# Patient Record
Sex: FEMALE | Race: WHITE | Employment: OTHER
[De-identification: names, ages, dates, MRNs, and addresses within clinical notes are randomized per-mention and may not be internally consistent; named-entity substitution may affect disease eponyms.]

---

## 2017-01-09 RX ORDER — TRAZODONE HYDROCHLORIDE 50 MG/1
50 TABLET ORAL NIGHTLY
Qty: 30 TABLET | Refills: 1 | Status: SHIPPED | OUTPATIENT
Start: 2017-01-09 | End: 2017-02-24 | Stop reason: ALTCHOICE

## 2017-02-09 DIAGNOSIS — I48.20 CHRONIC ATRIAL FIBRILLATION (HCC): ICD-10-CM

## 2017-02-09 DIAGNOSIS — Z79.01 LONG TERM (CURRENT) USE OF ANTICOAGULANTS: ICD-10-CM

## 2017-02-09 RX ORDER — LISINOPRIL 5 MG/1
TABLET ORAL
Qty: 180 TABLET | Refills: 3 | Status: SHIPPED | OUTPATIENT
Start: 2017-02-09 | End: 2017-04-04 | Stop reason: ALTCHOICE

## 2017-02-09 RX ORDER — WARFARIN SODIUM 10 MG/1
TABLET ORAL
Qty: 90 TABLET | Refills: 3 | Status: ON HOLD | OUTPATIENT
Start: 2017-02-09 | End: 2017-06-01 | Stop reason: HOSPADM

## 2017-02-09 RX ORDER — DILTIAZEM HCL 90 MG
TABLET ORAL
Qty: 270 TABLET | Refills: 3 | Status: ON HOLD | OUTPATIENT
Start: 2017-02-09 | End: 2017-06-01 | Stop reason: HOSPADM

## 2017-02-16 ENCOUNTER — TELEPHONE (OUTPATIENT)
Dept: FAMILY MEDICINE CLINIC | Facility: CLINIC | Age: 72
End: 2017-02-16

## 2017-02-16 DIAGNOSIS — R79.89 ELEVATED SERUM CREATININE: ICD-10-CM

## 2017-02-16 DIAGNOSIS — R79.9 ELEVATED BUN: Primary | ICD-10-CM

## 2017-02-20 ENCOUNTER — TELEPHONE (OUTPATIENT)
Dept: FAMILY MEDICINE CLINIC | Facility: CLINIC | Age: 72
End: 2017-02-20

## 2017-02-20 DIAGNOSIS — R79.89 ELEVATED SERUM CREATININE: Primary | ICD-10-CM

## 2017-02-22 ENCOUNTER — OFFICE VISIT (OUTPATIENT)
Dept: FAMILY MEDICINE CLINIC | Facility: CLINIC | Age: 72
End: 2017-02-22

## 2017-02-22 VITALS — DIASTOLIC BLOOD PRESSURE: 60 MMHG | WEIGHT: 265 LBS | BODY MASS INDEX: 46.94 KG/M2 | SYSTOLIC BLOOD PRESSURE: 94 MMHG

## 2017-02-22 DIAGNOSIS — L29.9 ITCHING: ICD-10-CM

## 2017-02-22 DIAGNOSIS — R58 ECCHYMOSIS: Primary | ICD-10-CM

## 2017-02-22 PROCEDURE — 3014F SCREEN MAMMO DOC REV: CPT | Performed by: FAMILY MEDICINE

## 2017-02-22 PROCEDURE — 99213 OFFICE O/P EST LOW 20 MIN: CPT | Performed by: FAMILY MEDICINE

## 2017-02-22 PROCEDURE — 1036F TOBACCO NON-USER: CPT | Performed by: FAMILY MEDICINE

## 2017-02-22 PROCEDURE — 4040F PNEUMOC VAC/ADMIN/RCVD: CPT | Performed by: FAMILY MEDICINE

## 2017-02-22 PROCEDURE — 1123F ACP DISCUSS/DSCN MKR DOCD: CPT | Performed by: FAMILY MEDICINE

## 2017-02-22 PROCEDURE — G8400 PT W/DXA NO RESULTS DOC: HCPCS | Performed by: FAMILY MEDICINE

## 2017-02-22 PROCEDURE — G8419 CALC BMI OUT NRM PARAM NOF/U: HCPCS | Performed by: FAMILY MEDICINE

## 2017-02-22 PROCEDURE — G8484 FLU IMMUNIZE NO ADMIN: HCPCS | Performed by: FAMILY MEDICINE

## 2017-02-22 PROCEDURE — G8427 DOCREV CUR MEDS BY ELIG CLIN: HCPCS | Performed by: FAMILY MEDICINE

## 2017-02-22 PROCEDURE — 1090F PRES/ABSN URINE INCON ASSESS: CPT | Performed by: FAMILY MEDICINE

## 2017-02-22 PROCEDURE — 3017F COLORECTAL CA SCREEN DOC REV: CPT | Performed by: FAMILY MEDICINE

## 2017-02-23 ENCOUNTER — TELEPHONE (OUTPATIENT)
Dept: FAMILY MEDICINE CLINIC | Facility: CLINIC | Age: 72
End: 2017-02-23

## 2017-02-23 DIAGNOSIS — R79.89 ELEVATED SERUM CREATININE: Primary | ICD-10-CM

## 2017-02-23 DIAGNOSIS — R58 ECCHYMOSIS: ICD-10-CM

## 2017-02-23 ASSESSMENT — ENCOUNTER SYMPTOMS
DIARRHEA: 0
VOMITING: 0
BLOOD IN STOOL: 0
EYE DISCHARGE: 0
SHORTNESS OF BREATH: 0
EYE REDNESS: 0
COLOR CHANGE: 1
COUGH: 0
NAUSEA: 0

## 2017-02-24 ENCOUNTER — TELEPHONE (OUTPATIENT)
Dept: FAMILY MEDICINE CLINIC | Facility: CLINIC | Age: 72
End: 2017-02-24

## 2017-02-24 DIAGNOSIS — R79.89 ELEVATED SERUM CREATININE: Primary | ICD-10-CM

## 2017-02-27 ENCOUNTER — TELEPHONE (OUTPATIENT)
Dept: FAMILY MEDICINE CLINIC | Facility: CLINIC | Age: 72
End: 2017-02-27

## 2017-02-27 DIAGNOSIS — R79.89 ELEVATED SERUM CREATININE: Primary | ICD-10-CM

## 2017-03-01 ENCOUNTER — TELEPHONE (OUTPATIENT)
Dept: FAMILY MEDICINE CLINIC | Facility: CLINIC | Age: 72
End: 2017-03-01

## 2017-03-01 DIAGNOSIS — R79.89 LOW SERUM CREATININE: Primary | ICD-10-CM

## 2017-03-07 ENCOUNTER — TELEPHONE (OUTPATIENT)
Dept: FAMILY MEDICINE CLINIC | Facility: CLINIC | Age: 72
End: 2017-03-07

## 2017-03-07 DIAGNOSIS — R79.89 ELEVATED SERUM CREATININE: Primary | ICD-10-CM

## 2017-03-10 ENCOUNTER — OFFICE VISIT (OUTPATIENT)
Dept: CARDIOLOGY | Facility: CLINIC | Age: 72
End: 2017-03-10

## 2017-03-10 VITALS
WEIGHT: 260 LBS | DIASTOLIC BLOOD PRESSURE: 70 MMHG | HEART RATE: 100 BPM | BODY MASS INDEX: 46.06 KG/M2 | OXYGEN SATURATION: 95 % | SYSTOLIC BLOOD PRESSURE: 110 MMHG

## 2017-03-10 DIAGNOSIS — E55.9 VITAMIN D DEFICIENCY DISEASE: ICD-10-CM

## 2017-03-10 DIAGNOSIS — R06.02 SOB (SHORTNESS OF BREATH): Primary | ICD-10-CM

## 2017-03-10 DIAGNOSIS — I27.20 PULMONARY HTN (HCC): ICD-10-CM

## 2017-03-10 DIAGNOSIS — I10 ESSENTIAL HYPERTENSION: ICD-10-CM

## 2017-03-10 DIAGNOSIS — I48.20 CHRONIC ATRIAL FIBRILLATION (HCC): ICD-10-CM

## 2017-03-10 PROCEDURE — G8427 DOCREV CUR MEDS BY ELIG CLIN: HCPCS | Performed by: INTERNAL MEDICINE

## 2017-03-10 PROCEDURE — G8484 FLU IMMUNIZE NO ADMIN: HCPCS | Performed by: INTERNAL MEDICINE

## 2017-03-10 PROCEDURE — G8400 PT W/DXA NO RESULTS DOC: HCPCS | Performed by: INTERNAL MEDICINE

## 2017-03-10 PROCEDURE — 1036F TOBACCO NON-USER: CPT | Performed by: INTERNAL MEDICINE

## 2017-03-10 PROCEDURE — 1090F PRES/ABSN URINE INCON ASSESS: CPT | Performed by: INTERNAL MEDICINE

## 2017-03-10 PROCEDURE — 99214 OFFICE O/P EST MOD 30 MIN: CPT | Performed by: INTERNAL MEDICINE

## 2017-03-10 PROCEDURE — 3017F COLORECTAL CA SCREEN DOC REV: CPT | Performed by: INTERNAL MEDICINE

## 2017-03-10 PROCEDURE — 3014F SCREEN MAMMO DOC REV: CPT | Performed by: INTERNAL MEDICINE

## 2017-03-10 PROCEDURE — G8417 CALC BMI ABV UP PARAM F/U: HCPCS | Performed by: INTERNAL MEDICINE

## 2017-03-10 PROCEDURE — 1123F ACP DISCUSS/DSCN MKR DOCD: CPT | Performed by: INTERNAL MEDICINE

## 2017-03-10 PROCEDURE — 4040F PNEUMOC VAC/ADMIN/RCVD: CPT | Performed by: INTERNAL MEDICINE

## 2017-03-14 ENCOUNTER — HOSPITAL ENCOUNTER (OUTPATIENT)
Age: 72
Setting detail: SPECIMEN
Discharge: HOME | End: 2017-03-14
Payer: MEDICARE

## 2017-03-14 ENCOUNTER — TELEPHONE (OUTPATIENT)
Dept: FAMILY MEDICINE CLINIC | Age: 72
End: 2017-03-14

## 2017-03-14 DIAGNOSIS — R79.89 ELEVATED SERUM CREATININE: Primary | ICD-10-CM

## 2017-03-14 DIAGNOSIS — R79.89 ELEVATED SERUM CREATININE: ICD-10-CM

## 2017-03-14 LAB
ANION GAP SERPL CALCULATED.3IONS-SCNC: 14 MMOL/L (ref 9–17)
BUN BLDV-MCNC: 41 MG/DL (ref 8–23)
BUN/CREAT BLD: 29 (ref 9–20)
CALCIUM SERPL-MCNC: 9 MG/DL (ref 8.6–10.4)
CHLORIDE BLD-SCNC: 102 MMOL/L (ref 98–107)
CO2: 20 MMOL/L (ref 20–31)
CREAT SERPL-MCNC: 1.42 MG/DL (ref 0.5–0.9)
GFR AFRICAN AMERICAN: 44 ML/MIN
GFR NON-AFRICAN AMERICAN: 36 ML/MIN
GFR SERPL CREATININE-BSD FRML MDRD: ABNORMAL ML/MIN/{1.73_M2}
GFR SERPL CREATININE-BSD FRML MDRD: ABNORMAL ML/MIN/{1.73_M2}
GLUCOSE BLD-MCNC: 100 MG/DL (ref 70–99)
POTASSIUM SERPL-SCNC: 5.1 MMOL/L (ref 3.7–5.3)
SODIUM BLD-SCNC: 136 MMOL/L (ref 135–144)

## 2017-03-14 PROCEDURE — 36415 COLL VENOUS BLD VENIPUNCTURE: CPT

## 2017-03-14 PROCEDURE — 80048 BASIC METABOLIC PNL TOTAL CA: CPT

## 2017-03-21 ENCOUNTER — HOSPITAL ENCOUNTER (OUTPATIENT)
Age: 72
Discharge: HOME | End: 2017-03-21
Payer: MEDICARE

## 2017-03-21 ENCOUNTER — HOSPITAL ENCOUNTER (OUTPATIENT)
Dept: PHARMACY | Age: 72
Setting detail: THERAPIES SERIES
Discharge: HOME | End: 2017-03-21
Payer: MEDICARE

## 2017-03-21 ENCOUNTER — OFFICE VISIT (OUTPATIENT)
Dept: FAMILY MEDICINE CLINIC | Age: 72
End: 2017-03-21
Payer: MEDICARE

## 2017-03-21 VITALS — WEIGHT: 263 LBS | SYSTOLIC BLOOD PRESSURE: 110 MMHG | BODY MASS INDEX: 46.59 KG/M2 | DIASTOLIC BLOOD PRESSURE: 60 MMHG

## 2017-03-21 VITALS
DIASTOLIC BLOOD PRESSURE: 68 MMHG | WEIGHT: 262.2 LBS | SYSTOLIC BLOOD PRESSURE: 110 MMHG | HEART RATE: 64 BPM | BODY MASS INDEX: 46.45 KG/M2

## 2017-03-21 DIAGNOSIS — Z79.01 LONG TERM CURRENT USE OF ANTICOAGULANT THERAPY: ICD-10-CM

## 2017-03-21 DIAGNOSIS — L85.3 DRY SKIN: ICD-10-CM

## 2017-03-21 DIAGNOSIS — E87.5 HYPERKALEMIA: ICD-10-CM

## 2017-03-21 DIAGNOSIS — N17.8 ACUTE RENAL FAILURE WITH OTHER SPECIFIED PATHOLOGICAL LESION IN KIDNEY (HCC): Primary | ICD-10-CM

## 2017-03-21 LAB
ANION GAP SERPL CALCULATED.3IONS-SCNC: 13 MMOL/L (ref 9–17)
BUN BLDV-MCNC: 35 MG/DL (ref 8–23)
BUN/CREAT BLD: 24 (ref 9–20)
CALCIUM SERPL-MCNC: 9.3 MG/DL (ref 8.6–10.4)
CHLORIDE BLD-SCNC: 102 MMOL/L (ref 98–107)
CO2: 22 MMOL/L (ref 20–31)
CREAT SERPL-MCNC: 1.44 MG/DL (ref 0.5–0.9)
GFR AFRICAN AMERICAN: 43 ML/MIN
GFR NON-AFRICAN AMERICAN: 36 ML/MIN
GFR SERPL CREATININE-BSD FRML MDRD: ABNORMAL ML/MIN/{1.73_M2}
GFR SERPL CREATININE-BSD FRML MDRD: ABNORMAL ML/MIN/{1.73_M2}
GLUCOSE BLD-MCNC: 92 MG/DL (ref 70–99)
INR BLD: 2
POTASSIUM SERPL-SCNC: 5.4 MMOL/L (ref 3.7–5.3)
SODIUM BLD-SCNC: 137 MMOL/L (ref 135–144)

## 2017-03-21 PROCEDURE — G8417 CALC BMI ABV UP PARAM F/U: HCPCS | Performed by: FAMILY MEDICINE

## 2017-03-21 PROCEDURE — 1090F PRES/ABSN URINE INCON ASSESS: CPT | Performed by: FAMILY MEDICINE

## 2017-03-21 PROCEDURE — 36415 COLL VENOUS BLD VENIPUNCTURE: CPT

## 2017-03-21 PROCEDURE — G8484 FLU IMMUNIZE NO ADMIN: HCPCS | Performed by: FAMILY MEDICINE

## 2017-03-21 PROCEDURE — 99213 OFFICE O/P EST LOW 20 MIN: CPT | Performed by: FAMILY MEDICINE

## 2017-03-21 PROCEDURE — G8427 DOCREV CUR MEDS BY ELIG CLIN: HCPCS | Performed by: FAMILY MEDICINE

## 2017-03-21 PROCEDURE — 85610 PROTHROMBIN TIME: CPT

## 2017-03-21 PROCEDURE — 3014F SCREEN MAMMO DOC REV: CPT | Performed by: FAMILY MEDICINE

## 2017-03-21 PROCEDURE — G8400 PT W/DXA NO RESULTS DOC: HCPCS | Performed by: FAMILY MEDICINE

## 2017-03-21 PROCEDURE — 1036F TOBACCO NON-USER: CPT | Performed by: FAMILY MEDICINE

## 2017-03-21 PROCEDURE — 3017F COLORECTAL CA SCREEN DOC REV: CPT | Performed by: FAMILY MEDICINE

## 2017-03-21 PROCEDURE — 80048 BASIC METABOLIC PNL TOTAL CA: CPT

## 2017-03-21 PROCEDURE — G0463 HOSPITAL OUTPT CLINIC VISIT: HCPCS

## 2017-03-21 PROCEDURE — 4040F PNEUMOC VAC/ADMIN/RCVD: CPT | Performed by: FAMILY MEDICINE

## 2017-03-21 PROCEDURE — 1123F ACP DISCUSS/DSCN MKR DOCD: CPT | Performed by: FAMILY MEDICINE

## 2017-03-21 ASSESSMENT — ENCOUNTER SYMPTOMS
SHORTNESS OF BREATH: 0
NAUSEA: 0
EYE DISCHARGE: 0
DIARRHEA: 0
COUGH: 0
EYE REDNESS: 0
VOMITING: 0
BLOOD IN STOOL: 0
ABDOMINAL PAIN: 0

## 2017-03-23 ENCOUNTER — HOSPITAL ENCOUNTER (OUTPATIENT)
Age: 72
Discharge: HOME | End: 2017-03-23
Payer: MEDICARE

## 2017-03-23 DIAGNOSIS — E87.5 HYPERKALEMIA: ICD-10-CM

## 2017-03-23 LAB — POTASSIUM SERPL-SCNC: 5 MMOL/L (ref 3.7–5.3)

## 2017-03-23 PROCEDURE — 84132 ASSAY OF SERUM POTASSIUM: CPT

## 2017-03-23 PROCEDURE — 36415 COLL VENOUS BLD VENIPUNCTURE: CPT

## 2017-03-23 RX ORDER — ATENOLOL 50 MG/1
TABLET ORAL
Qty: 90 TABLET | Refills: 3 | Status: SHIPPED | OUTPATIENT
Start: 2017-03-23 | End: 2018-04-16 | Stop reason: SDUPTHER

## 2017-03-29 ENCOUNTER — HOSPITAL ENCOUNTER (OUTPATIENT)
Dept: ULTRASOUND IMAGING | Age: 72
Discharge: HOME | End: 2017-03-29
Payer: MEDICARE

## 2017-03-29 ENCOUNTER — HOSPITAL ENCOUNTER (OUTPATIENT)
Age: 72
Discharge: HOME | End: 2017-03-29
Payer: MEDICARE

## 2017-03-29 DIAGNOSIS — N17.9 ACUTE KIDNEY FAILURE, UNSPECIFIED (HCC): ICD-10-CM

## 2017-03-29 LAB
-: ABNORMAL
ABSOLUTE EOS #: 0.2 K/UL (ref 0–0.4)
ABSOLUTE LYMPH #: 1.3 K/UL (ref 1.1–2.7)
ABSOLUTE MONO #: 0.6 K/UL (ref 0–1)
ALBUMIN SERPL-MCNC: 4.2 G/DL (ref 3.5–5.2)
AMORPHOUS: ABNORMAL
ANION GAP SERPL CALCULATED.3IONS-SCNC: 16 MMOL/L (ref 9–17)
BACTERIA: ABNORMAL
BASOPHILS # BLD: 1 % (ref 0–2)
BASOPHILS ABSOLUTE: 0.1 K/UL (ref 0–0.2)
BILIRUBIN URINE: NEGATIVE
BUN BLDV-MCNC: 36 MG/DL (ref 8–23)
CALCIUM SERPL-MCNC: 9.3 MG/DL (ref 8.6–10.4)
CASTS UA: ABNORMAL /LPF
CHLORIDE BLD-SCNC: 102 MMOL/L (ref 98–107)
CO2: 22 MMOL/L (ref 20–31)
COLOR: YELLOW
COMMENT UA: ABNORMAL
COMPLEMENT C3: 138 MG/DL (ref 90–180)
COMPLEMENT C4: 36 MG/DL (ref 10–40)
CREAT SERPL-MCNC: 1.44 MG/DL (ref 0.5–0.9)
CREATININE URINE: 37.2 MG/DL (ref 28–217)
CRYSTALS, UA: ABNORMAL /HPF
DIFFERENTIAL TYPE: YES
EOSINOPHIL,URINE: NORMAL
EOSINOPHILS RELATIVE PERCENT: 2 % (ref 0–5)
EPITHELIAL CELLS UA: ABNORMAL /HPF
GFR AFRICAN AMERICAN: 43 ML/MIN
GFR NON-AFRICAN AMERICAN: 36 ML/MIN
GFR SERPL CREATININE-BSD FRML MDRD: ABNORMAL ML/MIN/{1.73_M2}
GFR SERPL CREATININE-BSD FRML MDRD: ABNORMAL ML/MIN/{1.73_M2}
GLUCOSE URINE: NEGATIVE
HCT VFR BLD CALC: 32.3 % (ref 36–46)
HEMOGLOBIN: 10.6 G/DL (ref 12–16)
KETONES, URINE: NEGATIVE
LEUKOCYTE ESTERASE, URINE: NEGATIVE
LYMPHOCYTES # BLD: 16 % (ref 15–40)
MCH RBC QN AUTO: 29 PG (ref 26–34)
MCHC RBC AUTO-ENTMCNC: 32.7 G/DL (ref 31–37)
MCV RBC AUTO: 88.7 FL (ref 80–100)
MONOCYTES # BLD: 8 % (ref 4–8)
MUCUS: ABNORMAL
NITRITE, URINE: NEGATIVE
OTHER OBSERVATIONS UA: ABNORMAL
PDW BLD-RTO: 17.8 % (ref 12.1–15.2)
PH UA: 5 (ref 5–8)
PHOSPHORUS: 3.9 MG/DL (ref 2.6–4.5)
PLATELET # BLD: 206 K/UL (ref 140–450)
PLATELET ESTIMATE: ABNORMAL
PMV BLD AUTO: ABNORMAL FL (ref 6–12)
POTASSIUM SERPL-SCNC: 4.6 MMOL/L (ref 3.7–5.3)
PROTEIN UA: NEGATIVE
RBC # BLD: 3.65 M/UL (ref 4–5.2)
RBC # BLD: ABNORMAL 10*6/UL
RBC UA: ABNORMAL /HPF (ref 0–2)
RENAL EPITHELIAL, UA: ABNORMAL /HPF
SEG NEUTROPHILS: 73 % (ref 47–75)
SEGMENTED NEUTROPHILS ABSOLUTE COUNT: 5.9 K/UL (ref 2.5–7)
SODIUM BLD-SCNC: 140 MMOL/L (ref 135–144)
SPECIFIC GRAVITY UA: 1.01 (ref 1–1.03)
TOTAL PROTEIN, URINE: <4 MG/DL
TRICHOMONAS: ABNORMAL
TURBIDITY: CLEAR
URIC ACID: 7.2 MG/DL (ref 2.4–5.7)
URINE HGB: NEGATIVE
UROBILINOGEN, URINE: NORMAL
WBC # BLD: 8 K/UL (ref 3.5–11)
WBC # BLD: ABNORMAL 10*3/UL
WBC UA: ABNORMAL /HPF
YEAST: ABNORMAL

## 2017-03-29 PROCEDURE — 83516 IMMUNOASSAY NONANTIBODY: CPT

## 2017-03-29 PROCEDURE — 84100 ASSAY OF PHOSPHORUS: CPT

## 2017-03-29 PROCEDURE — 82040 ASSAY OF SERUM ALBUMIN: CPT

## 2017-03-29 PROCEDURE — 87205 SMEAR GRAM STAIN: CPT

## 2017-03-29 PROCEDURE — 86038 ANTINUCLEAR ANTIBODIES: CPT

## 2017-03-29 PROCEDURE — 86334 IMMUNOFIX E-PHORESIS SERUM: CPT

## 2017-03-29 PROCEDURE — 80051 ELECTROLYTE PANEL: CPT

## 2017-03-29 PROCEDURE — 82310 ASSAY OF CALCIUM: CPT

## 2017-03-29 PROCEDURE — 86335 IMMUNFIX E-PHORSIS/URINE/CSF: CPT

## 2017-03-29 PROCEDURE — 84550 ASSAY OF BLOOD/URIC ACID: CPT

## 2017-03-29 PROCEDURE — 84520 ASSAY OF UREA NITROGEN: CPT

## 2017-03-29 PROCEDURE — 82565 ASSAY OF CREATININE: CPT

## 2017-03-29 PROCEDURE — 82570 ASSAY OF URINE CREATININE: CPT

## 2017-03-29 PROCEDURE — 36415 COLL VENOUS BLD VENIPUNCTURE: CPT

## 2017-03-29 PROCEDURE — 81001 URINALYSIS AUTO W/SCOPE: CPT

## 2017-03-29 PROCEDURE — 76770 US EXAM ABDO BACK WALL COMP: CPT

## 2017-03-29 PROCEDURE — 86160 COMPLEMENT ANTIGEN: CPT

## 2017-03-29 PROCEDURE — 85025 COMPLETE CBC W/AUTO DIFF WBC: CPT

## 2017-03-29 PROCEDURE — 84156 ASSAY OF PROTEIN URINE: CPT

## 2017-03-30 LAB
ANTI-NUCLEAR ANTIBODY (ANA): NEGATIVE
PATHOLOGIST: NORMAL
SERUM IFX INTERP: NORMAL
URINE IFX INTERP: NORMAL
URINE IFX SPECIMEN: NORMAL
URINE TOTAL PROTEIN: 4 MG/DL
VOLUME: NORMAL ML

## 2017-03-31 LAB
ANCA MYELOPEROXIDASE: 17 AU/ML
ANCA PROTEINASE 3: 5 AU/ML
ANCA REFERENCE RANGE:: NORMAL

## 2017-04-04 ENCOUNTER — HOSPITAL ENCOUNTER (OUTPATIENT)
Dept: PHARMACY | Age: 72
Setting detail: THERAPIES SERIES
Discharge: HOME | End: 2017-04-04
Payer: MEDICARE

## 2017-04-04 VITALS
DIASTOLIC BLOOD PRESSURE: 82 MMHG | HEART RATE: 64 BPM | SYSTOLIC BLOOD PRESSURE: 120 MMHG | BODY MASS INDEX: 46.59 KG/M2 | WEIGHT: 263 LBS

## 2017-04-04 DIAGNOSIS — Z79.01 LONG TERM CURRENT USE OF ANTICOAGULANT THERAPY: ICD-10-CM

## 2017-04-04 LAB — INR BLD: 2.8

## 2017-04-04 PROCEDURE — 85610 PROTHROMBIN TIME: CPT

## 2017-04-04 PROCEDURE — G0463 HOSPITAL OUTPT CLINIC VISIT: HCPCS

## 2017-04-04 RX ORDER — ACETAMINOPHEN,DIPHENHYDRAMINE HCL 500; 25 MG/1; MG/1
2 TABLET, FILM COATED ORAL NIGHTLY PRN
COMMUNITY
End: 2020-07-13 | Stop reason: ALTCHOICE

## 2017-04-07 ENCOUNTER — HOSPITAL ENCOUNTER (OUTPATIENT)
Age: 72
Discharge: HOME | End: 2017-04-07
Payer: MEDICARE

## 2017-04-07 LAB
ALBUMIN SERPL-MCNC: 4 G/DL (ref 3.5–5.2)
ANION GAP SERPL CALCULATED.3IONS-SCNC: 16 MMOL/L (ref 9–17)
BUN BLDV-MCNC: 27 MG/DL (ref 8–23)
CALCIUM SERPL-MCNC: 9.2 MG/DL (ref 8.6–10.4)
CHLORIDE BLD-SCNC: 102 MMOL/L (ref 98–107)
CO2: 20 MMOL/L (ref 20–31)
CREAT SERPL-MCNC: 1.22 MG/DL (ref 0.5–0.9)
GFR AFRICAN AMERICAN: 52 ML/MIN
GFR NON-AFRICAN AMERICAN: 43 ML/MIN
GFR SERPL CREATININE-BSD FRML MDRD: ABNORMAL ML/MIN/{1.73_M2}
GFR SERPL CREATININE-BSD FRML MDRD: ABNORMAL ML/MIN/{1.73_M2}
PHOSPHORUS: 3.2 MG/DL (ref 2.6–4.5)
POTASSIUM SERPL-SCNC: 4.3 MMOL/L (ref 3.7–5.3)
SODIUM BLD-SCNC: 138 MMOL/L (ref 135–144)

## 2017-04-07 PROCEDURE — 82040 ASSAY OF SERUM ALBUMIN: CPT

## 2017-04-07 PROCEDURE — 82565 ASSAY OF CREATININE: CPT

## 2017-04-07 PROCEDURE — 84100 ASSAY OF PHOSPHORUS: CPT

## 2017-04-07 PROCEDURE — 36415 COLL VENOUS BLD VENIPUNCTURE: CPT

## 2017-04-07 PROCEDURE — 84520 ASSAY OF UREA NITROGEN: CPT

## 2017-04-07 PROCEDURE — 80051 ELECTROLYTE PANEL: CPT

## 2017-04-07 PROCEDURE — 82310 ASSAY OF CALCIUM: CPT

## 2017-04-19 ENCOUNTER — TELEPHONE (OUTPATIENT)
Dept: FAMILY MEDICINE CLINIC | Age: 72
End: 2017-04-19

## 2017-04-25 ENCOUNTER — HOSPITAL ENCOUNTER (OUTPATIENT)
Dept: PHARMACY | Age: 72
Setting detail: THERAPIES SERIES
Discharge: HOME | End: 2017-04-25
Payer: MEDICARE

## 2017-04-25 VITALS — BODY MASS INDEX: 46.54 KG/M2 | WEIGHT: 262.7 LBS

## 2017-04-25 DIAGNOSIS — Z79.01 LONG TERM CURRENT USE OF ANTICOAGULANT THERAPY: ICD-10-CM

## 2017-04-25 LAB — INR BLD: 3.8

## 2017-04-25 PROCEDURE — G0463 HOSPITAL OUTPT CLINIC VISIT: HCPCS | Performed by: FAMILY MEDICINE

## 2017-04-25 PROCEDURE — 85610 PROTHROMBIN TIME: CPT | Performed by: FAMILY MEDICINE

## 2017-05-04 DIAGNOSIS — I10 ESSENTIAL HYPERTENSION: ICD-10-CM

## 2017-05-05 RX ORDER — SPIRONOLACTONE 25 MG/1
TABLET ORAL
Qty: 90 TABLET | Refills: 1 | Status: SHIPPED | OUTPATIENT
Start: 2017-05-05 | End: 2017-06-08 | Stop reason: SDUPTHER

## 2017-05-05 RX ORDER — FUROSEMIDE 40 MG/1
40 TABLET ORAL DAILY
Qty: 90 TABLET | Refills: 1 | Status: ON HOLD | OUTPATIENT
Start: 2017-05-05 | End: 2017-06-01 | Stop reason: HOSPADM

## 2017-05-09 ENCOUNTER — HOSPITAL ENCOUNTER (OUTPATIENT)
Dept: PHARMACY | Age: 72
Setting detail: THERAPIES SERIES
Discharge: HOME | End: 2017-05-09
Payer: MEDICARE

## 2017-05-09 VITALS
HEART RATE: 72 BPM | SYSTOLIC BLOOD PRESSURE: 124 MMHG | WEIGHT: 262.2 LBS | BODY MASS INDEX: 46.45 KG/M2 | DIASTOLIC BLOOD PRESSURE: 82 MMHG

## 2017-05-09 DIAGNOSIS — Z79.01 LONG TERM CURRENT USE OF ANTICOAGULANT THERAPY: ICD-10-CM

## 2017-05-09 LAB — INR BLD: 3.1

## 2017-05-09 PROCEDURE — 99211 OFF/OP EST MAY X REQ PHY/QHP: CPT

## 2017-05-09 PROCEDURE — 85610 PROTHROMBIN TIME: CPT

## 2017-05-16 ENCOUNTER — TELEPHONE (OUTPATIENT)
Dept: PHARMACY | Age: 72
End: 2017-05-16

## 2017-05-18 ENCOUNTER — HOSPITAL ENCOUNTER (OUTPATIENT)
Dept: PHARMACY | Age: 72
Setting detail: THERAPIES SERIES
Discharge: HOME | End: 2017-05-18
Payer: MEDICARE

## 2017-05-18 VITALS
SYSTOLIC BLOOD PRESSURE: 133 MMHG | HEART RATE: 80 BPM | DIASTOLIC BLOOD PRESSURE: 81 MMHG | WEIGHT: 263.6 LBS | BODY MASS INDEX: 46.69 KG/M2

## 2017-05-18 DIAGNOSIS — Z79.01 LONG TERM CURRENT USE OF ANTICOAGULANT THERAPY: ICD-10-CM

## 2017-05-18 LAB — INR BLD: 2.4

## 2017-05-18 PROCEDURE — 85610 PROTHROMBIN TIME: CPT

## 2017-05-18 PROCEDURE — 99211 OFF/OP EST MAY X REQ PHY/QHP: CPT

## 2017-05-26 ENCOUNTER — APPOINTMENT (OUTPATIENT)
Dept: GENERAL RADIOLOGY | Age: 72
End: 2017-05-26
Payer: MEDICARE

## 2017-05-26 ENCOUNTER — HOSPITAL ENCOUNTER (EMERGENCY)
Age: 72
Discharge: OTHER INSTITUTION | End: 2017-05-27
Attending: FAMILY MEDICINE
Payer: MEDICARE

## 2017-05-26 DIAGNOSIS — I48.91 ATRIAL FIBRILLATION WITH RAPID VENTRICULAR RESPONSE (HCC): ICD-10-CM

## 2017-05-26 DIAGNOSIS — K92.2 LOWER GI BLEED: Primary | ICD-10-CM

## 2017-05-26 LAB
ABSOLUTE EOS #: 0 K/UL (ref 0–0.4)
ABSOLUTE EOS #: 0.1 K/UL (ref 0–0.4)
ABSOLUTE LYMPH #: 1.1 K/UL (ref 1.1–2.7)
ABSOLUTE LYMPH #: 1.7 K/UL (ref 1.1–2.7)
ABSOLUTE MONO #: 0.7 K/UL (ref 0–1)
ABSOLUTE MONO #: 0.9 K/UL (ref 0–1)
ALBUMIN SERPL-MCNC: 3.6 G/DL (ref 3.5–5.2)
ALBUMIN/GLOBULIN RATIO: ABNORMAL (ref 1–2.5)
ALP BLD-CCNC: 69 U/L (ref 35–104)
ALT SERPL-CCNC: 20 U/L (ref 5–33)
ANION GAP SERPL CALCULATED.3IONS-SCNC: 19 MMOL/L (ref 9–17)
AST SERPL-CCNC: 18 U/L
BASOPHILS # BLD: 0 %
BASOPHILS # BLD: 0 %
BASOPHILS ABSOLUTE: 0 K/UL (ref 0–0.2)
BASOPHILS ABSOLUTE: 0 K/UL (ref 0–0.2)
BILIRUB SERPL-MCNC: 0.21 MG/DL (ref 0.3–1.2)
BUN BLDV-MCNC: 27 MG/DL (ref 8–23)
BUN/CREAT BLD: 19 (ref 9–20)
CALCIUM SERPL-MCNC: 8.9 MG/DL (ref 8.6–10.4)
CHLORIDE BLD-SCNC: 100 MMOL/L (ref 98–107)
CO2: 21 MMOL/L (ref 20–31)
CREAT SERPL-MCNC: 1.43 MG/DL (ref 0.5–0.9)
DIFFERENTIAL TYPE: ABNORMAL
DIFFERENTIAL TYPE: YES
EOSINOPHILS RELATIVE PERCENT: 0 %
EOSINOPHILS RELATIVE PERCENT: 1 %
GFR AFRICAN AMERICAN: 43 ML/MIN
GFR NON-AFRICAN AMERICAN: 36 ML/MIN
GFR SERPL CREATININE-BSD FRML MDRD: ABNORMAL ML/MIN/{1.73_M2}
GFR SERPL CREATININE-BSD FRML MDRD: ABNORMAL ML/MIN/{1.73_M2}
GLUCOSE BLD-MCNC: 209 MG/DL (ref 70–99)
HCT VFR BLD CALC: 23.9 % (ref 36–46)
HCT VFR BLD CALC: 27 % (ref 36–46)
HEMOGLOBIN: 7.6 G/DL (ref 12–16)
HEMOGLOBIN: 8.7 G/DL (ref 12–16)
INR BLD: 1
LYMPHOCYTES # BLD: 13 %
LYMPHOCYTES # BLD: 9 %
MCH RBC QN AUTO: 27.7 PG (ref 26–34)
MCH RBC QN AUTO: 27.9 PG (ref 26–34)
MCHC RBC AUTO-ENTMCNC: 31.9 G/DL (ref 31–37)
MCHC RBC AUTO-ENTMCNC: 32.3 G/DL (ref 31–37)
MCV RBC AUTO: 86.5 FL (ref 80–100)
MCV RBC AUTO: 86.9 FL (ref 80–100)
MONOCYTES # BLD: 5 %
MONOCYTES # BLD: 7 %
PARTIAL THROMBOPLASTIN TIME: 24.7 SEC (ref 21–33)
PDW BLD-RTO: 16.8 % (ref 12.1–15.2)
PDW BLD-RTO: 16.9 % (ref 12.1–15.2)
PLATELET # BLD: 199 K/UL (ref 140–450)
PLATELET # BLD: 245 K/UL (ref 140–450)
PLATELET ESTIMATE: ABNORMAL
PLATELET ESTIMATE: ABNORMAL
PMV BLD AUTO: ABNORMAL FL (ref 6–12)
PMV BLD AUTO: ABNORMAL FL (ref 6–12)
POTASSIUM SERPL-SCNC: 4.6 MMOL/L (ref 3.7–5.3)
PROTHROMBIN TIME: 10.5 SEC (ref 9–11.6)
RBC # BLD: 2.75 M/UL (ref 4–5.2)
RBC # BLD: 3.12 M/UL (ref 4–5.2)
RBC # BLD: ABNORMAL 10*6/UL
RBC # BLD: ABNORMAL 10*6/UL
SEG NEUTROPHILS: 79 %
SEG NEUTROPHILS: 86 %
SEGMENTED NEUTROPHILS ABSOLUTE COUNT: 10.5 K/UL (ref 2.5–7)
SEGMENTED NEUTROPHILS ABSOLUTE COUNT: 11.2 K/UL (ref 2.5–7)
SODIUM BLD-SCNC: 140 MMOL/L (ref 135–144)
TOTAL PROTEIN: 6.5 G/DL (ref 6.4–8.3)
TROPONIN INTERP: NORMAL
TROPONIN T: <0.03 NG/ML
WBC # BLD: 13.1 K/UL (ref 3.5–11)
WBC # BLD: 13.1 K/UL (ref 3.5–11)
WBC # BLD: ABNORMAL 10*3/UL
WBC # BLD: ABNORMAL 10*3/UL

## 2017-05-26 PROCEDURE — 93005 ELECTROCARDIOGRAM TRACING: CPT

## 2017-05-26 PROCEDURE — 36415 COLL VENOUS BLD VENIPUNCTURE: CPT

## 2017-05-26 PROCEDURE — 86901 BLOOD TYPING SEROLOGIC RH(D): CPT

## 2017-05-26 PROCEDURE — 51702 INSERT TEMP BLADDER CATH: CPT

## 2017-05-26 PROCEDURE — 2580000003 HC RX 258: Performed by: FAMILY MEDICINE

## 2017-05-26 PROCEDURE — 85730 THROMBOPLASTIN TIME PARTIAL: CPT

## 2017-05-26 PROCEDURE — 86900 BLOOD TYPING SEROLOGIC ABO: CPT

## 2017-05-26 PROCEDURE — 85610 PROTHROMBIN TIME: CPT

## 2017-05-26 PROCEDURE — 96374 THER/PROPH/DIAG INJ IV PUSH: CPT

## 2017-05-26 PROCEDURE — 85025 COMPLETE CBC W/AUTO DIFF WBC: CPT

## 2017-05-26 PROCEDURE — 84484 ASSAY OF TROPONIN QUANT: CPT

## 2017-05-26 PROCEDURE — 6360000002 HC RX W HCPCS: Performed by: FAMILY MEDICINE

## 2017-05-26 PROCEDURE — 80053 COMPREHEN METABOLIC PANEL: CPT

## 2017-05-26 PROCEDURE — 86850 RBC ANTIBODY SCREEN: CPT

## 2017-05-26 PROCEDURE — 74000 XR ABDOMEN LIMITED (KUB): CPT

## 2017-05-26 PROCEDURE — 99285 EMERGENCY DEPT VISIT HI MDM: CPT

## 2017-05-26 RX ORDER — 0.9 % SODIUM CHLORIDE 0.9 %
500 INTRAVENOUS SOLUTION INTRAVENOUS ONCE
Status: DISCONTINUED | OUTPATIENT
Start: 2017-05-27 | End: 2017-05-27 | Stop reason: HOSPADM

## 2017-05-26 RX ORDER — 0.9 % SODIUM CHLORIDE 0.9 %
250 INTRAVENOUS SOLUTION INTRAVENOUS ONCE
Status: DISCONTINUED | OUTPATIENT
Start: 2017-05-27 | End: 2017-05-27 | Stop reason: HOSPADM

## 2017-05-26 RX ORDER — 0.9 % SODIUM CHLORIDE 0.9 %
500 INTRAVENOUS SOLUTION INTRAVENOUS ONCE
Status: DISCONTINUED | OUTPATIENT
Start: 2017-05-26 | End: 2017-05-27 | Stop reason: HOSPADM

## 2017-05-26 RX ORDER — 0.9 % SODIUM CHLORIDE 0.9 %
1000 INTRAVENOUS SOLUTION INTRAVENOUS ONCE
Status: COMPLETED | OUTPATIENT
Start: 2017-05-26 | End: 2017-05-26

## 2017-05-26 RX ORDER — ONDANSETRON 2 MG/ML
4 INJECTION INTRAMUSCULAR; INTRAVENOUS EVERY 30 MIN PRN
Status: DISCONTINUED | OUTPATIENT
Start: 2017-05-26 | End: 2017-05-27 | Stop reason: HOSPADM

## 2017-05-26 RX ADMIN — SODIUM CHLORIDE 1000 ML: 9 INJECTION, SOLUTION INTRAVENOUS at 21:40

## 2017-05-26 RX ADMIN — ONDANSETRON 4 MG: 2 INJECTION INTRAMUSCULAR; INTRAVENOUS at 21:40

## 2017-05-27 ENCOUNTER — HOSPITAL ENCOUNTER (INPATIENT)
Age: 72
LOS: 5 days | Discharge: HOME | DRG: 920 | End: 2017-06-01
Attending: EMERGENCY MEDICINE | Admitting: INTERNAL MEDICINE
Payer: MEDICARE

## 2017-05-27 VITALS
HEART RATE: 159 BPM | RESPIRATION RATE: 23 BRPM | BODY MASS INDEX: 46.25 KG/M2 | DIASTOLIC BLOOD PRESSURE: 64 MMHG | HEIGHT: 63 IN | WEIGHT: 261 LBS | SYSTOLIC BLOOD PRESSURE: 104 MMHG | OXYGEN SATURATION: 99 % | TEMPERATURE: 97.8 F

## 2017-05-27 DIAGNOSIS — K92.2 GASTROINTESTINAL HEMORRHAGE, UNSPECIFIED GASTROINTESTINAL HEMORRHAGE TYPE: Primary | ICD-10-CM

## 2017-05-27 DIAGNOSIS — D64.9 ANEMIA, UNSPECIFIED TYPE: ICD-10-CM

## 2017-05-27 DIAGNOSIS — I95.9 HYPOTENSION, UNSPECIFIED HYPOTENSION TYPE: ICD-10-CM

## 2017-05-27 PROBLEM — D50.0 BLOOD LOSS ANEMIA: Status: ACTIVE | Noted: 2017-05-27

## 2017-05-27 LAB
-: ABNORMAL
-: NORMAL
ABO/RH: NORMAL
ABSOLUTE EOS #: 0 K/UL (ref 0–0.4)
ABSOLUTE LYMPH #: 1.3 K/UL (ref 1–4.8)
ABSOLUTE MONO #: 0.6 K/UL (ref 0.1–1.2)
ALBUMIN SERPL-MCNC: 2.8 G/DL (ref 3.5–5.2)
ALBUMIN/GLOBULIN RATIO: 1.2 (ref 1–2.5)
ALP BLD-CCNC: 51 U/L (ref 35–104)
ALT SERPL-CCNC: 15 U/L (ref 5–33)
AMORPHOUS: ABNORMAL
AMORPHOUS: NORMAL
ANION GAP SERPL CALCULATED.3IONS-SCNC: 11 MMOL/L (ref 9–17)
ANION GAP: 15 MMOL/L (ref 8–16)
ANTIBODY SCREEN: NEGATIVE
ARM BAND NUMBER: NORMAL
AST SERPL-CCNC: 12 U/L
BACTERIA: ABNORMAL
BACTERIA: NORMAL
BASOPHILS # BLD: 0 %
BASOPHILS ABSOLUTE: 0 K/UL (ref 0–0.2)
BILIRUB SERPL-MCNC: 0.29 MG/DL (ref 0.3–1.2)
BILIRUBIN DIRECT: 0.09 MG/DL
BILIRUBIN URINE: NEGATIVE
BILIRUBIN URINE: NEGATIVE
BILIRUBIN, INDIRECT: 0.2 MG/DL (ref 0–1)
BLD PROD TYP BPU: NORMAL
BLD PROD TYP BPU: NORMAL
BUN BLDV-MCNC: 24 MG/DL (ref 8–23)
BUN/CREAT BLD: ABNORMAL (ref 9–20)
CALCIUM IONIZED: 1.1 MMOL/L (ref 1.13–1.33)
CALCIUM SERPL-MCNC: 7.5 MG/DL (ref 8.6–10.4)
CASTS UA: ABNORMAL /LPF (ref 0–8)
CASTS UA: NORMAL /LPF
CHLORIDE BLD-SCNC: 107 MMOL/L (ref 98–107)
CO2: 21 MMOL/L (ref 20–31)
COLOR: YELLOW
COLOR: YELLOW
COMMENT UA: ABNORMAL
CREAT SERPL-MCNC: 1.13 MG/DL (ref 0.5–0.9)
CROSSMATCH RESULT: NORMAL
CROSSMATCH RESULT: NORMAL
CRYSTALS, UA: ABNORMAL /HPF
CRYSTALS, UA: NORMAL /HPF
DIFFERENTIAL TYPE: ABNORMAL
DISPENSE STATUS BLOOD BANK: NORMAL
DISPENSE STATUS BLOOD BANK: NORMAL
EKG ATRIAL RATE: 101 BPM
EKG Q-T INTERVAL: 320 MS
EKG QRS DURATION: 78 MS
EKG QTC CALCULATION (BAZETT): 463 MS
EKG R AXIS: -40 DEGREES
EKG T AXIS: 66 DEGREES
EKG VENTRICULAR RATE: 126 BPM
EOSINOPHILS RELATIVE PERCENT: 0 %
EPITHELIAL CELLS UA: ABNORMAL /HPF (ref 0–5)
EPITHELIAL CELLS UA: NORMAL /HPF
EXPIRATION DATE: NORMAL
GFR AFRICAN AMERICAN: 57 ML/MIN
GFR NON-AFRICAN AMERICAN: 47 ML/MIN
GFR SERPL CREATININE-BSD FRML MDRD: ABNORMAL ML/MIN/{1.73_M2}
GFR SERPL CREATININE-BSD FRML MDRD: ABNORMAL ML/MIN/{1.73_M2}
GLOBULIN: ABNORMAL G/DL (ref 1.5–3.8)
GLUCOSE BLD-MCNC: 138 MG/DL (ref 74–106)
GLUCOSE BLD-MCNC: 148 MG/DL (ref 70–99)
GLUCOSE URINE: NEGATIVE
GLUCOSE URINE: NEGATIVE
HCO3 VENOUS: 22.4 MMOL/L (ref 24–30)
HCT VFR BLD CALC: 22.6 % (ref 36–46)
HCT VFR BLD CALC: 22.9 % (ref 36–46)
HCT VFR BLD CALC: 23.7 % (ref 36–46)
HCT VFR BLD CALC: 23.8 % (ref 36–46)
HEMOGLOBIN: 7.4 G/DL (ref 12–16)
HEMOGLOBIN: 7.4 G/DL (ref 12–16)
HEMOGLOBIN: 7.9 G/DL (ref 12–16)
HEMOGLOBIN: 8.1 G/DL (ref 12–16)
INR BLD: 1.1
KETONES, URINE: NEGATIVE
KETONES, URINE: NEGATIVE
LACTIC ACID, WHOLE BLOOD: 2 MMOL/L (ref 0.7–2.1)
LACTIC ACID: NORMAL MMOL/L
LEUKOCYTE ESTERASE, URINE: ABNORMAL
LEUKOCYTE ESTERASE, URINE: NEGATIVE
LIPASE: 25 U/L (ref 13–60)
LYMPHOCYTES # BLD: 12 %
MCH RBC QN AUTO: 28.8 PG (ref 26–34)
MCHC RBC AUTO-ENTMCNC: 32.5 G/DL (ref 31–37)
MCV RBC AUTO: 88.4 FL (ref 80–100)
MONOCYTES # BLD: 5 %
MRSA, DNA, NASAL: NORMAL
MUCUS: ABNORMAL
MUCUS: NORMAL
NEGATIVE BASE EXCESS, VEN: 4 (ref 0–2)
NITRITE, URINE: NEGATIVE
NITRITE, URINE: NEGATIVE
OTHER OBSERVATIONS UA: ABNORMAL
OTHER OBSERVATIONS UA: NORMAL
PARTIAL THROMBOPLASTIN TIME: 19.4 SEC (ref 21.3–31.3)
PCO2, VEN: 45 MM HG (ref 39–55)
PDW BLD-RTO: 17.3 % (ref 12.5–15.4)
PH UA: 5 (ref 5–8)
PH UA: 5 (ref 5–8)
PH VENOUS: 7.3 (ref 7.32–7.42)
PLATELET # BLD: 160 K/UL (ref 140–450)
PLATELET ESTIMATE: ABNORMAL
PMV BLD AUTO: 9 FL (ref 6–12)
POC BUN: 25 MG/DL (ref 6–20)
POC CHLORIDE: 110 MMOL/L (ref 98–110)
POC HEMATOCRIT: 21 % (ref 36–46)
POC HEMOGLOBIN: 7.1 G/DL (ref 12–16)
POC POTASSIUM: 4.6 MMOL/L (ref 3.5–5.1)
POC SODIUM: 143 MMOL/L (ref 136–145)
POC TROPONIN I: 0.01 NG/ML (ref 0–0.1)
POC TROPONIN INTERP: NORMAL
POSITIVE BASE EXCESS, VEN: ABNORMAL (ref 0–2)
POTASSIUM SERPL-SCNC: 4.8 MMOL/L (ref 3.7–5.3)
PROTEIN UA: ABNORMAL
PROTEIN UA: NEGATIVE
PROTHROMBIN TIME: 11.5 SEC (ref 9.4–12.6)
RBC # BLD: 2.59 M/UL (ref 4–5.2)
RBC # BLD: ABNORMAL 10*6/UL
RBC UA: ABNORMAL /HPF (ref 0–4)
RBC UA: NORMAL /HPF (ref 0–2)
RENAL EPITHELIAL, UA: ABNORMAL /HPF
RENAL EPITHELIAL, UA: NORMAL /HPF
SEG NEUTROPHILS: 83 %
SEGMENTED NEUTROPHILS ABSOLUTE COUNT: 9.6 K/UL (ref 1.8–7.7)
SODIUM BLD-SCNC: 139 MMOL/L (ref 135–144)
SPECIFIC GRAVITY UA: 1.02 (ref 1–1.03)
SPECIFIC GRAVITY UA: 1.02 (ref 1–1.03)
SPECIMEN DESCRIPTION: NORMAL
TOTAL CO2, VENOUS: 24 MM HG (ref 25–31)
TOTAL PROTEIN: 5.1 G/DL (ref 6.4–8.3)
TRANSFUSION STATUS: NORMAL
TRANSFUSION STATUS: NORMAL
TRICHOMONAS: ABNORMAL
TRICHOMONAS: NORMAL
TURBIDITY: CLEAR
TURBIDITY: CLEAR
UNIT DIVISION: 0
UNIT DIVISION: 0
UNIT NUMBER: NORMAL
UNIT NUMBER: NORMAL
URINE HGB: ABNORMAL
URINE HGB: NEGATIVE
UROBILINOGEN, URINE: NORMAL
UROBILINOGEN, URINE: NORMAL
WBC # BLD: 11.6 K/UL (ref 3.5–11)
WBC # BLD: ABNORMAL 10*3/UL
WBC UA: ABNORMAL /HPF (ref 0–5)
WBC UA: NORMAL /HPF
YEAST: ABNORMAL
YEAST: NORMAL

## 2017-05-27 PROCEDURE — 2580000003 HC RX 258: Performed by: STUDENT IN AN ORGANIZED HEALTH CARE EDUCATION/TRAINING PROGRAM

## 2017-05-27 PROCEDURE — 85014 HEMATOCRIT: CPT

## 2017-05-27 PROCEDURE — 82803 BLOOD GASES ANY COMBINATION: CPT

## 2017-05-27 PROCEDURE — 84520 ASSAY OF UREA NITROGEN: CPT

## 2017-05-27 PROCEDURE — 2580000003 HC RX 258: Performed by: INTERNAL MEDICINE

## 2017-05-27 PROCEDURE — 81001 URINALYSIS AUTO W/SCOPE: CPT

## 2017-05-27 PROCEDURE — 85018 HEMOGLOBIN: CPT

## 2017-05-27 PROCEDURE — P9016 RBC LEUKOCYTES REDUCED: HCPCS

## 2017-05-27 PROCEDURE — 84132 ASSAY OF SERUM POTASSIUM: CPT

## 2017-05-27 PROCEDURE — 83690 ASSAY OF LIPASE: CPT

## 2017-05-27 PROCEDURE — 36430 TRANSFUSION BLD/BLD COMPNT: CPT

## 2017-05-27 PROCEDURE — 80076 HEPATIC FUNCTION PANEL: CPT

## 2017-05-27 PROCEDURE — 99291 CRITICAL CARE FIRST HOUR: CPT | Performed by: INTERNAL MEDICINE

## 2017-05-27 PROCEDURE — 86920 COMPATIBILITY TEST SPIN: CPT

## 2017-05-27 PROCEDURE — 86850 RBC ANTIBODY SCREEN: CPT

## 2017-05-27 PROCEDURE — 87641 MR-STAPH DNA AMP PROBE: CPT

## 2017-05-27 PROCEDURE — 82947 ASSAY GLUCOSE BLOOD QUANT: CPT

## 2017-05-27 PROCEDURE — 36415 COLL VENOUS BLD VENIPUNCTURE: CPT

## 2017-05-27 PROCEDURE — 85610 PROTHROMBIN TIME: CPT

## 2017-05-27 PROCEDURE — 2580000003 HC RX 258: Performed by: EMERGENCY MEDICINE

## 2017-05-27 PROCEDURE — 6360000002 HC RX W HCPCS: Performed by: EMERGENCY MEDICINE

## 2017-05-27 PROCEDURE — 85025 COMPLETE CBC W/AUTO DIFF WBC: CPT

## 2017-05-27 PROCEDURE — 80048 BASIC METABOLIC PNL TOTAL CA: CPT

## 2017-05-27 PROCEDURE — 2000000000 HC ICU R&B

## 2017-05-27 PROCEDURE — 6360000002 HC RX W HCPCS: Performed by: INTERNAL MEDICINE

## 2017-05-27 PROCEDURE — 85730 THROMBOPLASTIN TIME PARTIAL: CPT

## 2017-05-27 PROCEDURE — 84484 ASSAY OF TROPONIN QUANT: CPT

## 2017-05-27 PROCEDURE — 86900 BLOOD TYPING SEROLOGIC ABO: CPT

## 2017-05-27 PROCEDURE — 93005 ELECTROCARDIOGRAM TRACING: CPT

## 2017-05-27 PROCEDURE — 82330 ASSAY OF CALCIUM: CPT

## 2017-05-27 PROCEDURE — 83605 ASSAY OF LACTIC ACID: CPT

## 2017-05-27 PROCEDURE — 99285 EMERGENCY DEPT VISIT HI MDM: CPT

## 2017-05-27 PROCEDURE — 82435 ASSAY OF BLOOD CHLORIDE: CPT

## 2017-05-27 PROCEDURE — 84295 ASSAY OF SERUM SODIUM: CPT

## 2017-05-27 PROCEDURE — 86901 BLOOD TYPING SEROLOGIC RH(D): CPT

## 2017-05-27 RX ORDER — SODIUM CHLORIDE 0.9 % (FLUSH) 0.9 %
10 SYRINGE (ML) INJECTION PRN
Status: DISCONTINUED | OUTPATIENT
Start: 2017-05-27 | End: 2017-06-01 | Stop reason: HOSPADM

## 2017-05-27 RX ORDER — ONDANSETRON 2 MG/ML
4 INJECTION INTRAMUSCULAR; INTRAVENOUS EVERY 6 HOURS PRN
Status: DISCONTINUED | OUTPATIENT
Start: 2017-05-27 | End: 2017-06-01 | Stop reason: HOSPADM

## 2017-05-27 RX ORDER — DILTIAZEM HYDROCHLORIDE 5 MG/ML
15 INJECTION INTRAVENOUS ONCE
Status: DISCONTINUED | OUTPATIENT
Start: 2017-05-27 | End: 2017-05-27

## 2017-05-27 RX ORDER — SODIUM CHLORIDE 9 MG/ML
INJECTION, SOLUTION INTRAVENOUS CONTINUOUS
Status: DISCONTINUED | OUTPATIENT
Start: 2017-05-27 | End: 2017-05-29

## 2017-05-27 RX ORDER — 0.9 % SODIUM CHLORIDE 0.9 %
250 INTRAVENOUS SOLUTION INTRAVENOUS ONCE
Status: DISCONTINUED | OUTPATIENT
Start: 2017-05-27 | End: 2017-05-27

## 2017-05-27 RX ORDER — 0.9 % SODIUM CHLORIDE 0.9 %
1000 INTRAVENOUS SOLUTION INTRAVENOUS ONCE
Status: COMPLETED | OUTPATIENT
Start: 2017-05-27 | End: 2017-05-27

## 2017-05-27 RX ORDER — ACETAMINOPHEN 325 MG/1
650 TABLET ORAL EVERY 4 HOURS PRN
Status: DISCONTINUED | OUTPATIENT
Start: 2017-05-27 | End: 2017-06-01 | Stop reason: HOSPADM

## 2017-05-27 RX ORDER — 0.9 % SODIUM CHLORIDE 0.9 %
250 INTRAVENOUS SOLUTION INTRAVENOUS ONCE
Status: COMPLETED | OUTPATIENT
Start: 2017-05-27 | End: 2017-05-28

## 2017-05-27 RX ORDER — SODIUM CHLORIDE 0.9 % (FLUSH) 0.9 %
10 SYRINGE (ML) INJECTION EVERY 12 HOURS SCHEDULED
Status: DISCONTINUED | OUTPATIENT
Start: 2017-05-27 | End: 2017-06-01 | Stop reason: HOSPADM

## 2017-05-27 RX ADMIN — SODIUM CHLORIDE: 9 INJECTION, SOLUTION INTRAVENOUS at 06:07

## 2017-05-27 RX ADMIN — Medication 10 ML: at 20:22

## 2017-05-27 RX ADMIN — CALCIUM GLUCONATE 1 G: 94 INJECTION, SOLUTION INTRAVENOUS at 08:52

## 2017-05-27 RX ADMIN — SODIUM CHLORIDE: 9 INJECTION, SOLUTION INTRAVENOUS at 16:27

## 2017-05-27 RX ADMIN — SODIUM CHLORIDE 250 ML: 9 INJECTION, SOLUTION INTRAVENOUS at 16:24

## 2017-05-27 RX ADMIN — SODIUM CHLORIDE 1000 ML: 9 INJECTION, SOLUTION INTRAVENOUS at 04:18

## 2017-05-27 RX ADMIN — CALCIUM GLUCONATE 1 G: 94 INJECTION, SOLUTION INTRAVENOUS at 16:24

## 2017-05-27 ASSESSMENT — ENCOUNTER SYMPTOMS
CHEST TIGHTNESS: 0
BLOOD IN STOOL: 1
SHORTNESS OF BREATH: 0
VOMITING: 0
NAUSEA: 0
ABDOMINAL PAIN: 0
DIARRHEA: 0
PHOTOPHOBIA: 0
COUGH: 0
CONSTIPATION: 0

## 2017-05-27 ASSESSMENT — PAIN SCALES - GENERAL
PAINLEVEL_OUTOF10: 0

## 2017-05-28 LAB
ANION GAP SERPL CALCULATED.3IONS-SCNC: 11 MMOL/L (ref 9–17)
BUN BLDV-MCNC: 11 MG/DL (ref 8–23)
BUN/CREAT BLD: ABNORMAL (ref 9–20)
CALCIUM IONIZED: 1.2 MMOL/L (ref 1.13–1.33)
CALCIUM SERPL-MCNC: 8 MG/DL (ref 8.6–10.4)
CHLORIDE BLD-SCNC: 110 MMOL/L (ref 98–107)
CO2: 21 MMOL/L (ref 20–31)
CREAT SERPL-MCNC: 0.78 MG/DL (ref 0.5–0.9)
GFR AFRICAN AMERICAN: >60 ML/MIN
GFR NON-AFRICAN AMERICAN: >60 ML/MIN
GFR SERPL CREATININE-BSD FRML MDRD: ABNORMAL ML/MIN/{1.73_M2}
GFR SERPL CREATININE-BSD FRML MDRD: ABNORMAL ML/MIN/{1.73_M2}
GLUCOSE BLD-MCNC: 107 MG/DL (ref 70–99)
HCT VFR BLD CALC: 22.5 % (ref 36–46)
HCT VFR BLD CALC: 22.8 % (ref 36–46)
HCT VFR BLD CALC: 23 % (ref 36–46)
HCT VFR BLD CALC: 24.7 % (ref 36–46)
HEMOGLOBIN: 7.5 G/DL (ref 12–16)
HEMOGLOBIN: 7.6 G/DL (ref 12–16)
HEMOGLOBIN: 7.6 G/DL (ref 12–16)
HEMOGLOBIN: 8 G/DL (ref 12–16)
MCH RBC QN AUTO: 29.2 PG (ref 26–34)
MCHC RBC AUTO-ENTMCNC: 34 G/DL (ref 31–37)
MCV RBC AUTO: 85.8 FL (ref 80–100)
PDW BLD-RTO: 17.9 % (ref 12.5–15.4)
PLATELET # BLD: 135 K/UL (ref 140–450)
PMV BLD AUTO: 8.7 FL (ref 6–12)
POTASSIUM SERPL-SCNC: 4.4 MMOL/L (ref 3.7–5.3)
RBC # BLD: 2.62 M/UL (ref 4–5.2)
SODIUM BLD-SCNC: 142 MMOL/L (ref 135–144)
WBC # BLD: 10.7 K/UL (ref 3.5–11)

## 2017-05-28 PROCEDURE — 6370000000 HC RX 637 (ALT 250 FOR IP): Performed by: FAMILY MEDICINE

## 2017-05-28 PROCEDURE — 85014 HEMATOCRIT: CPT

## 2017-05-28 PROCEDURE — 2580000003 HC RX 258: Performed by: EMERGENCY MEDICINE

## 2017-05-28 PROCEDURE — 85018 HEMOGLOBIN: CPT

## 2017-05-28 PROCEDURE — 99291 CRITICAL CARE FIRST HOUR: CPT | Performed by: INTERNAL MEDICINE

## 2017-05-28 PROCEDURE — 80048 BASIC METABOLIC PNL TOTAL CA: CPT

## 2017-05-28 PROCEDURE — 2580000003 HC RX 258: Performed by: FAMILY MEDICINE

## 2017-05-28 PROCEDURE — 6370000000 HC RX 637 (ALT 250 FOR IP): Performed by: EMERGENCY MEDICINE

## 2017-05-28 PROCEDURE — 36415 COLL VENOUS BLD VENIPUNCTURE: CPT

## 2017-05-28 PROCEDURE — 6370000000 HC RX 637 (ALT 250 FOR IP): Performed by: INTERNAL MEDICINE

## 2017-05-28 PROCEDURE — 2060000000 HC ICU INTERMEDIATE R&B

## 2017-05-28 PROCEDURE — 85027 COMPLETE CBC AUTOMATED: CPT

## 2017-05-28 PROCEDURE — 2580000003 HC RX 258: Performed by: INTERNAL MEDICINE

## 2017-05-28 PROCEDURE — 82330 ASSAY OF CALCIUM: CPT

## 2017-05-28 RX ORDER — ALLOPURINOL 300 MG/1
150 TABLET ORAL EVERY EVENING
Status: DISCONTINUED | OUTPATIENT
Start: 2017-05-28 | End: 2017-06-01 | Stop reason: HOSPADM

## 2017-05-28 RX ORDER — DILTIAZEM HYDROCHLORIDE 60 MG/1
60 TABLET, FILM COATED ORAL EVERY 8 HOURS SCHEDULED
Status: DISCONTINUED | OUTPATIENT
Start: 2017-05-28 | End: 2017-05-29

## 2017-05-28 RX ORDER — PANTOPRAZOLE SODIUM 40 MG/1
40 TABLET, DELAYED RELEASE ORAL
Status: DISCONTINUED | OUTPATIENT
Start: 2017-05-28 | End: 2017-06-01 | Stop reason: HOSPADM

## 2017-05-28 RX ORDER — DILTIAZEM HYDROCHLORIDE 60 MG/1
60 TABLET, FILM COATED ORAL ONCE
Status: COMPLETED | OUTPATIENT
Start: 2017-05-28 | End: 2017-05-28

## 2017-05-28 RX ORDER — DILTIAZEM HYDROCHLORIDE 60 MG/1
60 TABLET, FILM COATED ORAL EVERY 8 HOURS SCHEDULED
Status: DISCONTINUED | OUTPATIENT
Start: 2017-05-28 | End: 2017-05-28

## 2017-05-28 RX ORDER — 0.9 % SODIUM CHLORIDE 0.9 %
250 INTRAVENOUS SOLUTION INTRAVENOUS ONCE
Status: COMPLETED | OUTPATIENT
Start: 2017-05-28 | End: 2017-05-28

## 2017-05-28 RX ADMIN — SODIUM CHLORIDE 250 ML: 9 INJECTION, SOLUTION INTRAVENOUS at 09:07

## 2017-05-28 RX ADMIN — Medication 10 ML: at 22:03

## 2017-05-28 RX ADMIN — SODIUM CHLORIDE: 9 INJECTION, SOLUTION INTRAVENOUS at 20:04

## 2017-05-28 RX ADMIN — ACETAMINOPHEN 650 MG: 325 TABLET ORAL at 22:03

## 2017-05-28 RX ADMIN — ALLOPURINOL 150 MG: 300 TABLET ORAL at 17:42

## 2017-05-28 RX ADMIN — SODIUM CHLORIDE: 9 INJECTION, SOLUTION INTRAVENOUS at 03:51

## 2017-05-28 RX ADMIN — DILTIAZEM HYDROCHLORIDE 60 MG: 60 TABLET, FILM COATED ORAL at 10:20

## 2017-05-28 RX ADMIN — SODIUM CHLORIDE: 9 INJECTION, SOLUTION INTRAVENOUS at 11:56

## 2017-05-28 RX ADMIN — PANTOPRAZOLE SODIUM 40 MG: 40 TABLET, DELAYED RELEASE ORAL at 09:07

## 2017-05-28 RX ADMIN — Medication 10 ML: at 19:00

## 2017-05-28 RX ADMIN — DILTIAZEM HYDROCHLORIDE 60 MG: 60 TABLET, FILM COATED ORAL at 17:42

## 2017-05-28 RX ADMIN — Medication 10 ML: at 09:07

## 2017-05-28 ASSESSMENT — PULMONARY FUNCTION TESTS: PIF_VALUE: 19

## 2017-05-28 ASSESSMENT — PAIN SCALES - GENERAL
PAINLEVEL_OUTOF10: 0
PAINLEVEL_OUTOF10: 3
PAINLEVEL_OUTOF10: 0

## 2017-05-29 LAB
ALBUMIN SERPL-MCNC: 2.6 G/DL (ref 3.5–5.2)
ALBUMIN/GLOBULIN RATIO: 1 (ref 1–2.5)
ALP BLD-CCNC: 53 U/L (ref 35–104)
ALT SERPL-CCNC: 12 U/L (ref 5–33)
ANION GAP SERPL CALCULATED.3IONS-SCNC: 10 MMOL/L (ref 9–17)
AST SERPL-CCNC: 13 U/L
BILIRUB SERPL-MCNC: 0.32 MG/DL (ref 0.3–1.2)
BILIRUBIN DIRECT: 0.12 MG/DL
BILIRUBIN, INDIRECT: 0.2 MG/DL (ref 0–1)
BUN BLDV-MCNC: 6 MG/DL (ref 8–23)
BUN/CREAT BLD: ABNORMAL (ref 9–20)
CALCIUM SERPL-MCNC: 7.8 MG/DL (ref 8.6–10.4)
CHLORIDE BLD-SCNC: 107 MMOL/L (ref 98–107)
CO2: 22 MMOL/L (ref 20–31)
CREAT SERPL-MCNC: 0.76 MG/DL (ref 0.5–0.9)
GFR AFRICAN AMERICAN: >60 ML/MIN
GFR NON-AFRICAN AMERICAN: >60 ML/MIN
GFR SERPL CREATININE-BSD FRML MDRD: ABNORMAL ML/MIN/{1.73_M2}
GFR SERPL CREATININE-BSD FRML MDRD: ABNORMAL ML/MIN/{1.73_M2}
GLOBULIN: ABNORMAL G/DL (ref 1.5–3.8)
GLUCOSE BLD-MCNC: 95 MG/DL (ref 70–99)
HCT VFR BLD CALC: 20.5 % (ref 36–46)
HCT VFR BLD CALC: 24.3 % (ref 36–46)
HCT VFR BLD CALC: 25.8 % (ref 36–46)
HCT VFR BLD CALC: 26 % (ref 36–46)
HEMOGLOBIN: 6.8 G/DL (ref 12–16)
HEMOGLOBIN: 7.9 G/DL (ref 12–16)
HEMOGLOBIN: 8.4 G/DL (ref 12–16)
HEMOGLOBIN: 8.6 G/DL (ref 12–16)
INR BLD: 1
MCH RBC QN AUTO: 28.6 PG (ref 26–34)
MCHC RBC AUTO-ENTMCNC: 32.5 G/DL (ref 31–37)
MCV RBC AUTO: 87.8 FL (ref 80–100)
PDW BLD-RTO: 16.4 % (ref 12.5–15.4)
PLATELET # BLD: 133 K/UL (ref 140–450)
PMV BLD AUTO: 8.9 FL (ref 6–12)
POTASSIUM SERPL-SCNC: 4.1 MMOL/L (ref 3.7–5.3)
PROTHROMBIN TIME: 10.7 SEC (ref 9.4–12.6)
RBC # BLD: 2.76 M/UL (ref 4–5.2)
SODIUM BLD-SCNC: 139 MMOL/L (ref 135–144)
TOTAL PROTEIN: 5.1 G/DL (ref 6.4–8.3)
WBC # BLD: 8.7 K/UL (ref 3.5–11)

## 2017-05-29 PROCEDURE — 36415 COLL VENOUS BLD VENIPUNCTURE: CPT

## 2017-05-29 PROCEDURE — 85018 HEMOGLOBIN: CPT

## 2017-05-29 PROCEDURE — 85014 HEMATOCRIT: CPT

## 2017-05-29 PROCEDURE — 6370000000 HC RX 637 (ALT 250 FOR IP): Performed by: INTERNAL MEDICINE

## 2017-05-29 PROCEDURE — 6370000000 HC RX 637 (ALT 250 FOR IP): Performed by: EMERGENCY MEDICINE

## 2017-05-29 PROCEDURE — 93005 ELECTROCARDIOGRAM TRACING: CPT

## 2017-05-29 PROCEDURE — 6370000000 HC RX 637 (ALT 250 FOR IP): Performed by: FAMILY MEDICINE

## 2017-05-29 PROCEDURE — 85027 COMPLETE CBC AUTOMATED: CPT

## 2017-05-29 PROCEDURE — 2580000003 HC RX 258: Performed by: INTERNAL MEDICINE

## 2017-05-29 PROCEDURE — 80076 HEPATIC FUNCTION PANEL: CPT

## 2017-05-29 PROCEDURE — 2580000003 HC RX 258: Performed by: EMERGENCY MEDICINE

## 2017-05-29 PROCEDURE — 30233N1 TRANSFUSION OF NONAUTOLOGOUS RED BLOOD CELLS INTO PERIPHERAL VEIN, PERCUTANEOUS APPROACH: ICD-10-PCS | Performed by: INTERNAL MEDICINE

## 2017-05-29 PROCEDURE — 85610 PROTHROMBIN TIME: CPT

## 2017-05-29 PROCEDURE — 1200000000 HC SEMI PRIVATE

## 2017-05-29 PROCEDURE — 80048 BASIC METABOLIC PNL TOTAL CA: CPT

## 2017-05-29 PROCEDURE — 99291 CRITICAL CARE FIRST HOUR: CPT | Performed by: INTERNAL MEDICINE

## 2017-05-29 PROCEDURE — 86900 BLOOD TYPING SEROLOGIC ABO: CPT

## 2017-05-29 PROCEDURE — 36430 TRANSFUSION BLD/BLD COMPNT: CPT

## 2017-05-29 PROCEDURE — 2580000003 HC RX 258: Performed by: STUDENT IN AN ORGANIZED HEALTH CARE EDUCATION/TRAINING PROGRAM

## 2017-05-29 PROCEDURE — P9016 RBC LEUKOCYTES REDUCED: HCPCS

## 2017-05-29 RX ORDER — DIPHENHYDRAMINE HCL 25 MG
25 TABLET ORAL ONCE
Status: COMPLETED | OUTPATIENT
Start: 2017-05-29 | End: 2017-05-29

## 2017-05-29 RX ORDER — MAGNESIUM SULFATE 1 G/100ML
1 INJECTION INTRAVENOUS
Status: COMPLETED | OUTPATIENT
Start: 2017-05-30 | End: 2017-05-30

## 2017-05-29 RX ORDER — 0.9 % SODIUM CHLORIDE 0.9 %
250 INTRAVENOUS SOLUTION INTRAVENOUS ONCE
Status: COMPLETED | OUTPATIENT
Start: 2017-05-29 | End: 2017-05-29

## 2017-05-29 RX ORDER — ATENOLOL 50 MG/1
50 TABLET ORAL DAILY
Status: DISCONTINUED | OUTPATIENT
Start: 2017-05-29 | End: 2017-06-01 | Stop reason: HOSPADM

## 2017-05-29 RX ADMIN — DILTIAZEM HYDROCHLORIDE 60 MG: 60 TABLET, FILM COATED ORAL at 06:05

## 2017-05-29 RX ADMIN — DILTIAZEM HYDROCHLORIDE 90 MG: 60 TABLET, FILM COATED ORAL at 21:31

## 2017-05-29 RX ADMIN — ALLOPURINOL 150 MG: 300 TABLET ORAL at 18:02

## 2017-05-29 RX ADMIN — ACETAMINOPHEN 650 MG: 325 TABLET ORAL at 22:39

## 2017-05-29 RX ADMIN — DILTIAZEM HYDROCHLORIDE 90 MG: 60 TABLET, FILM COATED ORAL at 14:54

## 2017-05-29 RX ADMIN — Medication 10 ML: at 20:29

## 2017-05-29 RX ADMIN — PANTOPRAZOLE SODIUM 40 MG: 40 TABLET, DELAYED RELEASE ORAL at 06:05

## 2017-05-29 RX ADMIN — SODIUM CHLORIDE 250 ML: 0.9 INJECTION, SOLUTION INTRAVENOUS at 01:47

## 2017-05-29 RX ADMIN — Medication 10 ML: at 09:43

## 2017-05-29 RX ADMIN — DIPHENHYDRAMINE HCL 25 MG: 25 TABLET ORAL at 23:46

## 2017-05-29 RX ADMIN — ACETAMINOPHEN 650 MG: 325 TABLET ORAL at 14:11

## 2017-05-29 RX ADMIN — ATENOLOL 50 MG: 50 TABLET ORAL at 14:01

## 2017-05-29 ASSESSMENT — PAIN DESCRIPTION - PAIN TYPE: TYPE: ACUTE PAIN

## 2017-05-29 ASSESSMENT — PAIN SCALES - WONG BAKER
WONGBAKER_NUMERICALRESPONSE: 0

## 2017-05-29 ASSESSMENT — PAIN DESCRIPTION - LOCATION: LOCATION: HEAD

## 2017-05-29 ASSESSMENT — PAIN SCALES - GENERAL
PAINLEVEL_OUTOF10: 3
PAINLEVEL_OUTOF10: 0
PAINLEVEL_OUTOF10: 3
PAINLEVEL_OUTOF10: 0
PAINLEVEL_OUTOF10: 0

## 2017-05-30 LAB
ABO/RH: NORMAL
ANION GAP SERPL CALCULATED.3IONS-SCNC: 11 MMOL/L (ref 9–17)
ANTIBODY SCREEN: NEGATIVE
ARM BAND NUMBER: NORMAL
BLD PROD TYP BPU: NORMAL
BUN BLDV-MCNC: 12 MG/DL (ref 8–23)
BUN/CREAT BLD: ABNORMAL (ref 9–20)
CALCIUM IONIZED: 1.16 MMOL/L (ref 1.13–1.33)
CALCIUM SERPL-MCNC: 8.2 MG/DL (ref 8.6–10.4)
CHLORIDE BLD-SCNC: 103 MMOL/L (ref 98–107)
CO2: 23 MMOL/L (ref 20–31)
CREAT SERPL-MCNC: 0.98 MG/DL (ref 0.5–0.9)
CROSSMATCH RESULT: NORMAL
DISPENSE STATUS BLOOD BANK: NORMAL
EKG ATRIAL RATE: 113 BPM
EKG ATRIAL RATE: 94 BPM
EKG Q-T INTERVAL: 310 MS
EKG Q-T INTERVAL: 356 MS
EKG QRS DURATION: 82 MS
EKG QRS DURATION: 86 MS
EKG QTC CALCULATION (BAZETT): 428 MS
EKG QTC CALCULATION (BAZETT): 466 MS
EKG R AXIS: -27 DEGREES
EKG R AXIS: -4 DEGREES
EKG T AXIS: 37 DEGREES
EKG T AXIS: 58 DEGREES
EKG VENTRICULAR RATE: 136 BPM
EKG VENTRICULAR RATE: 87 BPM
EXPIRATION DATE: NORMAL
GFR AFRICAN AMERICAN: >60 ML/MIN
GFR NON-AFRICAN AMERICAN: 55 ML/MIN
GFR SERPL CREATININE-BSD FRML MDRD: ABNORMAL ML/MIN/{1.73_M2}
GFR SERPL CREATININE-BSD FRML MDRD: ABNORMAL ML/MIN/{1.73_M2}
GLUCOSE BLD-MCNC: 116 MG/DL (ref 70–99)
HCT VFR BLD CALC: 22.9 % (ref 36–46)
HEMOGLOBIN: 7.7 G/DL (ref 12–16)
MAGNESIUM: 2.2 MG/DL (ref 1.6–2.6)
MCH RBC QN AUTO: 29.4 PG (ref 26–34)
MCHC RBC AUTO-ENTMCNC: 33.5 G/DL (ref 31–37)
MCV RBC AUTO: 87.7 FL (ref 80–100)
PDW BLD-RTO: 16.8 % (ref 12.5–15.4)
PHOSPHORUS: 3.1 MG/DL (ref 2.6–4.5)
PLATELET # BLD: 151 K/UL (ref 140–450)
PMV BLD AUTO: 8.9 FL (ref 6–12)
POTASSIUM SERPL-SCNC: 3.9 MMOL/L (ref 3.7–5.3)
RBC # BLD: 2.61 M/UL (ref 4–5.2)
SODIUM BLD-SCNC: 137 MMOL/L (ref 135–144)
TRANSFUSION STATUS: NORMAL
UNIT DIVISION: 0
UNIT NUMBER: NORMAL
WBC # BLD: 8.1 K/UL (ref 3.5–11)

## 2017-05-30 PROCEDURE — 6360000002 HC RX W HCPCS: Performed by: INTERNAL MEDICINE

## 2017-05-30 PROCEDURE — 80048 BASIC METABOLIC PNL TOTAL CA: CPT

## 2017-05-30 PROCEDURE — 6370000000 HC RX 637 (ALT 250 FOR IP): Performed by: INTERNAL MEDICINE

## 2017-05-30 PROCEDURE — 99233 SBSQ HOSP IP/OBS HIGH 50: CPT | Performed by: INTERNAL MEDICINE

## 2017-05-30 PROCEDURE — 83735 ASSAY OF MAGNESIUM: CPT

## 2017-05-30 PROCEDURE — 85027 COMPLETE CBC AUTOMATED: CPT

## 2017-05-30 PROCEDURE — 84100 ASSAY OF PHOSPHORUS: CPT

## 2017-05-30 PROCEDURE — 36415 COLL VENOUS BLD VENIPUNCTURE: CPT

## 2017-05-30 PROCEDURE — 1200000000 HC SEMI PRIVATE

## 2017-05-30 PROCEDURE — 82330 ASSAY OF CALCIUM: CPT

## 2017-05-30 PROCEDURE — 6370000000 HC RX 637 (ALT 250 FOR IP): Performed by: FAMILY MEDICINE

## 2017-05-30 PROCEDURE — 2580000003 HC RX 258: Performed by: EMERGENCY MEDICINE

## 2017-05-30 RX ORDER — DILTIAZEM HYDROCHLORIDE 60 MG/1
60 TABLET, FILM COATED ORAL EVERY 8 HOURS SCHEDULED
Status: DISCONTINUED | OUTPATIENT
Start: 2017-05-30 | End: 2017-06-01 | Stop reason: HOSPADM

## 2017-05-30 RX ADMIN — DILTIAZEM HYDROCHLORIDE 90 MG: 60 TABLET, FILM COATED ORAL at 15:37

## 2017-05-30 RX ADMIN — MAGNESIUM SULFATE IN DEXTROSE 1 G: 10 INJECTION, SOLUTION INTRAVENOUS at 01:39

## 2017-05-30 RX ADMIN — MAGNESIUM SULFATE IN DEXTROSE 1 G: 10 INJECTION, SOLUTION INTRAVENOUS at 00:22

## 2017-05-30 RX ADMIN — Medication 10 ML: at 20:22

## 2017-05-30 RX ADMIN — DILTIAZEM HYDROCHLORIDE 60 MG: 60 TABLET, FILM COATED ORAL at 22:36

## 2017-05-30 RX ADMIN — ALLOPURINOL 150 MG: 300 TABLET ORAL at 18:10

## 2017-05-30 RX ADMIN — PANTOPRAZOLE SODIUM 40 MG: 40 TABLET, DELAYED RELEASE ORAL at 07:50

## 2017-05-30 ASSESSMENT — PAIN SCALES - GENERAL
PAINLEVEL_OUTOF10: 0

## 2017-05-31 ENCOUNTER — TELEPHONE (OUTPATIENT)
Dept: PHARMACY | Age: 72
End: 2017-05-31

## 2017-05-31 LAB
HCT VFR BLD CALC: 23 % (ref 36–46)
HEMOGLOBIN: 7.6 G/DL (ref 12–16)
MCH RBC QN AUTO: 29 PG (ref 26–34)
MCHC RBC AUTO-ENTMCNC: 33 G/DL (ref 31–37)
MCV RBC AUTO: 87.9 FL (ref 80–100)
PDW BLD-RTO: 17.2 % (ref 12.5–15.4)
PLATELET # BLD: 174 K/UL (ref 140–450)
PMV BLD AUTO: 8.9 FL (ref 6–12)
RBC # BLD: 2.62 M/UL (ref 4–5.2)
WBC # BLD: 8.9 K/UL (ref 3.5–11)

## 2017-05-31 PROCEDURE — 6370000000 HC RX 637 (ALT 250 FOR IP): Performed by: FAMILY MEDICINE

## 2017-05-31 PROCEDURE — 6370000000 HC RX 637 (ALT 250 FOR IP): Performed by: INTERNAL MEDICINE

## 2017-05-31 PROCEDURE — 85027 COMPLETE CBC AUTOMATED: CPT

## 2017-05-31 PROCEDURE — 83540 ASSAY OF IRON: CPT

## 2017-05-31 PROCEDURE — 83550 IRON BINDING TEST: CPT

## 2017-05-31 PROCEDURE — 99233 SBSQ HOSP IP/OBS HIGH 50: CPT | Performed by: INTERNAL MEDICINE

## 2017-05-31 PROCEDURE — 36415 COLL VENOUS BLD VENIPUNCTURE: CPT

## 2017-05-31 PROCEDURE — 2580000003 HC RX 258: Performed by: EMERGENCY MEDICINE

## 2017-05-31 PROCEDURE — 1200000000 HC SEMI PRIVATE

## 2017-05-31 PROCEDURE — 82728 ASSAY OF FERRITIN: CPT

## 2017-05-31 PROCEDURE — 6360000002 HC RX W HCPCS: Performed by: INTERNAL MEDICINE

## 2017-05-31 RX ORDER — FUROSEMIDE 10 MG/ML
20 INJECTION INTRAMUSCULAR; INTRAVENOUS DAILY
Status: DISCONTINUED | OUTPATIENT
Start: 2017-05-31 | End: 2017-06-01

## 2017-05-31 RX ORDER — FUROSEMIDE 20 MG/1
20 TABLET ORAL ONCE
Status: DISCONTINUED | OUTPATIENT
Start: 2017-05-31 | End: 2017-05-31

## 2017-05-31 RX ADMIN — DILTIAZEM HYDROCHLORIDE 60 MG: 60 TABLET, FILM COATED ORAL at 21:27

## 2017-05-31 RX ADMIN — ALLOPURINOL 150 MG: 300 TABLET ORAL at 18:49

## 2017-05-31 RX ADMIN — DILTIAZEM HYDROCHLORIDE 60 MG: 60 TABLET, FILM COATED ORAL at 06:34

## 2017-05-31 RX ADMIN — Medication 10 ML: at 20:17

## 2017-05-31 RX ADMIN — ATENOLOL 50 MG: 50 TABLET ORAL at 08:26

## 2017-05-31 RX ADMIN — DILTIAZEM HYDROCHLORIDE 60 MG: 60 TABLET, FILM COATED ORAL at 14:04

## 2017-05-31 RX ADMIN — FUROSEMIDE 20 MG: 10 INJECTION, SOLUTION INTRAMUSCULAR; INTRAVENOUS at 08:26

## 2017-05-31 RX ADMIN — PANTOPRAZOLE SODIUM 40 MG: 40 TABLET, DELAYED RELEASE ORAL at 06:34

## 2017-05-31 ASSESSMENT — PAIN SCALES - GENERAL
PAINLEVEL_OUTOF10: 0

## 2017-06-01 VITALS
HEART RATE: 89 BPM | OXYGEN SATURATION: 94 % | BODY MASS INDEX: 50.07 KG/M2 | RESPIRATION RATE: 20 BRPM | WEIGHT: 282.6 LBS | HEIGHT: 63 IN | DIASTOLIC BLOOD PRESSURE: 65 MMHG | SYSTOLIC BLOOD PRESSURE: 123 MMHG | TEMPERATURE: 98.3 F

## 2017-06-01 PROBLEM — D50.0 IRON DEFICIENCY ANEMIA DUE TO CHRONIC BLOOD LOSS: Status: ACTIVE | Noted: 2017-06-01

## 2017-06-01 PROBLEM — M10.9 ACUTE GOUT OF RIGHT FOOT: Status: ACTIVE | Noted: 2017-06-01

## 2017-06-01 LAB
ANION GAP SERPL CALCULATED.3IONS-SCNC: 13 MMOL/L (ref 9–17)
BUN BLDV-MCNC: 16 MG/DL (ref 8–23)
BUN/CREAT BLD: ABNORMAL (ref 9–20)
CALCIUM SERPL-MCNC: 8.3 MG/DL (ref 8.6–10.4)
CHLORIDE BLD-SCNC: 102 MMOL/L (ref 98–107)
CO2: 25 MMOL/L (ref 20–31)
CREAT SERPL-MCNC: 0.89 MG/DL (ref 0.5–0.9)
FERRITIN: 26 UG/L (ref 13–150)
GFR AFRICAN AMERICAN: >60 ML/MIN
GFR NON-AFRICAN AMERICAN: >60 ML/MIN
GFR SERPL CREATININE-BSD FRML MDRD: ABNORMAL ML/MIN/{1.73_M2}
GFR SERPL CREATININE-BSD FRML MDRD: ABNORMAL ML/MIN/{1.73_M2}
GLUCOSE BLD-MCNC: 85 MG/DL (ref 70–99)
HCT VFR BLD CALC: 24.8 % (ref 36–46)
HEMOGLOBIN: 8.3 G/DL (ref 12–16)
IRON SATURATION: 5 % (ref 20–55)
IRON: 16 UG/DL (ref 37–145)
MCH RBC QN AUTO: 29.1 PG (ref 26–34)
MCHC RBC AUTO-ENTMCNC: 33.3 G/DL (ref 31–37)
MCV RBC AUTO: 87.3 FL (ref 80–100)
PDW BLD-RTO: 16.6 % (ref 12.5–15.4)
PLATELET # BLD: 205 K/UL (ref 140–450)
PMV BLD AUTO: 8.6 FL (ref 6–12)
POTASSIUM SERPL-SCNC: 3.9 MMOL/L (ref 3.7–5.3)
RBC # BLD: 2.85 M/UL (ref 4–5.2)
SODIUM BLD-SCNC: 140 MMOL/L (ref 135–144)
TOTAL IRON BINDING CAPACITY: 306 UG/DL (ref 250–450)
UNSATURATED IRON BINDING CAPACITY: 290 UG/DL (ref 112–347)
WBC # BLD: 10.2 K/UL (ref 3.5–11)

## 2017-06-01 PROCEDURE — 6370000000 HC RX 637 (ALT 250 FOR IP): Performed by: HOSPITALIST

## 2017-06-01 PROCEDURE — 97162 PT EVAL MOD COMPLEX 30 MIN: CPT

## 2017-06-01 PROCEDURE — G8978 MOBILITY CURRENT STATUS: HCPCS

## 2017-06-01 PROCEDURE — G8987 SELF CARE CURRENT STATUS: HCPCS

## 2017-06-01 PROCEDURE — 6370000000 HC RX 637 (ALT 250 FOR IP): Performed by: EMERGENCY MEDICINE

## 2017-06-01 PROCEDURE — 84560 ASSAY OF URINE/URIC ACID: CPT

## 2017-06-01 PROCEDURE — 6360000002 HC RX W HCPCS: Performed by: HOSPITALIST

## 2017-06-01 PROCEDURE — 6370000000 HC RX 637 (ALT 250 FOR IP): Performed by: INTERNAL MEDICINE

## 2017-06-01 PROCEDURE — 99239 HOSP IP/OBS DSCHRG MGMT >30: CPT | Performed by: INTERNAL MEDICINE

## 2017-06-01 PROCEDURE — 6360000002 HC RX W HCPCS: Performed by: NURSE PRACTITIONER

## 2017-06-01 PROCEDURE — 80048 BASIC METABOLIC PNL TOTAL CA: CPT

## 2017-06-01 PROCEDURE — G8979 MOBILITY GOAL STATUS: HCPCS

## 2017-06-01 PROCEDURE — G8989 SELF CARE D/C STATUS: HCPCS

## 2017-06-01 PROCEDURE — 2580000003 HC RX 258: Performed by: INTERNAL MEDICINE

## 2017-06-01 PROCEDURE — 36415 COLL VENOUS BLD VENIPUNCTURE: CPT

## 2017-06-01 PROCEDURE — 85027 COMPLETE CBC AUTOMATED: CPT

## 2017-06-01 PROCEDURE — 6370000000 HC RX 637 (ALT 250 FOR IP): Performed by: FAMILY MEDICINE

## 2017-06-01 PROCEDURE — 97530 THERAPEUTIC ACTIVITIES: CPT

## 2017-06-01 PROCEDURE — 2580000003 HC RX 258: Performed by: HOSPITALIST

## 2017-06-01 PROCEDURE — 97166 OT EVAL MOD COMPLEX 45 MIN: CPT

## 2017-06-01 RX ORDER — LANOLIN ALCOHOL/MO/W.PET/CERES
325 CREAM (GRAM) TOPICAL 2 TIMES DAILY
Qty: 90 TABLET | Refills: 3 | Status: SHIPPED | OUTPATIENT
Start: 2017-06-01 | End: 2017-10-25

## 2017-06-01 RX ORDER — FUROSEMIDE 10 MG/ML
40 INJECTION INTRAMUSCULAR; INTRAVENOUS ONCE
Status: COMPLETED | OUTPATIENT
Start: 2017-06-01 | End: 2017-06-01

## 2017-06-01 RX ORDER — NAPROXEN 250 MG/1
500 TABLET ORAL 2 TIMES DAILY WITH MEALS
Status: DISCONTINUED | OUTPATIENT
Start: 2017-06-01 | End: 2017-06-01 | Stop reason: HOSPADM

## 2017-06-01 RX ORDER — FUROSEMIDE 40 MG/1
40 TABLET ORAL DAILY
Qty: 60 TABLET | Refills: 3 | Status: SHIPPED | OUTPATIENT
Start: 2017-06-02 | End: 2017-06-08 | Stop reason: DRUGHIGH

## 2017-06-01 RX ORDER — DILTIAZEM HYDROCHLORIDE 60 MG/1
60 TABLET, FILM COATED ORAL EVERY 8 HOURS SCHEDULED
Qty: 120 TABLET | Refills: 3 | Status: SHIPPED | OUTPATIENT
Start: 2017-06-01 | End: 2017-07-13 | Stop reason: SDUPTHER

## 2017-06-01 RX ORDER — FUROSEMIDE 40 MG/1
40 TABLET ORAL DAILY
Status: DISCONTINUED | OUTPATIENT
Start: 2017-06-02 | End: 2017-06-01 | Stop reason: HOSPADM

## 2017-06-01 RX ORDER — SPIRONOLACTONE 25 MG/1
25 TABLET ORAL DAILY
Status: DISCONTINUED | OUTPATIENT
Start: 2017-06-01 | End: 2017-06-01 | Stop reason: HOSPADM

## 2017-06-01 RX ADMIN — SPIRONOLACTONE 25 MG: 25 TABLET ORAL at 09:19

## 2017-06-01 RX ADMIN — DILTIAZEM HYDROCHLORIDE 60 MG: 60 TABLET, FILM COATED ORAL at 13:39

## 2017-06-01 RX ADMIN — PANTOPRAZOLE SODIUM 40 MG: 40 TABLET, DELAYED RELEASE ORAL at 05:30

## 2017-06-01 RX ADMIN — IRON SUCROSE 500 MG: 20 INJECTION, SOLUTION INTRAVENOUS at 09:19

## 2017-06-01 RX ADMIN — ALLOPURINOL 150 MG: 300 TABLET ORAL at 17:25

## 2017-06-01 RX ADMIN — NAPROXEN 500 MG: 250 TABLET ORAL at 09:35

## 2017-06-01 RX ADMIN — Medication 10 ML: at 09:35

## 2017-06-01 RX ADMIN — DILTIAZEM HYDROCHLORIDE 60 MG: 60 TABLET, FILM COATED ORAL at 05:30

## 2017-06-01 RX ADMIN — FUROSEMIDE 40 MG: 10 INJECTION, SOLUTION INTRAMUSCULAR; INTRAVENOUS at 13:37

## 2017-06-01 RX ADMIN — ACETAMINOPHEN 650 MG: 325 TABLET ORAL at 00:22

## 2017-06-01 RX ADMIN — NAPROXEN 500 MG: 250 TABLET ORAL at 17:24

## 2017-06-01 ASSESSMENT — PAIN DESCRIPTION - ORIENTATION
ORIENTATION: LEFT

## 2017-06-01 ASSESSMENT — PAIN DESCRIPTION - LOCATION
LOCATION: TOE (COMMENT WHICH ONE)
LOCATION: LEG
LOCATION: TOE (COMMENT WHICH ONE)
LOCATION: TOE (COMMENT WHICH ONE)

## 2017-06-01 ASSESSMENT — PAIN SCALES - GENERAL
PAINLEVEL_OUTOF10: 3
PAINLEVEL_OUTOF10: 2
PAINLEVEL_OUTOF10: 3
PAINLEVEL_OUTOF10: 3
PAINLEVEL_OUTOF10: 2
PAINLEVEL_OUTOF10: 2

## 2017-06-01 ASSESSMENT — PAIN DESCRIPTION - FREQUENCY: FREQUENCY: INTERMITTENT

## 2017-06-01 ASSESSMENT — PAIN DESCRIPTION - ONSET: ONSET: UNABLE TO TELL

## 2017-06-01 ASSESSMENT — PAIN DESCRIPTION - PAIN TYPE
TYPE: CHRONIC PAIN
TYPE: CHRONIC PAIN
TYPE: ACUTE PAIN

## 2017-06-01 ASSESSMENT — PAIN DESCRIPTION - DESCRIPTORS: DESCRIPTORS: ACHING

## 2017-06-05 ENCOUNTER — TELEPHONE (OUTPATIENT)
Dept: CARDIOLOGY CLINIC | Age: 72
End: 2017-06-05

## 2017-06-05 ENCOUNTER — TELEPHONE (OUTPATIENT)
Dept: PHARMACY | Age: 72
End: 2017-06-05

## 2017-06-05 ENCOUNTER — APPOINTMENT (OUTPATIENT)
Dept: PHARMACY | Age: 72
End: 2017-06-05
Payer: MEDICARE

## 2017-06-05 DIAGNOSIS — K92.0 GASTROINTESTINAL HEMORRHAGE WITH HEMATEMESIS: Primary | ICD-10-CM

## 2017-06-08 ENCOUNTER — OFFICE VISIT (OUTPATIENT)
Dept: CARDIOLOGY CLINIC | Age: 72
End: 2017-06-08
Payer: MEDICARE

## 2017-06-08 ENCOUNTER — HOSPITAL ENCOUNTER (OUTPATIENT)
Age: 72
Discharge: HOME | End: 2017-06-08
Payer: MEDICARE

## 2017-06-08 ENCOUNTER — HOSPITAL ENCOUNTER (OUTPATIENT)
Dept: PHARMACY | Age: 72
Setting detail: THERAPIES SERIES
Discharge: HOME | End: 2017-06-08
Payer: MEDICARE

## 2017-06-08 ENCOUNTER — HOSPITAL ENCOUNTER (OUTPATIENT)
Dept: GENERAL RADIOLOGY | Age: 72
Discharge: HOME | End: 2017-06-08
Payer: MEDICARE

## 2017-06-08 VITALS
BODY MASS INDEX: 48.54 KG/M2 | WEIGHT: 274 LBS | SYSTOLIC BLOOD PRESSURE: 98 MMHG | DIASTOLIC BLOOD PRESSURE: 72 MMHG | HEART RATE: 76 BPM

## 2017-06-08 VITALS
WEIGHT: 274 LBS | BODY MASS INDEX: 48.54 KG/M2 | HEART RATE: 97 BPM | SYSTOLIC BLOOD PRESSURE: 118 MMHG | OXYGEN SATURATION: 93 % | DIASTOLIC BLOOD PRESSURE: 60 MMHG

## 2017-06-08 DIAGNOSIS — I48.91 ATRIAL FIBRILLATION WITH RAPID VENTRICULAR RESPONSE (HCC): ICD-10-CM

## 2017-06-08 DIAGNOSIS — I10 ESSENTIAL HYPERTENSION: ICD-10-CM

## 2017-06-08 DIAGNOSIS — D50.0 IRON DEFICIENCY ANEMIA DUE TO CHRONIC BLOOD LOSS: ICD-10-CM

## 2017-06-08 DIAGNOSIS — D64.9 ANEMIA, UNSPECIFIED TYPE: ICD-10-CM

## 2017-06-08 DIAGNOSIS — R05.9 COUGH: ICD-10-CM

## 2017-06-08 DIAGNOSIS — Z79.01 LONG TERM CURRENT USE OF ANTICOAGULANT THERAPY: ICD-10-CM

## 2017-06-08 DIAGNOSIS — I48.20 CHRONIC ATRIAL FIBRILLATION (HCC): ICD-10-CM

## 2017-06-08 DIAGNOSIS — R06.02 SOB (SHORTNESS OF BREATH): Primary | ICD-10-CM

## 2017-06-08 DIAGNOSIS — R06.02 SOB (SHORTNESS OF BREATH): ICD-10-CM

## 2017-06-08 DIAGNOSIS — K92.0 GASTROINTESTINAL HEMORRHAGE WITH HEMATEMESIS: ICD-10-CM

## 2017-06-08 DIAGNOSIS — R93.89 ABNORMAL CXR: ICD-10-CM

## 2017-06-08 LAB
ABSOLUTE EOS #: 0.1 K/UL (ref 0–0.4)
ABSOLUTE LYMPH #: 1.1 K/UL (ref 1.1–2.7)
ABSOLUTE MONO #: 1.2 K/UL (ref 0–1)
BASOPHILS # BLD: 0 %
BASOPHILS ABSOLUTE: 0 K/UL (ref 0–0.2)
DIFFERENTIAL TYPE: YES
EOSINOPHILS RELATIVE PERCENT: 1 %
HCT VFR BLD CALC: 26.3 % (ref 36–46)
HEMOGLOBIN: 8.4 G/DL (ref 12–16)
INR BLD: 1.3
LYMPHOCYTES # BLD: 8 %
MCH RBC QN AUTO: 28.6 PG (ref 26–34)
MCHC RBC AUTO-ENTMCNC: 32.1 G/DL (ref 31–37)
MCV RBC AUTO: 89.2 FL (ref 80–100)
MONOCYTES # BLD: 10 %
PDW BLD-RTO: 17.4 % (ref 12.1–15.2)
PLATELET # BLD: 293 K/UL (ref 140–450)
PLATELET ESTIMATE: ABNORMAL
PMV BLD AUTO: ABNORMAL FL (ref 6–12)
RBC # BLD: 2.95 M/UL (ref 4–5.2)
RBC # BLD: ABNORMAL 10*6/UL
SEG NEUTROPHILS: 81 %
SEGMENTED NEUTROPHILS ABSOLUTE COUNT: 10.3 K/UL (ref 2.5–7)
WBC # BLD: 12.8 K/UL (ref 3.5–11)
WBC # BLD: ABNORMAL 10*3/UL

## 2017-06-08 PROCEDURE — 99213 OFFICE O/P EST LOW 20 MIN: CPT | Performed by: INTERNAL MEDICINE

## 2017-06-08 PROCEDURE — 85025 COMPLETE CBC W/AUTO DIFF WBC: CPT

## 2017-06-08 PROCEDURE — 71020 XR CHEST STANDARD TWO VW: CPT

## 2017-06-08 PROCEDURE — 36415 COLL VENOUS BLD VENIPUNCTURE: CPT

## 2017-06-08 PROCEDURE — 99211 OFF/OP EST MAY X REQ PHY/QHP: CPT

## 2017-06-08 PROCEDURE — 85610 PROTHROMBIN TIME: CPT

## 2017-06-08 RX ORDER — WARFARIN SODIUM 1 MG/1
1 TABLET ORAL
COMMUNITY
End: 2017-06-08 | Stop reason: ALTCHOICE

## 2017-06-08 RX ORDER — SPIRONOLACTONE 25 MG/1
50 TABLET ORAL DAILY
Qty: 90 TABLET | Refills: 1 | Status: SHIPPED | OUTPATIENT
Start: 2017-06-08 | End: 2017-08-10 | Stop reason: DRUGHIGH

## 2017-06-08 RX ORDER — FUROSEMIDE 40 MG/1
80 TABLET ORAL DAILY
Qty: 60 TABLET | Refills: 3 | Status: SHIPPED | OUTPATIENT
Start: 2017-06-08 | End: 2017-06-15 | Stop reason: SDUPTHER

## 2017-06-08 RX ORDER — WARFARIN SODIUM 10 MG/1
10 TABLET ORAL DAILY
COMMUNITY
End: 2017-06-22 | Stop reason: SDUPTHER

## 2017-06-09 ENCOUNTER — TELEPHONE (OUTPATIENT)
Dept: CARDIOLOGY CLINIC | Age: 72
End: 2017-06-09

## 2017-06-09 ENCOUNTER — HOSPITAL ENCOUNTER (OUTPATIENT)
Dept: CT IMAGING | Age: 72
Discharge: HOME | End: 2017-06-09
Payer: MEDICARE

## 2017-06-09 DIAGNOSIS — R06.02 SOB (SHORTNESS OF BREATH): ICD-10-CM

## 2017-06-09 DIAGNOSIS — R93.89 ABNORMAL CXR: ICD-10-CM

## 2017-06-09 DIAGNOSIS — J18.9 PNEUMONIA DUE TO INFECTIOUS ORGANISM, UNSPECIFIED LATERALITY, UNSPECIFIED PART OF LUNG: Primary | ICD-10-CM

## 2017-06-09 PROCEDURE — 71260 CT THORAX DX C+: CPT

## 2017-06-09 PROCEDURE — 6360000004 HC RX CONTRAST MEDICATION: Performed by: INTERNAL MEDICINE

## 2017-06-09 RX ORDER — LEVOFLOXACIN 500 MG/1
500 TABLET, FILM COATED ORAL DAILY
Qty: 7 TABLET | Refills: 0 | Status: SHIPPED | OUTPATIENT
Start: 2017-06-09 | End: 2017-06-15 | Stop reason: ALTCHOICE

## 2017-06-09 RX ORDER — PREDNISONE 20 MG/1
20 TABLET ORAL DAILY
Qty: 10 TABLET | Refills: 0 | Status: SHIPPED | OUTPATIENT
Start: 2017-06-09 | End: 2017-06-19

## 2017-06-09 RX ADMIN — IOVERSOL 75 ML: 741 INJECTION INTRA-ARTERIAL; INTRAVENOUS at 10:22

## 2017-06-12 ENCOUNTER — HOSPITAL ENCOUNTER (OUTPATIENT)
Dept: PHARMACY | Age: 72
Setting detail: THERAPIES SERIES
Discharge: HOME | End: 2017-06-12
Payer: MEDICARE

## 2017-06-12 ENCOUNTER — TELEPHONE (OUTPATIENT)
Dept: CARDIOLOGY CLINIC | Age: 72
End: 2017-06-12

## 2017-06-12 VITALS — BODY MASS INDEX: 46.91 KG/M2 | WEIGHT: 264.8 LBS

## 2017-06-12 DIAGNOSIS — Z79.01 LONG TERM CURRENT USE OF ANTICOAGULANT THERAPY: ICD-10-CM

## 2017-06-12 DIAGNOSIS — R07.89 OTHER CHEST PAIN: ICD-10-CM

## 2017-06-12 DIAGNOSIS — I27.20 PULMONARY HTN (HCC): ICD-10-CM

## 2017-06-12 DIAGNOSIS — I48.91 ATRIAL FIBRILLATION WITH RAPID VENTRICULAR RESPONSE (HCC): Primary | ICD-10-CM

## 2017-06-12 DIAGNOSIS — I48.20 CHRONIC ATRIAL FIBRILLATION (HCC): ICD-10-CM

## 2017-06-12 LAB — INR BLD: 2

## 2017-06-12 PROCEDURE — 99211 OFF/OP EST MAY X REQ PHY/QHP: CPT

## 2017-06-12 PROCEDURE — 85610 PROTHROMBIN TIME: CPT

## 2017-06-15 ENCOUNTER — HOSPITAL ENCOUNTER (OUTPATIENT)
Age: 72
Discharge: HOME | End: 2017-06-15
Payer: MEDICARE

## 2017-06-15 ENCOUNTER — HOSPITAL ENCOUNTER (OUTPATIENT)
Dept: GENERAL RADIOLOGY | Age: 72
Discharge: HOME | End: 2017-06-15
Payer: MEDICARE

## 2017-06-15 ENCOUNTER — HOSPITAL ENCOUNTER (OUTPATIENT)
Dept: PHARMACY | Age: 72
Setting detail: THERAPIES SERIES
Discharge: HOME | End: 2017-06-15
Payer: MEDICARE

## 2017-06-15 ENCOUNTER — OFFICE VISIT (OUTPATIENT)
Dept: CARDIOLOGY CLINIC | Age: 72
End: 2017-06-15
Payer: MEDICARE

## 2017-06-15 VITALS
OXYGEN SATURATION: 94 % | DIASTOLIC BLOOD PRESSURE: 60 MMHG | SYSTOLIC BLOOD PRESSURE: 120 MMHG | WEIGHT: 253 LBS | BODY MASS INDEX: 44.82 KG/M2 | HEART RATE: 103 BPM

## 2017-06-15 VITALS
WEIGHT: 254.4 LBS | BODY MASS INDEX: 45.06 KG/M2 | DIASTOLIC BLOOD PRESSURE: 62 MMHG | HEART RATE: 86 BPM | SYSTOLIC BLOOD PRESSURE: 103 MMHG

## 2017-06-15 DIAGNOSIS — Z79.01 LONG TERM CURRENT USE OF ANTICOAGULANT THERAPY: ICD-10-CM

## 2017-06-15 DIAGNOSIS — I27.20 PULMONARY HTN (HCC): Primary | ICD-10-CM

## 2017-06-15 DIAGNOSIS — I48.91 ATRIAL FIBRILLATION WITH RAPID VENTRICULAR RESPONSE (HCC): ICD-10-CM

## 2017-06-15 DIAGNOSIS — R07.89 OTHER CHEST PAIN: ICD-10-CM

## 2017-06-15 DIAGNOSIS — D64.9 ANEMIA, UNSPECIFIED TYPE: ICD-10-CM

## 2017-06-15 DIAGNOSIS — I10 ESSENTIAL HYPERTENSION: ICD-10-CM

## 2017-06-15 DIAGNOSIS — I27.20 PULMONARY HTN (HCC): ICD-10-CM

## 2017-06-15 DIAGNOSIS — D50.0 IRON DEFICIENCY ANEMIA DUE TO CHRONIC BLOOD LOSS: ICD-10-CM

## 2017-06-15 DIAGNOSIS — I48.20 CHRONIC ATRIAL FIBRILLATION (HCC): ICD-10-CM

## 2017-06-15 LAB
ABSOLUTE EOS #: 0.2 K/UL (ref 0–0.4)
ABSOLUTE LYMPH #: 2.6 K/UL (ref 1.1–2.7)
ABSOLUTE MONO #: 1 K/UL (ref 0–1)
ANION GAP SERPL CALCULATED.3IONS-SCNC: 16 MMOL/L (ref 9–17)
BASOPHILS # BLD: 0 %
BASOPHILS ABSOLUTE: 0 K/UL (ref 0–0.2)
BUN BLDV-MCNC: 35 MG/DL (ref 8–23)
BUN/CREAT BLD: 27 (ref 9–20)
CALCIUM SERPL-MCNC: 9.1 MG/DL (ref 8.6–10.4)
CHLORIDE BLD-SCNC: 102 MMOL/L (ref 98–107)
CO2: 25 MMOL/L (ref 20–31)
CREAT SERPL-MCNC: 1.28 MG/DL (ref 0.5–0.9)
DIFFERENTIAL TYPE: YES
EOSINOPHILS RELATIVE PERCENT: 1 %
GFR AFRICAN AMERICAN: 49 ML/MIN
GFR NON-AFRICAN AMERICAN: 41 ML/MIN
GFR SERPL CREATININE-BSD FRML MDRD: ABNORMAL ML/MIN/{1.73_M2}
GFR SERPL CREATININE-BSD FRML MDRD: ABNORMAL ML/MIN/{1.73_M2}
GLUCOSE BLD-MCNC: 115 MG/DL (ref 70–99)
HCT VFR BLD CALC: 31 % (ref 36–46)
HEMOGLOBIN: 9.9 G/DL (ref 12–16)
INR BLD: 2.3
LYMPHOCYTES # BLD: 18 %
MCH RBC QN AUTO: 28 PG (ref 26–34)
MCHC RBC AUTO-ENTMCNC: 32 G/DL (ref 31–37)
MCV RBC AUTO: 87.5 FL (ref 80–100)
MONOCYTES # BLD: 7 %
PDW BLD-RTO: 17.8 % (ref 12.1–15.2)
PLATELET # BLD: 393 K/UL (ref 140–450)
PLATELET ESTIMATE: ABNORMAL
PMV BLD AUTO: ABNORMAL FL (ref 6–12)
POTASSIUM SERPL-SCNC: 3.8 MMOL/L (ref 3.7–5.3)
RBC # BLD: 3.54 M/UL (ref 4–5.2)
RBC # BLD: ABNORMAL 10*6/UL
SEG NEUTROPHILS: 74 %
SEGMENTED NEUTROPHILS ABSOLUTE COUNT: 10.7 K/UL (ref 2.5–7)
SODIUM BLD-SCNC: 143 MMOL/L (ref 135–144)
WBC # BLD: 14.4 K/UL (ref 3.5–11)
WBC # BLD: ABNORMAL 10*3/UL

## 2017-06-15 PROCEDURE — 36415 COLL VENOUS BLD VENIPUNCTURE: CPT

## 2017-06-15 PROCEDURE — 85610 PROTHROMBIN TIME: CPT

## 2017-06-15 PROCEDURE — 99211 OFF/OP EST MAY X REQ PHY/QHP: CPT

## 2017-06-15 PROCEDURE — 71020 XR CHEST STANDARD TWO VW: CPT

## 2017-06-15 PROCEDURE — 1123F ACP DISCUSS/DSCN MKR DOCD: CPT | Performed by: INTERNAL MEDICINE

## 2017-06-15 PROCEDURE — 1090F PRES/ABSN URINE INCON ASSESS: CPT | Performed by: INTERNAL MEDICINE

## 2017-06-15 PROCEDURE — 85025 COMPLETE CBC W/AUTO DIFF WBC: CPT

## 2017-06-15 PROCEDURE — 3014F SCREEN MAMMO DOC REV: CPT | Performed by: INTERNAL MEDICINE

## 2017-06-15 PROCEDURE — 1036F TOBACCO NON-USER: CPT | Performed by: INTERNAL MEDICINE

## 2017-06-15 PROCEDURE — 4040F PNEUMOC VAC/ADMIN/RCVD: CPT | Performed by: INTERNAL MEDICINE

## 2017-06-15 PROCEDURE — 80048 BASIC METABOLIC PNL TOTAL CA: CPT

## 2017-06-15 PROCEDURE — 99213 OFFICE O/P EST LOW 20 MIN: CPT | Performed by: INTERNAL MEDICINE

## 2017-06-15 PROCEDURE — 3017F COLORECTAL CA SCREEN DOC REV: CPT | Performed by: INTERNAL MEDICINE

## 2017-06-15 PROCEDURE — G8427 DOCREV CUR MEDS BY ELIG CLIN: HCPCS | Performed by: INTERNAL MEDICINE

## 2017-06-15 PROCEDURE — G8417 CALC BMI ABV UP PARAM F/U: HCPCS | Performed by: INTERNAL MEDICINE

## 2017-06-15 PROCEDURE — 1111F DSCHRG MED/CURRENT MED MERGE: CPT | Performed by: INTERNAL MEDICINE

## 2017-06-15 PROCEDURE — G8400 PT W/DXA NO RESULTS DOC: HCPCS | Performed by: INTERNAL MEDICINE

## 2017-06-15 RX ORDER — FUROSEMIDE 40 MG/1
40 TABLET ORAL DAILY
Qty: 60 TABLET | Refills: 3 | Status: SHIPPED | OUTPATIENT
Start: 2017-06-15 | End: 2017-07-17 | Stop reason: ALTCHOICE

## 2017-06-15 RX ORDER — FUROSEMIDE 40 MG/1
80 TABLET ORAL DAILY
Qty: 60 TABLET | Refills: 3 | Status: SHIPPED | OUTPATIENT
Start: 2017-06-15 | End: 2017-06-22 | Stop reason: DRUGHIGH

## 2017-06-22 ENCOUNTER — HOSPITAL ENCOUNTER (OUTPATIENT)
Dept: PHARMACY | Age: 72
Setting detail: THERAPIES SERIES
Discharge: HOME | End: 2017-06-22
Payer: MEDICARE

## 2017-06-22 VITALS
SYSTOLIC BLOOD PRESSURE: 114 MMHG | HEART RATE: 76 BPM | WEIGHT: 251.8 LBS | DIASTOLIC BLOOD PRESSURE: 72 MMHG | BODY MASS INDEX: 44.6 KG/M2

## 2017-06-22 DIAGNOSIS — Z79.01 LONG TERM CURRENT USE OF ANTICOAGULANT THERAPY: ICD-10-CM

## 2017-06-22 DIAGNOSIS — I48.20 CHRONIC ATRIAL FIBRILLATION (HCC): ICD-10-CM

## 2017-06-22 LAB — INR BLD: 2.7

## 2017-06-22 PROCEDURE — 99211 OFF/OP EST MAY X REQ PHY/QHP: CPT

## 2017-06-22 PROCEDURE — 85610 PROTHROMBIN TIME: CPT

## 2017-06-22 RX ORDER — WARFARIN SODIUM 10 MG/1
TABLET ORAL
Qty: 90 TABLET | Refills: 0 | Status: SHIPPED
Start: 2017-06-22 | End: 2017-07-13 | Stop reason: DRUGHIGH

## 2017-07-05 ENCOUNTER — HOSPITAL ENCOUNTER (OUTPATIENT)
Dept: PHARMACY | Age: 72
Setting detail: THERAPIES SERIES
Discharge: HOME | End: 2017-07-05
Payer: MEDICARE

## 2017-07-05 VITALS — BODY MASS INDEX: 45.6 KG/M2 | SYSTOLIC BLOOD PRESSURE: 110 MMHG | WEIGHT: 257.4 LBS | DIASTOLIC BLOOD PRESSURE: 82 MMHG

## 2017-07-05 DIAGNOSIS — I48.20 CHRONIC ATRIAL FIBRILLATION (HCC): ICD-10-CM

## 2017-07-05 DIAGNOSIS — Z79.01 LONG TERM CURRENT USE OF ANTICOAGULANT THERAPY: ICD-10-CM

## 2017-07-05 LAB — INR BLD: 3

## 2017-07-05 PROCEDURE — 85610 PROTHROMBIN TIME: CPT

## 2017-07-05 PROCEDURE — 99211 OFF/OP EST MAY X REQ PHY/QHP: CPT

## 2017-07-13 ENCOUNTER — HOSPITAL ENCOUNTER (OUTPATIENT)
Dept: PHARMACY | Age: 72
Setting detail: THERAPIES SERIES
Discharge: HOME | End: 2017-07-13
Payer: MEDICARE

## 2017-07-13 ENCOUNTER — OFFICE VISIT (OUTPATIENT)
Dept: CARDIOLOGY CLINIC | Age: 72
End: 2017-07-13
Payer: MEDICARE

## 2017-07-13 ENCOUNTER — HOSPITAL ENCOUNTER (OUTPATIENT)
Age: 72
Discharge: HOME | End: 2017-07-13
Payer: MEDICARE

## 2017-07-13 VITALS
SYSTOLIC BLOOD PRESSURE: 108 MMHG | OXYGEN SATURATION: 92 % | HEART RATE: 68 BPM | BODY MASS INDEX: 45.88 KG/M2 | WEIGHT: 259 LBS | DIASTOLIC BLOOD PRESSURE: 64 MMHG

## 2017-07-13 VITALS
DIASTOLIC BLOOD PRESSURE: 68 MMHG | SYSTOLIC BLOOD PRESSURE: 112 MMHG | WEIGHT: 260.8 LBS | HEART RATE: 78 BPM | BODY MASS INDEX: 46.2 KG/M2

## 2017-07-13 DIAGNOSIS — D50.0 IRON DEFICIENCY ANEMIA DUE TO CHRONIC BLOOD LOSS: ICD-10-CM

## 2017-07-13 DIAGNOSIS — I10 ESSENTIAL HYPERTENSION: ICD-10-CM

## 2017-07-13 DIAGNOSIS — I48.20 CHRONIC ATRIAL FIBRILLATION (HCC): ICD-10-CM

## 2017-07-13 DIAGNOSIS — I48.91 ATRIAL FIBRILLATION WITH RAPID VENTRICULAR RESPONSE (HCC): Primary | ICD-10-CM

## 2017-07-13 DIAGNOSIS — Z79.01 LONG TERM CURRENT USE OF ANTICOAGULANT THERAPY: ICD-10-CM

## 2017-07-13 DIAGNOSIS — I27.20 PULMONARY HTN (HCC): ICD-10-CM

## 2017-07-13 LAB
ANION GAP SERPL CALCULATED.3IONS-SCNC: 17 MMOL/L (ref 9–17)
BUN BLDV-MCNC: 24 MG/DL (ref 8–23)
BUN/CREAT BLD: 20 (ref 9–20)
CALCIUM SERPL-MCNC: 9 MG/DL (ref 8.6–10.4)
CHLORIDE BLD-SCNC: 102 MMOL/L (ref 98–107)
CO2: 22 MMOL/L (ref 20–31)
CREAT SERPL-MCNC: 1.22 MG/DL (ref 0.5–0.9)
GFR AFRICAN AMERICAN: 52 ML/MIN
GFR NON-AFRICAN AMERICAN: 43 ML/MIN
GFR SERPL CREATININE-BSD FRML MDRD: ABNORMAL ML/MIN/{1.73_M2}
GFR SERPL CREATININE-BSD FRML MDRD: ABNORMAL ML/MIN/{1.73_M2}
GLUCOSE BLD-MCNC: 111 MG/DL (ref 70–99)
INR BLD: 2.9
POTASSIUM SERPL-SCNC: 4.1 MMOL/L (ref 3.7–5.3)
SODIUM BLD-SCNC: 141 MMOL/L (ref 135–144)

## 2017-07-13 PROCEDURE — 80048 BASIC METABOLIC PNL TOTAL CA: CPT

## 2017-07-13 PROCEDURE — 1090F PRES/ABSN URINE INCON ASSESS: CPT | Performed by: INTERNAL MEDICINE

## 2017-07-13 PROCEDURE — 1123F ACP DISCUSS/DSCN MKR DOCD: CPT | Performed by: INTERNAL MEDICINE

## 2017-07-13 PROCEDURE — 85610 PROTHROMBIN TIME: CPT

## 2017-07-13 PROCEDURE — 3017F COLORECTAL CA SCREEN DOC REV: CPT | Performed by: INTERNAL MEDICINE

## 2017-07-13 PROCEDURE — 36415 COLL VENOUS BLD VENIPUNCTURE: CPT

## 2017-07-13 PROCEDURE — G8400 PT W/DXA NO RESULTS DOC: HCPCS | Performed by: INTERNAL MEDICINE

## 2017-07-13 PROCEDURE — 99211 OFF/OP EST MAY X REQ PHY/QHP: CPT

## 2017-07-13 PROCEDURE — 99212 OFFICE O/P EST SF 10 MIN: CPT | Performed by: INTERNAL MEDICINE

## 2017-07-13 PROCEDURE — 3014F SCREEN MAMMO DOC REV: CPT | Performed by: INTERNAL MEDICINE

## 2017-07-13 PROCEDURE — G8417 CALC BMI ABV UP PARAM F/U: HCPCS | Performed by: INTERNAL MEDICINE

## 2017-07-13 PROCEDURE — 4040F PNEUMOC VAC/ADMIN/RCVD: CPT | Performed by: INTERNAL MEDICINE

## 2017-07-13 PROCEDURE — G8428 CUR MEDS NOT DOCUMENT: HCPCS | Performed by: INTERNAL MEDICINE

## 2017-07-13 PROCEDURE — 1036F TOBACCO NON-USER: CPT | Performed by: INTERNAL MEDICINE

## 2017-07-13 RX ORDER — WARFARIN SODIUM 10 MG/1
TABLET ORAL
Qty: 90 TABLET | Refills: 0 | Status: SHIPPED
Start: 2017-07-13 | End: 2017-10-25 | Stop reason: DRUGHIGH

## 2017-07-13 RX ORDER — DILTIAZEM HYDROCHLORIDE 60 MG/1
60 TABLET, FILM COATED ORAL EVERY 8 HOURS SCHEDULED
Qty: 120 TABLET | Refills: 3 | Status: SHIPPED | OUTPATIENT
Start: 2017-07-13 | End: 2017-08-03 | Stop reason: SDUPTHER

## 2017-07-17 ENCOUNTER — TELEPHONE (OUTPATIENT)
Dept: CARDIOLOGY CLINIC | Age: 72
End: 2017-07-17

## 2017-07-17 DIAGNOSIS — R60.0 BILATERAL EDEMA OF LOWER EXTREMITY: Primary | ICD-10-CM

## 2017-07-17 RX ORDER — FUROSEMIDE 20 MG/1
40 TABLET ORAL 2 TIMES DAILY
COMMUNITY
End: 2017-09-06 | Stop reason: SDUPTHER

## 2017-07-21 ENCOUNTER — TELEPHONE (OUTPATIENT)
Dept: CARDIOLOGY CLINIC | Age: 72
End: 2017-07-21

## 2017-07-21 ENCOUNTER — HOSPITAL ENCOUNTER (OUTPATIENT)
Age: 72
Discharge: HOME | End: 2017-07-21
Payer: MEDICARE

## 2017-07-21 DIAGNOSIS — R60.0 BILATERAL EDEMA OF LOWER EXTREMITY: ICD-10-CM

## 2017-07-21 LAB
ANION GAP SERPL CALCULATED.3IONS-SCNC: 19 MMOL/L (ref 9–17)
BUN BLDV-MCNC: 30 MG/DL (ref 8–23)
BUN/CREAT BLD: 23 (ref 9–20)
CALCIUM SERPL-MCNC: 9.5 MG/DL (ref 8.6–10.4)
CHLORIDE BLD-SCNC: 99 MMOL/L (ref 98–107)
CO2: 23 MMOL/L (ref 20–31)
CREAT SERPL-MCNC: 1.31 MG/DL (ref 0.5–0.9)
GFR AFRICAN AMERICAN: 48 ML/MIN
GFR NON-AFRICAN AMERICAN: 40 ML/MIN
GFR SERPL CREATININE-BSD FRML MDRD: ABNORMAL ML/MIN/{1.73_M2}
GFR SERPL CREATININE-BSD FRML MDRD: ABNORMAL ML/MIN/{1.73_M2}
GLUCOSE BLD-MCNC: 109 MG/DL (ref 70–99)
POTASSIUM SERPL-SCNC: 4.2 MMOL/L (ref 3.7–5.3)
SODIUM BLD-SCNC: 141 MMOL/L (ref 135–144)

## 2017-07-21 PROCEDURE — 36415 COLL VENOUS BLD VENIPUNCTURE: CPT

## 2017-07-21 PROCEDURE — 80048 BASIC METABOLIC PNL TOTAL CA: CPT

## 2017-08-03 RX ORDER — DILTIAZEM HYDROCHLORIDE 60 MG/1
60 TABLET, FILM COATED ORAL EVERY 8 HOURS SCHEDULED
Qty: 120 TABLET | Refills: 3 | Status: SHIPPED | OUTPATIENT
Start: 2017-08-03 | End: 2017-10-16 | Stop reason: SDUPTHER

## 2017-08-10 ENCOUNTER — HOSPITAL ENCOUNTER (OUTPATIENT)
Dept: PHARMACY | Age: 72
Setting detail: THERAPIES SERIES
Discharge: HOME | End: 2017-08-10
Payer: MEDICARE

## 2017-08-10 VITALS
WEIGHT: 253.4 LBS | HEART RATE: 92 BPM | BODY MASS INDEX: 44.89 KG/M2 | SYSTOLIC BLOOD PRESSURE: 120 MMHG | DIASTOLIC BLOOD PRESSURE: 76 MMHG

## 2017-08-10 DIAGNOSIS — Z79.01 LONG TERM CURRENT USE OF ANTICOAGULANT THERAPY: ICD-10-CM

## 2017-08-10 DIAGNOSIS — I48.20 CHRONIC ATRIAL FIBRILLATION (HCC): ICD-10-CM

## 2017-08-10 LAB — INR BLD: 2.7

## 2017-08-10 PROCEDURE — 85610 PROTHROMBIN TIME: CPT

## 2017-08-10 PROCEDURE — 99211 OFF/OP EST MAY X REQ PHY/QHP: CPT

## 2017-08-10 RX ORDER — SPIRONOLACTONE 25 MG/1
25 TABLET ORAL 2 TIMES DAILY
COMMUNITY
End: 2017-09-28 | Stop reason: SDUPTHER

## 2017-08-21 RX ORDER — ALLOPURINOL 300 MG/1
150 TABLET ORAL EVERY EVENING
Qty: 45 TABLET | Refills: 1 | Status: SHIPPED | OUTPATIENT
Start: 2017-08-21 | End: 2017-12-29 | Stop reason: SDUPTHER

## 2017-09-06 ENCOUNTER — HOSPITAL ENCOUNTER (OUTPATIENT)
Dept: PHARMACY | Age: 72
Setting detail: THERAPIES SERIES
Discharge: HOME | End: 2017-09-06
Payer: MEDICARE

## 2017-09-06 VITALS
BODY MASS INDEX: 45.28 KG/M2 | SYSTOLIC BLOOD PRESSURE: 152 MMHG | DIASTOLIC BLOOD PRESSURE: 71 MMHG | HEART RATE: 82 BPM | WEIGHT: 255.6 LBS

## 2017-09-06 DIAGNOSIS — Z79.01 LONG TERM CURRENT USE OF ANTICOAGULANT THERAPY: ICD-10-CM

## 2017-09-06 DIAGNOSIS — I48.20 CHRONIC ATRIAL FIBRILLATION (HCC): ICD-10-CM

## 2017-09-06 LAB — INR BLD: 3

## 2017-09-06 PROCEDURE — 99211 OFF/OP EST MAY X REQ PHY/QHP: CPT

## 2017-09-06 PROCEDURE — 85610 PROTHROMBIN TIME: CPT

## 2017-09-06 RX ORDER — FUROSEMIDE 40 MG/1
40 TABLET ORAL 2 TIMES DAILY
COMMUNITY
End: 2017-09-28 | Stop reason: SDUPTHER

## 2017-09-22 ENCOUNTER — HOSPITAL ENCOUNTER (OUTPATIENT)
Age: 72
Discharge: HOME | End: 2017-09-22
Payer: MEDICARE

## 2017-09-22 DIAGNOSIS — D50.0 IRON DEFICIENCY ANEMIA DUE TO CHRONIC BLOOD LOSS: ICD-10-CM

## 2017-09-22 DIAGNOSIS — I10 ESSENTIAL HYPERTENSION: ICD-10-CM

## 2017-09-22 DIAGNOSIS — I48.91 ATRIAL FIBRILLATION WITH RAPID VENTRICULAR RESPONSE (HCC): ICD-10-CM

## 2017-09-22 LAB
ABSOLUTE EOS #: 0.6 K/UL (ref 0–0.4)
ABSOLUTE LYMPH #: 1.3 K/UL (ref 1–4.8)
ABSOLUTE MONO #: 0.8 K/UL (ref 0–1)
ALBUMIN SERPL-MCNC: 4.3 G/DL (ref 3.5–5.2)
ALBUMIN/GLOBULIN RATIO: ABNORMAL (ref 1–2.5)
ALP BLD-CCNC: 83 U/L (ref 35–104)
ALT SERPL-CCNC: 22 U/L (ref 5–33)
ANION GAP SERPL CALCULATED.3IONS-SCNC: 14 MMOL/L (ref 9–17)
AST SERPL-CCNC: 23 U/L
BASOPHILS # BLD: 1 %
BASOPHILS ABSOLUTE: 0.1 K/UL (ref 0–0.2)
BILIRUB SERPL-MCNC: 0.4 MG/DL (ref 0.3–1.2)
BUN BLDV-MCNC: 28 MG/DL (ref 8–23)
BUN/CREAT BLD: 26 (ref 9–20)
CALCIUM SERPL-MCNC: 9.4 MG/DL (ref 8.6–10.4)
CHLORIDE BLD-SCNC: 99 MMOL/L (ref 98–107)
CO2: 25 MMOL/L (ref 20–31)
CREAT SERPL-MCNC: 1.09 MG/DL (ref 0.5–0.9)
DIFFERENTIAL TYPE: YES
EOSINOPHILS RELATIVE PERCENT: 6 %
GFR AFRICAN AMERICAN: 59 ML/MIN
GFR NON-AFRICAN AMERICAN: 49 ML/MIN
GFR SERPL CREATININE-BSD FRML MDRD: ABNORMAL ML/MIN/{1.73_M2}
GFR SERPL CREATININE-BSD FRML MDRD: ABNORMAL ML/MIN/{1.73_M2}
GLUCOSE BLD-MCNC: 113 MG/DL (ref 70–99)
HCT VFR BLD CALC: 41.5 % (ref 36–46)
HEMOGLOBIN: 13.9 G/DL (ref 12–16)
LYMPHOCYTES # BLD: 13 %
MCH RBC QN AUTO: 29.3 PG (ref 26–34)
MCHC RBC AUTO-ENTMCNC: 33.4 G/DL (ref 31–37)
MCV RBC AUTO: 87.8 FL (ref 80–100)
MONOCYTES # BLD: 9 %
PDW BLD-RTO: 17.2 % (ref 12.1–15.2)
PLATELET # BLD: 216 K/UL (ref 140–450)
PLATELET ESTIMATE: ABNORMAL
PMV BLD AUTO: ABNORMAL FL (ref 6–12)
POTASSIUM SERPL-SCNC: 4.2 MMOL/L (ref 3.7–5.3)
RBC # BLD: 4.73 M/UL (ref 4–5.2)
RBC # BLD: ABNORMAL 10*6/UL
SEG NEUTROPHILS: 71 %
SEGMENTED NEUTROPHILS ABSOLUTE COUNT: 6.9 K/UL (ref 2.5–7)
SODIUM BLD-SCNC: 138 MMOL/L (ref 135–144)
TOTAL PROTEIN: 7.8 G/DL (ref 6.4–8.3)
WBC # BLD: 9.8 K/UL (ref 3.5–11)
WBC # BLD: ABNORMAL 10*3/UL

## 2017-09-22 PROCEDURE — 85025 COMPLETE CBC W/AUTO DIFF WBC: CPT

## 2017-09-22 PROCEDURE — 36415 COLL VENOUS BLD VENIPUNCTURE: CPT

## 2017-09-22 PROCEDURE — 80053 COMPREHEN METABOLIC PANEL: CPT

## 2017-09-28 ENCOUNTER — OFFICE VISIT (OUTPATIENT)
Dept: CARDIOLOGY CLINIC | Age: 72
End: 2017-09-28
Payer: MEDICARE

## 2017-09-28 VITALS
BODY MASS INDEX: 45.17 KG/M2 | WEIGHT: 255 LBS | HEART RATE: 86 BPM | SYSTOLIC BLOOD PRESSURE: 110 MMHG | DIASTOLIC BLOOD PRESSURE: 60 MMHG | OXYGEN SATURATION: 96 %

## 2017-09-28 DIAGNOSIS — I48.20 CHRONIC ATRIAL FIBRILLATION (HCC): ICD-10-CM

## 2017-09-28 DIAGNOSIS — E55.9 VITAMIN D DEFICIENCY DISEASE: ICD-10-CM

## 2017-09-28 DIAGNOSIS — I10 ESSENTIAL HYPERTENSION: ICD-10-CM

## 2017-09-28 DIAGNOSIS — I95.9 HYPOTENSION, UNSPECIFIED HYPOTENSION TYPE: ICD-10-CM

## 2017-09-28 DIAGNOSIS — I48.91 ATRIAL FIBRILLATION WITH RAPID VENTRICULAR RESPONSE (HCC): Primary | ICD-10-CM

## 2017-09-28 PROCEDURE — 99214 OFFICE O/P EST MOD 30 MIN: CPT | Performed by: INTERNAL MEDICINE

## 2017-09-28 PROCEDURE — 1036F TOBACCO NON-USER: CPT | Performed by: INTERNAL MEDICINE

## 2017-09-28 PROCEDURE — G8417 CALC BMI ABV UP PARAM F/U: HCPCS | Performed by: INTERNAL MEDICINE

## 2017-09-28 PROCEDURE — 4040F PNEUMOC VAC/ADMIN/RCVD: CPT | Performed by: INTERNAL MEDICINE

## 2017-09-28 PROCEDURE — G8428 CUR MEDS NOT DOCUMENT: HCPCS | Performed by: INTERNAL MEDICINE

## 2017-09-28 PROCEDURE — 3014F SCREEN MAMMO DOC REV: CPT | Performed by: INTERNAL MEDICINE

## 2017-09-28 PROCEDURE — 1123F ACP DISCUSS/DSCN MKR DOCD: CPT | Performed by: INTERNAL MEDICINE

## 2017-09-28 PROCEDURE — 1090F PRES/ABSN URINE INCON ASSESS: CPT | Performed by: INTERNAL MEDICINE

## 2017-09-28 PROCEDURE — G8400 PT W/DXA NO RESULTS DOC: HCPCS | Performed by: INTERNAL MEDICINE

## 2017-09-28 PROCEDURE — 3017F COLORECTAL CA SCREEN DOC REV: CPT | Performed by: INTERNAL MEDICINE

## 2017-09-28 RX ORDER — SPIRONOLACTONE 25 MG/1
25 TABLET ORAL 2 TIMES DAILY
Qty: 90 TABLET | Refills: 3 | Status: SHIPPED | OUTPATIENT
Start: 2017-09-28 | End: 2018-02-17 | Stop reason: SDUPTHER

## 2017-09-28 RX ORDER — FUROSEMIDE 40 MG/1
40 TABLET ORAL 2 TIMES DAILY
Qty: 180 TABLET | Refills: 3 | Status: SHIPPED | OUTPATIENT
Start: 2017-09-28 | End: 2019-07-19 | Stop reason: DRUGHIGH

## 2017-09-28 NOTE — LETTER
Tyshawn Corrales M.D. [address] [place] [address]  [phone]          2017          Gisella Cm M.D.  [address] [address]      RE:  Anastasiya Conte  :  1945    Dear Dr. Issa Favorite:    CHIEF COMPLAINT:  1. Atrial fibrillation. 2.  CHF with shortness of breath. 3.  Moderate-to-severe mitral regurgitation. HISTORY OF PRESENT ILLNESS:  Mrs. Sherry Kaba is a pleasant 68-year-old female with a long history of paroxysmal atrial fibrillation, which is now chronic atrial fibrillation. She is anticoagulated with Coumadin with good rate control. She does have a history of moderate to moderately severe mitral regurgitation with EF in the 50% range, which is very pressure dependent. She does have LVH with diastolic dysfunction. She had marked shortness of breath, which started approximately in 2017 with increase in edema in both lower extremities. I saw her in  and increased her Lasix to twice a day as well as Aldactone to twice a day. I felt her shortness of breath was both CHF and exacerbation of her COPD. I brought her back in  and July and at that time, she was doing better with less edema. As I see her today, she is actually about back to baseline. She has chronic shortness of breath, but it seems to be about the same as what she has been previously. Her edema has been well controlled on her Lasix and her Aldactone. If she misses the dose of Lasix, she can tell her legs begin to swell quickly. She has had no PND or orthopnea. She is in chronic atrial fibrillation, but her rate is controlled and she cannot feel any palpitations. She has had no syncope or near syncope. She did have anemia and had a colonoscopy on 2016, which showed duodenitis.   She had polyps removed and had bleeding from a polypectomy on 05/25/2017. After her last colonoscopy she had gone to [place] because of a larger polyp that was removed in [place] by Dr. Kina Slaughter. The hot, humid weather has affected her breathing. She was more short of breath. However, it is cool today and she does feel better today. She has chronic renal insufficiency and has to watch her fluids carefully. She weighs herself daily and if her weight is up by more than 2 or 3 pounds, she will take an extra Lasix. She has not had to take any extra Lasix since being on the twice a day dosing of both Lasix and Aldactone. She has never had a myocardial infarction, never had a cardiac catheterization. She does have atypical chest pain, but I do not think that is angina but rather musculoskeletal.    RISK FACTORS FOR CORONARY ARTERY DISEASE:  Hypertension:  Positive. Hyperlipidemia:  Positive. Peripheral Vascular Disease:  Negative. Smoking:  Negative. Diabetes:  Negative. Other Family Members:  Negative. MEDICATIONS AT HOME:  She is currently on Zyloprim 300 mg half a tablet in the evening, aspirin 81 mg daily, atenolol 50 mg daily, vitamin D 2000 units b.i.d., colchicine p.r.n., Cardizem 60 mg q. 8 hours, iron 5 grains b.i.d., Lasix 40 mg b.i.d., spironolactone 25 mg b.i.d. and Coumadin as directed. PAST MEDICAL HISTORY:  Tubal ligation many years ago. Tonsillectomy many years ago. Hypertension for many years. She has probable sleep apnea with a long snoring history but will not do a sleep study. She currently has chronic atrial fibrillation, on Coumadin. She has pulmonary hypertension exacerbated by her sleep apnea, carpal tunnel release four years ago, colonoscopy in May with finding of a large ascending colon polyp with this being removed in [place] by Dr. Kina Slaughter on 05/23/2017 with her pending another colonoscopy within the next several months. FAMILY HISTORY:  No significant heart disease in the family. NEUROLOGIC EXAM:  Within normal limits. PSYCHIATRIC EXAM:  Within normal limits. LABORATORY DATA:  Sodium was 138, potassium 4.2, BUN is 28, creatinine 1.09, GFR was 49. ALT was 22. AST was 22. Glucose 113. White count 9.8, hemoglobin 13.9 with a platelet count of 809,378. EKG historically has showed atrial fibrillation with controlled ventricular response. IMPRESSION:  1. Shortness of breath, which I think is about at her baseline secondary to COPD and deconditioning. 2.  Chronic atrial fibrillation, rate well controlled and on Coumadin, which has been therapeutic. 3.  Congestive heart failure, which has cleared at this point and I think her fluid status is approximately euvolemic. 4.  Borderline normal LV function, EF of 50%. 5.  Chronic renal insufficiency, about at her baseline with a creatinine of 1.09 and a GFR of 49.  6.  Hypertension, very labile, well controlled. 7. LVH with resulting diastolic dysfunction. 8.  Hyperlipidemia, under good control. 9.  Lumbar disk disease. 10. Edema, which is about at her baseline now on her current diuretics. PLAN:  1. Continue same medications. 2.  We will see in one year with her seeing Dr. Vijaya Claudio in six months. 3.  If she should start developing increasing weight, shortness of breath, or pedal edema, I would of course want to see her for adjustment of her diuretics. DISCUSSION:  Mrs. Katrin Shell is fairly complex with her COPD, diastolic dysfunction and atrial fibrillation as well as her renal insufficiency. Her fluid status is fairly tenuous because of her LVH and her renal insufficiency. She did have marked edema, but this has resolved on her current diuretic schedule. Her renal function is also doing well with her current regimen.     I would make no change in her medications unless she begins to develop symptoms such as shortness of breath or increase in edema, ascites, etc. She will continue to monitor her weight on a daily basis. There is no indication that I need to do any testing such as a stress test.  She does have a history of moderate-to-severe mitral regurgitation, and she will need to have another echo in the future. I will see her in one year and at that time, we will consider doing a full echocardiogram depending on her clinical status. If she develops any new symptoms, I would be glad to see her anytime. I think, however, at this time she is fairly well tuned. Thank you very much for allowing me the privilege of seeing Mrs. Elvin Herrera. Any questions on my thoughts, please do not hesitate to contact me.     Sincerely,        Kailee Lackey    D:09/28/2017 10:22:01               T: 09/28/2017 10:29:20     [email or web address]  Job#: [id number]     Doc#: [id number]

## 2017-09-28 NOTE — MR AVS SNAPSHOT
After Visit Summary             Landon Clinton   2017 9:30 AM   Office Visit    Description:  Female : 1945   Provider:  Tray Fishman MD   Department:  [facility] Cardiology Specialist              Your Follow-Up and Future Appointments         Below is a list of your follow-up and future appointments. This may not be a complete list as you may have made appointments directly with providers that we are not aware of or your providers may have made some for you. Please call your providers to confirm appointments. It is important to keep your appointments. Please bring your current insurance card, photo ID, co-pay, and all medication bottles to your appointment. If self-pay, payment is expected at the time of service.         Your To-Do List     Future Appointments Provider Department Dept Phone    10/4/2017 9:15 AM [address] [facility] Medication Manangement [phone]    2018 10:30 AM Tray Fishman MD [facility] Cardiology Specialist [phone]    Future Orders Complete By Expires    CBC Auto Differential [[id number] Custom]  10/28/2018 2018    Comprehensive Metabolic Panel [TFC54 Custom]  10/28/2018 2018    EKG 12 Lead [EKG1 Custom]  10/28/2018 2018    Lipid Panel [LAB18 Custom]  10/28/2018 2018    Magnesium [PVH134 Custom]  10/28/2018 2018    TSH with Reflex [[id number] Custom]  10/28/2018 2018    Vitamin D 25 Hydroxy [KWM809 Custom]  10/28/2018 2018    XR CHEST STANDARD (2 VW) [[id number] Custom]  10/28/2018 2018         Information from Your Visit        Department     Name Address Phone [facility] FOR [facility] Cardiology Specialist [address] [place] [address] [phone]      You Were Seen for:         Comments    Atrial fibrillation with rapid ventricular response [facility])   [[id number]]         Vital Signs     Blood Pressure Pulse Weight Oxygen Saturation Body Mass Index Smoking Status 110/60 86 255 lb (115.7 kg) 96% 45.17 kg/m2 Former Smoker      Additional Information about your Body Mass Index (BMI)           Your BMI as listed above is considered obese (30 or more). BMI is an estimate of body fat, calculated from your height and weight. The higher your BMI, the greater your risk of heart disease, high blood pressure, type 2 diabetes, stroke, gallstones, arthritis, sleep apnea, and certain cancers. BMI is not perfect. It may overestimate body fat in athletes and people who are more muscular. Even a small weight loss (between 5 and 10 percent of your current weight) by decreasing your calorie intake and becoming more physically active will help lower your risk of developing or worsening diseases associated with obesity. Learn more at: [employer]             Where to Get Your Medications      These medications were sent to [address] [place] [address] [place] [phone] - F [phone]  [address] [place] [address]. [address]     Phone:  [phone]     furosemide 40 MG tablet    spironolactone 25 MG tablet         Your Current Medications Are              furosemide (LASIX) 40 MG tablet Take 1 tablet by mouth 2 times daily    spironolactone (ALDACTONE) 25 MG tablet Take 1 tablet by mouth 2 times daily    allopurinol (ZYLOPRIM) 300 MG tablet Take 0.5 tablets by mouth every evening    diltiazem (CARDIZEM) 60 MG tablet Take 1 tablet by mouth every 8 hours    warfarin (COUMADIN) 10 MG tablet Indications: daily as directed Take 1 whole tablet on Wednesdays and 1/2 tablet all other days of the week or as directed.  Managed by [facility]    ferrous sulfate (FE TABS) 325 (65 FE) MG EC tablet Take 1 tablet by mouth 2 times daily    NONFORMULARY Take 6-8 oz by mouth daily Tart Cherry Juice    diphenhydrAMINE-APAP, sleep, (ACETAMINOPHEN PM EX ST)  MG tablet

## 2017-09-28 NOTE — PROGRESS NOTES
Ov dR [place] 2 mth follow up  Edema improved takes  Lasix and aldactone bid  Notices if backs off lasix to  Once a day next day has   A lot of swelling  Weight stable at home  Sob improved  Suppose to go back  To [place] for another  Colonoscopy pt putting  It off  Mother age 80 living with pt  Again last 2 years  Seeing DR Jasiel Nielsen in   2 weeks  Will see in 1 year

## 2017-10-02 NOTE — PROGRESS NOTES
gone to [place] because of a larger polyp that was removed in [place] by Dr. Amira Camarena. The hot, humid weather has affected her breathing. She was more short of breath. However, it is cool today and she does feel better today. She has chronic renal insufficiency and has to watch her fluids carefully. She weighs herself daily and if her weight is up by more than 2 or 3 pounds, she will take an extra Lasix. She has not had to take any extra Lasix since being on the twice a day dosing of both Lasix and Aldactone. She has never had a myocardial infarction, never had a cardiac catheterization. She does have atypical chest pain, but I do not think that is angina but rather musculoskeletal.    RISK FACTORS FOR CORONARY ARTERY DISEASE:  Hypertension:  Positive. Hyperlipidemia:  Positive. Peripheral Vascular Disease:  Negative. Smoking:  Negative. Diabetes:  Negative. Other Family Members:  Negative. MEDICATIONS AT HOME:  She is currently on Zyloprim 300 mg half a tablet in the evening, aspirin 81 mg daily, atenolol 50 mg daily, vitamin D 2000 units b.i.d., colchicine p.r.n., Cardizem 60 mg q. 8 hours, iron 5 grains b.i.d., Lasix 40 mg b.i.d., spironolactone 25 mg b.i.d. and Coumadin as directed. PAST MEDICAL HISTORY:  Tubal ligation many years ago. Tonsillectomy many years ago. Hypertension for many years. She has probable sleep apnea with a long snoring history but will not do a sleep study. She currently has chronic atrial fibrillation, on Coumadin. She has pulmonary hypertension exacerbated by her sleep apnea, carpal tunnel release four years ago, colonoscopy in May with finding of a large ascending colon polyp with this being removed in [place] by Dr. Amira Camarena on 05/23/2017 with her pending another colonoscopy within the next several months. FAMILY HISTORY:  No significant heart disease in the family. SOCIAL HISTORY:  She is 74years old,  for 3-1/2 years, has three children.   Does not smoke or drink alcohol. She is fairly inactive. She has chronic back discomfort. Her 80year-old mother lives with her and has done so for the last 2 years, which makes it stressful for her at home. REVIEW OF SYSTEMS:  Cardiac as above. Other 10 systems reviewed including neurologic, psychiatric, pulmonary, cardiac, GI, , renal and musculoskeletal.  She has had no weight loss or weight gain. No change in bowel habits, no blood in stools. No fevers, sweats or chills. She has had no unusual edema but is at her baseline. She has not noticed any increase in abdominal girth secondary to ascites. PHYSICAL EXAMINATION:  VITAL SIGNS:  Her blood pressure is 120/60 with a heart rate of 80 and irregularly irregular. Respirations were 18. O2 saturation 96%. Weight 255 pounds. GENERAL:  She is a pleasant 68-year-old female. Denied pain. She was oriented to person, place and time. Answered questions appropriately. SKIN:  No unusual skin changes. HEENT:  The pupils are equally round and reactive to light and accommodation. Extraocular movements were intact. Mucous membranes were dry. NECK:  No JVD. Good carotid pulses. No carotid bruits. No lymphadenopathy or thyromegaly. CARDIOVASCULAR EXAM:  S1 and S2 were normal.  No S3 or S4. Soft systolic blowing type murmur. No diastolic murmur. PMI was normal.  No lifts, thrusts or pericardial friction rub. LUNGS:  Quite clear to auscultation and percussion. ABDOMEN:  Soft and nontender. Good bowel sounds. The aorta was not enlarged. No hepatomegaly, splenomegaly. EXTREMITIES:  Good femoral pulses. Good pedal pulses. No pedal edema. Skin was warm and dry. No calf tenderness. Nail beds pink. Good cap refill. PULSES:  Bilaterally symmetric radial, brachial and carotid pulses. No carotid bruits. Good femoral and pedal pulses. NEUROLOGIC EXAM:  Within normal limits. PSYCHIATRIC EXAM:  Within normal limits.     LABORATORY DATA:  Sodium was 138,

## 2017-10-04 ENCOUNTER — HOSPITAL ENCOUNTER (OUTPATIENT)
Dept: PHARMACY | Age: 72
Setting detail: THERAPIES SERIES
Discharge: HOME | End: 2017-10-04
Payer: MEDICARE

## 2017-10-04 ENCOUNTER — HOSPITAL ENCOUNTER (OUTPATIENT)
Age: 72
Discharge: HOME | End: 2017-10-04
Payer: MEDICARE

## 2017-10-04 VITALS
BODY MASS INDEX: 45.77 KG/M2 | DIASTOLIC BLOOD PRESSURE: 82 MMHG | SYSTOLIC BLOOD PRESSURE: 116 MMHG | WEIGHT: 258.4 LBS | HEART RATE: 68 BPM

## 2017-10-04 DIAGNOSIS — Z79.01 LONG TERM CURRENT USE OF ANTICOAGULANT THERAPY: ICD-10-CM

## 2017-10-04 DIAGNOSIS — I48.20 CHRONIC ATRIAL FIBRILLATION (HCC): ICD-10-CM

## 2017-10-04 LAB
ANION GAP SERPL CALCULATED.3IONS-SCNC: 13 MMOL/L (ref 9–17)
BUN BLDV-MCNC: 23 MG/DL (ref 8–23)
CALCIUM SERPL-MCNC: 9.2 MG/DL (ref 8.6–10.4)
CHLORIDE BLD-SCNC: 100 MMOL/L (ref 98–107)
CO2: 26 MMOL/L (ref 20–31)
CREAT SERPL-MCNC: 1.04 MG/DL (ref 0.5–0.9)
CREATININE URINE: 181.2 MG/DL (ref 28–217)
GFR AFRICAN AMERICAN: >60 ML/MIN
GFR NON-AFRICAN AMERICAN: 52 ML/MIN
GFR SERPL CREATININE-BSD FRML MDRD: ABNORMAL ML/MIN/{1.73_M2}
GFR SERPL CREATININE-BSD FRML MDRD: ABNORMAL ML/MIN/{1.73_M2}
HCT VFR BLD CALC: 39 % (ref 36–46)
HEMOGLOBIN: 13 G/DL (ref 12–16)
INR BLD: 3.1
PHOSPHORUS: 2.5 MG/DL (ref 2.6–4.5)
POTASSIUM SERPL-SCNC: 4.1 MMOL/L (ref 3.7–5.3)
PTH INTACT: 59.62 PG/ML (ref 15–65)
SODIUM BLD-SCNC: 139 MMOL/L (ref 135–144)
TOTAL PROTEIN, URINE: 20 MG/DL
URINE TOTAL PROTEIN CREATININE RATIO: 0.11 (ref 0–0.2)

## 2017-10-04 PROCEDURE — 99211 OFF/OP EST MAY X REQ PHY/QHP: CPT

## 2017-10-04 PROCEDURE — 84156 ASSAY OF PROTEIN URINE: CPT

## 2017-10-04 PROCEDURE — 85610 PROTHROMBIN TIME: CPT

## 2017-10-04 PROCEDURE — 85014 HEMATOCRIT: CPT

## 2017-10-04 PROCEDURE — 84520 ASSAY OF UREA NITROGEN: CPT

## 2017-10-04 PROCEDURE — 80051 ELECTROLYTE PANEL: CPT

## 2017-10-04 PROCEDURE — 84100 ASSAY OF PHOSPHORUS: CPT

## 2017-10-04 PROCEDURE — 85018 HEMOGLOBIN: CPT

## 2017-10-04 PROCEDURE — 83970 ASSAY OF PARATHORMONE: CPT

## 2017-10-04 PROCEDURE — 36415 COLL VENOUS BLD VENIPUNCTURE: CPT

## 2017-10-04 PROCEDURE — 82310 ASSAY OF CALCIUM: CPT

## 2017-10-04 PROCEDURE — 82570 ASSAY OF URINE CREATININE: CPT

## 2017-10-04 PROCEDURE — 82565 ASSAY OF CREATININE: CPT

## 2017-10-04 NOTE — MR AVS SNAPSHOT
type 2 diabetes, stroke, gallstones, arthritis, sleep apnea, and certain cancers. BMI is not perfect. It may overestimate body fat in athletes and people who are more muscular. Even a small weight loss (between 5 and 10 percent of your current weight) by decreasing your calorie intake and becoming more physically active will help lower your risk of developing or worsening diseases associated with obesity. Learn more at: [employer]             Medications and Orders      Your Current Medications Are              furosemide (LASIX) 40 MG tablet Take 1 tablet by mouth 2 times daily    spironolactone (ALDACTONE) 25 MG tablet Take 1 tablet by mouth 2 times daily    allopurinol (ZYLOPRIM) 300 MG tablet Take 0.5 tablets by mouth every evening    diltiazem (CARDIZEM) 60 MG tablet Take 1 tablet by mouth every 8 hours    warfarin (COUMADIN) 10 MG tablet Indications: daily as directed Take 1 whole tablet on Wednesdays and 1/2 tablet all other days of the week or as directed. Managed by [facility]    ferrous sulfate (FE TABS) 325 (65 FE) MG EC tablet Take 1 tablet by mouth 2 times daily    NONFORMULARY Take 6-8 oz by mouth daily Tart Cherry Juice    diphenhydrAMINE-APAP, sleep, (ACETAMINOPHEN PM EX ST)  MG tablet Take 2 tablets by mouth nightly     atenolol (TENORMIN) 50 MG tablet TAKE 1 TABLET EVERY DAY    acetaminophen (TYLENOL) 500 MG tablet Take 500 mg by mouth every 6 hours as needed for Pain    COLCHICINE PO Take 1 tablet by mouth See Admin Instructions Compounded medication from [facility] containing Colchicine 0.6 mg and APAP 325 mg that the patient takes only PRN for flare ups of gout    Cholecalciferol (VITAMIN D) 2000 UNITS CAPS capsule Take 2,000 Units by mouth 2 times daily. aspirin 81 MG EC tablet Take 81 mg by mouth daily.       Allergies              Merthiolate Glycerite [Thimerosal] Dermatitis    Redness, blisters

## 2017-10-04 NOTE — PATIENT INSTRUCTIONS
Decrease current dose of warfarin as instructed on dosing calendar provided. Continue to monitor urine and stool for signs and symptoms of bleeding. Please notify the clinic of any medication changes. Please remember to bring all medications (both prescription and OTC) to your next visit. Kindly notify the clinic if you are unable to make to your next appointment.

## 2017-10-17 RX ORDER — DILTIAZEM HYDROCHLORIDE 60 MG/1
TABLET, FILM COATED ORAL
Qty: 270 TABLET | Refills: 3 | Status: SHIPPED | OUTPATIENT
Start: 2017-10-17 | End: 2018-10-15 | Stop reason: SDUPTHER

## 2017-10-25 ENCOUNTER — HOSPITAL ENCOUNTER (OUTPATIENT)
Dept: PHARMACY | Age: 72
Setting detail: THERAPIES SERIES
Discharge: HOME | End: 2017-10-25
Payer: MEDICARE

## 2017-10-25 VITALS
BODY MASS INDEX: 45.92 KG/M2 | DIASTOLIC BLOOD PRESSURE: 78 MMHG | HEART RATE: 70 BPM | SYSTOLIC BLOOD PRESSURE: 118 MMHG | WEIGHT: 259.2 LBS

## 2017-10-25 DIAGNOSIS — Z79.01 LONG TERM CURRENT USE OF ANTICOAGULANT THERAPY: ICD-10-CM

## 2017-10-25 DIAGNOSIS — I48.20 CHRONIC ATRIAL FIBRILLATION (HCC): ICD-10-CM

## 2017-10-25 LAB — INR BLD: 2.2

## 2017-10-25 PROCEDURE — 85610 PROTHROMBIN TIME: CPT

## 2017-10-25 PROCEDURE — 99211 OFF/OP EST MAY X REQ PHY/QHP: CPT

## 2017-10-25 RX ORDER — WARFARIN SODIUM 10 MG/1
TABLET ORAL
Qty: 90 TABLET | Refills: 0 | Status: SHIPPED
Start: 2017-10-25 | End: 2018-01-10 | Stop reason: DRUGHIGH

## 2017-10-25 NOTE — PROGRESS NOTES
Fingerstick INR drawn per clinic protocol. Patient states no visible blood in urine and no black tarry stool. Denies any missed doses of warfarin. As discussed above, Joseph Mckeon says she stopped taking Iron \"a couple weeks ago\" on her own and does not plan to restart this medication at this time. She says she doesn't think she needs it anymore and is \"tired of her stools being black\". No change in other maintenance medications or in diet. Given her therapeutic INR today, we will continue current warfarin regimen and recheck INR in 5 weeks.

## 2017-11-07 ENCOUNTER — OFFICE VISIT (OUTPATIENT)
Dept: FAMILY MEDICINE CLINIC | Age: 72
End: 2017-11-07
Payer: MEDICARE

## 2017-11-07 VITALS — BODY MASS INDEX: 45.17 KG/M2 | DIASTOLIC BLOOD PRESSURE: 70 MMHG | WEIGHT: 255 LBS | SYSTOLIC BLOOD PRESSURE: 120 MMHG

## 2017-11-07 DIAGNOSIS — G89.29 CHRONIC PAIN OF RIGHT KNEE: ICD-10-CM

## 2017-11-07 DIAGNOSIS — Z23 NEED FOR INFLUENZA VACCINATION: Primary | ICD-10-CM

## 2017-11-07 DIAGNOSIS — Z86.010 ENCOUNTER FOR COLONOSCOPY DUE TO HISTORY OF ADENOMATOUS COLONIC POLYPS: ICD-10-CM

## 2017-11-07 DIAGNOSIS — D64.89 ANEMIA DUE TO OTHER CAUSE, NOT CLASSIFIED: ICD-10-CM

## 2017-11-07 DIAGNOSIS — Z12.11 ENCOUNTER FOR COLONOSCOPY DUE TO HISTORY OF ADENOMATOUS COLONIC POLYPS: ICD-10-CM

## 2017-11-07 DIAGNOSIS — E66.01 MORBID OBESITY WITH BMI OF 45.0-49.9, ADULT (HCC): ICD-10-CM

## 2017-11-07 DIAGNOSIS — M25.561 CHRONIC PAIN OF RIGHT KNEE: ICD-10-CM

## 2017-11-07 PROCEDURE — 1090F PRES/ABSN URINE INCON ASSESS: CPT | Performed by: FAMILY MEDICINE

## 2017-11-07 PROCEDURE — 1123F ACP DISCUSS/DSCN MKR DOCD: CPT | Performed by: FAMILY MEDICINE

## 2017-11-07 PROCEDURE — G8427 DOCREV CUR MEDS BY ELIG CLIN: HCPCS | Performed by: FAMILY MEDICINE

## 2017-11-07 PROCEDURE — 4040F PNEUMOC VAC/ADMIN/RCVD: CPT | Performed by: FAMILY MEDICINE

## 2017-11-07 PROCEDURE — G8400 PT W/DXA NO RESULTS DOC: HCPCS | Performed by: FAMILY MEDICINE

## 2017-11-07 PROCEDURE — 1036F TOBACCO NON-USER: CPT | Performed by: FAMILY MEDICINE

## 2017-11-07 PROCEDURE — G0008 ADMIN INFLUENZA VIRUS VAC: HCPCS | Performed by: FAMILY MEDICINE

## 2017-11-07 PROCEDURE — 99214 OFFICE O/P EST MOD 30 MIN: CPT | Performed by: FAMILY MEDICINE

## 2017-11-07 PROCEDURE — 3017F COLORECTAL CA SCREEN DOC REV: CPT | Performed by: FAMILY MEDICINE

## 2017-11-07 PROCEDURE — 90686 IIV4 VACC NO PRSV 0.5 ML IM: CPT | Performed by: FAMILY MEDICINE

## 2017-11-07 PROCEDURE — G8484 FLU IMMUNIZE NO ADMIN: HCPCS | Performed by: FAMILY MEDICINE

## 2017-11-07 PROCEDURE — 3014F SCREEN MAMMO DOC REV: CPT | Performed by: FAMILY MEDICINE

## 2017-11-07 PROCEDURE — G8417 CALC BMI ABV UP PARAM F/U: HCPCS | Performed by: FAMILY MEDICINE

## 2017-11-07 ASSESSMENT — PATIENT HEALTH QUESTIONNAIRE - PHQ9
2. FEELING DOWN, DEPRESSED OR HOPELESS: 1
SUM OF ALL RESPONSES TO PHQ QUESTIONS 1-9: 2
1. LITTLE INTEREST OR PLEASURE IN DOING THINGS: 1
SUM OF ALL RESPONSES TO PHQ9 QUESTIONS 1 & 2: 2

## 2017-11-07 NOTE — PROGRESS NOTES
maintenance requirements and ordered appropriate tests  Health Maintenance Due   Topic Date Due    DTaP/Tdap/Td vaccine (1 - Tdap) 03/15/1962    DEXA (modify frequency per FRAX score)  03/15/2008    Flu vaccine (1) 09/01/2017       Past Surgical History:     Past Surgical History:   Procedure Laterality Date    CARPAL TUNNEL RELEASE      bilaterally    COLONOSCOPY  11/28/2016    Nohemy Rust MD    COLONOSCOPY  05/2017    at [address] [place] [address] UPPER GASTROINTESTINAL ENDOSCOPY  11/28/2016    Nohemy Rust MD        Medications:       Prior to Admission medications    Medication Sig Start Date End Date Taking? Authorizing Provider   warfarin (COUMADIN) 10 MG tablet Take 1/2 tablet every day of the week or as directed. Managed by [id number] [address] 10/25/17  Yes Deonte Freeman MD   diltiazem (CARDIZEM) 60 MG tablet TAKE 1 TABLET EVERY 8 HOURS 10/17/17  Yes Deonte Freeman MD   furosemide (LASIX) 40 MG tablet Take 1 tablet by mouth 2 times daily 9/28/17  Yes Deonte Freeman MD   spironolactone (ALDACTONE) 25 MG tablet Take 1 tablet by mouth 2 times daily 9/28/17  Yes Deonte Freeman MD   allopurinol (ZYLOPRIM) 300 MG tablet Take 0.5 tablets by mouth every evening 8/21/17  Yes Lucy Carrillo MD   NONFORMULARY Take 6-8 oz by mouth daily [facility]   Yes Historical Provider, MD   diphenhydrAMINE-APAP, sleep, (ACETAMINOPHEN PM EX ST)  MG tablet Take 2 tablets by mouth nightly    Yes Historical Provider, MD   atenolol (TENORMIN) 50 MG tablet TAKE 1 TABLET EVERY DAY 3/23/17  Yes Deonte Freeman MD   Cholecalciferol (VITAMIN D) 2000 UNITS CAPS capsule Take 2,000 Units by mouth 2 times daily. Yes Historical Provider, MD   aspirin 81 MG EC tablet Take 81 mg by mouth daily.    Yes Historical Provider, MD   acetaminophen (TYLENOL) 500 MG tablet Take 500 mg by mouth every 6 hours as needed for Pain    Historical Provider, MD   COLCHICINE PO Take 1 tablet by mouth See Admin Instructions Compounded medication from [facility] containing Colchicine 0.6 mg and APAP 325 mg that the patient takes only PRN for flare ups of gout    Historical Provider, MD        Allergies:       Clarithromycin; Merthiolate glycerite [thimerosal]; and Tylenol with codeine #3 [acetaminophen-codeine]    Social History:     Tobacco:    reports that she quit smoking about 36 years ago. She has never used smokeless tobacco.  Alcohol:      reports that she does not drink alcohol. Drug Use:  reports that she does not use drugs. Family History:     Family History   Problem Relation Age of Onset    High Blood Pressure Mother     Diabetes Father        Review of Systems:       Review of Systems   Constitutional: Negative for chills, fatigue, fever and unexpected weight change. Eyes: Negative for discharge and redness. Respiratory: Negative for cough and shortness of breath. Cardiovascular: Negative for chest pain and palpitations. Gastrointestinal: Negative for abdominal pain, blood in stool, constipation, diarrhea, nausea and vomiting. Genitourinary: Negative for dysuria and hematuria. Musculoskeletal: Positive for arthralgias. Negative for joint swelling. Rt knee   Skin: Negative for pallor and rash. Neurological: Negative for dizziness, tingling, tremors, light-headedness, numbness and headaches. Psychiatric/Behavioral: Negative for confusion and sleep disturbance. Physical Exam:     Physical Exam   Constitutional: She is oriented to person, place, and time. She appears well-developed and well-nourished. HENT:   Head: Normocephalic and atraumatic. Mouth/Throat: No oropharyngeal exudate. Eyes: Conjunctivae are normal. Pupils are equal, round, and reactive to light. Left eye exhibits no discharge. Neck: Neck supple. No thyromegaly present. Cardiovascular: Normal rate and regular rhythm. No murmur heard.   Pulmonary/Chest: Effort normal and breath sounds normal.   Abdominal: Soft. Bowel sounds are normal. There is no tenderness. Musculoskeletal: She exhibits no edema. Right knee: She exhibits normal range of motion, no swelling, no effusion, no laceration and no erythema. Tenderness found. Neurological: She is alert and oriented to person, place, and time. Skin: Skin is warm. No rash noted. She is not diaphoretic. No erythema. Psychiatric: She has a normal mood and affect. Her behavior is normal.   Vitals reviewed. Vitals:  /70   Wt 255 lb (115.7 kg)   BMI 45.17 kg/m²       Data:     Lab Results   Component Value Date     10/04/2017    K 4.1 10/04/2017     10/04/2017    CO2 26 10/04/2017    BUN 23 10/04/2017    CREATININE 1.04 10/04/2017    GLUCOSE 113 09/22/2017    GLUCOSE 102 04/27/2012    PROT 7.8 09/22/2017    LABALBU 4.3 09/22/2017    LABALBU 4.4 04/10/2012    BILITOT 0.40 09/22/2017    ALKPHOS 83 09/22/2017    AST 23 09/22/2017    ALT 22 09/22/2017     Lab Results   Component Value Date    WBC 9.8 09/22/2017    RBC 4.73 09/22/2017    RBC 4.72 04/10/2012    HGB 13.0 10/04/2017    HCT 39.0 10/04/2017    MCV 87.8 09/22/2017    MCH 29.3 09/22/2017    MCHC 33.4 09/22/2017    RDW 17.2 09/22/2017     09/22/2017     04/10/2012    MPV NOT REPORTED 09/22/2017     Lab Results   Component Value Date    TSH 1.16 03/06/2017     Lab Results   Component Value Date    CHOL 170 03/06/2017    HDL 54 03/06/2017    LABA1C 6.4 01/11/2013          Assessment/Plan:       1. Chronic pain of right knee  Pt will monitor for now. D/w pt heat/ice, stretches and topical treatment. Pt will wait for now. 2. Encounter for colonoscopy due to history of adenomatous colonic polyps  Stable and pt will keep f/u with [facility] for repeat scope in future --- March/April per pt and then a repeat colonoscopy from them    3. Morbid obesity with BMI of 45.0-49.9, adult ([facility] [address]  Reviewed with pt low fat diet and try to stay active     4.

## 2017-11-12 ASSESSMENT — ENCOUNTER SYMPTOMS
ABDOMINAL PAIN: 0
BLOOD IN STOOL: 0
VOMITING: 0
EYE DISCHARGE: 0
CONSTIPATION: 0
NAUSEA: 0
COUGH: 0
SHORTNESS OF BREATH: 0
EYE REDNESS: 0
DIARRHEA: 0

## 2017-11-28 ENCOUNTER — HOSPITAL ENCOUNTER (OUTPATIENT)
Dept: PHARMACY | Age: 72
Setting detail: THERAPIES SERIES
Discharge: HOME | End: 2017-11-28
Payer: MEDICARE

## 2017-11-28 VITALS
HEART RATE: 74 BPM | SYSTOLIC BLOOD PRESSURE: 114 MMHG | DIASTOLIC BLOOD PRESSURE: 64 MMHG | BODY MASS INDEX: 45.65 KG/M2 | WEIGHT: 257.7 LBS

## 2017-11-28 DIAGNOSIS — I48.20 CHRONIC ATRIAL FIBRILLATION (HCC): ICD-10-CM

## 2017-11-28 DIAGNOSIS — Z79.01 LONG TERM CURRENT USE OF ANTICOAGULANT THERAPY: ICD-10-CM

## 2017-11-28 LAB — INR BLD: 2.2

## 2017-11-28 PROCEDURE — 85610 PROTHROMBIN TIME: CPT

## 2017-11-28 PROCEDURE — 99211 OFF/OP EST MAY X REQ PHY/QHP: CPT

## 2017-11-28 NOTE — PROGRESS NOTES
Fingerstick INR drawn per clinic protocol. Patient states no visible blood in urine and no black tarry stool. Denies any missed doses of warfarin. No change in other maintenance medications or in diet. Will continue current warfarin regimen and recheck INR in 6 weeks.

## 2018-01-02 RX ORDER — ALLOPURINOL 300 MG/1
TABLET ORAL
Qty: 45 TABLET | Refills: 1 | Status: SHIPPED | OUTPATIENT
Start: 2018-01-02 | End: 2018-09-17 | Stop reason: SDUPTHER

## 2018-01-08 ENCOUNTER — TELEPHONE (OUTPATIENT)
Dept: FAMILY MEDICINE CLINIC | Age: 73
End: 2018-01-08

## 2018-01-08 NOTE — TELEPHONE ENCOUNTER
[facility]   9/20/2018 10:30 AM MD Sanam Machuca [facility]            Patient Active Problem List:     Atrial fibrillation with rapid ventricular response ([address]     Kidney stones     Osteoarthritis     Chronic atrial fibrillation ([facility] [address]     Hypertension     Other screening breast examination     Pap smear for cervical cancer screening     Long term current use of anticoagulant therapy     Pulmonary HTN     Knee pain, chronic     Guaiac positive stools     Gastrointestinal hemorrhage     Hypotension     Colon polyp     Acute gout of right foot     Iron deficiency anemia due to chronic blood loss

## 2018-01-10 ENCOUNTER — HOSPITAL ENCOUNTER (OUTPATIENT)
Dept: PHARMACY | Age: 73
Setting detail: THERAPIES SERIES
Discharge: HOME | End: 2018-01-10
Payer: MEDICARE

## 2018-01-10 VITALS
HEART RATE: 76 BPM | DIASTOLIC BLOOD PRESSURE: 78 MMHG | BODY MASS INDEX: 46.02 KG/M2 | SYSTOLIC BLOOD PRESSURE: 116 MMHG | WEIGHT: 259.8 LBS

## 2018-01-10 DIAGNOSIS — I48.20 CHRONIC ATRIAL FIBRILLATION (HCC): ICD-10-CM

## 2018-01-10 DIAGNOSIS — Z79.01 LONG TERM CURRENT USE OF ANTICOAGULANT THERAPY: ICD-10-CM

## 2018-01-10 LAB — INR BLD: 1.9

## 2018-01-10 PROCEDURE — 85610 PROTHROMBIN TIME: CPT

## 2018-01-10 PROCEDURE — 99211 OFF/OP EST MAY X REQ PHY/QHP: CPT

## 2018-01-10 RX ORDER — WARFARIN SODIUM 10 MG/1
TABLET ORAL
Qty: 90 TABLET | Refills: 0 | Status: SHIPPED
Start: 2018-01-10 | End: 2018-05-14 | Stop reason: SDUPTHER

## 2018-02-19 RX ORDER — SPIRONOLACTONE 25 MG/1
TABLET ORAL
Qty: 180 TABLET | Refills: 3 | Status: SHIPPED | OUTPATIENT
Start: 2018-02-19 | End: 2019-03-31 | Stop reason: SDUPTHER

## 2018-02-21 ENCOUNTER — HOSPITAL ENCOUNTER (OUTPATIENT)
Dept: PHARMACY | Age: 73
Setting detail: THERAPIES SERIES
Discharge: HOME | End: 2018-02-21
Payer: MEDICARE

## 2018-02-21 VITALS
SYSTOLIC BLOOD PRESSURE: 116 MMHG | DIASTOLIC BLOOD PRESSURE: 72 MMHG | HEART RATE: 78 BPM | BODY MASS INDEX: 45.42 KG/M2 | WEIGHT: 256.4 LBS

## 2018-02-21 DIAGNOSIS — Z79.01 LONG TERM CURRENT USE OF ANTICOAGULANT THERAPY: ICD-10-CM

## 2018-02-21 DIAGNOSIS — I48.20 CHRONIC ATRIAL FIBRILLATION (HCC): ICD-10-CM

## 2018-02-21 LAB — INR BLD: 2.4

## 2018-02-21 PROCEDURE — 99211 OFF/OP EST MAY X REQ PHY/QHP: CPT

## 2018-02-21 PROCEDURE — 85610 PROTHROMBIN TIME: CPT

## 2018-03-01 ENCOUNTER — OFFICE VISIT (OUTPATIENT)
Dept: FAMILY MEDICINE CLINIC | Age: 73
End: 2018-03-01
Payer: MEDICARE

## 2018-03-01 VITALS
HEIGHT: 63 IN | DIASTOLIC BLOOD PRESSURE: 60 MMHG | BODY MASS INDEX: 45.36 KG/M2 | WEIGHT: 256 LBS | TEMPERATURE: 97.8 F | SYSTOLIC BLOOD PRESSURE: 110 MMHG

## 2018-03-01 DIAGNOSIS — M25.561 CHRONIC PAIN OF RIGHT KNEE: ICD-10-CM

## 2018-03-01 DIAGNOSIS — R10.11 RUQ ABDOMINAL PAIN: Primary | ICD-10-CM

## 2018-03-01 DIAGNOSIS — G89.29 CHRONIC PAIN OF RIGHT KNEE: ICD-10-CM

## 2018-03-01 PROCEDURE — 20610 DRAIN/INJ JOINT/BURSA W/O US: CPT | Performed by: FAMILY MEDICINE

## 2018-03-01 PROCEDURE — 99213 OFFICE O/P EST LOW 20 MIN: CPT | Performed by: FAMILY MEDICINE

## 2018-03-01 PROCEDURE — 1036F TOBACCO NON-USER: CPT | Performed by: FAMILY MEDICINE

## 2018-03-01 PROCEDURE — G8417 CALC BMI ABV UP PARAM F/U: HCPCS | Performed by: FAMILY MEDICINE

## 2018-03-01 PROCEDURE — 1123F ACP DISCUSS/DSCN MKR DOCD: CPT | Performed by: FAMILY MEDICINE

## 2018-03-01 PROCEDURE — 3014F SCREEN MAMMO DOC REV: CPT | Performed by: FAMILY MEDICINE

## 2018-03-01 PROCEDURE — G8484 FLU IMMUNIZE NO ADMIN: HCPCS | Performed by: FAMILY MEDICINE

## 2018-03-01 PROCEDURE — 4040F PNEUMOC VAC/ADMIN/RCVD: CPT | Performed by: FAMILY MEDICINE

## 2018-03-01 PROCEDURE — G8400 PT W/DXA NO RESULTS DOC: HCPCS | Performed by: FAMILY MEDICINE

## 2018-03-01 PROCEDURE — 1090F PRES/ABSN URINE INCON ASSESS: CPT | Performed by: FAMILY MEDICINE

## 2018-03-01 PROCEDURE — G8427 DOCREV CUR MEDS BY ELIG CLIN: HCPCS | Performed by: FAMILY MEDICINE

## 2018-03-01 PROCEDURE — 3017F COLORECTAL CA SCREEN DOC REV: CPT | Performed by: FAMILY MEDICINE

## 2018-03-01 ASSESSMENT — ENCOUNTER SYMPTOMS
ABDOMINAL PAIN: 1
EYE DISCHARGE: 0
EYE REDNESS: 0
COUGH: 0
CONSTIPATION: 0
VOMITING: 0
NAUSEA: 1
DIARRHEA: 0
HEMATOCHEZIA: 0
SHORTNESS OF BREATH: 0

## 2018-03-01 NOTE — PROGRESS NOTES
by mouth nightly    Yes Historical Provider, MD   atenolol (TENORMIN) 50 MG tablet TAKE 1 TABLET EVERY DAY 3/23/17  Yes Allie Kee MD   acetaminophen (TYLENOL) 500 MG tablet Take 500 mg by mouth every 6 hours as needed for Pain   Yes Historical Provider, MD   Cholecalciferol (VITAMIN D) 2000 UNITS CAPS capsule Take 2,000 Units by mouth 2 times daily. Yes Historical Provider, MD   aspirin 81 MG EC tablet Take 81 mg by mouth daily. Yes Historical Provider, MD   COLCHICINE PO Take 1 tablet by mouth See Admin Instructions Compounded medication from [facility] containing Colchicine 0.6 mg and APAP 325 mg that the patient takes only PRN for flare ups of gout    Historical Provider, MD        Allergies:       Clarithromycin; Merthiolate glycerite [thimerosal]; and Tylenol with codeine #3 [acetaminophen-codeine]    Social History:     Tobacco:    reports that she quit smoking about 37 years ago. She has never used smokeless tobacco.  Alcohol:      reports that she does not drink alcohol. Drug Use:  reports that she does not use drugs. Family History:     Family History   Problem Relation Age of Onset    High Blood Pressure Mother     Diabetes Father        Review of Systems:       Review of Systems   Constitutional: Negative for chills and fever. Eyes: Negative for discharge and redness. Respiratory: Negative for cough and shortness of breath. Cardiovascular: Negative for chest pain and leg swelling. Gastrointestinal: Positive for abdominal pain and nausea. Negative for constipation, diarrhea, hematochezia, melena and vomiting. Genitourinary: Negative for difficulty urinating and hematuria. Musculoskeletal: Positive for arthralgias. Negative for joint swelling. Rt knee   Skin: Negative for pallor and rash. Neurological: Negative for tingling and numbness. Physical Exam:     Physical Exam   Constitutional: She is oriented to person, place, and time.  She appears well-developed

## 2018-03-04 RX ORDER — METHYLPREDNISOLONE ACETATE 80 MG/ML
80 INJECTION, SUSPENSION INTRA-ARTICULAR; INTRALESIONAL; INTRAMUSCULAR; SOFT TISSUE ONCE
Status: COMPLETED | OUTPATIENT
Start: 2018-03-01 | End: 2018-03-07

## 2018-03-07 RX ADMIN — METHYLPREDNISOLONE ACETATE 80 MG: 80 INJECTION, SUSPENSION INTRA-ARTICULAR; INTRALESIONAL; INTRAMUSCULAR; SOFT TISSUE at 10:13

## 2018-04-04 ENCOUNTER — HOSPITAL ENCOUNTER (OUTPATIENT)
Dept: PHARMACY | Age: 73
Setting detail: THERAPIES SERIES
Discharge: HOME | End: 2018-04-04
Payer: MEDICARE

## 2018-04-04 VITALS
SYSTOLIC BLOOD PRESSURE: 108 MMHG | BODY MASS INDEX: 45.38 KG/M2 | WEIGHT: 256.2 LBS | DIASTOLIC BLOOD PRESSURE: 70 MMHG | HEART RATE: 73 BPM

## 2018-04-04 DIAGNOSIS — I48.20 CHRONIC ATRIAL FIBRILLATION (HCC): ICD-10-CM

## 2018-04-04 DIAGNOSIS — Z79.01 LONG TERM CURRENT USE OF ANTICOAGULANT THERAPY: ICD-10-CM

## 2018-04-04 LAB — INR BLD: 2.5

## 2018-04-04 PROCEDURE — 85610 PROTHROMBIN TIME: CPT

## 2018-04-04 PROCEDURE — 99211 OFF/OP EST MAY X REQ PHY/QHP: CPT

## 2018-04-17 RX ORDER — ATENOLOL 50 MG/1
TABLET ORAL
Qty: 90 TABLET | Refills: 3 | Status: SHIPPED | OUTPATIENT
Start: 2018-04-17 | End: 2019-04-08 | Stop reason: SDUPTHER

## 2018-04-23 ENCOUNTER — HOSPITAL ENCOUNTER (OUTPATIENT)
Age: 73
Discharge: HOME | End: 2018-04-23
Payer: MEDICARE

## 2018-04-23 LAB
ALBUMIN SERPL-MCNC: 4.1 G/DL (ref 3.5–5.2)
ANION GAP SERPL CALCULATED.3IONS-SCNC: 13 MMOL/L (ref 9–17)
BUN BLDV-MCNC: 26 MG/DL (ref 8–23)
BUN/CREAT BLD: 26 (ref 9–20)
CALCIUM SERPL-MCNC: 9.2 MG/DL (ref 8.6–10.4)
CHLORIDE BLD-SCNC: 102 MMOL/L (ref 98–107)
CO2: 24 MMOL/L (ref 20–31)
CREAT SERPL-MCNC: 1.01 MG/DL (ref 0.5–0.9)
CREATININE URINE: 134.9 MG/DL (ref 28–217)
GFR AFRICAN AMERICAN: >60 ML/MIN
GFR NON-AFRICAN AMERICAN: 53 ML/MIN
GFR SERPL CREATININE-BSD FRML MDRD: ABNORMAL ML/MIN/{1.73_M2}
GFR SERPL CREATININE-BSD FRML MDRD: ABNORMAL ML/MIN/{1.73_M2}
GLUCOSE BLD-MCNC: 98 MG/DL (ref 70–99)
HCT VFR BLD CALC: 40.3 % (ref 36–46)
HEMOGLOBIN: 13.5 G/DL (ref 12–16)
PHOSPHORUS: 2.8 MG/DL (ref 2.6–4.5)
POTASSIUM SERPL-SCNC: 4 MMOL/L (ref 3.7–5.3)
PTH INTACT: 46.3 PG/ML (ref 15–65)
SODIUM BLD-SCNC: 139 MMOL/L (ref 135–144)
TOTAL PROTEIN, URINE: 15 MG/DL
URIC ACID: 6.7 MG/DL (ref 2.4–5.7)
URINE TOTAL PROTEIN CREATININE RATIO: 0.11 (ref 0–0.2)
VITAMIN D 25-HYDROXY: 47.3 NG/ML (ref 30–100)

## 2018-04-23 PROCEDURE — 84550 ASSAY OF BLOOD/URIC ACID: CPT

## 2018-04-23 PROCEDURE — 82570 ASSAY OF URINE CREATININE: CPT

## 2018-04-23 PROCEDURE — 36415 COLL VENOUS BLD VENIPUNCTURE: CPT

## 2018-04-23 PROCEDURE — 84156 ASSAY OF PROTEIN URINE: CPT

## 2018-04-23 PROCEDURE — 85018 HEMOGLOBIN: CPT

## 2018-04-23 PROCEDURE — 83970 ASSAY OF PARATHORMONE: CPT

## 2018-04-23 PROCEDURE — 80069 RENAL FUNCTION PANEL: CPT

## 2018-04-23 PROCEDURE — 82306 VITAMIN D 25 HYDROXY: CPT

## 2018-04-23 PROCEDURE — 85014 HEMATOCRIT: CPT

## 2018-05-02 ENCOUNTER — HOSPITAL ENCOUNTER (OUTPATIENT)
Dept: GENERAL RADIOLOGY | Age: 73
Discharge: HOME | End: 2018-05-04
Payer: MEDICARE

## 2018-05-02 ENCOUNTER — HOSPITAL ENCOUNTER (OUTPATIENT)
Age: 73
Discharge: HOME | End: 2018-05-04
Payer: MEDICARE

## 2018-05-02 ENCOUNTER — OFFICE VISIT (OUTPATIENT)
Dept: FAMILY MEDICINE CLINIC | Age: 73
End: 2018-05-02
Payer: MEDICARE

## 2018-05-02 VITALS
OXYGEN SATURATION: 94 % | WEIGHT: 254 LBS | HEART RATE: 64 BPM | DIASTOLIC BLOOD PRESSURE: 70 MMHG | BODY MASS INDEX: 44.99 KG/M2 | SYSTOLIC BLOOD PRESSURE: 116 MMHG

## 2018-05-02 DIAGNOSIS — M25.561 ACUTE PAIN OF RIGHT KNEE: Primary | ICD-10-CM

## 2018-05-02 DIAGNOSIS — I48.20 CHRONIC ATRIAL FIBRILLATION (HCC): ICD-10-CM

## 2018-05-02 DIAGNOSIS — R07.89 OTHER CHEST PAIN: ICD-10-CM

## 2018-05-02 DIAGNOSIS — M25.561 ACUTE PAIN OF RIGHT KNEE: ICD-10-CM

## 2018-05-02 DIAGNOSIS — Z86.010 HISTORY OF COLON POLYPS: ICD-10-CM

## 2018-05-02 DIAGNOSIS — I10 ESSENTIAL HYPERTENSION: ICD-10-CM

## 2018-05-02 PROCEDURE — G8400 PT W/DXA NO RESULTS DOC: HCPCS | Performed by: FAMILY MEDICINE

## 2018-05-02 PROCEDURE — G8417 CALC BMI ABV UP PARAM F/U: HCPCS | Performed by: FAMILY MEDICINE

## 2018-05-02 PROCEDURE — 4040F PNEUMOC VAC/ADMIN/RCVD: CPT | Performed by: FAMILY MEDICINE

## 2018-05-02 PROCEDURE — 1090F PRES/ABSN URINE INCON ASSESS: CPT | Performed by: FAMILY MEDICINE

## 2018-05-02 PROCEDURE — 1123F ACP DISCUSS/DSCN MKR DOCD: CPT | Performed by: FAMILY MEDICINE

## 2018-05-02 PROCEDURE — G8427 DOCREV CUR MEDS BY ELIG CLIN: HCPCS | Performed by: FAMILY MEDICINE

## 2018-05-02 PROCEDURE — 1036F TOBACCO NON-USER: CPT | Performed by: FAMILY MEDICINE

## 2018-05-02 PROCEDURE — 3017F COLORECTAL CA SCREEN DOC REV: CPT | Performed by: FAMILY MEDICINE

## 2018-05-02 PROCEDURE — 73562 X-RAY EXAM OF KNEE 3: CPT

## 2018-05-02 PROCEDURE — 99214 OFFICE O/P EST MOD 30 MIN: CPT | Performed by: FAMILY MEDICINE

## 2018-05-02 ASSESSMENT — ENCOUNTER SYMPTOMS
BLURRED VISION: 0
HEMOPTYSIS: 0
COUGH: 0
VOMITING: 0
CONSTIPATION: 0
EYE REDNESS: 0
EYE DISCHARGE: 0
SHORTNESS OF BREATH: 1
DIARRHEA: 0
WHEEZING: 0
FACIAL SWELLING: 0
NAUSEA: 0
ABDOMINAL PAIN: 0
BLOOD IN STOOL: 0

## 2018-05-03 ENCOUNTER — TELEPHONE (OUTPATIENT)
Dept: FAMILY MEDICINE CLINIC | Age: 73
End: 2018-05-03

## 2018-05-03 DIAGNOSIS — G89.29 CHRONIC PAIN OF RIGHT KNEE: Primary | ICD-10-CM

## 2018-05-03 DIAGNOSIS — M25.561 CHRONIC PAIN OF RIGHT KNEE: Primary | ICD-10-CM

## 2018-05-14 RX ORDER — WARFARIN SODIUM 10 MG/1
TABLET ORAL
Qty: 90 TABLET | Refills: 0 | Status: SHIPPED | OUTPATIENT
Start: 2018-05-14 | End: 2018-06-25 | Stop reason: DRUGHIGH

## 2018-05-16 ENCOUNTER — HOSPITAL ENCOUNTER (OUTPATIENT)
Dept: PHARMACY | Age: 73
Setting detail: THERAPIES SERIES
Discharge: HOME | End: 2018-05-16
Payer: MEDICARE

## 2018-05-16 VITALS
SYSTOLIC BLOOD PRESSURE: 126 MMHG | DIASTOLIC BLOOD PRESSURE: 80 MMHG | BODY MASS INDEX: 44.99 KG/M2 | WEIGHT: 254 LBS | HEART RATE: 92 BPM

## 2018-05-16 DIAGNOSIS — I48.20 CHRONIC ATRIAL FIBRILLATION (HCC): ICD-10-CM

## 2018-05-16 DIAGNOSIS — Z79.01 LONG TERM CURRENT USE OF ANTICOAGULANT THERAPY: ICD-10-CM

## 2018-05-16 LAB — INR BLD: 2.9

## 2018-05-16 PROCEDURE — 99211 OFF/OP EST MAY X REQ PHY/QHP: CPT

## 2018-05-16 PROCEDURE — 85610 PROTHROMBIN TIME: CPT

## 2018-05-31 ENCOUNTER — TELEPHONE (OUTPATIENT)
Dept: CARDIOLOGY CLINIC | Age: 73
End: 2018-05-31

## 2018-06-07 ENCOUNTER — HOSPITAL ENCOUNTER (OUTPATIENT)
Dept: PHARMACY | Age: 73
Setting detail: THERAPIES SERIES
Discharge: HOME | End: 2018-06-07
Payer: MEDICARE

## 2018-06-07 VITALS
HEART RATE: 72 BPM | SYSTOLIC BLOOD PRESSURE: 130 MMHG | WEIGHT: 256 LBS | BODY MASS INDEX: 45.35 KG/M2 | DIASTOLIC BLOOD PRESSURE: 74 MMHG

## 2018-06-07 DIAGNOSIS — I48.20 CHRONIC ATRIAL FIBRILLATION (HCC): ICD-10-CM

## 2018-06-07 DIAGNOSIS — Z79.01 LONG TERM CURRENT USE OF ANTICOAGULANT THERAPY: ICD-10-CM

## 2018-06-07 LAB — INR BLD: 3.5

## 2018-06-07 PROCEDURE — 99211 OFF/OP EST MAY X REQ PHY/QHP: CPT

## 2018-06-07 PROCEDURE — 85610 PROTHROMBIN TIME: CPT

## 2018-06-17 ENCOUNTER — HOSPITAL ENCOUNTER (OUTPATIENT)
Age: 73
Setting detail: OBSERVATION
Discharge: HOME | End: 2018-06-18
Attending: FAMILY MEDICINE | Admitting: INTERNAL MEDICINE
Payer: MEDICARE

## 2018-06-17 DIAGNOSIS — K92.2 LOWER GI BLEED: ICD-10-CM

## 2018-06-17 DIAGNOSIS — K62.5 RECTAL BLEED: ICD-10-CM

## 2018-06-17 DIAGNOSIS — K62.5 RECTAL BLEEDING: Primary | ICD-10-CM

## 2018-06-17 LAB
ABO/RH: NORMAL
ABSOLUTE EOS #: 0.2 K/UL (ref 0–0.4)
ABSOLUTE IMMATURE GRANULOCYTE: ABNORMAL K/UL (ref 0–0.3)
ABSOLUTE LYMPH #: 1.4 K/UL (ref 1–4.8)
ABSOLUTE MONO #: 0.8 K/UL (ref 0–1)
ALBUMIN SERPL-MCNC: 4.1 G/DL (ref 3.5–5.2)
ALBUMIN/GLOBULIN RATIO: ABNORMAL (ref 1–2.5)
ALP BLD-CCNC: 78 U/L (ref 35–104)
ALT SERPL-CCNC: 34 U/L (ref 5–33)
ANION GAP SERPL CALCULATED.3IONS-SCNC: 15 MMOL/L (ref 9–17)
ANTIBODY SCREEN: NEGATIVE
ARM BAND NUMBER: NORMAL
AST SERPL-CCNC: 29 U/L
BASOPHILS # BLD: 0 % (ref 0–2)
BASOPHILS ABSOLUTE: 0 K/UL (ref 0–0.2)
BILIRUB SERPL-MCNC: 0.3 MG/DL (ref 0.3–1.2)
BUN BLDV-MCNC: 24 MG/DL (ref 8–23)
BUN/CREAT BLD: 26 (ref 9–20)
CALCIUM SERPL-MCNC: 9 MG/DL (ref 8.6–10.4)
CHLORIDE BLD-SCNC: 105 MMOL/L (ref 98–107)
CO2: 24 MMOL/L (ref 20–31)
CREAT SERPL-MCNC: 0.94 MG/DL (ref 0.5–0.9)
DIFFERENTIAL TYPE: YES
EOSINOPHILS RELATIVE PERCENT: 2 % (ref 0–5)
EXPIRATION DATE: NORMAL
GFR AFRICAN AMERICAN: >60 ML/MIN
GFR NON-AFRICAN AMERICAN: 58 ML/MIN
GFR SERPL CREATININE-BSD FRML MDRD: ABNORMAL ML/MIN/{1.73_M2}
GFR SERPL CREATININE-BSD FRML MDRD: ABNORMAL ML/MIN/{1.73_M2}
GLUCOSE BLD-MCNC: 116 MG/DL (ref 70–99)
HCT VFR BLD CALC: 36.6 % (ref 36–46)
HCT VFR BLD CALC: 37.6 % (ref 36–46)
HCT VFR BLD CALC: 38.2 % (ref 36–46)
HEMOGLOBIN: 12.6 G/DL (ref 12–16)
HEMOGLOBIN: 12.7 G/DL (ref 12–16)
HEMOGLOBIN: 12.8 G/DL (ref 12–16)
IMMATURE GRANULOCYTES: ABNORMAL %
INR BLD: 1.1
LYMPHOCYTES # BLD: 14 % (ref 15–40)
MCH RBC QN AUTO: 30.5 PG (ref 26–34)
MCHC RBC AUTO-ENTMCNC: 33.7 G/DL (ref 31–37)
MCV RBC AUTO: 90.7 FL (ref 80–100)
MONOCYTES # BLD: 8 % (ref 4–8)
NRBC AUTOMATED: ABNORMAL PER 100 WBC
PARTIAL THROMBOPLASTIN TIME: 29.6 SEC (ref 21–33)
PDW BLD-RTO: 15.3 % (ref 12.1–15.2)
PLATELET # BLD: 216 K/UL (ref 140–450)
PLATELET ESTIMATE: ABNORMAL
PMV BLD AUTO: ABNORMAL FL (ref 6–12)
POTASSIUM SERPL-SCNC: 4 MMOL/L (ref 3.7–5.3)
PROTHROMBIN TIME: 11.6 SEC (ref 9–11.6)
RBC # BLD: 4.15 M/UL (ref 4–5.2)
RBC # BLD: ABNORMAL 10*6/UL
SEG NEUTROPHILS: 76 % (ref 47–75)
SEGMENTED NEUTROPHILS ABSOLUTE COUNT: 7.2 K/UL (ref 2.5–7)
SODIUM BLD-SCNC: 144 MMOL/L (ref 135–144)
TOTAL PROTEIN: 7.1 G/DL (ref 6.4–8.3)
WBC # BLD: 9.6 K/UL (ref 3.5–11)
WBC # BLD: ABNORMAL 10*3/UL

## 2018-06-17 PROCEDURE — 85730 THROMBOPLASTIN TIME PARTIAL: CPT

## 2018-06-17 PROCEDURE — 99284 EMERGENCY DEPT VISIT MOD MDM: CPT

## 2018-06-17 PROCEDURE — 85025 COMPLETE CBC W/AUTO DIFF WBC: CPT

## 2018-06-17 PROCEDURE — 85610 PROTHROMBIN TIME: CPT

## 2018-06-17 PROCEDURE — 86901 BLOOD TYPING SEROLOGIC RH(D): CPT

## 2018-06-17 PROCEDURE — 86900 BLOOD TYPING SEROLOGIC ABO: CPT

## 2018-06-17 PROCEDURE — 85014 HEMATOCRIT: CPT

## 2018-06-17 PROCEDURE — 36415 COLL VENOUS BLD VENIPUNCTURE: CPT

## 2018-06-17 PROCEDURE — G0378 HOSPITAL OBSERVATION PER HR: HCPCS

## 2018-06-17 PROCEDURE — 80053 COMPREHEN METABOLIC PANEL: CPT

## 2018-06-17 PROCEDURE — 94761 N-INVAS EAR/PLS OXIMETRY MLT: CPT

## 2018-06-17 PROCEDURE — 6370000000 HC RX 637 (ALT 250 FOR IP): Performed by: INTERNAL MEDICINE

## 2018-06-17 PROCEDURE — 2580000003 HC RX 258

## 2018-06-17 PROCEDURE — 85018 HEMOGLOBIN: CPT

## 2018-06-17 PROCEDURE — 86850 RBC ANTIBODY SCREEN: CPT

## 2018-06-17 PROCEDURE — 2580000003 HC RX 258: Performed by: INTERNAL MEDICINE

## 2018-06-17 RX ORDER — FUROSEMIDE 40 MG/1
40 TABLET ORAL DAILY
Status: DISCONTINUED | OUTPATIENT
Start: 2018-06-17 | End: 2018-06-18 | Stop reason: HOSPADM

## 2018-06-17 RX ORDER — ACETAMINOPHEN 500 MG
500 TABLET ORAL EVERY 6 HOURS PRN
Status: DISCONTINUED | OUTPATIENT
Start: 2018-06-17 | End: 2018-06-18 | Stop reason: HOSPADM

## 2018-06-17 RX ORDER — ATENOLOL 50 MG/1
50 TABLET ORAL DAILY
Status: DISCONTINUED | OUTPATIENT
Start: 2018-06-17 | End: 2018-06-18 | Stop reason: HOSPADM

## 2018-06-17 RX ORDER — SODIUM CHLORIDE 0.9 % (FLUSH) 0.9 %
10 SYRINGE (ML) INJECTION PRN
Status: DISCONTINUED | OUTPATIENT
Start: 2018-06-17 | End: 2018-06-18 | Stop reason: HOSPADM

## 2018-06-17 RX ORDER — ONDANSETRON 2 MG/ML
4 INJECTION INTRAMUSCULAR; INTRAVENOUS EVERY 6 HOURS PRN
Status: DISCONTINUED | OUTPATIENT
Start: 2018-06-17 | End: 2018-06-18 | Stop reason: HOSPADM

## 2018-06-17 RX ORDER — SPIRONOLACTONE 25 MG/1
25 TABLET ORAL DAILY
Status: DISCONTINUED | OUTPATIENT
Start: 2018-06-17 | End: 2018-06-18 | Stop reason: HOSPADM

## 2018-06-17 RX ORDER — SODIUM CHLORIDE 0.9 % (FLUSH) 0.9 %
SYRINGE (ML) INJECTION
Status: COMPLETED
Start: 2018-06-17 | End: 2018-06-17

## 2018-06-17 RX ORDER — SODIUM CHLORIDE 9 MG/ML
INJECTION, SOLUTION INTRAVENOUS CONTINUOUS
Status: DISCONTINUED | OUTPATIENT
Start: 2018-06-17 | End: 2018-06-18

## 2018-06-17 RX ORDER — ALLOPURINOL 100 MG/1
150 TABLET ORAL NIGHTLY
Status: DISCONTINUED | OUTPATIENT
Start: 2018-06-17 | End: 2018-06-18 | Stop reason: HOSPADM

## 2018-06-17 RX ORDER — DIPHENHYDRAMINE HCL 25 MG
25 TABLET ORAL NIGHTLY PRN
Status: DISCONTINUED | OUTPATIENT
Start: 2018-06-17 | End: 2018-06-18 | Stop reason: HOSPADM

## 2018-06-17 RX ORDER — SODIUM CHLORIDE 0.9 % (FLUSH) 0.9 %
10 SYRINGE (ML) INJECTION EVERY 12 HOURS SCHEDULED
Status: DISCONTINUED | OUTPATIENT
Start: 2018-06-17 | End: 2018-06-18 | Stop reason: HOSPADM

## 2018-06-17 RX ADMIN — Medication 25 MG: at 23:56

## 2018-06-17 RX ADMIN — ATENOLOL 50 MG: 50 TABLET ORAL at 19:38

## 2018-06-17 RX ADMIN — VITAMIN D, TAB 1000IU (100/BT) 2000 UNITS: 25 TAB at 21:50

## 2018-06-17 RX ADMIN — Medication 10 ML: at 10:49

## 2018-06-17 RX ADMIN — FUROSEMIDE 40 MG: 40 TABLET ORAL at 13:54

## 2018-06-17 RX ADMIN — Medication 500 MG: at 13:56

## 2018-06-17 RX ADMIN — SODIUM CHLORIDE: 9 INJECTION, SOLUTION INTRAVENOUS at 10:50

## 2018-06-17 RX ADMIN — Medication 500 MG: at 23:55

## 2018-06-17 RX ADMIN — ALLOPURINOL 150 MG: 100 TABLET ORAL at 21:51

## 2018-06-17 RX ADMIN — SPIRONOLACTONE 25 MG: 25 TABLET, FILM COATED ORAL at 13:46

## 2018-06-17 RX ADMIN — DILTIAZEM HYDROCHLORIDE 60 MG: 30 TABLET, FILM COATED ORAL at 13:55

## 2018-06-17 RX ADMIN — DILTIAZEM HYDROCHLORIDE 60 MG: 30 TABLET, FILM COATED ORAL at 21:50

## 2018-06-17 ASSESSMENT — PAIN SCALES - GENERAL
PAINLEVEL_OUTOF10: 0
PAINLEVEL_OUTOF10: 5
PAINLEVEL_OUTOF10: 0
PAINLEVEL_OUTOF10: 2
PAINLEVEL_OUTOF10: 0

## 2018-06-18 ENCOUNTER — APPOINTMENT (OUTPATIENT)
Dept: PHARMACY | Age: 73
End: 2018-06-18
Payer: MEDICARE

## 2018-06-18 VITALS
TEMPERATURE: 98.2 F | WEIGHT: 254.9 LBS | OXYGEN SATURATION: 96 % | BODY MASS INDEX: 45.16 KG/M2 | RESPIRATION RATE: 18 BRPM | SYSTOLIC BLOOD PRESSURE: 114 MMHG | HEIGHT: 63 IN | DIASTOLIC BLOOD PRESSURE: 49 MMHG | HEART RATE: 82 BPM

## 2018-06-18 LAB
HCT VFR BLD CALC: 34.6 % (ref 36–46)
HCT VFR BLD CALC: 35.2 % (ref 36–46)
HCT VFR BLD CALC: 36.4 % (ref 36–46)
HEMOGLOBIN: 11.8 G/DL (ref 12–16)
HEMOGLOBIN: 11.8 G/DL (ref 12–16)
HEMOGLOBIN: 12.3 G/DL (ref 12–16)

## 2018-06-18 PROCEDURE — 94761 N-INVAS EAR/PLS OXIMETRY MLT: CPT

## 2018-06-18 PROCEDURE — 99214 OFFICE O/P EST MOD 30 MIN: CPT | Performed by: INTERNAL MEDICINE

## 2018-06-18 PROCEDURE — 6370000000 HC RX 637 (ALT 250 FOR IP): Performed by: INTERNAL MEDICINE

## 2018-06-18 PROCEDURE — 85018 HEMOGLOBIN: CPT

## 2018-06-18 PROCEDURE — 2580000003 HC RX 258: Performed by: INTERNAL MEDICINE

## 2018-06-18 PROCEDURE — 85014 HEMATOCRIT: CPT

## 2018-06-18 PROCEDURE — G0378 HOSPITAL OBSERVATION PER HR: HCPCS

## 2018-06-18 PROCEDURE — 36415 COLL VENOUS BLD VENIPUNCTURE: CPT

## 2018-06-18 RX ADMIN — DILTIAZEM HYDROCHLORIDE 60 MG: 30 TABLET, FILM COATED ORAL at 07:43

## 2018-06-18 RX ADMIN — Medication 10 ML: at 09:18

## 2018-06-18 RX ADMIN — ATENOLOL 50 MG: 50 TABLET ORAL at 09:16

## 2018-06-18 RX ADMIN — VITAMIN D, TAB 1000IU (100/BT) 2000 UNITS: 25 TAB at 09:16

## 2018-06-18 RX ADMIN — SPIRONOLACTONE 25 MG: 25 TABLET, FILM COATED ORAL at 09:16

## 2018-06-18 RX ADMIN — FUROSEMIDE 40 MG: 40 TABLET ORAL at 09:16

## 2018-06-18 RX ADMIN — DILTIAZEM HYDROCHLORIDE 60 MG: 30 TABLET, FILM COATED ORAL at 15:17

## 2018-06-18 ASSESSMENT — PAIN SCALES - GENERAL
PAINLEVEL_OUTOF10: 0
PAINLEVEL_OUTOF10: 0

## 2018-06-19 ENCOUNTER — CARE COORDINATION (OUTPATIENT)
Dept: CASE MANAGEMENT | Age: 73
End: 2018-06-19

## 2018-06-19 DIAGNOSIS — K62.5 RECTAL BLEED: Primary | ICD-10-CM

## 2018-06-19 PROCEDURE — 1111F DSCHRG MED/CURRENT MED MERGE: CPT | Performed by: FAMILY MEDICINE

## 2018-06-21 ENCOUNTER — TELEPHONE (OUTPATIENT)
Dept: PHARMACY | Age: 73
End: 2018-06-21

## 2018-06-22 ENCOUNTER — HOSPITAL ENCOUNTER (OUTPATIENT)
Age: 73
Discharge: HOME | End: 2018-06-22
Payer: MEDICARE

## 2018-06-22 LAB
ANION GAP SERPL CALCULATED.3IONS-SCNC: 14 MMOL/L (ref 9–17)
BUN BLDV-MCNC: 24 MG/DL (ref 8–23)
BUN/CREAT BLD: 22 (ref 9–20)
CALCIUM SERPL-MCNC: 9.1 MG/DL (ref 8.6–10.4)
CHLORIDE BLD-SCNC: 101 MMOL/L (ref 98–107)
CO2: 23 MMOL/L (ref 20–31)
CREAT SERPL-MCNC: 1.07 MG/DL (ref 0.5–0.9)
GFR AFRICAN AMERICAN: >60 ML/MIN
GFR NON-AFRICAN AMERICAN: 50 ML/MIN
GFR SERPL CREATININE-BSD FRML MDRD: ABNORMAL ML/MIN/{1.73_M2}
GFR SERPL CREATININE-BSD FRML MDRD: ABNORMAL ML/MIN/{1.73_M2}
GLUCOSE BLD-MCNC: 124 MG/DL (ref 70–99)
HCT VFR BLD CALC: 35.6 % (ref 36–46)
HEMOGLOBIN: 12.1 G/DL (ref 12–16)
MCH RBC QN AUTO: 30.8 PG (ref 26–34)
MCHC RBC AUTO-ENTMCNC: 33.9 G/DL (ref 31–37)
MCV RBC AUTO: 90.9 FL (ref 80–100)
NRBC AUTOMATED: ABNORMAL PER 100 WBC
PDW BLD-RTO: 15.4 % (ref 12.1–15.2)
PLATELET # BLD: 246 K/UL (ref 140–450)
PMV BLD AUTO: ABNORMAL FL (ref 6–12)
POTASSIUM SERPL-SCNC: 4.2 MMOL/L (ref 3.7–5.3)
RBC # BLD: 3.91 M/UL (ref 4–5.2)
SODIUM BLD-SCNC: 138 MMOL/L (ref 135–144)
WBC # BLD: 8.8 K/UL (ref 3.5–11)

## 2018-06-22 PROCEDURE — 36415 COLL VENOUS BLD VENIPUNCTURE: CPT

## 2018-06-22 PROCEDURE — 80048 BASIC METABOLIC PNL TOTAL CA: CPT

## 2018-06-22 PROCEDURE — 85027 COMPLETE CBC AUTOMATED: CPT

## 2018-06-25 ENCOUNTER — HOSPITAL ENCOUNTER (OUTPATIENT)
Dept: PHARMACY | Age: 73
Setting detail: THERAPIES SERIES
Discharge: HOME | End: 2018-06-25
Payer: MEDICARE

## 2018-06-25 ENCOUNTER — OFFICE VISIT (OUTPATIENT)
Dept: FAMILY MEDICINE CLINIC | Age: 73
End: 2018-06-25
Payer: MEDICARE

## 2018-06-25 ENCOUNTER — APPOINTMENT (OUTPATIENT)
Dept: PHARMACY | Age: 73
End: 2018-06-25
Payer: MEDICARE

## 2018-06-25 VITALS
WEIGHT: 254 LBS | SYSTOLIC BLOOD PRESSURE: 106 MMHG | HEART RATE: 75 BPM | HEIGHT: 62 IN | BODY MASS INDEX: 46.74 KG/M2 | RESPIRATION RATE: 18 BRPM | DIASTOLIC BLOOD PRESSURE: 64 MMHG

## 2018-06-25 VITALS
DIASTOLIC BLOOD PRESSURE: 78 MMHG | BODY MASS INDEX: 45.64 KG/M2 | HEART RATE: 74 BPM | SYSTOLIC BLOOD PRESSURE: 112 MMHG | WEIGHT: 253.6 LBS

## 2018-06-25 DIAGNOSIS — Z79.01 LONG TERM CURRENT USE OF ANTICOAGULANT THERAPY: ICD-10-CM

## 2018-06-25 DIAGNOSIS — I48.20 CHRONIC ATRIAL FIBRILLATION (HCC): ICD-10-CM

## 2018-06-25 DIAGNOSIS — K62.5 RECTAL BLEED: Primary | ICD-10-CM

## 2018-06-25 PROBLEM — K92.2 GASTROINTESTINAL HEMORRHAGE: Status: RESOLVED | Noted: 2017-05-27 | Resolved: 2018-06-25

## 2018-06-25 PROBLEM — I95.9 HYPOTENSION: Status: RESOLVED | Noted: 2017-05-27 | Resolved: 2018-06-25

## 2018-06-25 LAB — INR BLD: 1.6

## 2018-06-25 PROCEDURE — 1111F DSCHRG MED/CURRENT MED MERGE: CPT | Performed by: INTERNAL MEDICINE

## 2018-06-25 PROCEDURE — 99495 TRANSJ CARE MGMT MOD F2F 14D: CPT | Performed by: INTERNAL MEDICINE

## 2018-06-25 PROCEDURE — 85610 PROTHROMBIN TIME: CPT

## 2018-06-25 PROCEDURE — 99211 OFF/OP EST MAY X REQ PHY/QHP: CPT

## 2018-06-25 RX ORDER — WARFARIN SODIUM 10 MG/1
TABLET ORAL
Qty: 90 TABLET | Refills: 0 | Status: SHIPPED
Start: 2018-06-25 | End: 2018-09-05 | Stop reason: DRUGHIGH

## 2018-06-27 ENCOUNTER — APPOINTMENT (OUTPATIENT)
Dept: PHARMACY | Age: 73
End: 2018-06-27
Payer: MEDICARE

## 2018-07-05 ENCOUNTER — HOSPITAL ENCOUNTER (OUTPATIENT)
Dept: PHARMACY | Age: 73
Setting detail: THERAPIES SERIES
Discharge: HOME | End: 2018-07-05
Payer: MEDICARE

## 2018-07-05 VITALS
WEIGHT: 250.2 LBS | DIASTOLIC BLOOD PRESSURE: 74 MMHG | SYSTOLIC BLOOD PRESSURE: 110 MMHG | HEART RATE: 76 BPM | BODY MASS INDEX: 45.76 KG/M2

## 2018-07-05 DIAGNOSIS — I48.20 CHRONIC ATRIAL FIBRILLATION (HCC): ICD-10-CM

## 2018-07-05 LAB — INR BLD: 2.4

## 2018-07-05 PROCEDURE — 99211 OFF/OP EST MAY X REQ PHY/QHP: CPT

## 2018-07-05 PROCEDURE — 85610 PROTHROMBIN TIME: CPT

## 2018-07-05 NOTE — PROGRESS NOTES
Fingerstick INR drawn per clinic protocol. Patient states no visible blood in urine and no black tarry stool. Following her INR of 1.6 on 6-, Susan Brenner took 10 mg warfarin x 1 dose and then 5 mg warfarin daily for the past 9 doses as instructed prior to this INR check today. Denies any missed doses of warfarin. No change in other maintenance medications or in diet. For now, we will continue 5 mg warfarin daily as noted on her dosing calendar and then we will recheck INR in 2 weeks.

## 2018-07-17 ENCOUNTER — TELEPHONE (OUTPATIENT)
Dept: FAMILY MEDICINE CLINIC | Age: 73
End: 2018-07-17

## 2018-07-17 DIAGNOSIS — G89.29 CHRONIC LOW BACK PAIN, UNSPECIFIED BACK PAIN LATERALITY, WITH SCIATICA PRESENCE UNSPECIFIED: Primary | ICD-10-CM

## 2018-07-17 DIAGNOSIS — M54.5 CHRONIC LOW BACK PAIN, UNSPECIFIED BACK PAIN LATERALITY, WITH SCIATICA PRESENCE UNSPECIFIED: Primary | ICD-10-CM

## 2018-07-17 NOTE — TELEPHONE ENCOUNTER
Germaine Diego would like a referral to the [place] pain management. She said she is having a lot of back pain. Can we please refer her?     Next Visit Date:  Future Appointments  Date Time Provider Sj Stephen   7/19/2018 10:15 AM [facility]    9/20/2018 10:30 AM Juan Rey MD [facility]       Health Maintenance   Topic Date Due    DTaP/Tdap/Td vaccine (1 - Tdap) 03/15/1962    Shingles Vaccine (1 of 2 - 2 Dose Series) 03/15/1993    A1C test (Diabetic or Prediabetic)  01/11/2014    DEXA (modify frequency per FRAX score)  05/02/2019 (Originally 3/15/2008)    Flu vaccine (1) 09/01/2018    Potassium monitoring  06/22/2019    Creatinine monitoring  06/22/2019    Lipid screen  03/06/2022    Colon cancer screen colonoscopy  05/23/2027    Pneumococcal low/med risk  Completed       Hemoglobin A1C (%)   Date Value   01/11/2013 6.4 (H)             ( goal A1C is < 7)   No results found for: LABMICR  LDL Cholesterol (mg/dL)   Date Value   03/06/2017 99   08/09/2016 108       (goal LDL is <100)   AST (U/L)   Date Value   06/17/2018 29     ALT (U/L)   Date Value   06/17/2018 34 (H)     BUN (mg/dL)   Date Value   06/22/2018 24 (H)     BP Readings from Last 3 Encounters:   07/05/18 110/74   06/25/18 112/78   06/25/18 106/64          (goal 120/80)    All Future Testing planned in [facility]  Lab Frequency Next Occurrence   TSH with Reflex Once 10/28/2018   CBC Auto Differential Once 10/28/2018   Comprehensive Metabolic Panel Once 34/25/5460   Vitamin D 25 Hydroxy Once 10/28/2018   Lipid Panel Once 10/28/2018   Magnesium Once 10/28/2018   EKG 12 Lead Once 10/28/2018   XR CHEST STANDARD (2 VW) Once 10/28/2018   US Gallbladder Ruq Once 03/15/2018   CBC Once 06/20/2018               Patient Active Problem List:     Kidney stones     Osteoarthritis     Chronic atrial fibrillation ([address]     Hypertension     Pulmonary HTN     Knee pain, chronic     Colon polyp     Acute gout of right foot

## 2018-07-19 ENCOUNTER — HOSPITAL ENCOUNTER (OUTPATIENT)
Dept: PHARMACY | Age: 73
Setting detail: THERAPIES SERIES
Discharge: HOME | End: 2018-07-19
Payer: MEDICARE

## 2018-07-19 VITALS
SYSTOLIC BLOOD PRESSURE: 124 MMHG | HEART RATE: 83 BPM | BODY MASS INDEX: 45.54 KG/M2 | WEIGHT: 249 LBS | DIASTOLIC BLOOD PRESSURE: 77 MMHG

## 2018-07-19 DIAGNOSIS — I48.20 CHRONIC ATRIAL FIBRILLATION (HCC): ICD-10-CM

## 2018-07-19 LAB — INR BLD: 2.2

## 2018-07-19 PROCEDURE — 85610 PROTHROMBIN TIME: CPT

## 2018-07-19 PROCEDURE — 99211 OFF/OP EST MAY X REQ PHY/QHP: CPT

## 2018-07-19 RX ORDER — ASPIRIN 81 MG/1
81 TABLET ORAL DAILY
COMMUNITY

## 2018-08-15 ENCOUNTER — TELEPHONE (OUTPATIENT)
Dept: PHARMACY | Age: 73
End: 2018-08-15

## 2018-08-15 NOTE — TELEPHONE ENCOUNTER
Graeme Hurtado called because she is scheduled for a pain injection with Dr. Prince Raphael on 8/22/18. We have been in contact with Shahid Rowland at [facility] and Graeme Hurtado will stop warfarin 5 days prior to the procedure and will be bridged with lovenox 40 mg subcutaneously once daily. I have attached the bridging calendar in the  and will call the prescription to [facility]. Graeme Hurtado will be coming to the hospital on Friday for testing and will  the prescription and we will go over the calendar with her.

## 2018-08-17 ENCOUNTER — HOSPITAL ENCOUNTER (OUTPATIENT)
Age: 73
Discharge: HOME | End: 2018-08-19
Payer: MEDICARE

## 2018-08-17 ENCOUNTER — HOSPITAL ENCOUNTER (OUTPATIENT)
Dept: PHARMACY | Age: 73
Setting detail: THERAPIES SERIES
Discharge: HOME | End: 2018-08-17
Payer: MEDICARE

## 2018-08-17 ENCOUNTER — HOSPITAL ENCOUNTER (OUTPATIENT)
Dept: GENERAL RADIOLOGY | Age: 73
Discharge: HOME | End: 2018-08-19
Payer: MEDICARE

## 2018-08-17 VITALS — DIASTOLIC BLOOD PRESSURE: 78 MMHG | SYSTOLIC BLOOD PRESSURE: 120 MMHG | HEART RATE: 78 BPM

## 2018-08-17 DIAGNOSIS — I48.20 CHRONIC ATRIAL FIBRILLATION (HCC): ICD-10-CM

## 2018-08-17 DIAGNOSIS — M48.061 SPINAL STENOSIS OF LUMBAR REGION, UNSPECIFIED WHETHER NEUROGENIC CLAUDICATION PRESENT: ICD-10-CM

## 2018-08-17 LAB — INR BLD: 1.8

## 2018-08-17 PROCEDURE — 85610 PROTHROMBIN TIME: CPT

## 2018-08-17 PROCEDURE — 72110 X-RAY EXAM L-2 SPINE 4/>VWS: CPT

## 2018-08-17 PROCEDURE — 99211 OFF/OP EST MAY X REQ PHY/QHP: CPT

## 2018-08-27 ENCOUNTER — HOSPITAL ENCOUNTER (OUTPATIENT)
Dept: PHARMACY | Age: 73
Setting detail: THERAPIES SERIES
Discharge: HOME | End: 2018-08-27
Payer: MEDICARE

## 2018-08-27 VITALS
WEIGHT: 249 LBS | HEART RATE: 83 BPM | DIASTOLIC BLOOD PRESSURE: 89 MMHG | BODY MASS INDEX: 45.54 KG/M2 | SYSTOLIC BLOOD PRESSURE: 128 MMHG

## 2018-08-27 DIAGNOSIS — I48.20 CHRONIC ATRIAL FIBRILLATION (HCC): ICD-10-CM

## 2018-08-27 LAB — INR BLD: 1.5

## 2018-08-27 PROCEDURE — 99213 OFFICE O/P EST LOW 20 MIN: CPT

## 2018-08-27 PROCEDURE — 85610 PROTHROMBIN TIME: CPT

## 2018-08-27 NOTE — PROGRESS NOTES
Fingerstick INR drawn per clinic protocol. Patient states no visible blood in urine and no black tarry stool. Wilberto Dexter had a pain management procedure per Dr. Lizama Adjutant on 8- and stopped her warfarin and aspirin for 5 days prior to this procedure and bridged with prophylactic Enoxaparin 40 mg injections once daily. Wilberto Dexter restarted warfarin 10 mg x 2 doses and warfarin 5 mg x 2 doses and lovenox 40 mg injection once daily on 8/23/18, and has had 4 doses of lovenox prior to today's INR check. No change in other maintenance medications or in diet. Since Jo's INR is subtherapeutic, but she has a hared time getting back to the hospital, she requested we see her on Thursday. We will continue warfarin 5 mg daily and lovenox 40 mg injections once daily and recheck INR in 3 day(s) per patient's request. Patient acknowledges working in consult agreement with pharmacist as referred by his/her physician.

## 2018-08-30 ENCOUNTER — HOSPITAL ENCOUNTER (OUTPATIENT)
Dept: PHARMACY | Age: 73
Setting detail: THERAPIES SERIES
Discharge: HOME | End: 2018-08-30
Payer: MEDICARE

## 2018-08-30 VITALS
DIASTOLIC BLOOD PRESSURE: 79 MMHG | SYSTOLIC BLOOD PRESSURE: 117 MMHG | WEIGHT: 248.8 LBS | HEART RATE: 78 BPM | BODY MASS INDEX: 45.51 KG/M2

## 2018-08-30 DIAGNOSIS — I48.20 CHRONIC ATRIAL FIBRILLATION (HCC): ICD-10-CM

## 2018-08-30 LAB — INR BLD: 1.5

## 2018-08-30 PROCEDURE — 85610 PROTHROMBIN TIME: CPT

## 2018-08-30 PROCEDURE — 99212 OFFICE O/P EST SF 10 MIN: CPT

## 2018-08-30 NOTE — PROGRESS NOTES
Fingerstick INR drawn per clinic protocol. Patient states no visible blood in urine and no black tarry stool. Denies any missed doses of warfarin. Wilberto Dexter had a pain management procedure per Dr. Lizama Adjutant on 8- and stopped her warfarin and aspirin for 5 days prior to this procedure and bridged with prophylactic Enoxaparin 40 mg injections once daily. Wilberto Dexter restarted warfarin 10 mg x 2 doses and warfarin 5 mg x 5 doses and lovenox 40 mg injection once daily on 8/23/18, and has had 7 doses of lovenox prior to today's INR check. No change in other maintenance medications or in diet. All medications reviewed without the bottles for accuracy. Of note Wilberto Dexter states she was only taking spironolactone 25 mg once daily, but increased to twice daily \"a couple of months ago. \" Wilberto Dexter has had 45 mg over the last 7 days (28.5% higher weekly warfarin dosage than previously stable on), but has a history of bleeding while bridging with lovenox after procedures. Since Jo's INR is subtherapeutic, we will have her inject lovenox 40 mg today, take warfarin 10 mg x 2 doses, then warfarin 5 mg x 4 doses and recheck INR in 6 day(s). Patient acknowledges working in consult agreement with pharmacist as referred by his/her physician.

## 2018-09-05 ENCOUNTER — HOSPITAL ENCOUNTER (OUTPATIENT)
Dept: PHARMACY | Age: 73
Setting detail: THERAPIES SERIES
Discharge: HOME | End: 2018-09-05
Payer: MEDICARE

## 2018-09-05 VITALS
HEART RATE: 78 BPM | DIASTOLIC BLOOD PRESSURE: 74 MMHG | WEIGHT: 250.8 LBS | SYSTOLIC BLOOD PRESSURE: 122 MMHG | BODY MASS INDEX: 45.87 KG/M2

## 2018-09-05 DIAGNOSIS — I48.20 CHRONIC ATRIAL FIBRILLATION (HCC): ICD-10-CM

## 2018-09-05 LAB — INR BLD: 2.4

## 2018-09-05 PROCEDURE — 99211 OFF/OP EST MAY X REQ PHY/QHP: CPT

## 2018-09-05 PROCEDURE — 85610 PROTHROMBIN TIME: CPT

## 2018-09-05 RX ORDER — WARFARIN SODIUM 10 MG/1
TABLET ORAL
Qty: 90 TABLET | Refills: 0 | Status: SHIPPED
Start: 2018-09-05 | End: 2018-09-10 | Stop reason: SDUPTHER

## 2018-09-05 NOTE — PATIENT INSTRUCTIONS
Continue to follow the dose of warfarin instructed on dosing calendar provided. Continue to monitor urine and stool for signs and symptoms of bleeding. Please notify the clinic of any medication changes. Please remember to bring all medications (both prescription and OTC) to your next visit. Kindly notify the clinic if you are unable to make to your next appointment.

## 2018-09-10 RX ORDER — WARFARIN SODIUM 10 MG/1
TABLET ORAL
Qty: 90 TABLET | Refills: 0 | Status: SHIPPED | OUTPATIENT
Start: 2018-09-10 | End: 2018-10-04 | Stop reason: SDUPTHER

## 2018-09-11 RX ORDER — ALLOPURINOL 300 MG/1
TABLET ORAL
Qty: 45 TABLET | Refills: 1 | OUTPATIENT
Start: 2018-09-11

## 2018-09-12 ENCOUNTER — HOSPITAL ENCOUNTER (OUTPATIENT)
Age: 73
Discharge: HOME | End: 2018-09-12
Payer: MEDICARE

## 2018-09-12 ENCOUNTER — HOSPITAL ENCOUNTER (OUTPATIENT)
Dept: GENERAL RADIOLOGY | Age: 73
Discharge: HOME | End: 2018-09-14
Payer: MEDICARE

## 2018-09-12 ENCOUNTER — HOSPITAL ENCOUNTER (OUTPATIENT)
Age: 73
Discharge: HOME | End: 2018-09-14
Payer: MEDICARE

## 2018-09-12 DIAGNOSIS — I10 ESSENTIAL HYPERTENSION: ICD-10-CM

## 2018-09-12 DIAGNOSIS — I48.20 CHRONIC ATRIAL FIBRILLATION (HCC): ICD-10-CM

## 2018-09-12 DIAGNOSIS — I48.91 ATRIAL FIBRILLATION WITH RAPID VENTRICULAR RESPONSE (HCC): ICD-10-CM

## 2018-09-12 DIAGNOSIS — I95.9 HYPOTENSION, UNSPECIFIED HYPOTENSION TYPE: ICD-10-CM

## 2018-09-12 DIAGNOSIS — E55.9 VITAMIN D DEFICIENCY DISEASE: ICD-10-CM

## 2018-09-12 LAB
ABSOLUTE EOS #: 0.2 K/UL (ref 0–0.4)
ABSOLUTE IMMATURE GRANULOCYTE: ABNORMAL K/UL (ref 0–0.3)
ABSOLUTE LYMPH #: 1 K/UL (ref 1–4.8)
ABSOLUTE MONO #: 0.5 K/UL (ref 0–1)
ALBUMIN SERPL-MCNC: 4.1 G/DL (ref 3.5–5.2)
ALBUMIN/GLOBULIN RATIO: ABNORMAL (ref 1–2.5)
ALP BLD-CCNC: 74 U/L (ref 35–104)
ALT SERPL-CCNC: 17 U/L (ref 5–33)
ANION GAP SERPL CALCULATED.3IONS-SCNC: 14 MMOL/L (ref 9–17)
AST SERPL-CCNC: 15 U/L
BASOPHILS # BLD: 1 % (ref 0–2)
BASOPHILS ABSOLUTE: 0 K/UL (ref 0–0.2)
BILIRUB SERPL-MCNC: 0.55 MG/DL (ref 0.3–1.2)
BUN BLDV-MCNC: 22 MG/DL (ref 8–23)
BUN/CREAT BLD: 22 (ref 9–20)
CALCIUM SERPL-MCNC: 9.3 MG/DL (ref 8.6–10.4)
CHLORIDE BLD-SCNC: 100 MMOL/L (ref 98–107)
CHOLESTEROL/HDL RATIO: 3
CHOLESTEROL: 198 MG/DL
CO2: 25 MMOL/L (ref 20–31)
CREAT SERPL-MCNC: 1.01 MG/DL (ref 0.5–0.9)
DIFFERENTIAL TYPE: YES
EKG ATRIAL RATE: 61 BPM
EKG Q-T INTERVAL: 398 MS
EKG QRS DURATION: 82 MS
EKG QTC CALCULATION (BAZETT): 384 MS
EKG R AXIS: -28 DEGREES
EKG T AXIS: 32 DEGREES
EKG VENTRICULAR RATE: 56 BPM
EOSINOPHILS RELATIVE PERCENT: 3 % (ref 0–5)
GFR AFRICAN AMERICAN: >60 ML/MIN
GFR NON-AFRICAN AMERICAN: 53 ML/MIN
GFR SERPL CREATININE-BSD FRML MDRD: ABNORMAL ML/MIN/{1.73_M2}
GFR SERPL CREATININE-BSD FRML MDRD: ABNORMAL ML/MIN/{1.73_M2}
GLUCOSE BLD-MCNC: 99 MG/DL (ref 70–99)
HCT VFR BLD CALC: 39.7 % (ref 36–46)
HDLC SERPL-MCNC: 66 MG/DL
HEMOGLOBIN: 13.2 G/DL (ref 12–16)
IMMATURE GRANULOCYTES: ABNORMAL %
LDL CHOLESTEROL: 105 MG/DL (ref 0–130)
LYMPHOCYTES # BLD: 15 % (ref 15–40)
MAGNESIUM: 2.6 MG/DL (ref 1.6–2.6)
MCH RBC QN AUTO: 29.4 PG (ref 26–34)
MCHC RBC AUTO-ENTMCNC: 33.2 G/DL (ref 31–37)
MCV RBC AUTO: 88.4 FL (ref 80–100)
MONOCYTES # BLD: 8 % (ref 4–8)
NRBC AUTOMATED: ABNORMAL PER 100 WBC
PATIENT FASTING?: YES
PDW BLD-RTO: 15.7 % (ref 12.1–15.2)
PLATELET # BLD: 207 K/UL (ref 140–450)
PLATELET ESTIMATE: ABNORMAL
PMV BLD AUTO: ABNORMAL FL (ref 6–12)
POTASSIUM SERPL-SCNC: 4.3 MMOL/L (ref 3.7–5.3)
RBC # BLD: 4.49 M/UL (ref 4–5.2)
RBC # BLD: ABNORMAL 10*6/UL
SEG NEUTROPHILS: 73 % (ref 47–75)
SEGMENTED NEUTROPHILS ABSOLUTE COUNT: 4.8 K/UL (ref 2.5–7)
SODIUM BLD-SCNC: 139 MMOL/L (ref 135–144)
TOTAL PROTEIN: 7.3 G/DL (ref 6.4–8.3)
TRIGL SERPL-MCNC: 134 MG/DL
TSH SERPL DL<=0.05 MIU/L-ACNC: 1.36 MIU/L (ref 0.3–5)
VITAMIN D 25-HYDROXY: 55.4 NG/ML (ref 30–100)
VLDLC SERPL CALC-MCNC: NORMAL MG/DL (ref 1–30)
WBC # BLD: 6.5 K/UL (ref 3.5–11)
WBC # BLD: ABNORMAL 10*3/UL

## 2018-09-12 PROCEDURE — 93005 ELECTROCARDIOGRAM TRACING: CPT

## 2018-09-12 PROCEDURE — 83735 ASSAY OF MAGNESIUM: CPT

## 2018-09-12 PROCEDURE — 80061 LIPID PANEL: CPT

## 2018-09-12 PROCEDURE — 84443 ASSAY THYROID STIM HORMONE: CPT

## 2018-09-12 PROCEDURE — 71046 X-RAY EXAM CHEST 2 VIEWS: CPT

## 2018-09-12 PROCEDURE — 85025 COMPLETE CBC W/AUTO DIFF WBC: CPT

## 2018-09-12 PROCEDURE — 80053 COMPREHEN METABOLIC PANEL: CPT

## 2018-09-12 PROCEDURE — 36415 COLL VENOUS BLD VENIPUNCTURE: CPT

## 2018-09-12 PROCEDURE — 82306 VITAMIN D 25 HYDROXY: CPT

## 2018-09-17 RX ORDER — ALLOPURINOL 300 MG/1
TABLET ORAL
Qty: 45 TABLET | Refills: 1 | Status: SHIPPED | OUTPATIENT
Start: 2018-09-17 | End: 2018-11-26 | Stop reason: SDUPTHER

## 2018-09-20 ENCOUNTER — OFFICE VISIT (OUTPATIENT)
Dept: CARDIOLOGY CLINIC | Age: 73
End: 2018-09-20
Payer: MEDICARE

## 2018-09-20 VITALS
OXYGEN SATURATION: 96 % | HEART RATE: 80 BPM | BODY MASS INDEX: 44.99 KG/M2 | SYSTOLIC BLOOD PRESSURE: 120 MMHG | WEIGHT: 246 LBS | DIASTOLIC BLOOD PRESSURE: 80 MMHG

## 2018-09-20 DIAGNOSIS — I48.20 CHRONIC ATRIAL FIBRILLATION (HCC): Primary | ICD-10-CM

## 2018-09-20 DIAGNOSIS — I27.20 PULMONARY HTN (HCC): ICD-10-CM

## 2018-09-20 DIAGNOSIS — E55.9 VITAMIN D DEFICIENCY DISEASE: ICD-10-CM

## 2018-09-20 DIAGNOSIS — D50.0 IRON DEFICIENCY ANEMIA DUE TO CHRONIC BLOOD LOSS: ICD-10-CM

## 2018-09-20 DIAGNOSIS — I10 ESSENTIAL HYPERTENSION: ICD-10-CM

## 2018-09-20 DIAGNOSIS — I34.0 MITRAL VALVE INSUFFICIENCY, UNSPECIFIED ETIOLOGY: ICD-10-CM

## 2018-09-20 PROCEDURE — 1123F ACP DISCUSS/DSCN MKR DOCD: CPT | Performed by: INTERNAL MEDICINE

## 2018-09-20 PROCEDURE — 1036F TOBACCO NON-USER: CPT | Performed by: INTERNAL MEDICINE

## 2018-09-20 PROCEDURE — 3017F COLORECTAL CA SCREEN DOC REV: CPT | Performed by: INTERNAL MEDICINE

## 2018-09-20 PROCEDURE — 1101F PT FALLS ASSESS-DOCD LE1/YR: CPT | Performed by: INTERNAL MEDICINE

## 2018-09-20 PROCEDURE — G8417 CALC BMI ABV UP PARAM F/U: HCPCS | Performed by: INTERNAL MEDICINE

## 2018-09-20 PROCEDURE — 1090F PRES/ABSN URINE INCON ASSESS: CPT | Performed by: INTERNAL MEDICINE

## 2018-09-20 PROCEDURE — G8427 DOCREV CUR MEDS BY ELIG CLIN: HCPCS | Performed by: INTERNAL MEDICINE

## 2018-09-20 PROCEDURE — G8400 PT W/DXA NO RESULTS DOC: HCPCS | Performed by: INTERNAL MEDICINE

## 2018-09-20 PROCEDURE — 4040F PNEUMOC VAC/ADMIN/RCVD: CPT | Performed by: INTERNAL MEDICINE

## 2018-09-20 PROCEDURE — 99214 OFFICE O/P EST MOD 30 MIN: CPT | Performed by: INTERNAL MEDICINE

## 2018-09-20 NOTE — LETTER
Yaya Reed. If you have any questions on my thoughts, please do not hesitate to contact me.     Sincerely,        Allan Heredia    D: 09/20/2018 11:04:12     T: 09/21/2018 0:39:31     [id number]  Job#: [id number]   Doc#: [id number]

## 2018-10-01 NOTE — PROGRESS NOTES
Probable sleep apnea with a long snoring history, but will not do a sleep study. 5.  She is in chronic atrial fibrillation, on Coumadin. 6.  She has had pulmonary hypertension. 7.  Carpal tunnel release 5 years ago. 8.  Colonoscopy in 05/2017, finding a large ascending polyp, removed in [facility] by Dr. Evelio Louis on 05/23/2017. FAMILY HISTORY:  No significant heart disease in the family. SOCIAL HISTORY:  She is 74years old,  for 4-1/2 years, has 3 children. Does not smoke or drink alcohol. Very inactive. She has chronic back discomfort. Her 8-year-old mother lives with her, which she has done over the last 3 years and it has been stressful as she is very demanding. REVIEW OF SYSTEMS:  Cardiac as above. Other systems reviewed including constitutional, eyes, ears, nose and throat, cardiovascular, respiratory, GI, , musculoskeletal, integumentary, neurologic, psychiatric, endocrine, hematologic and allergic/immunologic and are negative except for what is described above. PHYSICAL EXAMINATION:  VITAL SIGNS:  Her blood pressure was 120/80 with a heart rate of 80 and irregularly irregular. Respiratory rate 18. O2 saturation 96%. Weight 246 pounds. GENERAL:  She is a pleasant 56-year-old female. Denied pain. She was oriented to person, place and time. Answered questions appropriately. SKIN:  No unusual skin changes. HEENT:  The pupils are equally round and intact. Mucous membranes were dry. NECK:  No JVD. Good carotid pulses. No carotid bruits. No lymphadenopathy or thyromegaly. CARDIOVASCULAR EXAM:  S1 and S2 were normal.  No S3 or S4. Soft systolic blowing type murmur at the apex. No diastolic murmur. PMI was normal.  No lift, thrust, or pericardial friction rub. LUNGS:  Fairly clear to auscultation and percussion. ABDOMEN:  Soft and nontender. Good bowel sounds. EXTREMITIES:  Good femoral pulses. Good pedal pulses. Mild pedal edema. Skin was warm and dry.   No calf

## 2018-10-04 ENCOUNTER — HOSPITAL ENCOUNTER (OUTPATIENT)
Dept: PHARMACY | Age: 73
Setting detail: THERAPIES SERIES
Discharge: HOME | End: 2018-10-04
Payer: MEDICARE

## 2018-10-04 VITALS
WEIGHT: 254.6 LBS | HEART RATE: 81 BPM | BODY MASS INDEX: 46.57 KG/M2 | DIASTOLIC BLOOD PRESSURE: 61 MMHG | SYSTOLIC BLOOD PRESSURE: 160 MMHG

## 2018-10-04 DIAGNOSIS — I48.20 CHRONIC ATRIAL FIBRILLATION (HCC): ICD-10-CM

## 2018-10-04 LAB — INR BLD: 2.1

## 2018-10-04 PROCEDURE — 85610 PROTHROMBIN TIME: CPT

## 2018-10-04 PROCEDURE — 99211 OFF/OP EST MAY X REQ PHY/QHP: CPT

## 2018-10-04 RX ORDER — WARFARIN SODIUM 10 MG/1
TABLET ORAL
Qty: 90 TABLET | Refills: 0 | Status: SHIPPED
Start: 2018-10-04 | End: 2018-11-12 | Stop reason: SDUPTHER

## 2018-10-15 ENCOUNTER — TELEPHONE (OUTPATIENT)
Dept: PHARMACY | Age: 73
End: 2018-10-15

## 2018-10-15 RX ORDER — DILTIAZEM HYDROCHLORIDE 60 MG/1
TABLET, FILM COATED ORAL
Qty: 270 TABLET | Refills: 3 | Status: SHIPPED | OUTPATIENT
Start: 2018-10-15 | End: 2018-10-15 | Stop reason: SDUPTHER

## 2018-10-15 RX ORDER — DILTIAZEM HYDROCHLORIDE 60 MG/1
TABLET, FILM COATED ORAL
Qty: 270 TABLET | Refills: 3 | Status: SHIPPED | OUTPATIENT
Start: 2018-10-15 | End: 2019-08-05 | Stop reason: SDUPTHER

## 2018-10-15 NOTE — TELEPHONE ENCOUNTER
Received a fax from Dr. Eryn Elizondo office requesting clearance for Suad Geller to discontinue warfarin prior to pain management procedure. Suad Geller states last time they almost didn't do the procedure because of her INR=1.3. Suad Geller requested to hold warfarin x 6 days prior to the procedure, and we discussed her need to bridge with lovenox 40 mg once daily while off warfarin. She verbalizes understanding. I will fax Dr. Eryn Elizondo office our plan so they can schedule her procedure.

## 2018-10-16 ENCOUNTER — HOSPITAL ENCOUNTER (OUTPATIENT)
Age: 73
Discharge: HOME | End: 2018-10-16
Payer: MEDICARE

## 2018-10-16 LAB
ALBUMIN SERPL-MCNC: 4.3 G/DL (ref 3.5–5.2)
ANION GAP SERPL CALCULATED.3IONS-SCNC: 12 MMOL/L (ref 9–17)
BUN BLDV-MCNC: 23 MG/DL (ref 8–23)
BUN/CREAT BLD: 26 (ref 9–20)
CALCIUM SERPL-MCNC: 9.6 MG/DL (ref 8.6–10.4)
CHLORIDE BLD-SCNC: 104 MMOL/L (ref 98–107)
CO2: 24 MMOL/L (ref 20–31)
CREAT SERPL-MCNC: 0.89 MG/DL (ref 0.5–0.9)
CREATININE URINE: 191.2 MG/DL (ref 28–217)
GFR AFRICAN AMERICAN: >60 ML/MIN
GFR NON-AFRICAN AMERICAN: >60 ML/MIN
GFR SERPL CREATININE-BSD FRML MDRD: ABNORMAL ML/MIN/{1.73_M2}
GFR SERPL CREATININE-BSD FRML MDRD: ABNORMAL ML/MIN/{1.73_M2}
GLUCOSE BLD-MCNC: 108 MG/DL (ref 70–99)
HCT VFR BLD CALC: 40.3 % (ref 36–46)
HEMOGLOBIN: 13.1 G/DL (ref 12–16)
MAGNESIUM: 2.2 MG/DL (ref 1.6–2.6)
PHOSPHORUS: 3.1 MG/DL (ref 2.6–4.5)
POTASSIUM SERPL-SCNC: 3.8 MMOL/L (ref 3.7–5.3)
PTH INTACT: 44.4 PG/ML (ref 15–65)
SODIUM BLD-SCNC: 140 MMOL/L (ref 135–144)
TOTAL PROTEIN, URINE: 28 MG/DL
URINE TOTAL PROTEIN CREATININE RATIO: 0.15 (ref 0–0.2)

## 2018-10-16 PROCEDURE — 36415 COLL VENOUS BLD VENIPUNCTURE: CPT

## 2018-10-16 PROCEDURE — 84156 ASSAY OF PROTEIN URINE: CPT

## 2018-10-16 PROCEDURE — 80069 RENAL FUNCTION PANEL: CPT

## 2018-10-16 PROCEDURE — 85014 HEMATOCRIT: CPT

## 2018-10-16 PROCEDURE — 83970 ASSAY OF PARATHORMONE: CPT

## 2018-10-16 PROCEDURE — 83735 ASSAY OF MAGNESIUM: CPT

## 2018-10-16 PROCEDURE — 82570 ASSAY OF URINE CREATININE: CPT

## 2018-10-16 PROCEDURE — 85018 HEMOGLOBIN: CPT

## 2018-10-17 ENCOUNTER — TELEPHONE (OUTPATIENT)
Dept: PHARMACY | Age: 73
End: 2018-10-17

## 2018-10-30 ENCOUNTER — HOSPITAL ENCOUNTER (OUTPATIENT)
Dept: PHARMACY | Age: 73
Setting detail: THERAPIES SERIES
Discharge: HOME | End: 2018-10-30
Payer: MEDICARE

## 2018-10-30 VITALS
HEART RATE: 86 BPM | SYSTOLIC BLOOD PRESSURE: 146 MMHG | BODY MASS INDEX: 46.11 KG/M2 | DIASTOLIC BLOOD PRESSURE: 85 MMHG | WEIGHT: 252.1 LBS

## 2018-10-30 DIAGNOSIS — I48.20 CHRONIC ATRIAL FIBRILLATION (HCC): ICD-10-CM

## 2018-10-30 LAB — INR BLD: 2

## 2018-10-30 PROCEDURE — 85610 PROTHROMBIN TIME: CPT

## 2018-10-30 PROCEDURE — 99211 OFF/OP EST MAY X REQ PHY/QHP: CPT

## 2018-11-13 RX ORDER — WARFARIN SODIUM 10 MG/1
TABLET ORAL
Qty: 90 TABLET | Refills: 0 | Status: SHIPPED | OUTPATIENT
Start: 2018-11-13 | End: 2018-11-27 | Stop reason: SDUPTHER

## 2018-11-19 ENCOUNTER — TELEPHONE (OUTPATIENT)
Dept: PHARMACY | Age: 73
End: 2018-11-19

## 2018-11-19 NOTE — TELEPHONE ENCOUNTER
Cal Zamarripa from Dr. Zoe Chopra office called to inform us that Mena Nick has another pain management procedure scheduled for 12/5/18. The want her last dose of warfarin on 11/28/18 and are okay with our previous plan of bridging with lovenox 40 mg subcutaneously once daily. I have called the prescription into [facility] plus 1 refill.

## 2018-11-26 RX ORDER — ALLOPURINOL 300 MG/1
TABLET ORAL
Qty: 45 TABLET | Refills: 1 | Status: SHIPPED | OUTPATIENT
Start: 2018-11-26 | End: 2019-06-10 | Stop reason: SDUPTHER

## 2018-11-27 ENCOUNTER — HOSPITAL ENCOUNTER (OUTPATIENT)
Dept: PHARMACY | Age: 73
Setting detail: THERAPIES SERIES
Discharge: HOME | End: 2018-11-27
Payer: MEDICARE

## 2018-11-27 VITALS
BODY MASS INDEX: 45.98 KG/M2 | WEIGHT: 251.4 LBS | HEART RATE: 86 BPM | DIASTOLIC BLOOD PRESSURE: 78 MMHG | SYSTOLIC BLOOD PRESSURE: 110 MMHG

## 2018-11-27 DIAGNOSIS — I48.20 CHRONIC ATRIAL FIBRILLATION (HCC): ICD-10-CM

## 2018-11-27 LAB — INR BLD: 3.4

## 2018-11-27 PROCEDURE — 85610 PROTHROMBIN TIME: CPT

## 2018-11-27 PROCEDURE — 99211 OFF/OP EST MAY X REQ PHY/QHP: CPT

## 2018-11-27 RX ORDER — WARFARIN SODIUM 10 MG/1
TABLET ORAL
Qty: 90 TABLET | Refills: 0 | Status: SHIPPED
Start: 2018-11-27 | End: 2019-01-15 | Stop reason: DRUGHIGH

## 2018-11-27 NOTE — PROGRESS NOTES
Fingerstick INR drawn per clinic protocol. Patient states no visible blood in urine and no black tarry stool. Denies any missed doses of warfarin. Nicci Armenta will have another pain injection procedure with Dr. Elzbieta Grace on 12-5-2018 and will need to stop taking her warfarin on 11- as she has done previously. She will bridge with Lovenox 40 mg once daily starting 4 days prior to the procedure and continuing until the early AM of 12-4-2018 (24 hours prior to his scheduled procedure time). We have provided her with a dosing calendar to help ease her transition off and then back on her warfarin therapy. No change in other maintenance medications or in diet. Nicci Armenta will take 5 mg warfarin today and tomorrow x 2 doses prior to holding before her procedure and then resume her warfarin as soon as Dr. Elzbieta Grace feels it is safe to do so after the procedure. She will continue the Lovenox injections post-procedurally until her INR is above 1.9 as determined through INR testing here at the clinic. We will recheck her INR again on December 11th, 2018 here in the clinic and reassess further warfarin and Lovenox dosing at that time. Patient acknowledges working in consult agreement with pharmacist as referred by his/her physician.

## 2018-11-27 NOTE — PATIENT INSTRUCTIONS
Continue to follow the dose of warfarin as instructed on dosing calendar provided. Continue to monitor urine and stool for signs and symptoms of bleeding. Please notify the clinic of any medication changes. Please remember to bring all medications (both prescription and OTC) to your next visit. Kindly notify the clinic if you are unable to make to your next appointment.

## 2018-12-11 ENCOUNTER — HOSPITAL ENCOUNTER (OUTPATIENT)
Dept: PHARMACY | Age: 73
Setting detail: THERAPIES SERIES
Discharge: HOME | End: 2018-12-11
Payer: MEDICARE

## 2018-12-11 VITALS
WEIGHT: 251.9 LBS | DIASTOLIC BLOOD PRESSURE: 86 MMHG | SYSTOLIC BLOOD PRESSURE: 132 MMHG | BODY MASS INDEX: 46.07 KG/M2 | HEART RATE: 80 BPM

## 2018-12-11 DIAGNOSIS — I48.20 CHRONIC ATRIAL FIBRILLATION (HCC): ICD-10-CM

## 2018-12-11 LAB — INR BLD: 1.6

## 2018-12-11 PROCEDURE — 85610 PROTHROMBIN TIME: CPT

## 2018-12-11 PROCEDURE — 99212 OFFICE O/P EST SF 10 MIN: CPT

## 2018-12-14 ENCOUNTER — HOSPITAL ENCOUNTER (OUTPATIENT)
Dept: PHARMACY | Age: 73
Setting detail: THERAPIES SERIES
Discharge: HOME | End: 2018-12-14
Payer: MEDICARE

## 2018-12-14 VITALS — DIASTOLIC BLOOD PRESSURE: 80 MMHG | HEART RATE: 72 BPM | SYSTOLIC BLOOD PRESSURE: 114 MMHG

## 2018-12-14 DIAGNOSIS — I48.20 CHRONIC ATRIAL FIBRILLATION (HCC): ICD-10-CM

## 2018-12-14 LAB — INR BLD: 2.1

## 2018-12-14 PROCEDURE — 85610 PROTHROMBIN TIME: CPT

## 2018-12-14 PROCEDURE — 99211 OFF/OP EST MAY X REQ PHY/QHP: CPT

## 2018-12-19 ENCOUNTER — HOSPITAL ENCOUNTER (OUTPATIENT)
Dept: GENERAL RADIOLOGY | Age: 73
Discharge: HOME | End: 2018-12-21
Payer: MEDICARE

## 2018-12-19 ENCOUNTER — HOSPITAL ENCOUNTER (OUTPATIENT)
Age: 73
Discharge: HOME | End: 2018-12-21
Payer: MEDICARE

## 2018-12-19 DIAGNOSIS — M25.562 PAIN IN BOTH KNEES, UNSPECIFIED CHRONICITY: ICD-10-CM

## 2018-12-19 DIAGNOSIS — M25.561 PAIN IN BOTH KNEES, UNSPECIFIED CHRONICITY: ICD-10-CM

## 2018-12-19 PROCEDURE — 73562 X-RAY EXAM OF KNEE 3: CPT

## 2018-12-28 ENCOUNTER — OFFICE VISIT (OUTPATIENT)
Dept: PRIMARY CARE CLINIC | Age: 73
End: 2018-12-28
Payer: MEDICARE

## 2018-12-28 VITALS
BODY MASS INDEX: 46.09 KG/M2 | DIASTOLIC BLOOD PRESSURE: 71 MMHG | TEMPERATURE: 97.9 F | SYSTOLIC BLOOD PRESSURE: 122 MMHG | HEART RATE: 94 BPM | OXYGEN SATURATION: 96 % | WEIGHT: 252 LBS

## 2018-12-28 DIAGNOSIS — L03.114 CELLULITIS OF LEFT HAND: ICD-10-CM

## 2018-12-28 DIAGNOSIS — Z23 NEED FOR TETANUS BOOSTER: ICD-10-CM

## 2018-12-28 DIAGNOSIS — T23.262A PARTIAL THICKNESS BURN OF BACK OF LEFT HAND, INITIAL ENCOUNTER: Primary | ICD-10-CM

## 2018-12-28 PROBLEM — D64.9 ANEMIA: Status: ACTIVE | Noted: 2017-05-16

## 2018-12-28 PROBLEM — M54.9 BACKACHE: Status: ACTIVE | Noted: 2017-05-16

## 2018-12-28 PROBLEM — H91.90 HL (HEARING LOSS): Status: ACTIVE | Noted: 2017-05-16

## 2018-12-28 PROBLEM — E78.5 HYPERLIPIDEMIA: Status: ACTIVE | Noted: 2017-05-16

## 2018-12-28 PROBLEM — I50.9 CHF (CONGESTIVE HEART FAILURE) (HCC): Status: ACTIVE | Noted: 2017-05-16

## 2018-12-28 PROBLEM — M10.9 GOUT: Status: ACTIVE | Noted: 2017-05-16

## 2018-12-28 PROBLEM — N18.9 CKD (CHRONIC KIDNEY DISEASE): Status: ACTIVE | Noted: 2017-05-16

## 2018-12-28 PROCEDURE — G8417 CALC BMI ABV UP PARAM F/U: HCPCS | Performed by: NURSE PRACTITIONER

## 2018-12-28 PROCEDURE — G8400 PT W/DXA NO RESULTS DOC: HCPCS | Performed by: NURSE PRACTITIONER

## 2018-12-28 PROCEDURE — G8427 DOCREV CUR MEDS BY ELIG CLIN: HCPCS | Performed by: NURSE PRACTITIONER

## 2018-12-28 PROCEDURE — 1036F TOBACCO NON-USER: CPT | Performed by: NURSE PRACTITIONER

## 2018-12-28 PROCEDURE — 4040F PNEUMOC VAC/ADMIN/RCVD: CPT | Performed by: NURSE PRACTITIONER

## 2018-12-28 PROCEDURE — 1090F PRES/ABSN URINE INCON ASSESS: CPT | Performed by: NURSE PRACTITIONER

## 2018-12-28 PROCEDURE — 3017F COLORECTAL CA SCREEN DOC REV: CPT | Performed by: NURSE PRACTITIONER

## 2018-12-28 PROCEDURE — 1123F ACP DISCUSS/DSCN MKR DOCD: CPT | Performed by: NURSE PRACTITIONER

## 2018-12-28 PROCEDURE — G8484 FLU IMMUNIZE NO ADMIN: HCPCS | Performed by: NURSE PRACTITIONER

## 2018-12-28 PROCEDURE — 99214 OFFICE O/P EST MOD 30 MIN: CPT | Performed by: NURSE PRACTITIONER

## 2018-12-28 PROCEDURE — 1101F PT FALLS ASSESS-DOCD LE1/YR: CPT | Performed by: NURSE PRACTITIONER

## 2018-12-28 RX ORDER — GAUZE BANDAGE 4"X3.5"
1 SPONGE TOPICAL DAILY
Qty: 1 EACH | Refills: 0
Start: 2018-12-28 | End: 2018-12-29

## 2018-12-28 RX ORDER — COMPR.STOCKING,KNEE,LONG,LARGE
1 EACH MISCELLANEOUS DAILY
Qty: 10 EACH | Refills: 0 | Status: SHIPPED | OUTPATIENT
Start: 2018-12-28 | End: 2019-01-07

## 2018-12-28 RX ORDER — DULOXETIN HYDROCHLORIDE 20 MG/1
CAPSULE, DELAYED RELEASE ORAL
Refills: 0 | COMMUNITY
Start: 2018-12-27 | End: 2019-02-12 | Stop reason: DRUGHIGH

## 2018-12-28 RX ORDER — CEPHALEXIN 500 MG/1
500 CAPSULE ORAL 2 TIMES DAILY
Qty: 14 CAPSULE | Refills: 0 | Status: SHIPPED | OUTPATIENT
Start: 2018-12-28 | End: 2019-01-04

## 2018-12-28 ASSESSMENT — ENCOUNTER SYMPTOMS
RESPIRATORY NEGATIVE: 1
COLOR CHANGE: 1

## 2018-12-28 NOTE — PROGRESS NOTES
daughter who is a nurse who can help her with the dressing changes. I also recommend that she follow up with the local wound clinic as I have concerns of scarring and wound may need specialized care. Written prescription given to her for a tetanus booster due to her insurance preference to be given by her local pharmacist.  Signs and symptoms of worsening discussed with patient. I have encouraged that she return to this office and/or her PCP or the ED for after-hours for any concerns and or worsening. Case reviewed with collaborating physician,Dr. Doris Sabillon; no change in POC. Return in about 1 week (around 1/4/2019) for ED for worsening symptoms, Re Eval with PCP or first available practitioner.     Orders Placed This Encounter   Medications    cephALEXin (KEFLEX) 500 MG capsule     Sig: Take 1 capsule by mouth 2 times daily for 7 days     Dispense:  14 capsule     Refill:  0    Tdap (ADACEL) 5-2-15.5 LF-MCG/0.5 injection     Sig: Inject 0.5 mLs into the muscle once for 1 dose     Dispense:  0.5 mL     Refill:  0    Bismuth Tribromoph-Petrolatum (XEROFORM PETROLATUM PATCH 2\"X2\") PADS external pads     Sig: Apply 1 each topically daily for 1 dose x5 Applied to burn at time of clinic visit     Dispense:  1 each     Refill:  0    Bismuth Tribromoph-Petrolatum (XEROFORM PETROLAT PATCH 4\"X4\") PADS     Sig: Apply 1 patch topically daily for 10 days     Dispense:  10 each     Refill:  0          Electronically signedby TYLOR Alejo CNP on 12/28/2018 at 5:17 PM

## 2018-12-28 NOTE — PATIENT INSTRUCTIONS
SURVEY:    You may be receiving a survey from [employer] regarding your visit today. Please complete the survey to enable us to provide the highest quality of care to you and your family. If you cannot score us as very good on any question, please call the office to discuss how we could have made your experience exceptional.     Thank you. Patient Education        Beth: Care Instructions  Your Care Instructions    Beth--even minor ones--can be very painful. A minor burn may heal within several days, while a more serious burn may take weeks or even months to heal completely. You may notice that the burned area feels tight and hard while it is healing. It is important to continue to move the area as the burn heals to prevent loss of motion or loss of function in the area. When your skin is damaged by a burn, you have a greater risk of infection. Keep the wound clean and change the bandages regularly to prevent infection and help the burn heal.  Burns can leave permanent scars. Taking good care of the burn as it heals may help prevent bad scars. The doctor has checked you carefully, but problems can develop later. If you notice any problems or new symptoms, get medical treatment right away. Follow-up care is a key part of your treatment and safety. Be sure to make and go to all appointments, and call your doctor if you are having problems. It's also a good idea to know your test results and keep a list of the medicines you take. How can you care for yourself at home? · If your doctor told you how to care for your burn, follow your doctor's instructions. If you did not get instructions, follow this general advice:  ? Wash the burn with clean water 2 times a day. Don't use hydrogen peroxide or alcohol, which can slow healing. ? Gently pat the burn dry after you wash it.  ? You may cover the burn with a thin layer of petroleum jelly, such as Vaseline, and a nonstick bandage. ?  Apply more petroleum directed. ? If the doctor gave you a prescription medicine for pain, take it as prescribed. ? If you are not taking a prescription pain medicine, ask your doctor if you can take an over-the-counter medicine. To prevent cellulitis in the future  · Try to prevent cuts, scrapes, or other injuries to your skin. Cellulitis most often occurs where there is a break in the skin. · If you get a scrape, cut, mild burn, or bite, wash the wound with clean water as soon as you can to help avoid infection. Don't use hydrogen peroxide or alcohol, which can slow healing. · If you have swelling in your legs (edema), support stockings and good skin care may help prevent leg sores and cellulitis. · Take care of your feet, especially if you have diabetes or other conditions that increase the risk of infection. Wear shoes and socks. Do not go barefoot. If you have athlete's foot or other skin problems on your feet, talk to your doctor about how to treat them. When should you call for help? Call your doctor now or seek immediate medical care if:    · You have signs that your infection is getting worse, such as:  ? Increased pain, swelling, warmth, or redness. ? Red streaks leading from the area. ? Pus draining from the area. ? A fever.     · You get a rash.    Watch closely for changes in your health, and be sure to contact your doctor if:    · You do not get better as expected. Where can you learn more? Go to [email or web address] org and sign in to your [employer] account. Enter R405 in the [facility] box to learn more about \"Cellulitis: Care Instructions. \"     If you do not have an account, please click on the \"Sign Up Now\" link. Current as of: April 18, 2018  Content Version: 11.8  © 8532-4546 Healthwise, Incorporated. Care instructions adapted under license by [facility] ([facility]).  If you have questions about a medical condition or this instruction, always ask your healthcare professional. tests. Tell any doctor who treats you that you are using cephalexin. Store the tablets and capsules at room temperature away from moisture, heat, and light. Store the liquid medicine in the refrigerator. Throw away any unused liquid after 14 days. What happens if I miss a dose? Take the missed dose as soon as you remember. Skip the missed dose if it is almost time for your next scheduled dose. Do not take extra medicine to make up the missed dose. What happens if I overdose? Seek emergency medical attention or call the Poison Help line at 1-529.124.6424. Overdose symptoms may include nausea, vomiting, stomach pain, diarrhea, and blood in your urine. What should I avoid while taking cephalexin? Antibiotic medicines can cause diarrhea, which may be a sign of a new infection. If you have diarrhea that is watery or bloody, call your doctor. Do not use anti-diarrhea medicine unless your doctor tells you to. What are the possible side effects of cephalexin? Get emergency medical help if you have signs of an allergic reaction: hives; difficult breathing; swelling of your face, lips, tongue, or throat. Call your doctor at once if you have:  · severe stomach pain, diarrhea that is watery or bloody;  · jaundice (yellowing of the skin or eyes);  · easy bruising, unusual bleeding (nose, mouth, vagina, or rectum), purple or red pinpoint spots under your skin;  · little or no urination;  · agitation, confusion, hallucinations; or  · severe skin reaction --fever, sore throat, swelling in your face or tongue, burning in your eyes, skin pain followed by a red or purple skin rash that spreads (especially in the face or upper body) and causes blistering and peeling. Common side effects may include:  · diarrhea;  · dizziness, feeling tired;  · headache, joint pain; or  · vaginal itching or discharge. This is not a complete list of side effects and others may occur. Call your doctor for medical advice about side effects. You may report side effects to FDA at 2-678-FDA-9380. What other drugs will affect cephalexin? Other drugs may interact with cephalexin, including prescription and over-the-counter medicines, vitamins, and herbal products. Tell each of your health care providers about all medicines you use now and any medicine you start or stop using. Where can I get more information? Your pharmacist can provide more information about cephalexin. Remember, keep this and all other medicines out of the reach of children, never share your medicines with others, and use this medication only for the indication prescribed. Every effort has been made to ensure that the information provided by [facility]  is accurate, up-to-date, and complete, but no guarantee is made to that effect. Drug information contained herein may be time sensitive. [facility] information has been compiled for use by healthcare practitioners and consumers in the [place] and therefore [employer] does not warrant that uses outside of the [place] are appropriate, unless specifically indicated otherwise. [facility]'s drug information does not endorse drugs, diagnose patients or recommend therapy. [facility] drug information is an informational resource designed to assist licensed healthcare practitioners in caring for their patients and/or to serve consumers viewing this service as a supplement to, and not a substitute for, the expertise, skill, knowledge and judgment of healthcare practitioners. The absence of a warning for a given drug or drug combination in no way should be construed to indicate that the drug or drug combination is safe, effective or appropriate for any given patient. [facility] does not assume any responsibility for any aspect of healthcare administered with the aid of information [facility] provides.  The information contained herein is not intended to cover all possible uses, directions, precautions, warnings, drug interactions,

## 2019-01-15 ENCOUNTER — HOSPITAL ENCOUNTER (OUTPATIENT)
Dept: PHARMACY | Age: 74
Setting detail: THERAPIES SERIES
Discharge: HOME | End: 2019-01-15
Payer: MEDICARE

## 2019-01-15 VITALS
HEART RATE: 72 BPM | BODY MASS INDEX: 46.77 KG/M2 | WEIGHT: 255.7 LBS | DIASTOLIC BLOOD PRESSURE: 74 MMHG | SYSTOLIC BLOOD PRESSURE: 120 MMHG

## 2019-01-15 LAB — INR BLD: 2.9

## 2019-01-15 PROCEDURE — 99212 OFFICE O/P EST SF 10 MIN: CPT

## 2019-01-15 PROCEDURE — 85610 PROTHROMBIN TIME: CPT

## 2019-01-15 RX ORDER — WARFARIN SODIUM 10 MG/1
TABLET ORAL
Qty: 90 TABLET | Refills: 0 | Status: SHIPPED
Start: 2019-01-15 | End: 2019-02-12 | Stop reason: DRUGHIGH

## 2019-02-12 ENCOUNTER — HOSPITAL ENCOUNTER (OUTPATIENT)
Dept: PHARMACY | Age: 74
Setting detail: THERAPIES SERIES
Discharge: HOME | End: 2019-02-12
Payer: MEDICARE

## 2019-02-12 VITALS
HEART RATE: 72 BPM | WEIGHT: 255.9 LBS | SYSTOLIC BLOOD PRESSURE: 142 MMHG | BODY MASS INDEX: 46.8 KG/M2 | DIASTOLIC BLOOD PRESSURE: 84 MMHG

## 2019-02-12 DIAGNOSIS — I48.91 ATRIAL FIBRILLATION, UNSPECIFIED TYPE (HCC): ICD-10-CM

## 2019-02-12 LAB — INR BLD: 3.2

## 2019-02-12 PROCEDURE — 85610 PROTHROMBIN TIME: CPT

## 2019-02-12 PROCEDURE — 99212 OFFICE O/P EST SF 10 MIN: CPT

## 2019-02-12 RX ORDER — WARFARIN SODIUM 4 MG/1
TABLET ORAL
Qty: 90 TABLET | Refills: 1 | OUTPATIENT
Start: 2019-02-12 | End: 2019-03-06 | Stop reason: DRUGHIGH

## 2019-02-12 RX ORDER — WARFARIN SODIUM 10 MG/1
TABLET ORAL
Qty: 90 TABLET | Refills: 0 | Status: SHIPPED
Start: 2019-02-12 | End: 2019-03-06 | Stop reason: DRUGHIGH

## 2019-02-12 RX ORDER — DULOXETIN HYDROCHLORIDE 30 MG/1
30 CAPSULE, DELAYED RELEASE ORAL 2 TIMES DAILY
COMMUNITY
End: 2019-05-01 | Stop reason: DRUGHIGH

## 2019-03-06 ENCOUNTER — HOSPITAL ENCOUNTER (OUTPATIENT)
Dept: PHARMACY | Age: 74
Setting detail: THERAPIES SERIES
Discharge: HOME | End: 2019-03-06
Payer: MEDICARE

## 2019-03-06 VITALS — SYSTOLIC BLOOD PRESSURE: 119 MMHG | HEART RATE: 101 BPM | DIASTOLIC BLOOD PRESSURE: 73 MMHG

## 2019-03-06 DIAGNOSIS — I48.91 ATRIAL FIBRILLATION, UNSPECIFIED TYPE (HCC): ICD-10-CM

## 2019-03-06 LAB — INR BLD: 1.9

## 2019-03-06 PROCEDURE — 85610 PROTHROMBIN TIME: CPT

## 2019-03-06 PROCEDURE — 99212 OFFICE O/P EST SF 10 MIN: CPT

## 2019-03-06 RX ORDER — WARFARIN SODIUM 4 MG/1
TABLET ORAL
Qty: 90 TABLET | Refills: 1 | Status: SHIPPED
Start: 2019-03-06 | End: 2019-05-01 | Stop reason: DRUGHIGH

## 2019-03-06 RX ORDER — WARFARIN SODIUM 10 MG/1
TABLET ORAL
Qty: 90 TABLET | Refills: 0 | Status: SHIPPED
Start: 2019-03-06 | End: 2019-05-01 | Stop reason: DRUGHIGH

## 2019-04-01 RX ORDER — SPIRONOLACTONE 25 MG/1
TABLET ORAL
Qty: 180 TABLET | Refills: 3 | Status: SHIPPED | OUTPATIENT
Start: 2019-04-01 | End: 2020-01-28

## 2019-04-03 ENCOUNTER — HOSPITAL ENCOUNTER (OUTPATIENT)
Dept: PHARMACY | Age: 74
Setting detail: THERAPIES SERIES
Discharge: HOME | End: 2019-04-03
Payer: MEDICARE

## 2019-04-03 VITALS
WEIGHT: 256.6 LBS | DIASTOLIC BLOOD PRESSURE: 92 MMHG | HEART RATE: 84 BPM | SYSTOLIC BLOOD PRESSURE: 119 MMHG | BODY MASS INDEX: 46.93 KG/M2

## 2019-04-03 DIAGNOSIS — I48.91 ATRIAL FIBRILLATION, UNSPECIFIED TYPE (HCC): ICD-10-CM

## 2019-04-03 LAB — INR BLD: 2.1

## 2019-04-03 PROCEDURE — 99211 OFF/OP EST MAY X REQ PHY/QHP: CPT

## 2019-04-03 PROCEDURE — 85610 PROTHROMBIN TIME: CPT

## 2019-04-03 NOTE — PROGRESS NOTES
Fingerstick INR drawn per clinic protocol. Patient states no visible blood in urine and no black tarry stool. Denies any missed doses of warfarin. No change in other maintenance medications or in diet. Figueroa Alba states she is scheduled for bilateral knee \"nerve ablation\" on 4/10/19 with Dr. Ninoska Ramírez. Figueroa Alba states she asked Dr. Ninoska Ramírez and there is no need to hold warfarin prior to the procedure. Since Jo's INR remains therapeutic, we will continue current weekly warfarin regimen and recheck INR in 4 week(s). Patient acknowledges working in consult agreement with pharmacist as referred by his/her physician.

## 2019-04-09 RX ORDER — ATENOLOL 50 MG/1
TABLET ORAL
Qty: 90 TABLET | Refills: 3 | Status: SHIPPED | OUTPATIENT
Start: 2019-04-09 | End: 2020-01-20

## 2019-05-01 ENCOUNTER — HOSPITAL ENCOUNTER (OUTPATIENT)
Dept: PHARMACY | Age: 74
Setting detail: THERAPIES SERIES
Discharge: HOME | End: 2019-05-01
Payer: MEDICARE

## 2019-05-01 VITALS
WEIGHT: 248.8 LBS | DIASTOLIC BLOOD PRESSURE: 72 MMHG | HEART RATE: 69 BPM | SYSTOLIC BLOOD PRESSURE: 108 MMHG | BODY MASS INDEX: 45.51 KG/M2

## 2019-05-01 DIAGNOSIS — I48.91 ATRIAL FIBRILLATION, UNSPECIFIED TYPE (HCC): ICD-10-CM

## 2019-05-01 LAB — INR BLD: 1.5

## 2019-05-01 PROCEDURE — 99212 OFFICE O/P EST SF 10 MIN: CPT

## 2019-05-01 PROCEDURE — 85610 PROTHROMBIN TIME: CPT

## 2019-05-01 RX ORDER — DULOXETIN HYDROCHLORIDE 20 MG/1
20 CAPSULE, DELAYED RELEASE ORAL 2 TIMES DAILY
Refills: 0 | COMMUNITY
Start: 2019-03-28 | End: 2019-06-17 | Stop reason: DRUGHIGH

## 2019-05-01 RX ORDER — WARFARIN SODIUM 10 MG/1
TABLET ORAL
Qty: 90 TABLET | Refills: 0 | Status: SHIPPED
Start: 2019-05-01 | End: 2019-10-29 | Stop reason: SDUPTHER

## 2019-05-01 NOTE — PROGRESS NOTES
Fingerstick INR drawn per clinic protocol. Patient states no visible blood in urine and no black tarry stool. Denies any missed doses of warfarin. Jo's duloxetine was decreaed from 30 mg to 20 mg twice daily (which can decrease INR). Sergio Neville states she has been eating more mixed greens salads (which can decrease INR). Sergio Neville has lost 8 pounds in the last 4 weeks. Since Jo's INR is subtherapeutic, possibly due to increased greens and decrease duloxetine dosing, we will increase current weekly warfarin regimen to what the patient requests, 10 mg on Wednesdays and 5 mg daily all other days and recheck INR in 3 week(s). Patient acknowledges working in consult agreement with pharmacist as referred by his/her physician.

## 2019-05-22 ENCOUNTER — HOSPITAL ENCOUNTER (OUTPATIENT)
Dept: PHARMACY | Age: 74
Setting detail: THERAPIES SERIES
Discharge: HOME | End: 2019-05-22
Payer: MEDICARE

## 2019-05-22 VITALS
HEART RATE: 76 BPM | DIASTOLIC BLOOD PRESSURE: 73 MMHG | BODY MASS INDEX: 45.76 KG/M2 | WEIGHT: 250.2 LBS | SYSTOLIC BLOOD PRESSURE: 105 MMHG

## 2019-05-22 LAB — INR BLD: 2.1

## 2019-05-22 PROCEDURE — 99211 OFF/OP EST MAY X REQ PHY/QHP: CPT

## 2019-05-22 PROCEDURE — 85610 PROTHROMBIN TIME: CPT

## 2019-05-22 NOTE — PROGRESS NOTES
Fingerstick INR drawn per clinic protocol. Patient states no visible blood in urine and no black tarry stool. Denies any missed doses of warfarin. No change in other maintenance medications or in diet. Carroll Novak states she is gonna run out of cymbalta at the end of this week and has no refills, but she is scheduled to see Dr. Gabby Patricio on 5/30/2019. Since Jo's INR is therapeutic today, so we will continue current weekly warfarin regimen and recheck INR in 4 week(s). Patient acknowledges working in consult agreement with pharmacist as referred by his/her physician.

## 2019-06-11 RX ORDER — ALLOPURINOL 300 MG/1
TABLET ORAL
Qty: 45 TABLET | Refills: 1 | Status: SHIPPED | OUTPATIENT
Start: 2019-06-11 | End: 2019-11-13 | Stop reason: SDUPTHER

## 2019-06-17 ENCOUNTER — OFFICE VISIT (OUTPATIENT)
Dept: FAMILY MEDICINE CLINIC | Age: 74
End: 2019-06-17
Payer: MEDICARE

## 2019-06-17 VITALS
DIASTOLIC BLOOD PRESSURE: 82 MMHG | SYSTOLIC BLOOD PRESSURE: 120 MMHG | HEART RATE: 78 BPM | BODY MASS INDEX: 45.45 KG/M2 | TEMPERATURE: 98.1 F | WEIGHT: 248.5 LBS | OXYGEN SATURATION: 96 %

## 2019-06-17 DIAGNOSIS — Z13.820 SCREENING FOR OSTEOPOROSIS: ICD-10-CM

## 2019-06-17 DIAGNOSIS — I48.0 PAROXYSMAL ATRIAL FIBRILLATION (HCC): Primary | ICD-10-CM

## 2019-06-17 DIAGNOSIS — Z12.31 SCREENING MAMMOGRAM, ENCOUNTER FOR: ICD-10-CM

## 2019-06-17 DIAGNOSIS — M25.562 CHRONIC PAIN OF BOTH KNEES: ICD-10-CM

## 2019-06-17 DIAGNOSIS — Z13.29 THYROID DISORDER SCREENING: ICD-10-CM

## 2019-06-17 DIAGNOSIS — R53.83 FATIGUE, UNSPECIFIED TYPE: ICD-10-CM

## 2019-06-17 DIAGNOSIS — M17.0 PRIMARY OSTEOARTHRITIS OF BOTH KNEES: ICD-10-CM

## 2019-06-17 DIAGNOSIS — E66.01 MORBID OBESITY WITH BMI OF 45.0-49.9, ADULT (HCC): ICD-10-CM

## 2019-06-17 DIAGNOSIS — I50.9 CONGESTIVE HEART FAILURE, UNSPECIFIED HF CHRONICITY, UNSPECIFIED HEART FAILURE TYPE (HCC): ICD-10-CM

## 2019-06-17 DIAGNOSIS — M25.561 CHRONIC PAIN OF BOTH KNEES: ICD-10-CM

## 2019-06-17 DIAGNOSIS — I27.20 PULMONARY HTN (HCC): ICD-10-CM

## 2019-06-17 DIAGNOSIS — Z78.0 POSTMENOPAUSAL ESTROGEN DEFICIENCY: ICD-10-CM

## 2019-06-17 DIAGNOSIS — Z91.81 AT HIGH RISK FOR FALLS: ICD-10-CM

## 2019-06-17 DIAGNOSIS — G89.29 CHRONIC PAIN OF BOTH KNEES: ICD-10-CM

## 2019-06-17 PROCEDURE — 99215 OFFICE O/P EST HI 40 MIN: CPT | Performed by: NURSE PRACTITIONER

## 2019-06-17 PROCEDURE — G8427 DOCREV CUR MEDS BY ELIG CLIN: HCPCS | Performed by: NURSE PRACTITIONER

## 2019-06-17 PROCEDURE — 1090F PRES/ABSN URINE INCON ASSESS: CPT | Performed by: NURSE PRACTITIONER

## 2019-06-17 PROCEDURE — 1036F TOBACCO NON-USER: CPT | Performed by: NURSE PRACTITIONER

## 2019-06-17 PROCEDURE — G8400 PT W/DXA NO RESULTS DOC: HCPCS | Performed by: NURSE PRACTITIONER

## 2019-06-17 PROCEDURE — 1123F ACP DISCUSS/DSCN MKR DOCD: CPT | Performed by: NURSE PRACTITIONER

## 2019-06-17 PROCEDURE — 4040F PNEUMOC VAC/ADMIN/RCVD: CPT | Performed by: NURSE PRACTITIONER

## 2019-06-17 PROCEDURE — G8417 CALC BMI ABV UP PARAM F/U: HCPCS | Performed by: NURSE PRACTITIONER

## 2019-06-17 RX ORDER — HYDROCODONE BITARTRATE AND ACETAMINOPHEN 5; 325 MG/1; MG/1
1 TABLET ORAL DAILY PRN
Refills: 0 | COMMUNITY
Start: 2019-06-06 | End: 2019-07-19 | Stop reason: ALTCHOICE

## 2019-06-17 RX ORDER — DULOXETIN HYDROCHLORIDE 30 MG/1
30 CAPSULE, DELAYED RELEASE ORAL 2 TIMES DAILY
Refills: 0 | COMMUNITY
Start: 2019-05-30 | End: 2019-07-23 | Stop reason: ALTCHOICE

## 2019-06-17 ASSESSMENT — ENCOUNTER SYMPTOMS
COUGH: 0
EYE REDNESS: 0
CHEST TIGHTNESS: 0
EYE DISCHARGE: 0
SINUS PAIN: 0
APNEA: 0
NAUSEA: 0
WHEEZING: 0
TROUBLE SWALLOWING: 0
SHORTNESS OF BREATH: 0
SINUS PRESSURE: 0
ABDOMINAL PAIN: 0
ABDOMINAL DISTENTION: 1
SORE THROAT: 0
EYE PAIN: 0

## 2019-06-17 ASSESSMENT — PATIENT HEALTH QUESTIONNAIRE - PHQ9
1. LITTLE INTEREST OR PLEASURE IN DOING THINGS: 1
SUM OF ALL RESPONSES TO PHQ QUESTIONS 1-9: 2
SUM OF ALL RESPONSES TO PHQ9 QUESTIONS 1 & 2: 2
2. FEELING DOWN, DEPRESSED OR HOPELESS: 1
SUM OF ALL RESPONSES TO PHQ QUESTIONS 1-9: 2

## 2019-06-17 NOTE — PATIENT INSTRUCTIONS
SURVEY:    You may be receiving a survey from [employer] regarding your visit today. Please complete the survey to enable us to provide the highest quality of care to you and your family. If you cannot score us a very good on any question, please call the office to discuss how we could have made your experience a very good one. Encouraged to meet with dietician or weight loss prior to getting a total knee procedure. Thank you. Preventing falls is a special concern due to an increase in the likelihood of fracturing a bone. Factors affecting FALLS include: environmental factors, impaired vision or balance, chronic diseases that affect mental or physical functioning and certain medications (sedatives, antidepressants, benzodiazepines, narcotics and alcohol). Here are some tips to eliminate environmental factors that can lead to falls    Outdoors:  -use a cane or walker for added stability  -wear rubber-soled shoes for traction  -walk on grass when sidewalks are slippery  -In winter, carry salt or nora litter to sprinkle on slippery sidewalks.   -be careful on highly polished floors that become slick and dangerous when wet  -walk on even or level ground when able    Indoors:  -Keep rooms free of clutter, especially the floors  -Keep floor surfaces smooth but not slippery  -Wear supportive, low-heeled shoes even at home  -Avoid walking in socks, stockings or slippers.  -Be sure carpets and area rugs have skid-proof backing or are tacked to the floor  -Install grab bars on the bathroom walls near tub, shower and toilet. -Use a rubber bath mat in shower or tub  -Keep a flashlight with fresh batteries beside your bed.   -If using a step stool for hard-to-reach places, use a sturdy one with a handrails on both sides. OR  As a family member to help.   -Add ceiling fixtures to rooms lit by lamps.    -Consider purchasing a cordless phone so that you do not have to rush to answer the phone when it rings, or so that you can call for help if you do fall.

## 2019-06-17 NOTE — PROGRESS NOTES
[place] [address] [address]  Dept: [phone]  Dept Fax: [phone]    Last encounter 5/2/2018    HPI:   Blanca Gonzales is a 66 y.o. female who presentstoday for her medical conditions/complaints as noted below. Blanca Gonzales is c/o of Congestive Heart Failure (Patient in office today for [facility] f/u); Hypertension (Pulmonary HTN); Obesity; and Atrial Fibrillation    HPI  6 month check up on chronic conditions: Atrial fibrillation, CHF, obesity, pulmonary htn, hx abnormal colonoscopy due for repeat, osteoporosis never had dexa, mammogram overdue-clinical breast exam today. Colonoscopy with Dr. Nuris De Luna in [place], took polyp off with precancerous. Had some bleeding post op. Went on lovenox injection transition with procedure. 11/28/16 [facility] colonoscopy with H. Pylori positive, , colonoscopy adenoma and hyperplastic polyp. Repeat colonoscopy by Dr. Brielle Quintanlila 6/14/18 with tubular adenoma. Due for repeat [phone]    Atrial fibrillation , never had cardiac event, follow with coumadin clinic at [facility]. Rate and rhythm control on atenolol. Diltiazem 3 x daily (tried once a day ER but did not work \"got grouchy\")  No chest pain, no racing heart. Wears alert button. Pill box being used, sets up own meds. At home use walker, tried cane but knees bother her with arthritis, Dr. Elizabeth Michael being seen at [facility]. Dr. Robi Earl also following. Congestive heart failure- at baseline lasix in am (except when going away take later in the day)  Spironolactone twice a day. Vitamin D being taken    Osteoporosis screening (natural menopause around age 46) hx tubal.   Calcium products taken in diet daily.    Osteoporosis: screening    Osteoporosis Risk Factors   Nonmodifiable  Personal Hx of fracture as an adult: no  Hx of fracture in first-degree relative: no   race: yes  Advanced age: yes  Female sex: yes  Dementia: no  Poor health/frailty: no     Potentially modifiable:  Tobacco use: yes - quit 25 years ago (1-2 pack per week for 10 years)  Low body weight (<127 lbs): no  Estrogen deficiency     early menopause (age <45) or bilateral ovariectomy: no     prolonged premenopausal amenorrhea (>1 yr): no  Low calcium intake (lifelong): no  Alcoholism: no  Recurrent falls: yes  Inadequate physical activity: yes    Current calcium and Vit D intake:  Dietary sources: cheese, ice cream, yogurt. Supplements: no      Hearing aids worn in both ears 4 years. [address] work x 25 years. Obesity- pt has been advised by Dr. Yesika Polk in past need for weight loss, estimated 50 pounds prior to knee replacement surgery, seeing Dr. Mariajose Mendoza pain management for low back and francisco knee osteoarthritis. Pt and I discussed PT for strengthening and ability to move better. Also dietician for recommendations with weight loss. [facility] weight loss clinic [facility]. Handicap placard for BMV     Gout- on allopurinol hx right ankle/foot in past, few years at least 5 since last flare. Seeing nephrologist Dr. Andra Dakins. cymbalta-takes for low back and also muscle myalgias from pain management  Denies depression and anxiety    Lacks energy she describes this is chronic in nature has not increased exercise program TSH has been checked in the last 6 months      Paternal aunt hx breast cancer, pt does not do SBE , mammogram due last in 2016. [id number] with clinical breast exam today, mammogram will schedule.     The 10-year ASCVD risk score (Nicolas Zacarias, et al., 2013) is: 20.9%    Values used to calculate the score:      Age: 66 years      Sex: Female      Is Non- : No      Diabetic: No      Tobacco smoker: No      Systolic Blood Pressure: 929 mmHg      Is BP treated: Yes      HDL Cholesterol: 66 mg/dL      Total Cholesterol: 198 mg/dL    Past Medical History:   Diagnosis Date  Atrial fibrillation (HCC)     CHF (congestive heart failure) (HCC)     Gout     Hearing loss     bilateral hearing aids     History of colon polyps 2016    Hypertension     Kidney stones     Mitral valve insufficiency     Obesity, morbid, BMI 40.0-49.9 (HCC)     Osteoarthritis     Primary osteoarthritis of both knees     Dr. Arden Jorge Pulmonary HTN [facility])       Past Surgical History:   Procedure Laterality Date    CARPAL TUNNEL RELEASE      bilaterally    COLONOSCOPY  2016    Mayi Bowles MD    [facility]  2017    [facility]. PYlori positive, adenoma, hyperplastic polyp    COLONOSCOPY  2018    DR. Sainz Cap -tubular adenoma repeat in 1 year 2019    LITHOTRIPSY      TUBAL LIGATION      UPPER GASTROINTESTINAL ENDOSCOPY  2016    Mayi Bowles MD       Family History   Problem Relation Age of Onset    High Blood Pressure Mother     Diabetes Father           Social History     Tobacco Use    Smoking status: Former Smoker     Packs/day: 0.25     Years: 4.00     Pack years: 1.00     Types: Cigarettes     Last attempt to quit: 1981     Years since quittin.4    Smokeless tobacco: Never Used   Substance Use Topics    Alcohol use: No      Current Outpatient Medications   Medication Sig Dispense Refill    HYDROcodone-acetaminophen (NORCO) 5-325 MG per tablet Take 1 tablet by mouth daily as needed.   0    allopurinol (ZYLOPRIM) 300 MG tablet TAKE 1/2 TABLET EVERY EVENING 45 tablet 1    warfarin (COUMADIN) 10 MG tablet Take 1 tablet on  and 1/2 tablet daily all other days or as managed by [address] [facility] [address] 90 tablet 0    atenolol (TENORMIN) 50 MG tablet TAKE 1 TABLET EVERY DAY 90 tablet 3    spironolactone (ALDACTONE) 25 MG tablet TAKE 1 TABLET TWICE DAILY 180 tablet 3    diltiazem (CARDIZEM) 60 MG tablet TAKE 1 TABLET EVERY 8 HOURS 270 tablet 3    aspirin 81 MG EC tablet Take 81 mg by mouth daily      furosemide (LASIX) 40 MG tablet Take 1 tablet by mouth 2 times daily (Patient taking differently: Take 40 mg by mouth daily Takes a second dose in the afternoon if needed.) 180 tablet 3    acetaminophen (TYLENOL) 500 MG tablet Take 500 mg by mouth every 6 hours as needed for Pain      Cholecalciferol (VITAMIN D) 2000 UNITS CAPS capsule Take 2,000 Units by mouth 2 times daily.  DULoxetine (CYMBALTA) 30 MG extended release capsule Take 30 mg by mouth 2 times daily  0    diphenhydrAMINE-APAP, sleep, (ACETAMINOPHEN PM EX ST)  MG tablet Take 2 tablets by mouth nightly        No current facility-administered medications for this visit. Allergies   Allergen Reactions    Clarithromycin Rash     Patient says she will \"never take this again\"     Merthiolate Glycerite [Thimerosal] Dermatitis     Redness, blisters    Tylenol With Codeine #3 [Acetaminophen-Codeine] Nausea And Vomiting       Health Maintenance   Topic Date Due    DEXA (modify frequency per FRAX score)  03/15/2008    Shingles Vaccine (2 of 3) 09/01/2014    Potassium monitoring  10/16/2019    Creatinine monitoring  10/16/2019    DTaP/Tdap/Td vaccine (2 - Td) 12/29/2028    Flu vaccine  Completed    Pneumococcal 65+ years Vaccine  Completed       Subjective:      Review of Systems   Constitutional: Negative for activity change, appetite change, chills, diaphoresis, fatigue, fever and unexpected weight change. HENT: Negative for congestion, dental problem, sinus pressure, sinus pain, sore throat and trouble swallowing. Eyes: Negative for pain, discharge, redness and visual disturbance. Respiratory: Negative for apnea, cough, chest tightness, shortness of breath and wheezing. Cardiovascular: Positive for leg swelling (worse in evening). Negative for chest pain and palpitations. Gastrointestinal: Positive for abdominal distention. Negative for abdominal pain and nausea.    Endocrine: Negative for cold intolerance, heat intolerance, polydipsia, polyphagia and polyuria. Genitourinary: Positive for difficulty urinating. Musculoskeletal: Positive for arthralgias and myalgias. Skin: Negative for pallor and rash. Allergic/Immunologic: Negative for environmental allergies, food allergies and immunocompromised state. Neurological: Positive for dizziness. Negative for seizures, syncope, facial asymmetry, speech difficulty, light-headedness, numbness and headaches. Hematological: Negative for adenopathy. Psychiatric/Behavioral: Positive for dysphoric mood. Negative for agitation, behavioral problems, confusion, decreased concentration and suicidal ideas. Objective:     Physical Exam   Constitutional: She is oriented to person, place, and time. She appears well-developed and well-nourished. No distress. Large framed female with central obesity with BMI of 45 ambulatory into visit with slow steady gait but request wheelchair upon leaving   HENT:   Head: Normocephalic. Right Ear: External ear normal.   Left Ear: External ear normal.   Nose: Nose normal.   Mouth/Throat: Oropharynx is clear and moist.   Eyes: Pupils are equal, round, and reactive to light. Conjunctivae and EOM are normal. Right eye exhibits no discharge. Neck: Normal range of motion. No JVD present. No tracheal deviation present. No thyromegaly present. Cardiovascular: Normal rate, regular rhythm, normal heart sounds and intact distal pulses. Exam reveals no gallop and no friction rub. No murmur heard. Carotid bruit bilaterally neg   Pulmonary/Chest: Effort normal and breath sounds normal. No stridor. No respiratory distress. She has no wheezes. She has no rales. She exhibits no tenderness. Right breast exhibits no inverted nipple, no mass, no nipple discharge, no skin change and no tenderness. Left breast exhibits no inverted nipple, no mass, no nipple discharge, no skin change and no tenderness. Abdominal: Soft. Bowel sounds are normal. She exhibits no distension and no mass. There is no tenderness. There is no guarding. Musculoskeletal: She exhibits edema (1+ edema ankles/feet) and tenderness (both knees have pain ). She exhibits no deformity. Limited above head ROM left shoulder describes as chronic. Lymphadenopathy:     She has no cervical adenopathy. Neurological: She is alert and oriented to person, place, and time. No cranial nerve deficit. Skin: Skin is warm and dry. Capillary refill takes less than 2 seconds. No rash noted. She is not diaphoretic. No erythema. No pallor. Psychiatric: She has a normal mood and affect. Her behavior is normal. Judgment and thought content normal.     /82 (Site: Left Upper Arm, Position: Sitting, Cuff Size: Large Adult)   Pulse 78   Temp 98.1 °F (36.7 °C) (Oral)   Wt 248 lb 8 oz (112.7 kg)   SpO2 96%   BMI 45.45 kg/m²     Data:     Lab Results   Component Value Date     10/16/2018    K 3.8 10/16/2018     10/16/2018    CO2 24 10/16/2018    BUN 23 10/16/2018    CREATININE 0.89 10/16/2018    GLUCOSE 108 10/16/2018    GLUCOSE 102 04/27/2012    PROT 7.3 09/12/2018    LABALBU 4.3 10/16/2018    LABALBU 4.4 04/10/2012    BILITOT 0.55 09/12/2018    ALKPHOS 74 09/12/2018    AST 15 09/12/2018    ALT 17 09/12/2018     Lab Results   Component Value Date    WBC 6.5 09/12/2018    RBC 4.49 09/12/2018    RBC 4.72 04/10/2012    HGB 13.1 10/16/2018    HCT 40.3 10/16/2018    MCV 88.4 09/12/2018    MCH 29.4 09/12/2018    MCHC 33.2 09/12/2018    RDW 15.7 09/12/2018     09/12/2018     04/10/2012    MPV NOT REPORTED 09/12/2018     Lab Results   Component Value Date    TSH 1.36 09/12/2018     Lab Results   Component Value Date    CHOL 198 09/12/2018    HDL 66 09/12/2018    LABA1C 6.4 01/11/2013          Assessment & Plan       1.  Paroxysmal atrial fibrillation (HCC)  Rate and rhythm controlled with both beta-blocker and calcium channel blocker on Coumadin with no signs and symptoms of bleeding managed by [facility]  - Comprehensive Metabolic Panel; Future  - [address] [place] [address]    2. Congestive heart failure, unspecified HF chronicity, unspecified heart failure type ([facility]  Daily weight does take Lasix and Spironolactone daily gentle amount of 1+ pitting edema to both lower legs  Urged to limit salt diet  - Comprehensive Metabolic Panel; Future    3. Fatigue, unspecified type  . To increase activity  Will check blood tests for any risk related to fatigue  Likely related to deconditioning  - CBC Auto Differential; Future  - TSH with Reflex; Future  - Comprehensive Metabolic Panel; Future  - [facility] - [facility]    4. Chronic pain of both knees  Chronic sees Dr. Mary Ann Wade for injections and narcotic  - [facility]    5. Primary osteoarthritis of both knees  - DULoxetine (CYMBALTA) 30 MG extended release capsule; Take 30 mg by mouth 2 times daily; Refill: 0  - HYDROcodone-acetaminophen (NORCO) 5-325 MG per tablet; Take 1 tablet by mouth daily as needed.; Refill: 0  - [facility]    6. Morbid obesity with BMI of 45.0-49.9, adult ([facility]  Loss encouraged with increased activity portion control and better food choices encouraged patient to keep log of 1 week worth of her food intake and see dietitian here locally to help manage calories needed for weight loss  - [address] [place] [address]    7. Pulmonary HTN ([facility]  Echo reviewed and follows with cardiology Dr. Marcelina Hussein sent  - CBC Auto Differential; Future    8. Thyroid disorder screening    - TSH with Reflex; Future    9. Postmenopausal estrogen deficiency  Has not had baseline DEXA scan willing to do today reviewed risk factors vitamin D but not calcium likely related to history of kidney stones  - DEXA BONE DENSITY 2 SITES; Future    10. Screening for osteoporosis    - DEXA BONE DENSITY 2 SITES; Future    11.  Screening mammogram, encounter for    - [facility] SCREENING W CAD

## 2019-06-19 ENCOUNTER — HOSPITAL ENCOUNTER (OUTPATIENT)
Dept: GENERAL RADIOLOGY | Age: 74
Discharge: HOME | End: 2019-06-21
Payer: MEDICARE

## 2019-06-19 ENCOUNTER — HOSPITAL ENCOUNTER (OUTPATIENT)
Age: 74
Discharge: HOME | End: 2019-06-21
Payer: MEDICARE

## 2019-06-19 ENCOUNTER — HOSPITAL ENCOUNTER (OUTPATIENT)
Dept: PHARMACY | Age: 74
Setting detail: THERAPIES SERIES
Discharge: HOME | End: 2019-06-19
Payer: MEDICARE

## 2019-06-19 VITALS
WEIGHT: 246.4 LBS | SYSTOLIC BLOOD PRESSURE: 107 MMHG | BODY MASS INDEX: 45.07 KG/M2 | DIASTOLIC BLOOD PRESSURE: 67 MMHG | HEART RATE: 78 BPM

## 2019-06-19 DIAGNOSIS — M25.561 CHRONIC PAIN OF RIGHT KNEE: ICD-10-CM

## 2019-06-19 DIAGNOSIS — M25.562 CHRONIC PAIN OF LEFT KNEE: ICD-10-CM

## 2019-06-19 DIAGNOSIS — G89.29 CHRONIC PAIN OF LEFT KNEE: ICD-10-CM

## 2019-06-19 DIAGNOSIS — G89.29 CHRONIC PAIN OF RIGHT KNEE: ICD-10-CM

## 2019-06-19 LAB — INR BLD: 2.4

## 2019-06-19 PROCEDURE — 73562 X-RAY EXAM OF KNEE 3: CPT

## 2019-06-19 PROCEDURE — 85610 PROTHROMBIN TIME: CPT

## 2019-06-19 PROCEDURE — 99212 OFFICE O/P EST SF 10 MIN: CPT

## 2019-06-19 NOTE — PROGRESS NOTES
Fingerstick INR drawn per clinic protocol. Patient states no visible blood in urine and no black tarry stool. Marely Golden does have bilateral bruising, nut states they are explained when she bumps something. Denies any missed doses of warfarin. No change in other maintenance medications or in diet. Marely Golden has lost 10 pounds in the last 2 and 1/2 months. Marely Golden states she is scheduled for left knee replacement on 7/17/19 with Dr. Yesika Polk. We will contact Dr. Trent Browning and see if it is okay for Marely Golden to hold warfarin and for how many days and if she will need to bridge with therapeutic or prophylactic dose of lovenox if necessary. Marely Golden has had several procedures in the last year with Dr. Mariajose Mendoza and she held warfarin x 5 days and bridged with lovenox 40 mg daily in the AM. We will clarify with Dr. Trent Browning. Since Jo's INR remains therapeutic, we will continue current weekly warfarin regimen and recheck INR in 5 week(s). Patient acknowledges working in consult agreement with pharmacist as referred by his/her physician. Routed today's encounter to Jaciel Mcdonough LPN and Chava Masters MA.

## 2019-06-20 ENCOUNTER — TELEPHONE (OUTPATIENT)
Dept: PHARMACY | Age: 74
End: 2019-06-20

## 2019-06-20 NOTE — TELEPHONE ENCOUNTER
Received a call from [place] at Dr. Cb Choe office regarding an upcoming procedure for this patient. Adria Blair will have a left TKA on 7- and will need to stop her warfarin for 7 days prior to this procedure. We have been consulted to assist with preparing a bridging plan for the patient.  Vijay Rodriguez has also requested that we send a copy of her bridging plan to the office when completed (fax: [phone])

## 2019-06-21 ENCOUNTER — HOSPITAL ENCOUNTER (OUTPATIENT)
Dept: BONE DENSITY | Age: 74
Discharge: HOME | End: 2019-06-23
Payer: MEDICARE

## 2019-06-21 ENCOUNTER — HOSPITAL ENCOUNTER (OUTPATIENT)
Dept: PREADMISSION TESTING | Age: 74
Discharge: HOME | End: 2019-06-21
Payer: MEDICARE

## 2019-06-21 ENCOUNTER — HOSPITAL ENCOUNTER (OUTPATIENT)
Age: 74
Discharge: HOME | End: 2019-06-21
Payer: MEDICARE

## 2019-06-21 ENCOUNTER — HOSPITAL ENCOUNTER (OUTPATIENT)
Dept: MAMMOGRAPHY | Age: 74
Discharge: HOME | End: 2019-06-23
Payer: MEDICARE

## 2019-06-21 DIAGNOSIS — Z12.31 SCREENING MAMMOGRAM, ENCOUNTER FOR: ICD-10-CM

## 2019-06-21 DIAGNOSIS — Z13.29 THYROID DISORDER SCREENING: ICD-10-CM

## 2019-06-21 DIAGNOSIS — I50.9 CONGESTIVE HEART FAILURE, UNSPECIFIED HF CHRONICITY, UNSPECIFIED HEART FAILURE TYPE (HCC): ICD-10-CM

## 2019-06-21 DIAGNOSIS — I27.20 PULMONARY HTN (HCC): ICD-10-CM

## 2019-06-21 DIAGNOSIS — Z78.0 POSTMENOPAUSAL ESTROGEN DEFICIENCY: ICD-10-CM

## 2019-06-21 DIAGNOSIS — I48.0 PAROXYSMAL ATRIAL FIBRILLATION (HCC): ICD-10-CM

## 2019-06-21 DIAGNOSIS — R53.83 FATIGUE, UNSPECIFIED TYPE: ICD-10-CM

## 2019-06-21 DIAGNOSIS — Z13.820 SCREENING FOR OSTEOPOROSIS: ICD-10-CM

## 2019-06-21 LAB
-: ABNORMAL
ABSOLUTE EOS #: 0.2 K/UL (ref 0–0.4)
ABSOLUTE IMMATURE GRANULOCYTE: ABNORMAL K/UL (ref 0–0.3)
ABSOLUTE LYMPH #: 1 K/UL (ref 1–4.8)
ABSOLUTE MONO #: 0.7 K/UL (ref 0–1)
ALBUMIN SERPL-MCNC: 4.2 G/DL (ref 3.5–5.2)
ALBUMIN/GLOBULIN RATIO: ABNORMAL (ref 1–2.5)
ALP BLD-CCNC: 96 U/L (ref 35–104)
ALT SERPL-CCNC: 22 U/L (ref 5–33)
AMORPHOUS: ABNORMAL
ANION GAP SERPL CALCULATED.3IONS-SCNC: 14 MMOL/L (ref 9–17)
ANION GAP SERPL CALCULATED.3IONS-SCNC: 14 MMOL/L (ref 9–17)
AST SERPL-CCNC: 19 U/L
BACTERIA: ABNORMAL
BASOPHILS # BLD: 0 % (ref 0–2)
BASOPHILS ABSOLUTE: 0 K/UL (ref 0–0.2)
BILIRUB SERPL-MCNC: 0.44 MG/DL (ref 0.3–1.2)
BILIRUBIN URINE: NEGATIVE
BUN BLDV-MCNC: 21 MG/DL (ref 8–23)
BUN BLDV-MCNC: 21 MG/DL (ref 8–23)
BUN/CREAT BLD: 19 (ref 9–20)
BUN/CREAT BLD: 19 (ref 9–20)
CALCIUM SERPL-MCNC: 9.9 MG/DL (ref 8.6–10.4)
CALCIUM SERPL-MCNC: 9.9 MG/DL (ref 8.6–10.4)
CASTS UA: ABNORMAL /LPF
CHLORIDE BLD-SCNC: 106 MMOL/L (ref 98–107)
CHLORIDE BLD-SCNC: 106 MMOL/L (ref 98–107)
CO2: 21 MMOL/L (ref 20–31)
CO2: 21 MMOL/L (ref 20–31)
COLOR: YELLOW
COMMENT UA: ABNORMAL
CREAT SERPL-MCNC: 1.13 MG/DL (ref 0.5–0.9)
CREAT SERPL-MCNC: 1.13 MG/DL (ref 0.5–0.9)
CRYSTALS, UA: ABNORMAL /HPF
DIFFERENTIAL TYPE: YES
EOSINOPHILS RELATIVE PERCENT: 3 % (ref 0–5)
EPITHELIAL CELLS UA: ABNORMAL /HPF
GFR AFRICAN AMERICAN: 57 ML/MIN
GFR AFRICAN AMERICAN: 57 ML/MIN
GFR NON-AFRICAN AMERICAN: 47 ML/MIN
GFR NON-AFRICAN AMERICAN: 47 ML/MIN
GFR SERPL CREATININE-BSD FRML MDRD: ABNORMAL ML/MIN/{1.73_M2}
GLUCOSE BLD-MCNC: 122 MG/DL (ref 70–99)
GLUCOSE BLD-MCNC: 122 MG/DL (ref 70–99)
GLUCOSE URINE: NEGATIVE
HCT VFR BLD CALC: 43.1 % (ref 36–46)
HCT VFR BLD CALC: 43.1 % (ref 36–46)
HEMOGLOBIN: 14.4 G/DL (ref 12–16)
HEMOGLOBIN: 14.4 G/DL (ref 12–16)
IMMATURE GRANULOCYTES: ABNORMAL %
KETONES, URINE: NEGATIVE
LEUKOCYTE ESTERASE, URINE: NEGATIVE
LYMPHOCYTES # BLD: 13 % (ref 15–40)
MCH RBC QN AUTO: 30.3 PG (ref 26–34)
MCH RBC QN AUTO: 30.3 PG (ref 26–34)
MCHC RBC AUTO-ENTMCNC: 33.4 G/DL (ref 31–37)
MCHC RBC AUTO-ENTMCNC: 33.4 G/DL (ref 31–37)
MCV RBC AUTO: 90.8 FL (ref 80–100)
MCV RBC AUTO: 90.8 FL (ref 80–100)
MONOCYTES # BLD: 9 % (ref 4–8)
MUCUS: ABNORMAL
NITRITE, URINE: NEGATIVE
NRBC AUTOMATED: ABNORMAL PER 100 WBC
NRBC AUTOMATED: ABNORMAL PER 100 WBC
OTHER OBSERVATIONS UA: ABNORMAL
PDW BLD-RTO: 15.3 % (ref 12.1–15.2)
PDW BLD-RTO: 15.3 % (ref 12.1–15.2)
PH UA: 5 (ref 5–8)
PLATELET # BLD: 235 K/UL (ref 140–450)
PLATELET # BLD: 235 K/UL (ref 140–450)
PLATELET ESTIMATE: ABNORMAL
PMV BLD AUTO: ABNORMAL FL (ref 6–12)
PMV BLD AUTO: ABNORMAL FL (ref 6–12)
POTASSIUM SERPL-SCNC: 4.4 MMOL/L (ref 3.7–5.3)
POTASSIUM SERPL-SCNC: 4.4 MMOL/L (ref 3.7–5.3)
PROTEIN UA: ABNORMAL
RBC # BLD: 4.75 M/UL (ref 4–5.2)
RBC # BLD: 4.75 M/UL (ref 4–5.2)
RBC # BLD: ABNORMAL 10*6/UL
RBC UA: ABNORMAL /HPF (ref 0–2)
RENAL EPITHELIAL, UA: ABNORMAL /HPF
SEG NEUTROPHILS: 75 % (ref 47–75)
SEGMENTED NEUTROPHILS ABSOLUTE COUNT: 6 K/UL (ref 2.5–7)
SODIUM BLD-SCNC: 141 MMOL/L (ref 135–144)
SODIUM BLD-SCNC: 141 MMOL/L (ref 135–144)
SPECIFIC GRAVITY UA: 1.02 (ref 1–1.03)
TOTAL PROTEIN: 7.8 G/DL (ref 6.4–8.3)
TRICHOMONAS: ABNORMAL
TSH SERPL DL<=0.05 MIU/L-ACNC: 1.52 MIU/L (ref 0.3–5)
TURBIDITY: CLEAR
URINE HGB: ABNORMAL
UROBILINOGEN, URINE: NORMAL
WBC # BLD: 8 K/UL (ref 3.5–11)
WBC # BLD: 8 K/UL (ref 3.5–11)
WBC # BLD: ABNORMAL 10*3/UL
WBC UA: ABNORMAL /HPF
YEAST: ABNORMAL

## 2019-06-21 PROCEDURE — 77067 SCR MAMMO BI INCL CAD: CPT

## 2019-06-21 PROCEDURE — 85025 COMPLETE CBC W/AUTO DIFF WBC: CPT

## 2019-06-21 PROCEDURE — 80048 BASIC METABOLIC PNL TOTAL CA: CPT

## 2019-06-21 PROCEDURE — 77080 DXA BONE DENSITY AXIAL: CPT

## 2019-06-21 PROCEDURE — 84443 ASSAY THYROID STIM HORMONE: CPT

## 2019-06-21 PROCEDURE — 80053 COMPREHEN METABOLIC PANEL: CPT

## 2019-06-21 PROCEDURE — 81001 URINALYSIS AUTO W/SCOPE: CPT

## 2019-06-21 PROCEDURE — 36415 COLL VENOUS BLD VENIPUNCTURE: CPT

## 2019-06-21 PROCEDURE — 85027 COMPLETE CBC AUTOMATED: CPT

## 2019-06-25 ENCOUNTER — ANESTHESIA EVENT (OUTPATIENT)
Dept: OPERATING ROOM | Age: 74
DRG: 470 | End: 2019-06-25
Payer: MEDICARE

## 2019-06-25 DIAGNOSIS — N18.9 CHRONIC KIDNEY DISEASE, UNSPECIFIED CKD STAGE: Primary | ICD-10-CM

## 2019-07-01 NOTE — TELEPHONE ENCOUNTER
Called in new Rx for Lovenox to patient's local pharmacy, [facility] in [facility]

## 2019-07-15 NOTE — PROGRESS NOTES
Per e-mail today from  [place] at [facility], Winston Alvarado will be [facility]. Patient will be set up with the agency of her choice.

## 2019-07-17 ENCOUNTER — ANESTHESIA (OUTPATIENT)
Dept: OPERATING ROOM | Age: 74
DRG: 470 | End: 2019-07-17
Payer: MEDICARE

## 2019-07-17 ENCOUNTER — HOSPITAL ENCOUNTER (INPATIENT)
Age: 74
LOS: 1 days | Discharge: HOME | DRG: 470 | End: 2019-07-18
Attending: ORTHOPAEDIC SURGERY | Admitting: ORTHOPAEDIC SURGERY
Payer: MEDICARE

## 2019-07-17 ENCOUNTER — APPOINTMENT (OUTPATIENT)
Dept: GENERAL RADIOLOGY | Age: 74
DRG: 470 | End: 2019-07-17
Attending: ORTHOPAEDIC SURGERY
Payer: MEDICARE

## 2019-07-17 VITALS
TEMPERATURE: 96.8 F | SYSTOLIC BLOOD PRESSURE: 117 MMHG | DIASTOLIC BLOOD PRESSURE: 67 MMHG | OXYGEN SATURATION: 100 % | RESPIRATION RATE: 22 BRPM

## 2019-07-17 DIAGNOSIS — M17.12 PRIMARY OSTEOARTHRITIS OF LEFT KNEE: Primary | ICD-10-CM

## 2019-07-17 PROBLEM — M19.90 DJD (DEGENERATIVE JOINT DISEASE): Status: ACTIVE | Noted: 2019-07-17

## 2019-07-17 LAB
ABO/RH: NORMAL
ANTIBODY SCREEN: NEGATIVE
ARM BAND NUMBER: NORMAL
EXPIRATION DATE: NORMAL
INR BLD: 1.1
INR BLD: 1.1
PROTHROMBIN TIME: 10.4 SEC (ref 9–11.6)
PROTHROMBIN TIME: 10.5 SEC (ref 9–11.6)

## 2019-07-17 PROCEDURE — 85610 PROTHROMBIN TIME: CPT

## 2019-07-17 PROCEDURE — 86900 BLOOD TYPING SEROLOGIC ABO: CPT

## 2019-07-17 PROCEDURE — 7100000001 HC PACU RECOVERY - ADDTL 15 MIN: Performed by: ORTHOPAEDIC SURGERY

## 2019-07-17 PROCEDURE — 2500000003 HC RX 250 WO HCPCS: Performed by: INTERNAL MEDICINE

## 2019-07-17 PROCEDURE — 76942 ECHO GUIDE FOR BIOPSY: CPT | Performed by: NURSE ANESTHETIST, CERTIFIED REGISTERED

## 2019-07-17 PROCEDURE — 3700000001 HC ADD 15 MINUTES (ANESTHESIA): Performed by: ORTHOPAEDIC SURGERY

## 2019-07-17 PROCEDURE — 2580000003 HC RX 258: Performed by: ORTHOPAEDIC SURGERY

## 2019-07-17 PROCEDURE — 97166 OT EVAL MOD COMPLEX 45 MIN: CPT

## 2019-07-17 PROCEDURE — 3600000006 HC SURGERY LEVEL 6 BASE: Performed by: ORTHOPAEDIC SURGERY

## 2019-07-17 PROCEDURE — 0SRD0J9 REPLACEMENT OF LEFT KNEE JOINT WITH SYNTHETIC SUBSTITUTE, CEMENTED, OPEN APPROACH: ICD-10-PCS | Performed by: ORTHOPAEDIC SURGERY

## 2019-07-17 PROCEDURE — 1200000000 HC SEMI PRIVATE

## 2019-07-17 PROCEDURE — 6360000002 HC RX W HCPCS: Performed by: ORTHOPAEDIC SURGERY

## 2019-07-17 PROCEDURE — 3600000016 HC SURGERY LEVEL 6 ADDTL 15MIN: Performed by: ORTHOPAEDIC SURGERY

## 2019-07-17 PROCEDURE — 7100000000 HC PACU RECOVERY - FIRST 15 MIN: Performed by: ORTHOPAEDIC SURGERY

## 2019-07-17 PROCEDURE — 86850 RBC ANTIBODY SCREEN: CPT

## 2019-07-17 PROCEDURE — 36415 COLL VENOUS BLD VENIPUNCTURE: CPT

## 2019-07-17 PROCEDURE — 2500000003 HC RX 250 WO HCPCS: Performed by: NURSE ANESTHETIST, CERTIFIED REGISTERED

## 2019-07-17 PROCEDURE — 97162 PT EVAL MOD COMPLEX 30 MIN: CPT

## 2019-07-17 PROCEDURE — 73560 X-RAY EXAM OF KNEE 1 OR 2: CPT

## 2019-07-17 PROCEDURE — 3700000000 HC ANESTHESIA ATTENDED CARE: Performed by: ORTHOPAEDIC SURGERY

## 2019-07-17 PROCEDURE — 6370000000 HC RX 637 (ALT 250 FOR IP): Performed by: ORTHOPAEDIC SURGERY

## 2019-07-17 PROCEDURE — 94761 N-INVAS EAR/PLS OXIMETRY MLT: CPT

## 2019-07-17 PROCEDURE — 6360000002 HC RX W HCPCS: Performed by: NURSE ANESTHETIST, CERTIFIED REGISTERED

## 2019-07-17 PROCEDURE — 2709999900 HC NON-CHARGEABLE SUPPLY: Performed by: ORTHOPAEDIC SURGERY

## 2019-07-17 PROCEDURE — C1776 JOINT DEVICE (IMPLANTABLE): HCPCS | Performed by: ORTHOPAEDIC SURGERY

## 2019-07-17 PROCEDURE — C9290 INJ, BUPIVACAINE LIPOSOME: HCPCS | Performed by: NURSE ANESTHETIST, CERTIFIED REGISTERED

## 2019-07-17 PROCEDURE — 2500000003 HC RX 250 WO HCPCS: Performed by: ORTHOPAEDIC SURGERY

## 2019-07-17 PROCEDURE — 88311 DECALCIFY TISSUE: CPT

## 2019-07-17 PROCEDURE — 88305 TISSUE EXAM BY PATHOLOGIST: CPT

## 2019-07-17 PROCEDURE — 86901 BLOOD TYPING SEROLOGIC RH(D): CPT

## 2019-07-17 DEVICE — IMPLANTABLE DEVICE: Type: IMPLANTABLE DEVICE | Site: KNEE | Status: FUNCTIONAL

## 2019-07-17 DEVICE — TRAY TIB SZ 3 KNEE CO CHROM FIX BEAR MOD CEM PFC SIG: Type: IMPLANTABLE DEVICE | Site: KNEE | Status: FUNCTIONAL

## 2019-07-17 DEVICE — CEMENT BNE RADIOPAQUE FAST SET ACRYL RESIN HI VISC SIMPLEXHV: Type: IMPLANTABLE DEVICE | Site: KNEE | Status: FUNCTIONAL

## 2019-07-17 DEVICE — COMPONENT PAT DIA35MM DST KNEE POLY OVL DOME 3 PEG NP CEM: Type: IMPLANTABLE DEVICE | Site: KNEE | Status: FUNCTIONAL

## 2019-07-17 RX ORDER — MIDAZOLAM HYDROCHLORIDE 1 MG/ML
INJECTION INTRAMUSCULAR; INTRAVENOUS PRN
Status: DISCONTINUED | OUTPATIENT
Start: 2019-07-17 | End: 2019-07-17 | Stop reason: SDUPTHER

## 2019-07-17 RX ORDER — KETAMINE HYDROCHLORIDE 50 MG/ML
INJECTION, SOLUTION, CONCENTRATE INTRAMUSCULAR; INTRAVENOUS PRN
Status: DISCONTINUED | OUTPATIENT
Start: 2019-07-17 | End: 2019-07-17 | Stop reason: SDUPTHER

## 2019-07-17 RX ORDER — PROPOFOL 10 MG/ML
INJECTION, EMULSION INTRAVENOUS CONTINUOUS PRN
Status: DISCONTINUED | OUTPATIENT
Start: 2019-07-17 | End: 2019-07-17 | Stop reason: SDUPTHER

## 2019-07-17 RX ORDER — PHENYLEPHRINE HYDROCHLORIDE 10 MG/ML
INJECTION INTRAVENOUS PRN
Status: DISCONTINUED | OUTPATIENT
Start: 2019-07-17 | End: 2019-07-17 | Stop reason: SDUPTHER

## 2019-07-17 RX ORDER — OXYCODONE HYDROCHLORIDE 5 MG/1
10 TABLET ORAL EVERY 4 HOURS PRN
Status: DISCONTINUED | OUTPATIENT
Start: 2019-07-17 | End: 2019-07-18 | Stop reason: HOSPADM

## 2019-07-17 RX ORDER — SODIUM CHLORIDE 0.9 % (FLUSH) 0.9 %
10 SYRINGE (ML) INJECTION PRN
Status: DISCONTINUED | OUTPATIENT
Start: 2019-07-17 | End: 2019-07-18 | Stop reason: HOSPADM

## 2019-07-17 RX ORDER — CEFEPIME HYDROCHLORIDE 2 G/1
INJECTION, POWDER, FOR SOLUTION INTRAVENOUS
Status: DISCONTINUED
Start: 2019-07-17 | End: 2019-07-18 | Stop reason: WASHOUT

## 2019-07-17 RX ORDER — ATENOLOL 50 MG/1
50 TABLET ORAL DAILY
Status: DISCONTINUED | OUTPATIENT
Start: 2019-07-17 | End: 2019-07-18 | Stop reason: HOSPADM

## 2019-07-17 RX ORDER — SODIUM CHLORIDE, SODIUM LACTATE, POTASSIUM CHLORIDE, CALCIUM CHLORIDE 600; 310; 30; 20 MG/100ML; MG/100ML; MG/100ML; MG/100ML
INJECTION, SOLUTION INTRAVENOUS CONTINUOUS
Status: DISCONTINUED | OUTPATIENT
Start: 2019-07-17 | End: 2019-07-17

## 2019-07-17 RX ORDER — DEXAMETHASONE SODIUM PHOSPHATE 10 MG/ML
INJECTION INTRAMUSCULAR; INTRAVENOUS PRN
Status: DISCONTINUED | OUTPATIENT
Start: 2019-07-17 | End: 2019-07-17 | Stop reason: SDUPTHER

## 2019-07-17 RX ORDER — FUROSEMIDE 40 MG/1
40 TABLET ORAL DAILY
Status: DISCONTINUED | OUTPATIENT
Start: 2019-07-17 | End: 2019-07-18 | Stop reason: HOSPADM

## 2019-07-17 RX ORDER — WARFARIN SODIUM 5 MG/1
10 TABLET ORAL ONCE
Status: COMPLETED | OUTPATIENT
Start: 2019-07-17 | End: 2019-07-17

## 2019-07-17 RX ORDER — SODIUM CHLORIDE 0.9 % (FLUSH) 0.9 %
10 SYRINGE (ML) INJECTION EVERY 12 HOURS SCHEDULED
Status: DISCONTINUED | OUTPATIENT
Start: 2019-07-17 | End: 2019-07-17 | Stop reason: HOSPADM

## 2019-07-17 RX ORDER — SPIRONOLACTONE 25 MG/1
25 TABLET ORAL DAILY
Status: DISCONTINUED | OUTPATIENT
Start: 2019-07-17 | End: 2019-07-18 | Stop reason: HOSPADM

## 2019-07-17 RX ORDER — SODIUM CHLORIDE 0.9 % (FLUSH) 0.9 %
10 SYRINGE (ML) INJECTION PRN
Status: DISCONTINUED | OUTPATIENT
Start: 2019-07-17 | End: 2019-07-17 | Stop reason: HOSPADM

## 2019-07-17 RX ORDER — DULOXETIN HYDROCHLORIDE 30 MG/1
30 CAPSULE, DELAYED RELEASE ORAL 2 TIMES DAILY
Status: DISCONTINUED | OUTPATIENT
Start: 2019-07-17 | End: 2019-07-18 | Stop reason: HOSPADM

## 2019-07-17 RX ORDER — ONDANSETRON 2 MG/ML
4 INJECTION INTRAMUSCULAR; INTRAVENOUS EVERY 6 HOURS PRN
Status: DISCONTINUED | OUTPATIENT
Start: 2019-07-17 | End: 2019-07-18 | Stop reason: HOSPADM

## 2019-07-17 RX ORDER — DIPHENHYDRAMINE HCL 25 MG
25 TABLET ORAL NIGHTLY PRN
Status: DISCONTINUED | OUTPATIENT
Start: 2019-07-17 | End: 2019-07-18 | Stop reason: HOSPADM

## 2019-07-17 RX ORDER — DOCUSATE SODIUM 100 MG/1
100 CAPSULE, LIQUID FILLED ORAL 2 TIMES DAILY
Status: DISCONTINUED | OUTPATIENT
Start: 2019-07-17 | End: 2019-07-18 | Stop reason: HOSPADM

## 2019-07-17 RX ORDER — DILTIAZEM HYDROCHLORIDE 5 MG/ML
5 INJECTION INTRAVENOUS EVERY 4 HOURS PRN
Status: DISCONTINUED | OUTPATIENT
Start: 2019-07-17 | End: 2019-07-18 | Stop reason: HOSPADM

## 2019-07-17 RX ORDER — BUPIVACAINE HYDROCHLORIDE 7.5 MG/ML
INJECTION, SOLUTION INTRASPINAL PRN
Status: DISCONTINUED | OUTPATIENT
Start: 2019-07-17 | End: 2019-07-17 | Stop reason: SDUPTHER

## 2019-07-17 RX ORDER — ONDANSETRON 2 MG/ML
INJECTION INTRAMUSCULAR; INTRAVENOUS PRN
Status: DISCONTINUED | OUTPATIENT
Start: 2019-07-17 | End: 2019-07-17 | Stop reason: SDUPTHER

## 2019-07-17 RX ORDER — ALLOPURINOL 100 MG/1
300 TABLET ORAL DAILY
Status: DISCONTINUED | OUTPATIENT
Start: 2019-07-17 | End: 2019-07-18 | Stop reason: HOSPADM

## 2019-07-17 RX ORDER — FENTANYL CITRATE 50 UG/ML
INJECTION, SOLUTION INTRAMUSCULAR; INTRAVENOUS PRN
Status: DISCONTINUED | OUTPATIENT
Start: 2019-07-17 | End: 2019-07-17 | Stop reason: SDUPTHER

## 2019-07-17 RX ORDER — OXYCODONE HYDROCHLORIDE 5 MG/1
5 TABLET ORAL EVERY 4 HOURS PRN
Status: DISCONTINUED | OUTPATIENT
Start: 2019-07-17 | End: 2019-07-18 | Stop reason: HOSPADM

## 2019-07-17 RX ORDER — ACETAMINOPHEN 10 MG/ML
INJECTION, SOLUTION INTRAVENOUS PRN
Status: DISCONTINUED | OUTPATIENT
Start: 2019-07-17 | End: 2019-07-17 | Stop reason: SDUPTHER

## 2019-07-17 RX ORDER — MORPHINE SULFATE 2 MG/ML
2 INJECTION, SOLUTION INTRAMUSCULAR; INTRAVENOUS
Status: DISCONTINUED | OUTPATIENT
Start: 2019-07-17 | End: 2019-07-18 | Stop reason: HOSPADM

## 2019-07-17 RX ORDER — ACETAMINOPHEN 500 MG
500 TABLET ORAL NIGHTLY PRN
Status: DISCONTINUED | OUTPATIENT
Start: 2019-07-17 | End: 2019-07-18 | Stop reason: HOSPADM

## 2019-07-17 RX ORDER — ACETAMINOPHEN,DIPHENHYDRAMINE HCL 500; 25 MG/1; MG/1
1 TABLET, FILM COATED ORAL PRN
Status: DISCONTINUED | OUTPATIENT
Start: 2019-07-17 | End: 2019-07-17 | Stop reason: CLARIF

## 2019-07-17 RX ORDER — SODIUM CHLORIDE 0.9 % (FLUSH) 0.9 %
10 SYRINGE (ML) INJECTION EVERY 12 HOURS SCHEDULED
Status: DISCONTINUED | OUTPATIENT
Start: 2019-07-17 | End: 2019-07-18 | Stop reason: HOSPADM

## 2019-07-17 RX ORDER — CEFAZOLIN SODIUM 2 G/50ML
2 SOLUTION INTRAVENOUS EVERY 8 HOURS
Status: COMPLETED | OUTPATIENT
Start: 2019-07-17 | End: 2019-07-18

## 2019-07-17 RX ORDER — TRANEXAMIC ACID 100 MG/ML
INJECTION, SOLUTION INTRAVENOUS PRN
Status: DISCONTINUED | OUTPATIENT
Start: 2019-07-17 | End: 2019-07-17 | Stop reason: ALTCHOICE

## 2019-07-17 RX ORDER — BUPIVACAINE HYDROCHLORIDE 2.5 MG/ML
INJECTION, SOLUTION EPIDURAL; INFILTRATION; INTRACAUDAL PRN
Status: DISCONTINUED | OUTPATIENT
Start: 2019-07-17 | End: 2019-07-17 | Stop reason: SDUPTHER

## 2019-07-17 RX ADMIN — FENTANYL CITRATE 50 MCG: 50 INJECTION, SOLUTION INTRAMUSCULAR; INTRAVENOUS at 13:47

## 2019-07-17 RX ADMIN — DEXAMETHASONE SODIUM PHOSPHATE 10 MG: 10 INJECTION INTRAMUSCULAR; INTRAVENOUS at 13:46

## 2019-07-17 RX ADMIN — ONDANSETRON 4 MG: 2 INJECTION, SOLUTION INTRAMUSCULAR; INTRAVENOUS at 13:51

## 2019-07-17 RX ADMIN — BUPIVACAINE HYDROCHLORIDE 30 ML: 2.5 INJECTION, SOLUTION EPIDURAL; INFILTRATION; INTRACAUDAL at 13:02

## 2019-07-17 RX ADMIN — BUPIVACAINE 10 ML: 13.3 INJECTION, SUSPENSION, LIPOSOMAL INFILTRATION at 13:02

## 2019-07-17 RX ADMIN — SODIUM CHLORIDE, POTASSIUM CHLORIDE, SODIUM LACTATE AND CALCIUM CHLORIDE: 600; 310; 30; 20 INJECTION, SOLUTION INTRAVENOUS at 13:54

## 2019-07-17 RX ADMIN — SODIUM CHLORIDE, POTASSIUM CHLORIDE, SODIUM LACTATE AND CALCIUM CHLORIDE: 600; 310; 30; 20 INJECTION, SOLUTION INTRAVENOUS at 11:24

## 2019-07-17 RX ADMIN — MORPHINE SULFATE 2 MG: 2 INJECTION, SOLUTION INTRAMUSCULAR; INTRAVENOUS at 17:36

## 2019-07-17 RX ADMIN — WARFARIN SODIUM 10 MG: 5 TABLET ORAL at 18:19

## 2019-07-17 RX ADMIN — DILTIAZEM HYDROCHLORIDE 60 MG: 30 TABLET ORAL at 21:26

## 2019-07-17 RX ADMIN — PHENYLEPHRINE HYDROCHLORIDE 100 MCG: 10 INJECTION INTRAVENOUS at 14:42

## 2019-07-17 RX ADMIN — DILTIAZEM HYDROCHLORIDE 5 MG: 5 INJECTION INTRAVENOUS at 23:21

## 2019-07-17 RX ADMIN — PROPOFOL 50 MCG/KG/MIN: 10 INJECTION INTRAVENOUS at 13:36

## 2019-07-17 RX ADMIN — CEFAZOLIN SODIUM 2 G: 2 SOLUTION INTRAVENOUS at 21:52

## 2019-07-17 RX ADMIN — ACETAMINOPHEN 1000 MG: 10 INJECTION, SOLUTION INTRAVENOUS at 14:31

## 2019-07-17 RX ADMIN — BUPIVACAINE HYDROCHLORIDE 1.4 ML: 7.5 INJECTION, SOLUTION SUBARACHNOID at 12:57

## 2019-07-17 RX ADMIN — OXYCODONE HYDROCHLORIDE 5 MG: 5 TABLET ORAL at 20:51

## 2019-07-17 RX ADMIN — DEXTROSE MONOHYDRATE 2 G: 50 INJECTION, SOLUTION INTRAVENOUS at 13:25

## 2019-07-17 RX ADMIN — PHENYLEPHRINE HYDROCHLORIDE 100 MCG: 10 INJECTION INTRAVENOUS at 14:09

## 2019-07-17 RX ADMIN — MIDAZOLAM 2 MG: 1 INJECTION INTRAMUSCULAR; INTRAVENOUS at 12:45

## 2019-07-17 RX ADMIN — PHENYLEPHRINE HYDROCHLORIDE 100 MCG: 10 INJECTION INTRAVENOUS at 14:52

## 2019-07-17 RX ADMIN — DOCUSATE SODIUM 100 MG: 100 CAPSULE, LIQUID FILLED ORAL at 21:27

## 2019-07-17 RX ADMIN — FENTANYL CITRATE 50 MCG: 50 INJECTION, SOLUTION INTRAMUSCULAR; INTRAVENOUS at 12:45

## 2019-07-17 RX ADMIN — KETAMINE HYDROCHLORIDE 25 MG: 50 INJECTION, SOLUTION INTRAMUSCULAR; INTRAVENOUS at 13:38

## 2019-07-17 ASSESSMENT — PAIN SCALES - GENERAL
PAINLEVEL_OUTOF10: 4
PAINLEVEL_OUTOF10: 2
PAINLEVEL_OUTOF10: 0
PAINLEVEL_OUTOF10: 3
PAINLEVEL_OUTOF10: 3
PAINLEVEL_OUTOF10: 4
PAINLEVEL_OUTOF10: 4
PAINLEVEL_OUTOF10: 0
PAINLEVEL_OUTOF10: 0

## 2019-07-17 ASSESSMENT — COPD QUESTIONNAIRES: CAT_SEVERITY: NO INTERVAL CHANGE

## 2019-07-17 ASSESSMENT — PAIN - FUNCTIONAL ASSESSMENT: PAIN_FUNCTIONAL_ASSESSMENT: 0-10

## 2019-07-17 ASSESSMENT — PAIN DESCRIPTION - ORIENTATION: ORIENTATION: LEFT

## 2019-07-17 ASSESSMENT — PAIN DESCRIPTION - LOCATION: LOCATION: KNEE

## 2019-07-17 ASSESSMENT — PAIN DESCRIPTION - PAIN TYPE: TYPE: SURGICAL PAIN

## 2019-07-17 NOTE — PROGRESS NOTES
To PACU for spinal/ block. Time out performed per policy. Procedure and patient signature verified from consent prior to initiating block.

## 2019-07-17 NOTE — PROGRESS NOTES
[facility]  [facility]  Date: 2019  Patient Name: Antonio Stock        MRN: [id number]    : 1945  (66 y.o.)  Gender: female   Referring Practitioner: Dr. Purvi Araiza  Diagnosis: L TKA  Additional Pertinent Hx: Patient is admitted for an elective L TKA  Past Medical History:   Diagnosis Date    Atrial fibrillation ([address]     CHF (congestive heart failure) ([address]     Gout     Hearing loss     bilateral hearing aids     History of colon polyps     Hypertension     Kidney stones     Mitral valve insufficiency     Obesity, morbid, BMI 40.0-49.9 ([address]     Osteoarthritis     Primary osteoarthritis of both knees     Dr. Fidel Perez Pulmonary HTN [facility])      Past Surgical History:   Procedure Laterality Date    CARPAL TUNNEL RELEASE      bilaterally    COLONOSCOPY  2016    Immanuel Beck MD    COLONOSCOPY  2017    at [address] [place] [address]. PYlori positive, adenoma, hyperplastic polyp    COLONOSCOPY  2018    DR. Opal Joshi -tubular adenoma repeat in 1 year 2019    JOINT REPLACEMENT Left 2019    knee    LITHOTRIPSY      TUBAL LIGATION      UPPER GASTROINTESTINAL ENDOSCOPY  2016    Immanuel Beck MD                Subjective  Subjective: Patient is supine in bed upon arrival.   Pain Level: 4  Pain Location: Knee  Pain Orientation: Left  Orientation  Overall Orientation Status: Within Normal Limits  Vision  Vision: Impaired  Vision Exceptions: Wears glasses at all times  Hearing  Hearing: Exceptions to [facility]  Hearing Exceptions: Bilateral hearing aid  Social/Functional History  Lives With: Alone  Type of Home: House  Home Layout: Two level, Able to Live on Main level with bedroom/bathroom  Home Access: Stairs to enter with rails  Entrance Stairs - Number of Steps: 1  Entrance Stairs - Rails: Left  Bathroom Shower/Tub: Walk-in shower(has a lip to get into shower)  Bathroom Toilet: Handicap height  Bathroom Equipment: Grab bars around toilet, Shower chair, 3-in-1 commode  Home Equipment: Rolling walker, Standard walker, Cane, Lift chair  ADL Assistance: Independent  Homemaking Assistance: Needs assistance  Laundry: Moderate  Vacuuming: Moderate  Cleaning: Moderate  Gardening: Total  Yard Work: Total  Homemaking Responsibilities: Yes  Ambulation Assistance: Independent(with use of w. walker as needed)  Transfer Assistance: Independent(with use of w. walker as needed)  Active : Yes  Prior Function  ADL Assistance: Independent  Homemaking Assistance: Needs assistance  Laundry: Moderate  Vacuuming: Moderate  Cleaning: Moderate  Gardening: Total  Yard Work: Total  Ambulation Assistance: Independent(with use of w. walker as needed)  Transfer Assistance: Independent(with use of w. walker as needed)    Objective                     Gross LUE Strength: WFL  Gross RUE Strength: WFL  ADL  Feeding: Independent  Grooming: Independent  UE Bathing: Setup  LE Bathing: Moderate assistance  UE Dressing: Setup  LE Dressing: Setup, Moderate assistance  Toileting: Moderate assistance   Supine to Sit: Moderate assistance  Sit to Supine: Moderate assistance       Balance  Sitting Balance: Independent  Standing Balance: Minimal assistance                 Assessment: Patient is supine in bed upon arrival.  Demonstrated ability to ambulate from bed to bedside chair with minimal assistance x2 for safety. Patient was anxious about transfer and returning home during evaluation. No LOB or SOB noted. Patient remians seated in bedside chair with call light in reach. Recommend OT services in order to address ADL/IADL activities prior to returning home. Goals  Short term goals  Time Frame for Short term goals: 3 days (7-19-19)  Short term goal 1: Patient to be independent with UB/LB bathing. Short term goal 2: Patient to be independent with UB/LB dressing.    Short term goal 3: Patient to tolerate static standing x 7 minutes without LOB in order to complete self care tasks   Short term

## 2019-07-17 NOTE — ANESTHESIA PRE PROCEDURE
 History of colon polyps 2016    Hypertension     Kidney stones     Mitral valve insufficiency     Obesity, morbid, BMI 40.0-49.9 (HCC)     Osteoarthritis     Primary osteoarthritis of both knees      Minus Liner Pulmonary HTN [facility])        Past Surgical History:        Procedure Laterality Date    CARPAL TUNNEL RELEASE      bilaterally    COLONOSCOPY  2016    Galileo Albert MD    COLONOSCOPY  2017    at [facility]. PYlori positive, adenoma, hyperplastic polyp    COLONOSCOPY  2018    DR. Lindsey Certain -tubular adenoma repeat in 1 year 2019    LITHOTRIPSY      TUBAL LIGATION      UPPER GASTROINTESTINAL ENDOSCOPY  2016    Galileo Albert MD       Social History:    Social History     Tobacco Use    Smoking status: Former Smoker     Packs/day: 0.25     Years: 4.00     Pack years: 1.00     Types: Cigarettes     Last attempt to quit: 1981     Years since quittin.5    Smokeless tobacco: Never Used   Substance Use Topics    Alcohol use: No                                Counseling given: Not Answered      Vital Signs (Current):   Vitals:    19 1106   BP: (!) 110/91   Pulse: 67   Resp: 20   Temp: 36.8 °C (98.3 °F)   SpO2: 93%   Weight: 248 lb (112.5 kg)   Height: 5' 1\" (1.549 m)                                              BP Readings from Last 3 Encounters:   19 (!) 110/91   19 107/67   19 120/82       NPO Status: Time of last liquid consumption:                         Time of last solid consumption:                         Date of last liquid consumption: 19                        Date of last solid food consumption: 19    BMI:   Wt Readings from Last 3 Encounters:   19 248 lb (112.5 kg)   19 246 lb 6.4 oz (111.8 kg)   19 248 lb 8 oz (112.7 kg)     Body mass index is 46.86 kg/m².     CBC:   Lab Results   Component Value Date    WBC 8.0 2019    WBC 8.0 2019    RBC 4.75 2019    RBC 4.75 2019    RBC Neuro/Psych:   Negative Neuro/Psych ROS              GI/Hepatic/Renal:   (+) renal disease: no interval change, morbid obesity          Endo/Other: Negative Endo/Other ROS             Pt had PAT visit. Abdominal:   (+) obese,         Vascular:                                    Anesthesia Plan      spinal and MAC     ASA 4     (Left IPACK/SUDARSHAN  And Left adductor canal blocks for post op pain management)      MIPS: Postoperative opioids intended and Prophylactic antiemetics administered. Anesthetic plan and risks discussed with patient. Use of blood products discussed with patient whom consented to blood products. Plan discussed with attending.                 TYLOR Mcdermott - CRNA   7/17/2019

## 2019-07-17 NOTE — BRIEF OP NOTE
Brief Postoperative Note  ______________________________________________________________    Patient: Karli Cota  YOB: 1945  MRN: [id number]  Date of Procedure: 7/17/2019    Pre-Op Diagnosis: LEFT KNEE O/A, OBESITY    Post-Op Diagnosis: Same       Procedure(s):  LEFT KNEE TOTAL ARTHROPLASTY- DEPUY    Anesthesia: Monitor Anesthesia Care, Spinal    Surgeon(s):  Yazmin Carrasquillo DO    Assistant:     Estimated Blood Loss (mL):0    Complications: 0    Specimens:   0    Implants:  0      Drains: 0    Findings: see dictation    [address] [facility] [address], DO  Date: 7/17/2019  Time: 1:47 PM

## 2019-07-17 NOTE — PROGRESS NOTES
[facility]  Physical Therapy  Evaluation  Date: 2019  Patient Name: Michelle Mares        MRN: [id number]    : 1945  (66 y.o.)  Gender: female   Referring Practitioner: Dr. Haider Cortés  Diagnosis: Left TKR  Additional Pertinent Hx: Admitted for elective left TKR and referred to PT to assess functional mobility and prepare for discharge. Past Medical History:   Diagnosis Date    Atrial fibrillation ([address]     CHF (congestive heart failure) (HCC)     Gout     Hearing loss     bilateral hearing aids     History of colon polyps 2016    Hypertension     Kidney stones     Mitral valve insufficiency     Obesity, morbid, BMI 40.0-49.9 (HCC)     Osteoarthritis     Primary osteoarthritis of both knees     Dr. Erik Gallego Pulmonary HTN [facility])      Past Surgical History:   Procedure Laterality Date    CARPAL TUNNEL RELEASE      bilaterally    COLONOSCOPY  2016    Christy Schneider MD    COLONOSCOPY  2017    at [facility]. PYlori positive, adenoma, hyperplastic polyp    COLONOSCOPY  2018    DR. Rodriguez Rivero -tubular adenoma repeat in 1 year 2019    JOINT REPLACEMENT Left 2019    knee    LITHOTRIPSY      TUBAL LIGATION      UPPER GASTROINTESTINAL ENDOSCOPY  2016    Christy Schneider MD       Restrictions         Subjective         Pain Level: 4    Orientation  Overall Orientation Status: Within Functional Limits    Home Living  Type of Home: House  Home Layout: Two level, Able to Live on Main level with bedroom/bathroom  Entrance Stairs - Number of Steps: 1  Entrance Stairs - Rails: Left  Home Access: Stairs to enter with rails  Home Equipment: Rolling walker, Standard walker, Cane, Lift chair    Prior Level of Function  Prior Function  ADL Assistance: Independent  Homemaking Assistance: Needs assistance  Laundry: Moderate  Vacuuming: Moderate  Cleaning: Moderate  Gardening:  Total  Yard Work: Total  Ambulation Assistance: Independent(with use of w. walker as needed)  Transfer

## 2019-07-17 NOTE — PLAN OF CARE
[facility]    Inpatient Occupational Therapy  Plan of Care  OT Orders Received and Evaluation Complete  Date: 2019  Patient Name: Damian Wakefield        MRN: [id number]    : 1945  (66 y.o.)  Referring Practitioner: Dr. Rowena Ramirez  Onset Date: 19  Referral Date: 19  Diagnosis: L TKA  Treatment Diagnosis: weakness    Identified Problem Areas  Performance deficits / Impairments: Decreased functional mobility , Decreased high-level IADLs, Decreased ADL status, Decreased strength, Decreased endurance  Assessment: Patient is supine in bed upon arrival.  Demonstrated ability to ambulate from bed to bedside chair with minimal assistance x2 for safety. Patient was anxious about transfer and returning home during evaluation. No LOB or SOB noted. Patient remians seated in bedside chair with call light in reach. Recommend OT services in order to address ADL/IADL activities prior to returning home. Justification for Skilled Services:  [x]  Complete Daily Tasks Safely  [x] Improve Balance   [x]  Return to Prior Level of Function  [x]  Family/Caregiver Education    [] Improve UE strength  [x] Patient Education: [x]Adaptive Equipment   [x]Home Exercise Program and Progression    Treatment Plan  Plan  Times per week: 3x  Times per day: Daily(1-2x day)  Plan weeks: 3 days   [] Modalities:  [x] Therapeutic Exercise   [x] Therapeutic Activity  [] Splinting:     [] Home Safety Evaluation         [x] ADL Retraining                       [] Muscle Re-education [] Cognitive Retraining            [] Sensory Integration  [x] Patient Education [] Home Exercise Program [] Fine Motor Coordination  Discharge Recommendations: Continue to assess pending progress    GOALS  Short term goals  Time Frame for Short term goals: 3 days (19)  Short term goal 1: Patient to be independent with UB/LB bathing. Short term goal 2: Patient to be independent with UB/LB dressing.    Short term goal 3: Patient to tolerate static standing x 7 minutes without LOB in order to complete self care tasks   Short term goal 4: Patient to complete toileting task and hygiene independently. Upon Swingbed Transfer this Plan of Care will be followed until revision is completed.     Shannon Rahman, OTR/L                    Date: 7/17/2019

## 2019-07-17 NOTE — ANESTHESIA PROCEDURE NOTES
Left IPACK/SUDARSHAN and left adductor canal blocks    Patient location during procedure: procedure area  Start time: 7/17/2019 1:00 PM  End time: 7/17/2019 1:11 PM  Preanesthetic Checklist  Completed: patient identified, site marked, surgical consent, pre-op evaluation, timeout performed, IV checked, risks and benefits discussed, monitors and equipment checked, anesthesia consent given, oxygen available and patient being monitored  Peripheral Block  Patient position: supine  Prep: ChloraPrep  Patient monitoring: cardiac monitor, continuous pulse ox, frequent blood pressure checks and IV access  Block type: iPacks and Saphenous  Laterality: left  Injection technique: single-shot  Procedures: ultrasound guided  Provider prep: mask and sterile gloves  Needle  Needle type:  Other   Needle gauge: 21 G  Needle length: 11 cm  Needle localization: anatomical landmarks and ultrasound guidance  Test dose: negativeOther needle type: [place]  Assessment  Injection assessment: negative aspiration for heme, no paresthesia on injection and local visualized surrounding nerve on ultrasound  Slow fractionated injection: yes  Hemodynamics: stable  Additional Notes  Ropivacaine 0.5% 15ml injected for the left AC block  The following solution was injected for the left IPACK/SUDARSHAN blocks:  Experal 1.3%--20ml  Bupivacaine 0.25%--30ml  Na Cl 0.9%--30ml  Total injected was 75ml  No AAC  Reason for block: post-op pain management and at surgeon's request

## 2019-07-18 VITALS
DIASTOLIC BLOOD PRESSURE: 74 MMHG | SYSTOLIC BLOOD PRESSURE: 126 MMHG | HEART RATE: 85 BPM | HEIGHT: 61 IN | BODY MASS INDEX: 46.82 KG/M2 | RESPIRATION RATE: 18 BRPM | OXYGEN SATURATION: 95 % | WEIGHT: 248 LBS | TEMPERATURE: 97.9 F

## 2019-07-18 LAB
ABSOLUTE EOS #: ABNORMAL K/UL (ref 0–0.4)
ABSOLUTE IMMATURE GRANULOCYTE: ABNORMAL K/UL (ref 0–0.3)
ABSOLUTE LYMPH #: 0.64 K/UL (ref 1–4.8)
ABSOLUTE MONO #: 0.48 K/UL (ref 0–1)
ANION GAP SERPL CALCULATED.3IONS-SCNC: 14 MMOL/L (ref 9–17)
BASOPHILS # BLD: ABNORMAL % (ref 0–2)
BASOPHILS ABSOLUTE: ABNORMAL K/UL (ref 0–0.2)
BUN BLDV-MCNC: 25 MG/DL (ref 8–23)
BUN/CREAT BLD: 23 (ref 9–20)
CALCIUM SERPL-MCNC: 9.5 MG/DL (ref 8.6–10.4)
CHLORIDE BLD-SCNC: 102 MMOL/L (ref 98–107)
CO2: 22 MMOL/L (ref 20–31)
CREAT SERPL-MCNC: 1.07 MG/DL (ref 0.5–0.9)
DIFFERENTIAL TYPE: ABNORMAL
EOSINOPHILS RELATIVE PERCENT: ABNORMAL % (ref 0–5)
GFR AFRICAN AMERICAN: >60 ML/MIN
GFR NON-AFRICAN AMERICAN: 50 ML/MIN
GFR SERPL CREATININE-BSD FRML MDRD: ABNORMAL ML/MIN/{1.73_M2}
GFR SERPL CREATININE-BSD FRML MDRD: ABNORMAL ML/MIN/{1.73_M2}
GLUCOSE BLD-MCNC: 218 MG/DL (ref 70–99)
HCT VFR BLD CALC: 37.7 % (ref 36–46)
HEMOGLOBIN: 12.7 G/DL (ref 12–16)
IMMATURE GRANULOCYTES: ABNORMAL %
INR BLD: 1.1
LYMPHOCYTES # BLD: 4 % (ref 15–40)
MCH RBC QN AUTO: 30.6 PG (ref 26–34)
MCHC RBC AUTO-ENTMCNC: 33.6 G/DL (ref 31–37)
MCV RBC AUTO: 91.1 FL (ref 80–100)
MONOCYTES # BLD: 3 % (ref 4–8)
MORPHOLOGY: ABNORMAL
NRBC AUTOMATED: ABNORMAL PER 100 WBC
PDW BLD-RTO: 15.2 % (ref 12.1–15.2)
PLATELET # BLD: 200 K/UL (ref 140–450)
PLATELET ESTIMATE: ABNORMAL
PMV BLD AUTO: ABNORMAL FL (ref 6–12)
POTASSIUM SERPL-SCNC: 4.5 MMOL/L (ref 3.7–5.3)
PROTHROMBIN TIME: 10.5 SEC (ref 9–11.6)
RBC # BLD: 4.14 M/UL (ref 4–5.2)
RBC # BLD: ABNORMAL 10*6/UL
SEG NEUTROPHILS: 93 % (ref 47–75)
SEGMENTED NEUTROPHILS ABSOLUTE COUNT: 14.98 K/UL (ref 2.5–7)
SODIUM BLD-SCNC: 138 MMOL/L (ref 135–144)
WBC # BLD: 16.1 K/UL (ref 3.5–11)
WBC # BLD: ABNORMAL 10*3/UL

## 2019-07-18 PROCEDURE — 97535 SELF CARE MNGMENT TRAINING: CPT

## 2019-07-18 PROCEDURE — 80048 BASIC METABOLIC PNL TOTAL CA: CPT

## 2019-07-18 PROCEDURE — 85025 COMPLETE CBC W/AUTO DIFF WBC: CPT

## 2019-07-18 PROCEDURE — 6360000002 HC RX W HCPCS: Performed by: ORTHOPAEDIC SURGERY

## 2019-07-18 PROCEDURE — 85610 PROTHROMBIN TIME: CPT

## 2019-07-18 PROCEDURE — 97116 GAIT TRAINING THERAPY: CPT

## 2019-07-18 PROCEDURE — 36415 COLL VENOUS BLD VENIPUNCTURE: CPT

## 2019-07-18 PROCEDURE — 2580000003 HC RX 258: Performed by: ORTHOPAEDIC SURGERY

## 2019-07-18 PROCEDURE — 97530 THERAPEUTIC ACTIVITIES: CPT

## 2019-07-18 PROCEDURE — 6370000000 HC RX 637 (ALT 250 FOR IP): Performed by: ORTHOPAEDIC SURGERY

## 2019-07-18 PROCEDURE — 97110 THERAPEUTIC EXERCISES: CPT

## 2019-07-18 PROCEDURE — 94761 N-INVAS EAR/PLS OXIMETRY MLT: CPT

## 2019-07-18 RX ORDER — PSEUDOEPHEDRINE HCL 30 MG
100 TABLET ORAL 2 TIMES DAILY PRN
Qty: 20 CAPSULE | Refills: 0
Start: 2019-07-18 | End: 2019-12-16 | Stop reason: ALTCHOICE

## 2019-07-18 RX ORDER — OXYCODONE HYDROCHLORIDE AND ACETAMINOPHEN 5; 325 MG/1; MG/1
1-2 TABLET ORAL EVERY 4 HOURS PRN
Qty: 50 TABLET | Refills: 0
Start: 2019-07-18 | End: 2019-07-25

## 2019-07-18 RX ADMIN — SPIRONOLACTONE 25 MG: 25 TABLET, FILM COATED ORAL at 09:21

## 2019-07-18 RX ADMIN — ALLOPURINOL 300 MG: 100 TABLET ORAL at 09:21

## 2019-07-18 RX ADMIN — ACETAMINOPHEN 500 MG: 500 TABLET, FILM COATED ORAL at 04:24

## 2019-07-18 RX ADMIN — Medication 10 ML: at 09:23

## 2019-07-18 RX ADMIN — DILTIAZEM HYDROCHLORIDE 60 MG: 30 TABLET ORAL at 13:45

## 2019-07-18 RX ADMIN — MORPHINE SULFATE 2 MG: 2 INJECTION, SOLUTION INTRAMUSCULAR; INTRAVENOUS at 01:09

## 2019-07-18 RX ADMIN — DOCUSATE SODIUM 100 MG: 100 CAPSULE, LIQUID FILLED ORAL at 09:21

## 2019-07-18 RX ADMIN — OXYCODONE HYDROCHLORIDE 10 MG: 5 TABLET ORAL at 09:21

## 2019-07-18 RX ADMIN — DILTIAZEM HYDROCHLORIDE 60 MG: 30 TABLET ORAL at 05:07

## 2019-07-18 RX ADMIN — FUROSEMIDE 40 MG: 40 TABLET ORAL at 09:21

## 2019-07-18 RX ADMIN — ATENOLOL 50 MG: 50 TABLET ORAL at 09:22

## 2019-07-18 RX ADMIN — CEFAZOLIN SODIUM 2 G: 2 SOLUTION INTRAVENOUS at 05:07

## 2019-07-18 RX ADMIN — ENOXAPARIN SODIUM 30 MG: 30 INJECTION SUBCUTANEOUS at 09:22

## 2019-07-18 ASSESSMENT — PAIN DESCRIPTION - PAIN TYPE
TYPE: ACUTE PAIN;SURGICAL PAIN
TYPE: ACUTE PAIN

## 2019-07-18 ASSESSMENT — PAIN DESCRIPTION - ORIENTATION: ORIENTATION: LEFT

## 2019-07-18 ASSESSMENT — PAIN SCALES - GENERAL
PAINLEVEL_OUTOF10: 2
PAINLEVEL_OUTOF10: 3
PAINLEVEL_OUTOF10: 7
PAINLEVEL_OUTOF10: 6
PAINLEVEL_OUTOF10: 5
PAINLEVEL_OUTOF10: 8

## 2019-07-18 ASSESSMENT — PAIN DESCRIPTION - LOCATION
LOCATION: HEAD
LOCATION: KNEE

## 2019-07-18 NOTE — PROGRESS NOTES
Pt laying in bed- states pain is 5/10 in L knee. Pain meds given. Pt dressing taken down to Aquacel. Aquacel is clean, dry, intact. Pt refused cryocuff at this time. Respirations are even and unlabored. Bowel sounds are present in all quadrants- abdomen is soft nontender. Pulses +2 in bilateral lower extremities. Pt is able to wiggle toes and denies numbness. Call light in reach. Will continue to monitor.

## 2019-07-18 NOTE — PROGRESS NOTES
Called Dr. Shefali Vo due to pt having tachycardia. Explained to Dr. Shefali Vo that patient HR up to 150's-160's without exertion and cardizem PO was given. Gave Dr. Shefali Vo most recent vitals- per Dr. Shefali Vo give cardizem 5 mg IV q4h prn for HR greater than 100. Orders placed. Ansina 9101 Dr. Shefali Vo to verify ok to give cardizem with /56 (110/60 manual). Per Dr. Shefali Vo ok to give medication.

## 2019-07-18 NOTE — ANESTHESIA POST-OP
Alert and awake. Sitting up in chair. N/V checks of lower extremities WNL. Left knee pain well controlled. Vital signs are stable. No voiced complaints. No AAC.

## 2019-07-18 NOTE — PROGRESS NOTES
All patient belongings packed and given to daughter. Dc instructions explained to patient and daughter. All questions answered. Patient transferred to  and taken to front. Transport to  residence via private car.  Electronically signed by Pratik Villavicencio RN on 7/18/2019 at 3:57 PM

## 2019-07-18 NOTE — PROGRESS NOTES
S/P L TKA POD #1:    Pain well controlled. Denies CP or SOB. No new complaints. Exam:  VSS, L thigh and leg supple with expected swelling. Dressing C/D/I. N/V/M intact distally. Mechanical DVT prevention in place. AM labs reviewed. CBC:   Lab Results   Component Value Date    WBC 16.1 07/18/2019    RBC 4.14 07/18/2019    RBC 4.72 04/10/2012    HGB 12.7 07/18/2019    HCT 37.7 07/18/2019    MCV 91.1 07/18/2019    MCH 30.6 07/18/2019    MCHC 33.6 07/18/2019    RDW 15.2 07/18/2019     07/18/2019     04/10/2012    MPV NOT REPORTED 07/18/2019     BMP:    Lab Results   Component Value Date     07/18/2019    K 4.5 07/18/2019     07/18/2019    CO2 22 07/18/2019    BUN 25 07/18/2019    LABALBU 4.2 06/21/2019    LABALBU 4.4 04/10/2012    CREATININE 1.07 07/18/2019    CALCIUM 9.5 07/18/2019    GFRAA >60 07/18/2019    LABGLOM 50 07/18/2019    GLUCOSE 218 07/18/2019    GLUCOSE 102 04/27/2012       Impression:  1. S/P L TKA POD #1  2. Leukocytosis - likely stress related. Plan:  1. Initiate DVT Lovenox plus mechanical prophylaxis. 2.  Perioperative antibiotics per SCIP protocol. 3.  Begin daily dressing care. 4.  Cryocuff per protocol. 5.  Discharge planning for post inpt needs. 6.  PT and OT services as ordered. 7.  All questions answered at the bedside.

## 2019-07-18 NOTE — PROGRESS NOTES
Pt up to [facility] with terese. Medium BM in BSC soft formed. Call light in reach. Will continue to monitor.

## 2019-07-18 NOTE — DISCHARGE INSTR - COC
Continuity of Care Form    Patient Name: Jessica Tay   :  1945  MRN:  [id number]    Admit date:  2019  Discharge date:  2019    Code Status Order: Full Code   Advance Directives:   Advance Care Flowsheet Documentation     Date/Time Healthcare Directive Type of Healthcare Directive Copy in [address] Agent's Name Healthcare Agent's Phone Number    19 3304  Yes, patient has an advance directive for healthcare treatment  Durable power of  for health care;Living will  No, copy requested from family  Healthcare power of   [facility]  [phone]    19 1110  Yes, patient has an advance directive for healthcare treatment  --  Yes, copy in chart  --  --  --          Admitting Physician:  Zacarias Childers DO  PCP: Nevelyn Kanner, MD    Discharging Nurse: [facility] Unit/Room#: 0268/0268-01  Discharging Unit Phone Number: [phone]    Emergency Contact:   Extended Emergency Contact Information  Primary Emergency Contact: Emily Garcia  Address: [address] Phone: [phone]  Relation: Child    Past Surgical History:  Past Surgical History:   Procedure Laterality Date    CARPAL TUNNEL RELEASE      bilaterally    COLONOSCOPY  2016    Gayatri Morataya MD    COLONOSCOPY  2017    at [address] [place] [address]. PYlori positive, adenoma, hyperplastic polyp    COLONOSCOPY  2018    DR. Toro Life -tubular adenoma repeat in 1 year 2019    JOINT REPLACEMENT Left 2019    knee    LITHOTRIPSY      TUBAL LIGATION      UPPER GASTROINTESTINAL ENDOSCOPY  2016    Gayatri Morataya MD       Immunization History:   Immunization History   Administered Date(s) Administered    Influenza Vaccine, unspecified formulation 10/01/2016    Influenza Virus Vaccine 10/16/2014, 10/21/2015, 2018    Influenza, Quadv, 3 yrs and older, IM, PF (Fluzone 3 yrs and older or Afluria 5 yrs and older) 10/20/2016, 11/07/2017    Pneumococcal Conjugate 13-valent ([id number]) 04/21/2016    Pneumococcal Polysaccharide ([id number]) 12/22/2010    Tdap (Boostrix, Adacel) 12/29/2018    Zoster Live (Zostavax) 07/07/2014       Active Problems:  Patient Active Problem List   Diagnosis Code    Kidney stones N20.0    Localized osteoarthritis of left knee M17.12    Atrial fibrillation ([facility]) I48.91    Pulmonary HTN ([facility]) I27.20    Knee pain, chronic M25.569, G89.29    Polyp of cecum D12.0    Acute gout of right foot M10.9    Iron deficiency anemia due to chronic blood loss D50.0    Rectal bleed K62.5    Mitral valve insufficiency I34.0    Anemia D64.9    Backache M54.9    CHF (congestive heart failure) ([facility]) I50.9    CKD (chronic kidney disease) N18.9    Echocardiogram abnormal R93.1    Gout M10.9    HL (hearing loss) H91.90    Hyperlipidemia E78.5    Morbid obesity with BMI of 45.0-49.9, adult ([facility]) E66.01, Z68.42    DJD (degenerative joint disease) M19.90       Isolation/Infection:   Isolation          No Isolation            Nurse Assessment:  Last Vital Signs: BP (!) 117/56   Pulse 78   Temp 98 °F (36.7 °C) (Oral)   Resp 22   Ht 5' 1\" (1.549 m)   Wt 248 lb (112.5 kg)   SpO2 93%   BMI 46.86 kg/m²     Last documented pain score (0-10 scale): Pain Level: 7  Last Weight:   Wt Readings from Last 1 Encounters:   07/17/19 248 lb (112.5 kg)     Mental Status:  oriented and alert    IV Access:  - None    Nursing Mobility/ADLs:  Walking   Assisted  Transfer  Assisted  Bathing  Assisted  Dressing  Assisted  Toileting  Assisted  Feeding  Independent  Med Admin  Assisted  Med Delivery   whole    Wound Care Documentation and Therapy:        Elimination:  Continence:   · Bowel:  Yes  · Bladder: Yes  Urinary Catheter: Removal Date 7/18/2019   Colostomy/Ileostomy/Ileal Conduit: No       Date of Last BM: 7/17/2019    Intake/Output Summary (Last 24 hours) at 7/18/2019 1323  Last data filed

## 2019-07-18 NOTE — PROGRESS NOTES
[facility]  Occupational Therapy  Daily Note  Date: 2019  Patient Name: Cristina Flaherty        MRN: [id number]    : 1945  (66 y.o.)    Subjective: Pt returning from PT     Objective  ADL  Equipment Provided: Reacher, Sock aid, Long-handled sponge  Feeding: Independent  Grooming: Independent  UE Bathing: Setup  LE Bathing: Minimal assistance  UE Dressing: Setup  LE Dressing: Setup, Minimal assistance  Toileting: Moderate assistance           Supine to Sit: Moderate assistance  Sit to Supine: Moderate assistance             Balance  Sitting Balance: Independent  Standing Balance: Contact guard assistance       Sit to stand: Minimal assistance  Stand to sit: Minimal assistance          Assessment  Assessment: Pt just returning to room per PTA. Pt agreeable to sponge bath seated in bedside chair this date. Pt completes UB/LB bathing & dressing tasks as documented above. Pt provided with & educated on use of AE (reacher, sock aid, & long handled sponge) for LB dressing. Pt demo's decreased endurance during ADLs AEB requiring frequent rest breaks/increased time to complete & SOB w/ activity. Educated on threading L LLE through LB clothing first for greater ease w/ dressing tasks. V/U and demonstrates use of all AE throughout session w/ good follow through. Requires Min A for STS from chair for washing alex area & buttocks, CGA for standing balance. Pt remains seated in chair w/ call light in reach, legs elevated & basin at side as pt states she feels nauseated. RN notified. Continue to progress as able. Goals  Short Term Goals  Time Frame for Short term goals: 3 days (19)  Short term goal 1: Patient to be independent with UB/LB bathing. Short term goal 2: Patient to be independent with UB/LB dressing.    Short term goal 3: Patient to tolerate static standing x 7 minutes without LOB in order to complete self care tasks   Short term goal 4: Patient to complete toileting task and hygiene

## 2019-07-18 NOTE — PROGRESS NOTES
Pt educated not to take two types of pain medications. She previously had Norco at home. Patient reports she does not have any of the Norco previously prescribed left at home. Nurse educates use of pain medication that will be prescribed on discharge. Electronically signed by Parish Marti RN on 7/18/2019 at 1:48 PM

## 2019-07-19 ENCOUNTER — TELEPHONE (OUTPATIENT)
Dept: PHARMACY | Facility: CLINIC | Age: 74
End: 2019-07-19

## 2019-07-19 DIAGNOSIS — M17.12 LOCALIZED OSTEOARTHRITIS OF LEFT KNEE: Primary | ICD-10-CM

## 2019-07-19 PROCEDURE — 1111F DSCHRG MED/CURRENT MED MERGE: CPT

## 2019-07-19 RX ORDER — FUROSEMIDE 40 MG/1
TABLET ORAL
COMMUNITY
End: 2019-08-07 | Stop reason: SDUPTHER

## 2019-07-22 ENCOUNTER — TELEPHONE (OUTPATIENT)
Dept: PHARMACY | Age: 74
End: 2019-07-22

## 2019-07-22 DIAGNOSIS — I48.91 ATRIAL FIBRILLATION, UNSPECIFIED TYPE (HCC): Primary | ICD-10-CM

## 2019-07-22 DIAGNOSIS — Z79.01 LONG TERM CURRENT USE OF ANTICOAGULANTS WITH INR GOAL OF 2.0-3.0: ICD-10-CM

## 2019-07-22 LAB — SURGICAL PATHOLOGY REPORT: NORMAL

## 2019-07-23 ENCOUNTER — HOSPITAL ENCOUNTER (OUTPATIENT)
Dept: PHARMACY | Age: 74
Setting detail: THERAPIES SERIES
Discharge: HOME | End: 2019-07-23
Payer: MEDICARE

## 2019-07-23 LAB — INR BLD: 1.9

## 2019-07-23 NOTE — PROGRESS NOTES
Fingerstick INR drawn per [facility] protocol. All communication is via telephone with homecare nurse, Alanna Blankenship RN. Patient states no visible blood in urine and no black tarry stool. Cadence Vo missed 6 doses of warfarin 7/12 through 7/17/19 and she had left knee replacement with Dr. Jesenia Bonner. Cadence Vo states she restarted warfarin on 7/18/19 upon discharge from the hospital, warfarin 10 mg daily x 4 days and 5 mg last night (10,10,10,10,5) and has been giving herself once daily lovenox 40 mg injections restarting on 7/18/19. Cadence Vo has been taking percocet 5/325 1-2 tabs q4h as needed for pain. RN is unable to verify medications verbally. According to electronic chart review, Cadence Vo is currently off cymbalta due to kidney issues?  Since Jo's INR=1.9 today we will discontinue lovenox 40 mg injection daily, and will continue current weekly warfarin regimen  (warfarin 10 mg on Wednesdays and 5 mg daily all other days of the week) and have homecare recheck INR in 8 day(s) on Wednesday, July 31, while RN is at the home for staple removal.

## 2019-07-31 ENCOUNTER — HOSPITAL ENCOUNTER (OUTPATIENT)
Dept: PHARMACY | Age: 74
Setting detail: THERAPIES SERIES
Discharge: HOME | End: 2019-07-31
Payer: MEDICARE

## 2019-07-31 LAB — INR BLD: 2.1

## 2019-08-05 RX ORDER — DILTIAZEM HYDROCHLORIDE 60 MG/1
TABLET, FILM COATED ORAL
Qty: 270 TABLET | Refills: 3 | Status: SHIPPED | OUTPATIENT
Start: 2019-08-05 | End: 2020-09-17 | Stop reason: SDUPTHER

## 2019-08-07 ENCOUNTER — HOSPITAL ENCOUNTER (OUTPATIENT)
Dept: PHARMACY | Age: 74
Setting detail: THERAPIES SERIES
Discharge: HOME | End: 2019-08-07
Payer: MEDICARE

## 2019-08-07 LAB — INTERNATIONAL NORMALIZATION RATIO, POC: 2.2

## 2019-08-07 RX ORDER — FUROSEMIDE 40 MG/1
40 TABLET ORAL 2 TIMES DAILY
Qty: 180 TABLET | Refills: 3 | Status: SHIPPED | OUTPATIENT
Start: 2019-08-07 | End: 2019-08-16 | Stop reason: SDUPTHER

## 2019-08-14 ENCOUNTER — HOSPITAL ENCOUNTER (OUTPATIENT)
Age: 74
Discharge: HOME | End: 2019-08-16
Payer: MEDICARE

## 2019-08-14 ENCOUNTER — HOSPITAL ENCOUNTER (OUTPATIENT)
Dept: PHARMACY | Age: 74
Setting detail: THERAPIES SERIES
Discharge: HOME | End: 2019-08-14
Payer: MEDICARE

## 2019-08-14 ENCOUNTER — HOSPITAL ENCOUNTER (OUTPATIENT)
Dept: GENERAL RADIOLOGY | Age: 74
Discharge: HOME | End: 2019-08-16
Payer: MEDICARE

## 2019-08-14 VITALS
HEART RATE: 86 BPM | DIASTOLIC BLOOD PRESSURE: 76 MMHG | WEIGHT: 249.6 LBS | BODY MASS INDEX: 47.16 KG/M2 | SYSTOLIC BLOOD PRESSURE: 138 MMHG

## 2019-08-14 DIAGNOSIS — Z96.652 H/O TOTAL KNEE REPLACEMENT, LEFT: ICD-10-CM

## 2019-08-14 LAB — INTERNATIONAL NORMALIZATION RATIO, POC: 2.4

## 2019-08-14 PROCEDURE — 85610 PROTHROMBIN TIME: CPT

## 2019-08-14 PROCEDURE — 99211 OFF/OP EST MAY X REQ PHY/QHP: CPT

## 2019-08-14 PROCEDURE — 73562 X-RAY EXAM OF KNEE 3: CPT

## 2019-08-14 RX ORDER — OXYCODONE HYDROCHLORIDE AND ACETAMINOPHEN 5; 325 MG/1; MG/1
1-2 TABLET ORAL EVERY 4 HOURS PRN
Refills: 0 | COMMUNITY
Start: 2019-08-05 | End: 2019-09-19 | Stop reason: ALTCHOICE

## 2019-08-14 NOTE — PROGRESS NOTES
Fingerstick INR drawn per clinic protocol. Patient states no visible blood in urine and no black tarry stool. Denies any missed doses of warfarin. No change in other maintenance medications or in diet. Leopoldo Dubois had a 4 week follow up with Dr. Jarrod Clancy prior to our visit today (left total knee replacement). Leopoldo Dubois states she will need a repeat colonoscopy in October and will have her other knee replaced in the spring 2020. Since Jo's INR remains therapeutic, we will continue current weekly warfarin regimen and recheck INR in 5 week(s) after Dr. Estefani Alarcon appt. Patient acknowledges working in consult agreement with pharmacist as referred by his/her physician.

## 2019-08-16 RX ORDER — FUROSEMIDE 40 MG/1
40 TABLET ORAL 2 TIMES DAILY
Qty: 60 TABLET | Refills: 3 | Status: SHIPPED | OUTPATIENT
Start: 2019-08-16 | End: 2020-09-17

## 2019-08-26 ENCOUNTER — HOSPITAL ENCOUNTER (OUTPATIENT)
Dept: PHYSICAL THERAPY | Age: 74
Setting detail: THERAPIES SERIES
Discharge: HOME | End: 2019-08-26
Payer: MEDICARE

## 2019-08-26 PROCEDURE — 97162 PT EVAL MOD COMPLEX 30 MIN: CPT

## 2019-08-26 PROCEDURE — 97110 THERAPEUTIC EXERCISES: CPT

## 2019-08-26 ASSESSMENT — PAIN DESCRIPTION - ORIENTATION: ORIENTATION: LEFT

## 2019-08-26 ASSESSMENT — PAIN SCALES - GENERAL: PAINLEVEL_OUTOF10: 4

## 2019-08-26 ASSESSMENT — PAIN DESCRIPTION - LOCATION: LOCATION: KNEE

## 2019-08-26 NOTE — PLAN OF CARE
[facility]       Phone: [phone]   Date: 2019                      Outpatient Physical Therapy  Fax: [phone]    ACCT #: [de-identified]                     Plan of Care  [facility]#: [id number]  Patient: Ora Pascual  : 1945    Referring Practitioner: Dr. Deidre Arroyo    Referral Date : 19    Diagnosis: Left TKA  Onset Date: 19  Treatment Diagnosis: Left knee pain    Assessment  Body structures, Functions, Activity limitations: Decreased functional mobility , Decreased ROM, Decreased strength  Assessment: S/P left knee TKA. Patient has CHF and moderate edema in left leg. She reports decrease balance, wants to be able to walk without device safely. Ex per Doc Flow. Plan for manual therapy, therex and gait training. Prognosis: Good    Treatment Plan   Days: 3 times per week Weeks: 4 weeks Total # of Visits Approved: 12    Therapeutic Exercise, Gait Training, Patient Education/HEP and Manual Therapy: Myofacial Release     Goals  Short term goals  Time Frame for Short term goals: 9  Short term goal 1: Increase PROM left knee flexion 110 degrees to ambulate up and down stairs  Short term goal 2: Increase strength left knee extension 4+/5 to transfer sit to stand without difficulty  Long term goals  Time Frame for Long term goals : 12  Long term goal 1: Patient to ambulate without device WFL. Long term goal 2: Improve functional mobility with score > 40/80 on LEFS     Carnella Bi, PT   Date: 2019    ______________________________________ Date: 2019  Physician Signature  By signing above or cosigning electronically, I have reviewed this Plan of Care and certify a need for medically necessary rehabilitation services.

## 2019-08-28 ENCOUNTER — HOSPITAL ENCOUNTER (OUTPATIENT)
Dept: PHYSICAL THERAPY | Age: 74
Setting detail: THERAPIES SERIES
Discharge: HOME | End: 2019-08-28
Payer: MEDICARE

## 2019-08-28 PROCEDURE — 97110 THERAPEUTIC EXERCISES: CPT

## 2019-08-28 ASSESSMENT — PAIN SCALES - GENERAL: PAINLEVEL_OUTOF10: 4

## 2019-08-28 NOTE — PRE-CERTIFICATION NOTE
Medicare Cap     [] Physical Therapy  [] Speech Therapy  [] Occupational therapy    *PT and Speech caps combine      $9742 Cap limit < kx modifier needed < $4495 requires pre-cert     Patient Name: Nahomy Marcus  YOB: 1945     Date of [facility] Name $$$ charge Daily Charge YTD   Total $   8/26/19 [place], ex 83.06, 30.16 113.22 113.22   8/28/19 Ex x 3

## 2019-08-28 NOTE — PRE-CERTIFICATION NOTE
Medicare Cap     [] Physical Therapy  [] Speech Therapy  [] Occupational therapy    *PT and Speech caps combine      $5574 Cap limit < kx modifier needed < $4019 requires pre-cert     Patient Name: Cyndy Vidal  YOB: 1945     Date of [place] 61 Name $$$ charge Daily Charge YTD   Total $   8/26/19 [place], ex 83.06, 30.16 113.22 113.22   8/28/19 Ex x 3 30.16 x 3 90.48 203.70

## 2019-08-30 ENCOUNTER — HOSPITAL ENCOUNTER (OUTPATIENT)
Dept: PHYSICAL THERAPY | Age: 74
Setting detail: THERAPIES SERIES
Discharge: HOME | End: 2019-08-30
Payer: MEDICARE

## 2019-08-30 PROCEDURE — 97110 THERAPEUTIC EXERCISES: CPT

## 2019-08-30 ASSESSMENT — PAIN SCALES - GENERAL: PAINLEVEL_OUTOF10: 4

## 2019-09-03 ENCOUNTER — HOSPITAL ENCOUNTER (OUTPATIENT)
Dept: PHYSICAL THERAPY | Age: 74
Setting detail: THERAPIES SERIES
Discharge: HOME | End: 2019-09-03
Payer: MEDICARE

## 2019-09-03 PROCEDURE — 97110 THERAPEUTIC EXERCISES: CPT

## 2019-09-03 ASSESSMENT — PAIN SCALES - GENERAL: PAINLEVEL_OUTOF10: 1

## 2019-09-03 NOTE — PROGRESS NOTES
Phone: [phone]      Fax: [phone]                            Outpatient Physical Therapy                                                                            Daily Note    Date: 9/3/2019  Patient Name: Jessica Tay        MRN: [id number]   ACCT#:  [de-identified]  : 1945  (66 y.o.)    Referring Practitioner: Dr. Charles Rader    Referral Date : 19    Diagnosis: Left TKA  Treatment Diagnosis: Left knee pain    Onset Date: 19  PT Insurance Information: MCR, [employer]  Total # of Visits Approved: 12 Per Physician Order  Total # of Visits to Date: 4  No Show: 0  Canceled Appointment: 0  Plan of Care/Certification Expiration Date: 19    Pre-Treatment Pain:  1/10     Assessment  Assessment: Pt ambulated to therapy with s-cane,  Going without device in her home. Progressed ex with hurdles and sidestep with tband . Good kapil. PROM improved to 108 deg flexion. Chart Reviewed: Yes    Plan  Plan: Continue with current plan    Exercises/Modalities/Manual:  See DocFlow Sheet          Goals  (Total # of Visits to Date: 4)   Short Term Goals - Time Frame for Short term goals: 9  Short term goal 1: Increase PROM left knee flexion 110 degrees to ambulate up and down stairs  Short term goal 2: Increase strength left knee extension 4+/5 to transfer sit to stand without difficulty     Long Term Goals - Time Frame for Long term goals : 12  Long term goal 1: Patient to ambulate without device WFL.   Long term goal 2: Improve functional mobility with score > 40/80 on LEFS     Post Treatment Pain:  1/10    Time In: 1324    Time Out : 1404        Timed Code Treatment Minutes: 40 Minutes  Total Treatment Time: 40 Minutes    Rene Gama PTA   Date: 9/3/2019

## 2019-09-05 ENCOUNTER — HOSPITAL ENCOUNTER (OUTPATIENT)
Dept: PHYSICAL THERAPY | Age: 74
Setting detail: THERAPIES SERIES
Discharge: HOME | End: 2019-09-05
Payer: MEDICARE

## 2019-09-05 PROCEDURE — 97110 THERAPEUTIC EXERCISES: CPT

## 2019-09-05 PROCEDURE — 97140 MANUAL THERAPY 1/> REGIONS: CPT

## 2019-09-05 ASSESSMENT — PAIN DESCRIPTION - ORIENTATION: ORIENTATION: LEFT

## 2019-09-05 ASSESSMENT — PAIN DESCRIPTION - LOCATION: LOCATION: KNEE

## 2019-09-05 ASSESSMENT — PAIN SCALES - GENERAL: PAINLEVEL_OUTOF10: 1

## 2019-09-06 ENCOUNTER — HOSPITAL ENCOUNTER (OUTPATIENT)
Dept: PHYSICAL THERAPY | Age: 74
Setting detail: THERAPIES SERIES
Discharge: HOME | End: 2019-09-06
Payer: MEDICARE

## 2019-09-06 PROCEDURE — 97140 MANUAL THERAPY 1/> REGIONS: CPT

## 2019-09-06 PROCEDURE — 97110 THERAPEUTIC EXERCISES: CPT

## 2019-09-09 ENCOUNTER — HOSPITAL ENCOUNTER (OUTPATIENT)
Dept: PHYSICAL THERAPY | Age: 74
Setting detail: THERAPIES SERIES
Discharge: HOME | End: 2019-09-09
Payer: MEDICARE

## 2019-09-09 PROCEDURE — 97110 THERAPEUTIC EXERCISES: CPT

## 2019-09-09 ASSESSMENT — PAIN SCALES - GENERAL: PAINLEVEL_OUTOF10: 1

## 2019-09-09 ASSESSMENT — PAIN DESCRIPTION - ORIENTATION: ORIENTATION: LEFT

## 2019-09-09 ASSESSMENT — PAIN DESCRIPTION - LOCATION: LOCATION: KNEE

## 2019-09-09 NOTE — PROGRESS NOTES
Phone: [address]      Fax: [phone]                            Outpatient Physical Therapy                                                                            Daily Note    Date: 2019  Patient Name: Carmen Schultz        MRN: [id number]   ACCT#:  [de-identified]  : 1945  (66 y.o.)    Referring Practitioner: Dr. Isabel Munson    Referral Date : 19    Diagnosis: Left TKA  Treatment Diagnosis: Left knee pain    Onset Date: 19  PT Insurance Information: M[employer], [employer]  Total # of Visits Approved: 12 Per Physician Order  Total # of Visits to Date: 7  Plan of Care/Certification Expiration Date: 19    Pre-Treatment Pain: 1/10     Assessment  Assessment: Patient ambulating without device at home and with cane in community. Strength MMt left knee ext 4+/5, flexion 4+/5, hip flexion 4/5. Patient transfering sit to stand without difficulty. Chart Reviewed: Yes    Plan  Plan: Continue with current plan    Exercises/Modalities/Manual:  See DocFlow Sheet    Education:           Goals  (Total # of Visits to Date: 7)   Short Term Goals - Time Frame for Short term goals: 9  Short term goal 1: Increase PROM left knee flexion 110 degrees to ambulate up and down stairs  Short term goal 2: Increase strength left knee extension 4+/5 to transfer sit to stand without difficulty-Met             Long Term Goals - Time Frame for Long term goals : 12  Long term goal 1: Patient to ambulate without device WFL.   Long term goal 2: Improve functional mobility with score > 40/80 on LEFS             Post Treatment Pain:  1/10    Time In: 13:47    Time Out : 14:23        Timed Code Treatment Minutes: 35 Minutes  Total Treatment Time: Jeremy 122, PT     Date: 2019

## 2019-09-10 ENCOUNTER — HOSPITAL ENCOUNTER (OUTPATIENT)
Age: 74
Discharge: HOME | End: 2019-09-10
Payer: MEDICARE

## 2019-09-10 ENCOUNTER — HOSPITAL ENCOUNTER (OUTPATIENT)
Dept: NON INVASIVE DIAGNOSTICS | Age: 74
Discharge: HOME | End: 2019-09-10
Payer: MEDICARE

## 2019-09-10 ENCOUNTER — HOSPITAL ENCOUNTER (OUTPATIENT)
Dept: GENERAL RADIOLOGY | Age: 74
Discharge: HOME | End: 2019-09-12
Payer: MEDICARE

## 2019-09-10 ENCOUNTER — HOSPITAL ENCOUNTER (OUTPATIENT)
Age: 74
Discharge: HOME | End: 2019-09-12
Payer: MEDICARE

## 2019-09-10 DIAGNOSIS — I27.20 PULMONARY HTN (HCC): ICD-10-CM

## 2019-09-10 DIAGNOSIS — I34.0 MITRAL VALVE INSUFFICIENCY, UNSPECIFIED ETIOLOGY: ICD-10-CM

## 2019-09-10 DIAGNOSIS — D50.0 IRON DEFICIENCY ANEMIA DUE TO CHRONIC BLOOD LOSS: ICD-10-CM

## 2019-09-10 DIAGNOSIS — I48.20 CHRONIC ATRIAL FIBRILLATION (HCC): ICD-10-CM

## 2019-09-10 DIAGNOSIS — I10 ESSENTIAL HYPERTENSION: ICD-10-CM

## 2019-09-10 DIAGNOSIS — E55.9 VITAMIN D DEFICIENCY DISEASE: ICD-10-CM

## 2019-09-10 LAB
ABSOLUTE EOS #: 0.2 K/UL (ref 0–0.4)
ABSOLUTE IMMATURE GRANULOCYTE: ABNORMAL K/UL (ref 0–0.3)
ABSOLUTE LYMPH #: 1.4 K/UL (ref 1–4.8)
ABSOLUTE MONO #: 0.7 K/UL (ref 0–1)
ALBUMIN SERPL-MCNC: 4.3 G/DL (ref 3.5–5.2)
ALBUMIN/GLOBULIN RATIO: ABNORMAL (ref 1–2.5)
ALP BLD-CCNC: 127 U/L (ref 35–104)
ALT SERPL-CCNC: 18 U/L (ref 5–33)
ANION GAP SERPL CALCULATED.3IONS-SCNC: 14 MMOL/L (ref 9–17)
AST SERPL-CCNC: 16 U/L
BASOPHILS # BLD: 1 % (ref 0–2)
BASOPHILS ABSOLUTE: 0 K/UL (ref 0–0.2)
BILIRUB SERPL-MCNC: 0.56 MG/DL (ref 0.3–1.2)
BUN BLDV-MCNC: 19 MG/DL (ref 8–23)
BUN/CREAT BLD: 20 (ref 9–20)
CALCIUM SERPL-MCNC: 10.4 MG/DL (ref 8.6–10.4)
CHLORIDE BLD-SCNC: 101 MMOL/L (ref 98–107)
CHOLESTEROL/HDL RATIO: 3.4
CHOLESTEROL: 184 MG/DL
CO2: 25 MMOL/L (ref 20–31)
CREAT SERPL-MCNC: 0.96 MG/DL (ref 0.5–0.9)
DIFFERENTIAL TYPE: YES
EOSINOPHILS RELATIVE PERCENT: 3 % (ref 0–5)
GFR AFRICAN AMERICAN: >60 ML/MIN
GFR NON-AFRICAN AMERICAN: 57 ML/MIN
GFR SERPL CREATININE-BSD FRML MDRD: ABNORMAL ML/MIN/{1.73_M2}
GFR SERPL CREATININE-BSD FRML MDRD: ABNORMAL ML/MIN/{1.73_M2}
GLUCOSE BLD-MCNC: 105 MG/DL (ref 70–99)
HCT VFR BLD CALC: 40 % (ref 36–46)
HDLC SERPL-MCNC: 54 MG/DL
HEMOGLOBIN: 13.3 G/DL (ref 12–16)
IMMATURE GRANULOCYTES: ABNORMAL %
LDL CHOLESTEROL: 98 MG/DL (ref 0–130)
LV EF: 48 %
LVEF MODALITY: NORMAL
LYMPHOCYTES # BLD: 17 % (ref 15–40)
MAGNESIUM: 2.3 MG/DL (ref 1.6–2.6)
MCH RBC QN AUTO: 29.4 PG (ref 26–34)
MCHC RBC AUTO-ENTMCNC: 33.2 G/DL (ref 31–37)
MCV RBC AUTO: 88.6 FL (ref 80–100)
MONOCYTES # BLD: 9 % (ref 4–8)
NRBC AUTOMATED: ABNORMAL PER 100 WBC
PATIENT FASTING?: YES
PDW BLD-RTO: 15.8 % (ref 12.1–15.2)
PLATELET # BLD: 238 K/UL (ref 140–450)
PLATELET ESTIMATE: ABNORMAL
PMV BLD AUTO: ABNORMAL FL (ref 6–12)
POTASSIUM SERPL-SCNC: 4.5 MMOL/L (ref 3.7–5.3)
RBC # BLD: 4.51 M/UL (ref 4–5.2)
RBC # BLD: ABNORMAL 10*6/UL
SEG NEUTROPHILS: 70 % (ref 47–75)
SEGMENTED NEUTROPHILS ABSOLUTE COUNT: 5.9 K/UL (ref 2.5–7)
SODIUM BLD-SCNC: 140 MMOL/L (ref 135–144)
TOTAL PROTEIN: 8 G/DL (ref 6.4–8.3)
TRIGL SERPL-MCNC: 161 MG/DL
TSH SERPL DL<=0.05 MIU/L-ACNC: 1.3 MIU/L (ref 0.3–5)
VITAMIN D 25-HYDROXY: 56.4 NG/ML (ref 30–100)
VLDLC SERPL CALC-MCNC: ABNORMAL MG/DL (ref 1–30)
WBC # BLD: 8.3 K/UL (ref 3.5–11)
WBC # BLD: ABNORMAL 10*3/UL

## 2019-09-10 PROCEDURE — 83735 ASSAY OF MAGNESIUM: CPT

## 2019-09-10 PROCEDURE — 80053 COMPREHEN METABOLIC PANEL: CPT

## 2019-09-10 PROCEDURE — 82306 VITAMIN D 25 HYDROXY: CPT

## 2019-09-10 PROCEDURE — 93005 ELECTROCARDIOGRAM TRACING: CPT

## 2019-09-10 PROCEDURE — 71046 X-RAY EXAM CHEST 2 VIEWS: CPT

## 2019-09-10 PROCEDURE — 85025 COMPLETE CBC W/AUTO DIFF WBC: CPT

## 2019-09-10 PROCEDURE — 84443 ASSAY THYROID STIM HORMONE: CPT

## 2019-09-10 PROCEDURE — 80061 LIPID PANEL: CPT

## 2019-09-10 PROCEDURE — 36415 COLL VENOUS BLD VENIPUNCTURE: CPT

## 2019-09-10 PROCEDURE — 93306 TTE W/DOPPLER COMPLETE: CPT

## 2019-09-11 ENCOUNTER — HOSPITAL ENCOUNTER (OUTPATIENT)
Dept: PHYSICAL THERAPY | Age: 74
Setting detail: THERAPIES SERIES
Discharge: HOME | End: 2019-09-11
Payer: MEDICARE

## 2019-09-11 PROCEDURE — 97110 THERAPEUTIC EXERCISES: CPT

## 2019-09-11 ASSESSMENT — PAIN SCALES - GENERAL: PAINLEVEL_OUTOF10: 1

## 2019-09-12 LAB
EKG ATRIAL RATE: 122 BPM
EKG Q-T INTERVAL: 394 MS
EKG QRS DURATION: 86 MS
EKG QTC CALCULATION (BAZETT): 390 MS
EKG R AXIS: -22 DEGREES
EKG T AXIS: 42 DEGREES
EKG VENTRICULAR RATE: 59 BPM

## 2019-09-12 PROCEDURE — 93010 ELECTROCARDIOGRAM REPORT: CPT | Performed by: INTERNAL MEDICINE

## 2019-09-13 ENCOUNTER — HOSPITAL ENCOUNTER (OUTPATIENT)
Dept: PHYSICAL THERAPY | Age: 74
Setting detail: THERAPIES SERIES
Discharge: HOME | End: 2019-09-13
Payer: MEDICARE

## 2019-09-13 PROCEDURE — 97110 THERAPEUTIC EXERCISES: CPT

## 2019-09-13 ASSESSMENT — PAIN SCALES - GENERAL: PAINLEVEL_OUTOF10: 1

## 2019-09-13 NOTE — PRE-CERTIFICATION NOTE
Medicare Cap     [] Physical Therapy  [] Speech Therapy  [] Occupational therapy    *PT and Speech caps combine      $1570 Cap limit < kx modifier needed < $5880 requires pre-cert     Patient Name: Jesús Drew  YOB: 1945     Date of Mö[address] Name $$$ charge Daily Charge YTD   Total $   8/26/19 Eval, ex 83.06, 30.16 113.22 113.22   8/28/19 Ex x 3 30.16 x 3 90.48 203.70   8/30/19 Ex x 3 30.16 x 3 90.48 294.18   9/3/19 Ex x 3 30.16 x 3 90.48 384.66   9/5/19 Ex x2, man x1 30.16 x 2, 27.43 87.75 472.41   9/6/19 Ex x 2, man x 1 30.16 x 2, 27.43 87.75 560.16   9/9/19 Ex x 2 30.16 x 2 60.32 620.48   9/11/19 Ex x 3      9/13/19 Ex x2

## 2019-09-13 NOTE — PRE-CERTIFICATION NOTE
Medicare Cap     [] Physical Therapy  [] Speech Therapy  [] Occupational therapy    *PT and Speech caps combine      $1940 Cap limit < kx modifier needed < $9645 requires pre-cert     Patient Name: Michelle Mares  YOB: 1945     Date of Mö[address] Name $$$ charge Daily Charge YTD   Total $   8/26/19 [place], ex 83.06, 30.16 113.22 113.22   8/28/19 Ex x 3 30.16 x 3 90.48 203.70   8/30/19 Ex x 3 30.16 x 3 90.48 294.18   9/3/19 Ex x 3 30.16 x 3 90.48 384.66   9/5/19 Ex x2, man x1 30.16 x 2, 27.43 87.75 472.41   9/6/19 Ex x 2, man x 1 30.16 x 2, 27.43 87.75 560.16   9/9/19 Ex x 2 30.16 x 2 60.32 620.48   9/11/19 Ex x 3 30.16 x 3 90.48 710.96   9/13/19 Ex x2 30.16 x 2 60.32 771.28

## 2019-09-16 ENCOUNTER — HOSPITAL ENCOUNTER (OUTPATIENT)
Dept: PHYSICAL THERAPY | Age: 74
Setting detail: THERAPIES SERIES
Discharge: HOME | End: 2019-09-16
Payer: MEDICARE

## 2019-09-16 PROCEDURE — 97110 THERAPEUTIC EXERCISES: CPT

## 2019-09-16 ASSESSMENT — PAIN SCALES - GENERAL: PAINLEVEL_OUTOF10: 2

## 2019-09-16 NOTE — PRE-CERTIFICATION NOTE
Medicare Cap     [] Physical Therapy  [] Speech Therapy  [] Occupational therapy    *PT and Speech caps combine      $1940 Cap limit < kx modifier needed < $2076 requires pre-cert     Patient Name: George Holcomb  YOB: 1945     Date of Mö[address] 63 Name $$$ charge Daily Charge YTD   Total $   8/26/19 [place], ex 83.06, 30.16 113.22 113.22   8/28/19 Ex x 3 30.16 x 3 90.48 203.70   8/30/19 Ex x 3 30.16 x 3 90.48 294.18   9/3/19 Ex x 3 30.16 x 3 90.48 384.66   9/5/19 Ex x2, man x1 30.16 x 2, 27.43 87.75 472.41   9/6/19 Ex x 2, man x 1 30.16 x 2, 27.43 87.75 560.16   9/9/19 Ex x 2 30.16 x 2 60.32 620.48   9/11/19 Ex x 3 30.16 x 3 90.48 710.96   9/13/19 Ex x2 30.16 x 2 60.32 771.28   9/16/19 Ex x3

## 2019-09-16 NOTE — PROGRESS NOTES
Phone: [address]      Fax: [phone]                            Outpatient Physical Therapy                                                                            Daily Note    Date: 2019  Patient Name: Donte Hale        MRN: [id number]   ACCT#:  [de-identified]  : 1945  (66 y.o.)    Referring Practitioner: Dr. Indio Hernadez    Referral Date : 19    Diagnosis: Left TKA  Treatment Diagnosis: Left knee pain    Onset Date: 19  PT Insurance Information: [facility], [employer]  Total # of Visits Approved: 12 Per Physician Order  Total # of Visits to Date: 10  No Show: 0  Canceled Appointment: 0  Plan of Care/Certification Expiration Date: 19    Pre-Treatment Pain:  2/10     Assessment  Assessment: Patient arrived using [employer] stating used walker all weekend d/t fall last week. PROM flexion = 105 degrees Plan to recert for more visits to work on balance. Chart Reviewed: Yes    Plan  Plan: Continue with current plan    Exercises/Modalities/Manual:  See DocFlow Sheet    Education:           Goals  (Total # of Visits to Date: 10)   Short Term Goals - Time Frame for Short term goals: 9  Short term goal 1: Increase PROM left knee flexion 110 degrees to ambulate up and down stairs  Short term goal 2: Increase strength left knee extension 4+/5 to transfer sit to stand without difficulty-Met             Long Term Goals - Time Frame for Long term goals : 12  Long term goal 1: Patient to ambulate without device WFL.   Long term goal 2: Improve functional mobility with score > 40/80 on LEFS             Post Treatment Pain:  1/10    Time In: 0859  Time Out: 0943  Timed Code Treatment Minutes: 44 Minutes  Total Treatment Time: 40 Minutes    Sugar Ashby PTA     Date: 2019

## 2019-09-16 NOTE — PLAN OF CARE
[place] [facility]  Phone: [phone]   Date: 2019                  Outpatient Physical Therapy Fax: [phone]    ACCT #: [de-identified]                  Recertification  [facility]#: [id number]  Patient: Brandon Escobedo  : 1945    Referring Practitioner: Dr. Edgar Alegria    Referral Date : 19    Diagnosis: Left TKA  Onset Date: 19  Treatment Diagnosis: Left knee pain    Assessment  Body structures, Functions, Activity limitations: Decreased functional mobility , Decreased ROM, Decreased strength  Assessment:Patient reports when spraying yard 19, had a fall having R knee take most of the fall. Checked knees, no increased swelling noted. Patient arrived using [email or web address] stating used walker all weekend d/t fall last week. PROM flexion = 105 degrees. Prognosis: Good    Treatment Plan   Days: 3 times per week    For additional 3 weeks for Total # of Visits Approved: 18  Therapeutic Exercise- emphasis balance, Gait Training, Patient Education/HEP and Manual Therapy: Myofacial Release    Goals  Short term goals  Time Frame for Short term goals: 9  Short term goal 1: Increase PROM left knee flexion 110 degrees to ambulate up and down stairs  Short term goal 2: Increase strength left knee extension 4+/5 to transfer sit to stand without difficulty-Met  Long term goals  Time Frame for Long term goals : 25  Long term goal 1: Patient to ambulate without device WFL.   Long term goal 2: Improve functional mobility with score > 40/80 on LEFS    Joayanaa Corners    Date: 2019        ______________________________________ Date: 2019  Physician Signature

## 2019-09-16 NOTE — PRE-CERTIFICATION NOTE
Medicare Cap     [] Physical Therapy  [] Speech Therapy  [] Occupational therapy    *PT and Speech caps combine      $1940 Cap limit < kx modifier needed < $5727 requires pre-cert     Patient Name: Dean Holland  YOB: 1945     Date of Mö[address] Name $$$ charge Daily Charge YTD   Total $   8/26/19 [place]ata, ex 83.06, 30.16 113.22 113.22   8/28/19 Ex x 3 30.16 x 3 90.48 203.70   8/30/19 Ex x 3 30.16 x 3 90.48 294.18   9/3/19 Ex x 3 30.16 x 3 90.48 384.66   9/5/19 Ex x2, man x1 30.16 x 2, 27.43 87.75 472.41   9/6/19 Ex x 2, man x 1 30.16 x 2, 27.43 87.75 560.16   9/9/19 Ex x 2 30.16 x 2 60.32 620.48   9/11/19 Ex x 3 30.16 x 3 90.48 710.96   9/13/19 Ex x2 30.16 x 2 60.32 771.28   9/16/19 Ex x3 30.16 x 2 60.32 831.60

## 2019-09-18 ENCOUNTER — HOSPITAL ENCOUNTER (OUTPATIENT)
Dept: PHYSICAL THERAPY | Age: 74
Setting detail: THERAPIES SERIES
Discharge: HOME | End: 2019-09-18
Payer: MEDICARE

## 2019-09-18 PROCEDURE — 97110 THERAPEUTIC EXERCISES: CPT

## 2019-09-18 ASSESSMENT — PAIN SCALES - GENERAL: PAINLEVEL_OUTOF10: 1

## 2019-09-18 NOTE — PROGRESS NOTES
Phone: [address]      Fax: [phone]                            Outpatient Physical Therapy                                                                            Daily Note    Date: 2019  Patient Name: Karina Griffin        MRN: [id number]   ACCT#:  [de-identified]  : 1945  (66 y.o.)    Referring Practitioner: Dr. Reina Montague    Referral Date : 19    Diagnosis: Left TKA  Treatment Diagnosis: Left knee pain    Onset Date: 19  PT Insurance Information: Ilene Khan  Total # of Visits Approved: 18 Per Physician Order  Total # of Visits to Date: 11  No Show: 0  Canceled Appointment: 0  Plan of Care/Certification Expiration Date: 10/14/19    Pre-Treatment Pain:  1/10     Assessment  Assessment: Into department utilizing straight cane this morning. Rates pain today as 10. Continued with exercises as outlined above. PROM L knee flexion 106 degrees. Chart Reviewed: Yes    Plan  Plan: Continue with current plan    Exercises/Modalities/Manual:  See DocFlow Sheet    Education:           Goals  (Total # of Visits to Date: 6)   Short Term Goals - Time Frame for Short term goals: 9  Short term goal 1: Increase PROM left knee flexion 110 degrees to ambulate up and down stairs  Short term goal 2: Increase strength left knee extension 4+/5 to transfer sit to stand without difficulty-Met             Long Term Goals - Time Frame for Long term goals : 25  Long term goal 1: Patient to ambulate without device WFL.   Long term goal 2: Improve functional mobility with score > 40/80 on LEFS             Post Treatment Pain: 110    Time In:  1028  Time Out : 1112        Timed Code Treatment Minutes: 44 Minutes  Total Treatment Time: 44 Minutes    Kalani Reynoso     Date: 2019

## 2019-09-18 NOTE — PRE-CERTIFICATION NOTE
Medicare Cap     [] Physical Therapy  [] Speech Therapy  [] Occupational therapy    *PT and Speech caps combine      $1940 Cap limit < kx modifier needed < $9441 requires pre-cert     Patient Name: Juaquin Kocher  YOB: 1945     Date of Mö[address] Name $$$ charge Daily Charge YTD   Total $   8/26/19 Eval, ex 83.06, 30.16 113.22 113.22   8/28/19 Ex x 3 30.16 x 3 90.48 203.70   8/30/19 Ex x 3 30.16 x 3 90.48 294.18   9/3/19 Ex x 3 30.16 x 3 90.48 384.66   9/5/19 Ex x2, man x1 30.16 x 2, 27.43 87.75 472.41   9/6/19 Ex x 2, man x 1 30.16 x 2, 27.43 87.75 560.16   9/9/19 Ex x 2 30.16 x 2 60.32 620.48   9/11/19 Ex x 3 30.16 x 3 90.48 710.96   9/13/19 Ex x2 30.16 x 2 60.32 771.28   9/16/19 Ex x3 30.16 x 2 60.32 831.60   9/18/19 Ex x3

## 2019-09-19 ENCOUNTER — HOSPITAL ENCOUNTER (OUTPATIENT)
Dept: PHARMACY | Age: 74
Setting detail: THERAPIES SERIES
Discharge: HOME | End: 2019-09-19
Payer: MEDICARE

## 2019-09-19 ENCOUNTER — OFFICE VISIT (OUTPATIENT)
Dept: CARDIOLOGY CLINIC | Age: 74
End: 2019-09-19
Payer: MEDICARE

## 2019-09-19 VITALS
SYSTOLIC BLOOD PRESSURE: 124 MMHG | HEART RATE: 83 BPM | DIASTOLIC BLOOD PRESSURE: 60 MMHG | OXYGEN SATURATION: 94 % | WEIGHT: 247 LBS | BODY MASS INDEX: 46.67 KG/M2

## 2019-09-19 VITALS
HEART RATE: 80 BPM | WEIGHT: 247.4 LBS | BODY MASS INDEX: 46.75 KG/M2 | DIASTOLIC BLOOD PRESSURE: 74 MMHG | SYSTOLIC BLOOD PRESSURE: 105 MMHG

## 2019-09-19 DIAGNOSIS — E55.9 VITAMIN D DEFICIENCY: ICD-10-CM

## 2019-09-19 DIAGNOSIS — I48.20 CHRONIC ATRIAL FIBRILLATION (HCC): Primary | ICD-10-CM

## 2019-09-19 DIAGNOSIS — I50.9 CONGESTIVE HEART FAILURE, UNSPECIFIED HF CHRONICITY, UNSPECIFIED HEART FAILURE TYPE (HCC): ICD-10-CM

## 2019-09-19 DIAGNOSIS — I48.20 CHRONIC ATRIAL FIBRILLATION (HCC): ICD-10-CM

## 2019-09-19 DIAGNOSIS — I27.20 PULMONARY HTN (HCC): ICD-10-CM

## 2019-09-19 DIAGNOSIS — E78.5 HYPERLIPIDEMIA, UNSPECIFIED HYPERLIPIDEMIA TYPE: ICD-10-CM

## 2019-09-19 DIAGNOSIS — I34.0 MITRAL VALVE INSUFFICIENCY, UNSPECIFIED ETIOLOGY: ICD-10-CM

## 2019-09-19 LAB — INTERNATIONAL NORMALIZATION RATIO, POC: 2.3

## 2019-09-19 PROCEDURE — 1123F ACP DISCUSS/DSCN MKR DOCD: CPT | Performed by: INTERNAL MEDICINE

## 2019-09-19 PROCEDURE — G8399 PT W/DXA RESULTS DOCUMENT: HCPCS | Performed by: INTERNAL MEDICINE

## 2019-09-19 PROCEDURE — 1090F PRES/ABSN URINE INCON ASSESS: CPT | Performed by: INTERNAL MEDICINE

## 2019-09-19 PROCEDURE — 99214 OFFICE O/P EST MOD 30 MIN: CPT | Performed by: INTERNAL MEDICINE

## 2019-09-19 PROCEDURE — 1036F TOBACCO NON-USER: CPT | Performed by: INTERNAL MEDICINE

## 2019-09-19 PROCEDURE — 99211 OFF/OP EST MAY X REQ PHY/QHP: CPT

## 2019-09-19 PROCEDURE — G8417 CALC BMI ABV UP PARAM F/U: HCPCS | Performed by: INTERNAL MEDICINE

## 2019-09-19 PROCEDURE — 85610 PROTHROMBIN TIME: CPT

## 2019-09-19 PROCEDURE — 4040F PNEUMOC VAC/ADMIN/RCVD: CPT | Performed by: INTERNAL MEDICINE

## 2019-09-19 PROCEDURE — G8427 DOCREV CUR MEDS BY ELIG CLIN: HCPCS | Performed by: INTERNAL MEDICINE

## 2019-09-19 NOTE — LETTER
1.  Tubal ligation many years ago. 2.  Tonsillectomy many years. 3.  Hypertension. 4.  Long snoring history but will not do a sleep study. 5.  Chronic atrial fibrillation, on Coumadin. 6.  Pulmonary hypertension. 7.  Carpal tunnel release, 5 years ago. 8.  Colonoscopy in 2017, with large ascending polyp removed in [place] by Dr. Layla Rodriguez on 05/23/2017. FAMILY HISTORY:  No significant heart disease in the family. SOCIAL HISTORY:  She is 66years old,  for a 5 and half years. Three children. Does not smoke or drink alcohol. Very inactive. Has chronic back pain. She and her family is making a [employer]Writerkraut, which is a special recipe by their family. It was her 's recipe, who has passed away. REVIEW OF SYSTEMS:  Cardiac as above. Other systems reviewed including constitutional, eyes, ears, nose and throat, cardiovascular, respiratory, GI, , musculoskeletal, integumentary, neurologic, psychiatric, endocrine, hematologic and allergic/immunologic are negative except for what is described above. PHYSICAL EXAMINATION:  VITAL SIGNS:  Her blood pressure is 124/60 with a heart rate of 80 and irregularly irregular. Respiratory rate 18. O2 sat 94%. Weight 247 pounds. GENERAL:  She is a pleasant 75-year-old female. Denied pain. She was oriented to person, place and time. Answered questions appropriately. SKIN:  No unusual skin changes. HEENT:  The pupils are equally round and intact. Mucous membranes were dry. NECK:  No JVD. Good carotid pulses. No carotid bruits. No lymphadenopathy or thyromegaly. CARDIOVASCULAR EXAM:  S1 and S2 were normal.  No S3 or S4. Soft systolic blowing type murmur. No diastolic murmur. PMI was normal.  No lift, thrust, or pericardial friction rub. LUNGS:  Clear to auscultation and percussion. ABDOMEN:  Soft and nontender. Good bowel sounds. EXTREMITIES:  Good femoral pulses. Good pedal pulses. Mild pedal edema.

## 2019-09-20 ENCOUNTER — HOSPITAL ENCOUNTER (OUTPATIENT)
Dept: PHYSICAL THERAPY | Age: 74
Setting detail: THERAPIES SERIES
Discharge: HOME | End: 2019-09-20
Payer: MEDICARE

## 2019-09-20 PROCEDURE — 97110 THERAPEUTIC EXERCISES: CPT

## 2019-09-20 ASSESSMENT — PAIN SCALES - GENERAL: PAINLEVEL_OUTOF10: 1

## 2019-09-23 ENCOUNTER — HOSPITAL ENCOUNTER (OUTPATIENT)
Dept: PHYSICAL THERAPY | Age: 74
Setting detail: THERAPIES SERIES
Discharge: HOME | End: 2019-09-23
Payer: MEDICARE

## 2019-09-23 PROCEDURE — 97110 THERAPEUTIC EXERCISES: CPT

## 2019-09-23 ASSESSMENT — PAIN SCALES - GENERAL: PAINLEVEL_OUTOF10: 1

## 2019-09-23 NOTE — PRE-CERTIFICATION NOTE
Medicare Cap     [] Physical Therapy  [] Speech Therapy  [] Occupational therapy    *PT and Speech caps combine      $1940 Cap limit < kx modifier needed < $6267 requires pre-cert     Patient Name: George Munoz  YOB: 1945     Date of Möhe [address] Name $$$ charge Daily Charge YTD   Total $   8/26/19 [place], ex 83.06, 30.16 113.22 113.22   8/28/19 Ex x 3 30.16 x 3 90.48 203.70   8/30/19 Ex x 3 30.16 x 3 90.48 294.18   9/3/19 Ex x 3 30.16 x 3 90.48 384.66   9/5/19 Ex x2, man x1 30.16 x 2, 27.43 87.75 472.41   9/6/19 Ex x 2, man x 1 30.16 x 2, 27.43 87.75 560.16   9/9/19 Ex x 2 30.16 x 2 60.32 620.48   9/11/19 Ex x 3 30.16 x 3 90.48 710.96   9/13/19 Ex x2 30.16 x 2 60.32 771.28   9/16/19 Ex x3 30.16 x 2 60.32 831.60   9/18/19 Ex x3 30.16 x 3 90.48 922.08   9/20/19 Ex x 3 30.16 x 3 90.48 1012.56   9/23/19 Ex x 3

## 2019-09-25 ENCOUNTER — HOSPITAL ENCOUNTER (OUTPATIENT)
Dept: PHYSICAL THERAPY | Age: 74
Setting detail: THERAPIES SERIES
Discharge: HOME | End: 2019-09-25
Payer: MEDICARE

## 2019-09-25 PROCEDURE — 97140 MANUAL THERAPY 1/> REGIONS: CPT

## 2019-09-25 PROCEDURE — 97110 THERAPEUTIC EXERCISES: CPT

## 2019-09-25 ASSESSMENT — PAIN DESCRIPTION - LOCATION: LOCATION: KNEE

## 2019-09-25 ASSESSMENT — PAIN SCALES - GENERAL: PAINLEVEL_OUTOF10: 1

## 2019-09-25 NOTE — PRE-CERTIFICATION NOTE
Medicare Cap     [] Physical Therapy  [] Speech Therapy  [] Occupational therapy    *PT and Speech caps combine      $1940 Cap limit < kx modifier needed < $7474 requires pre-cert     Patient Name: Karen Kowalski  YOB: 1945     Date of Möhe [address] Name $$$ charge Daily Charge YTD   Total $   8/26/19 Agata, ex 83.06, 30.16 113.22 113.22   8/28/19 Ex x 3 30.16 x 3 90.48 203.70   8/30/19 Ex x 3 30.16 x 3 90.48 294.18   9/3/19 Ex x 3 30.16 x 3 90.48 384.66   9/5/19 Ex x2, man x1 30.16 x 2, 27.43 87.75 472.41   9/6/19 Ex x 2, man x 1 30.16 x 2, 27.43 87.75 560.16   9/9/19 Ex x 2 30.16 x 2 60.32 620.48   9/11/19 Ex x 3 30.16 x 3 90.48 710.96   9/13/19 Ex x2 30.16 x 2 60.32 771.28   9/16/19 Ex x3 30.16 x 2 60.32 831.60   9/18/19 Ex x3 30.16 x 3 90.48 922.08   9/20/19 Ex x 3 30.16 x 3 90.48 1012.56   9/23/19 Ex x 3 30.16 x 3 90.48 1103.04   9/25/19 Ex x3

## 2019-09-26 NOTE — PROGRESS NOTES
Linda Go M.D. [address] [place] [address]  [phone]          2019          Trev Feldman MD  [address] [address]      RE:   Subha Mendieta  :  1945      Dear Dr. Ana Sullivan:    CHIEF COMPLAINT:  1. Chronic atrial fibrillation. 2.  On Coumadin. 3.  Mitral regurgitation. HISTORY OF PRESENT ILLNESS:  I had the pleasure of seeing Mrs. Tahira Spencer in our office on 2019. She is a pleasant 42-year-old female who has chronic atrial fibrillation and is on Coumadin. She has had good rate control. She has at least moderate to moderate-to-severe mitral regurgitation with an EF in the 45% to 50% range. She does also have LVH with diastolic dysfunction. In 2017, we increased her Lasix and Aldactone -____ 0:40 because of edema. This has resolved. She overall is doing well. She had her left knee replaced by Dr. Gilbert Dutton in July and is in rehab. She also burned back of her left hand in December while baking and went to the walk-in. She has had no chest pain. No change in her underlying shortness of breath. Energy level is about the same as it has been previously. She has had no weight loss, weight gain. No change in bowel habits, no blood in her stool. She is fairly inactive but walking with a cane at this time. CARDIAC RISK FACTORS:  Hypertension:  Positive. Hyperlipidemia:  Positive. Peripheral Vascular Disease:  Negative. Smoking:  Negative. Diabetes:  Negative. Other Family Members:  Negative. MEDICATIONS AT HOME:  She is currently on Tylenol p.r.n., Zyloprim 300 mg half a tablet daily, Tenormin 50 mg daily, aspirin 81 mg daily, vitamin D 2000 units b.i.d., Cardizem 60 mg every 8 hours, Lasix 40 mg in the morning and in the evening if needed, Percocet p.r.n., Aldactone 25 mg b.i.d., Coumadin as directed. PAST MEDICAL HISTORY:  1.   Tubal ligation many Good cap refill. PULSES:  Bilateral symmetrical radial, brachial and carotid pulses. No carotid bruits. Good femoral and pedal pulses. NEUROLOGIC EXAM:  Within normal limits. PSYCHIATRIC EXAM:  Within normal limits. LABORATORY DATA:  Her sodium was 140, potassium 4.5, BUN 19, creatinine 0.96, GFR was 57, calcium was 10.4. Cholesterol 184 with an HDL of 54, LDL 98, triglycerides 161. ALT was 18, AST was 16. TSH was 1.30. Vitamin D was 56.4. White count 8.3, hemoglobin 13.3 with a platelet count 252,098. Chest x-ray was unremarkable. EKG showed atrial fibrillation with controlled ventricular Response. Echocardiogram done on 09/10/2019, showed an EF of 45% to 50%, with moderate mitral regurgitation. IMPRESSION:  1. Chronic atrial fibrillation, on Coumadin and rate control. 2.  Shortness of breath secondary to COPD and deconditioning, which is about the same. 3.  Mild LV dysfunction, EF of 45% to 50%. 4.  At least moderate mitral regurgitation. 5.  Chronic renal insufficiency, which is stable. 6.  Hypertension, well controlled. 7.  Hyperlipidemia, well controlled. 8.  Status post left knee replacement in 07/2019. PLAN:  1. Continue same medications. 2.  See in 1 year. DISCUSSION:  Mrs. Jenise Toth overall is doing well. She has had no unusual shortness of breath or unusual loss of energy. She has chronic shortness of breath but there has been no change. She also denies any chest pain or chest discomfort. I made no change in medications. Her risks are nicely modified and she is as active as she can, within the limits of her arthritis. Thank you very much for allowing me the privilege of seeing Mrs. Jenise Toth. If you have any questions on my thoughts, please do not hesitate to contact me.      Sincerely,        Jalil Berman    D: 09/19/2019 10:37:26     T: 09/19/2019 10:45:49     [email or web address]  Job#: [id number]   Doc#: [id number]

## 2019-09-27 ENCOUNTER — HOSPITAL ENCOUNTER (OUTPATIENT)
Dept: PHYSICAL THERAPY | Age: 74
Setting detail: THERAPIES SERIES
Discharge: HOME | End: 2019-09-27
Payer: MEDICARE

## 2019-09-27 PROCEDURE — 97110 THERAPEUTIC EXERCISES: CPT

## 2019-09-30 ENCOUNTER — HOSPITAL ENCOUNTER (OUTPATIENT)
Dept: PHYSICAL THERAPY | Age: 74
Setting detail: THERAPIES SERIES
Discharge: HOME | End: 2019-09-30
Payer: MEDICARE

## 2019-09-30 PROCEDURE — 97110 THERAPEUTIC EXERCISES: CPT

## 2019-10-02 ENCOUNTER — HOSPITAL ENCOUNTER (OUTPATIENT)
Dept: PHYSICAL THERAPY | Age: 74
Setting detail: THERAPIES SERIES
Discharge: HOME | End: 2019-10-02
Payer: MEDICARE

## 2019-10-02 PROCEDURE — 97110 THERAPEUTIC EXERCISES: CPT

## 2019-10-02 ASSESSMENT — PAIN DESCRIPTION - LOCATION: LOCATION: KNEE

## 2019-10-02 ASSESSMENT — PAIN SCALES - GENERAL: PAINLEVEL_OUTOF10: 2

## 2019-10-04 ENCOUNTER — HOSPITAL ENCOUNTER (OUTPATIENT)
Dept: PHYSICAL THERAPY | Age: 74
Setting detail: THERAPIES SERIES
Discharge: HOME | End: 2019-10-04
Payer: MEDICARE

## 2019-10-04 PROCEDURE — 97110 THERAPEUTIC EXERCISES: CPT

## 2019-10-04 ASSESSMENT — PAIN SCALES - GENERAL: PAINLEVEL_OUTOF10: 3

## 2019-10-08 ENCOUNTER — TELEPHONE (OUTPATIENT)
Dept: PHARMACY | Age: 74
End: 2019-10-08

## 2019-10-09 ENCOUNTER — TELEPHONE (OUTPATIENT)
Dept: PHARMACY | Age: 74
End: 2019-10-09

## 2019-10-14 ENCOUNTER — TELEPHONE (OUTPATIENT)
Dept: PHARMACY | Age: 74
End: 2019-10-14

## 2019-10-23 ENCOUNTER — TELEPHONE (OUTPATIENT)
Dept: PHARMACY | Age: 74
End: 2019-10-23

## 2019-10-28 ENCOUNTER — HOSPITAL ENCOUNTER (OUTPATIENT)
Dept: PHARMACY | Age: 74
Setting detail: THERAPIES SERIES
Discharge: HOME | End: 2019-10-28
Payer: MEDICARE

## 2019-10-28 VITALS
SYSTOLIC BLOOD PRESSURE: 116 MMHG | BODY MASS INDEX: 46.86 KG/M2 | HEART RATE: 82 BPM | WEIGHT: 248 LBS | DIASTOLIC BLOOD PRESSURE: 74 MMHG

## 2019-10-28 DIAGNOSIS — I48.91 ATRIAL FIBRILLATION, UNSPECIFIED TYPE (HCC): ICD-10-CM

## 2019-10-28 LAB — INTERNATIONAL NORMALIZATION RATIO, POC: 2.4

## 2019-10-28 PROCEDURE — 99211 OFF/OP EST MAY X REQ PHY/QHP: CPT

## 2019-10-28 PROCEDURE — 85610 PROTHROMBIN TIME: CPT

## 2019-10-29 RX ORDER — WARFARIN SODIUM 10 MG/1
TABLET ORAL
Qty: 90 TABLET | Refills: 3 | OUTPATIENT
Start: 2019-10-29 | End: 2020-07-13 | Stop reason: DRUGHIGH

## 2019-10-30 ENCOUNTER — HOSPITAL ENCOUNTER (OUTPATIENT)
Dept: GENERAL RADIOLOGY | Age: 74
Discharge: HOME | End: 2019-11-01
Payer: MEDICARE

## 2019-10-30 ENCOUNTER — HOSPITAL ENCOUNTER (OUTPATIENT)
Age: 74
Discharge: HOME | End: 2019-11-01
Payer: MEDICARE

## 2019-10-30 DIAGNOSIS — Z96.652 HISTORY OF TOTAL KNEE ARTHROPLASTY, LEFT: ICD-10-CM

## 2019-10-30 DIAGNOSIS — M25.561 RIGHT KNEE PAIN, UNSPECIFIED CHRONICITY: ICD-10-CM

## 2019-10-30 PROCEDURE — 73564 X-RAY EXAM KNEE 4 OR MORE: CPT

## 2019-11-03 ENCOUNTER — HOSPITAL ENCOUNTER (EMERGENCY)
Age: 74
Discharge: HOME | End: 2019-11-03
Attending: FAMILY MEDICINE
Payer: MEDICARE

## 2019-11-03 VITALS
OXYGEN SATURATION: 97 % | TEMPERATURE: 98 F | BODY MASS INDEX: 48.29 KG/M2 | HEART RATE: 82 BPM | RESPIRATION RATE: 18 BRPM | DIASTOLIC BLOOD PRESSURE: 89 MMHG | SYSTOLIC BLOOD PRESSURE: 121 MMHG | WEIGHT: 246 LBS | HEIGHT: 60 IN

## 2019-11-03 DIAGNOSIS — Z79.01 LONG TERM (CURRENT) USE OF ANTICOAGULANTS: ICD-10-CM

## 2019-11-03 DIAGNOSIS — K62.5 RECTAL BLEED: Primary | ICD-10-CM

## 2019-11-03 LAB
CHP ED QC CHECK: YES
HEMOCCULT STL QL: POSITIVE

## 2019-11-03 PROCEDURE — 86850 RBC ANTIBODY SCREEN: CPT

## 2019-11-03 PROCEDURE — 85610 PROTHROMBIN TIME: CPT

## 2019-11-03 PROCEDURE — 80048 BASIC METABOLIC PNL TOTAL CA: CPT

## 2019-11-03 PROCEDURE — 85025 COMPLETE CBC W/AUTO DIFF WBC: CPT

## 2019-11-03 PROCEDURE — 86900 BLOOD TYPING SEROLOGIC ABO: CPT

## 2019-11-03 PROCEDURE — 86901 BLOOD TYPING SEROLOGIC RH(D): CPT

## 2019-11-03 PROCEDURE — 99283 EMERGENCY DEPT VISIT LOW MDM: CPT

## 2019-11-04 ENCOUNTER — TELEPHONE (OUTPATIENT)
Dept: FAMILY MEDICINE CLINIC | Age: 74
End: 2019-11-04

## 2019-11-04 LAB
ABO/RH: NORMAL
ABSOLUTE EOS #: 0.1 K/UL (ref 0–0.4)
ABSOLUTE IMMATURE GRANULOCYTE: ABNORMAL K/UL (ref 0–0.3)
ABSOLUTE LYMPH #: 1.4 K/UL (ref 1–4.8)
ABSOLUTE MONO #: 0.8 K/UL (ref 0–1)
ANION GAP SERPL CALCULATED.3IONS-SCNC: 15 MMOL/L (ref 9–17)
ANTIBODY SCREEN: NEGATIVE
ARM BAND NUMBER: NORMAL
BASOPHILS # BLD: 0 % (ref 0–2)
BASOPHILS ABSOLUTE: 0 K/UL (ref 0–0.2)
BUN BLDV-MCNC: 25 MG/DL (ref 8–23)
BUN/CREAT BLD: 24 (ref 9–20)
CALCIUM SERPL-MCNC: 9.4 MG/DL (ref 8.6–10.4)
CHLORIDE BLD-SCNC: 105 MMOL/L (ref 98–107)
CO2: 18 MMOL/L (ref 20–31)
CREAT SERPL-MCNC: 1.03 MG/DL (ref 0.5–0.9)
DIFFERENTIAL TYPE: YES
EOSINOPHILS RELATIVE PERCENT: 1 % (ref 0–5)
EXPIRATION DATE: NORMAL
GFR AFRICAN AMERICAN: >60 ML/MIN
GFR NON-AFRICAN AMERICAN: 52 ML/MIN
GFR SERPL CREATININE-BSD FRML MDRD: ABNORMAL ML/MIN/{1.73_M2}
GFR SERPL CREATININE-BSD FRML MDRD: ABNORMAL ML/MIN/{1.73_M2}
GLUCOSE BLD-MCNC: 133 MG/DL (ref 70–99)
HCT VFR BLD CALC: 35.3 % (ref 36–46)
HEMOGLOBIN: 11.8 G/DL (ref 12–16)
IMMATURE GRANULOCYTES: ABNORMAL %
INR BLD: 2.6
LYMPHOCYTES # BLD: 14 % (ref 15–40)
MCH RBC QN AUTO: 29.1 PG (ref 26–34)
MCHC RBC AUTO-ENTMCNC: 33.3 G/DL (ref 31–37)
MCV RBC AUTO: 87.3 FL (ref 80–100)
MONOCYTES # BLD: 8 % (ref 4–8)
NRBC AUTOMATED: ABNORMAL PER 100 WBC
PDW BLD-RTO: 16.5 % (ref 12.1–15.2)
PLATELET # BLD: 234 K/UL (ref 140–450)
PLATELET ESTIMATE: ABNORMAL
PMV BLD AUTO: ABNORMAL FL (ref 6–12)
POTASSIUM SERPL-SCNC: 4 MMOL/L (ref 3.7–5.3)
PROTHROMBIN TIME: 25.4 SEC (ref 9–11.6)
RBC # BLD: 4.04 M/UL (ref 4–5.2)
RBC # BLD: ABNORMAL 10*6/UL
SEG NEUTROPHILS: 77 % (ref 47–75)
SEGMENTED NEUTROPHILS ABSOLUTE COUNT: 7.5 K/UL (ref 2.5–7)
SODIUM BLD-SCNC: 138 MMOL/L (ref 135–144)
WBC # BLD: 9.8 K/UL (ref 3.5–11)
WBC # BLD: ABNORMAL 10*3/UL

## 2019-11-04 ASSESSMENT — ENCOUNTER SYMPTOMS
ABDOMINAL PAIN: 0
BLOOD IN STOOL: 1
ANAL BLEEDING: 1
DIARRHEA: 0
RECTAL PAIN: 0

## 2019-11-05 ENCOUNTER — HOSPITAL ENCOUNTER (OUTPATIENT)
Age: 74
Discharge: HOME | End: 2019-11-05
Payer: MEDICARE

## 2019-11-05 DIAGNOSIS — K62.5 RECTAL BLEED: ICD-10-CM

## 2019-11-05 DIAGNOSIS — Z79.01 LONG TERM (CURRENT) USE OF ANTICOAGULANTS: ICD-10-CM

## 2019-11-05 LAB
HCT VFR BLD CALC: 31.1 % (ref 36–46)
HEMOGLOBIN: 10.6 G/DL (ref 12–16)
INR BLD: 2.1
PROTHROMBIN TIME: 21 SEC (ref 9–11.6)

## 2019-11-05 PROCEDURE — 36415 COLL VENOUS BLD VENIPUNCTURE: CPT

## 2019-11-05 PROCEDURE — 85018 HEMOGLOBIN: CPT

## 2019-11-05 PROCEDURE — 85014 HEMATOCRIT: CPT

## 2019-11-05 PROCEDURE — 85610 PROTHROMBIN TIME: CPT

## 2019-11-14 RX ORDER — ALLOPURINOL 300 MG/1
TABLET ORAL
Qty: 45 TABLET | Refills: 0 | Status: SHIPPED | OUTPATIENT
Start: 2019-11-14 | End: 2020-01-13 | Stop reason: SDUPTHER

## 2019-12-04 ENCOUNTER — HOSPITAL ENCOUNTER (OUTPATIENT)
Dept: PHARMACY | Age: 74
Setting detail: THERAPIES SERIES
Discharge: HOME | End: 2019-12-04
Payer: MEDICARE

## 2019-12-04 VITALS
BODY MASS INDEX: 49.25 KG/M2 | HEART RATE: 73 BPM | SYSTOLIC BLOOD PRESSURE: 128 MMHG | DIASTOLIC BLOOD PRESSURE: 79 MMHG | WEIGHT: 252.2 LBS

## 2019-12-04 LAB — INR BLD: 2.8

## 2019-12-04 PROCEDURE — 99211 OFF/OP EST MAY X REQ PHY/QHP: CPT

## 2019-12-04 PROCEDURE — 85610 PROTHROMBIN TIME: CPT

## 2019-12-16 ENCOUNTER — OFFICE VISIT (OUTPATIENT)
Dept: FAMILY MEDICINE CLINIC | Age: 74
End: 2019-12-16
Payer: MEDICARE

## 2019-12-16 ENCOUNTER — HOSPITAL ENCOUNTER (OUTPATIENT)
Age: 74
Discharge: HOME | End: 2019-12-16
Payer: MEDICARE

## 2019-12-16 VITALS
SYSTOLIC BLOOD PRESSURE: 108 MMHG | WEIGHT: 250 LBS | TEMPERATURE: 97.5 F | BODY MASS INDEX: 48.82 KG/M2 | HEART RATE: 77 BPM | OXYGEN SATURATION: 97 % | DIASTOLIC BLOOD PRESSURE: 52 MMHG

## 2019-12-16 DIAGNOSIS — I48.19 PERSISTENT ATRIAL FIBRILLATION (HCC): ICD-10-CM

## 2019-12-16 DIAGNOSIS — D50.0 IRON DEFICIENCY ANEMIA DUE TO CHRONIC BLOOD LOSS: ICD-10-CM

## 2019-12-16 DIAGNOSIS — J34.0 NASAL ULCER: ICD-10-CM

## 2019-12-16 DIAGNOSIS — I48.19 PERSISTENT ATRIAL FIBRILLATION (HCC): Primary | ICD-10-CM

## 2019-12-16 DIAGNOSIS — F51.01 PRIMARY INSOMNIA: ICD-10-CM

## 2019-12-16 DIAGNOSIS — E66.01 MORBID OBESITY WITH BMI OF 45.0-49.9, ADULT (HCC): ICD-10-CM

## 2019-12-16 DIAGNOSIS — M17.0 PRIMARY OSTEOARTHRITIS OF BOTH KNEES: ICD-10-CM

## 2019-12-16 LAB
ABSOLUTE EOS #: 0.3 K/UL (ref 0–0.4)
ABSOLUTE IMMATURE GRANULOCYTE: ABNORMAL K/UL (ref 0–0.3)
ABSOLUTE LYMPH #: 1.4 K/UL (ref 1–4.8)
ABSOLUTE MONO #: 1 K/UL (ref 0–1)
ANION GAP SERPL CALCULATED.3IONS-SCNC: 13 MMOL/L (ref 9–17)
BASOPHILS # BLD: 0 % (ref 0–2)
BASOPHILS ABSOLUTE: 0 K/UL (ref 0–0.2)
BUN BLDV-MCNC: 27 MG/DL (ref 8–23)
BUN/CREAT BLD: 21 (ref 9–20)
CALCIUM SERPL-MCNC: 10 MG/DL (ref 8.6–10.4)
CHLORIDE BLD-SCNC: 104 MMOL/L (ref 98–107)
CO2: 25 MMOL/L (ref 20–31)
CREAT SERPL-MCNC: 1.26 MG/DL (ref 0.5–0.9)
DIFFERENTIAL TYPE: YES
EOSINOPHILS RELATIVE PERCENT: 3 % (ref 0–5)
GFR AFRICAN AMERICAN: 50 ML/MIN
GFR NON-AFRICAN AMERICAN: 41 ML/MIN
GFR SERPL CREATININE-BSD FRML MDRD: ABNORMAL ML/MIN/{1.73_M2}
GFR SERPL CREATININE-BSD FRML MDRD: ABNORMAL ML/MIN/{1.73_M2}
GLUCOSE BLD-MCNC: 110 MG/DL (ref 70–99)
HCT VFR BLD CALC: 37.1 % (ref 36–46)
HEMOGLOBIN: 12.1 G/DL (ref 12–16)
IMMATURE GRANULOCYTES: ABNORMAL %
LYMPHOCYTES # BLD: 14 % (ref 15–40)
MCH RBC QN AUTO: 27.8 PG (ref 26–34)
MCHC RBC AUTO-ENTMCNC: 32.7 G/DL (ref 31–37)
MCV RBC AUTO: 85 FL (ref 80–100)
MONOCYTES # BLD: 10 % (ref 4–8)
NRBC AUTOMATED: ABNORMAL PER 100 WBC
PDW BLD-RTO: 16.6 % (ref 12.1–15.2)
PLATELET # BLD: 292 K/UL (ref 140–450)
PLATELET ESTIMATE: ABNORMAL
PMV BLD AUTO: ABNORMAL FL (ref 6–12)
POTASSIUM SERPL-SCNC: 4.4 MMOL/L (ref 3.7–5.3)
RBC # BLD: 4.37 M/UL (ref 4–5.2)
RBC # BLD: ABNORMAL 10*6/UL
SEG NEUTROPHILS: 73 % (ref 47–75)
SEGMENTED NEUTROPHILS ABSOLUTE COUNT: 7.3 K/UL (ref 2.5–7)
SODIUM BLD-SCNC: 142 MMOL/L (ref 135–144)
WBC # BLD: 10 K/UL (ref 3.5–11)
WBC # BLD: ABNORMAL 10*3/UL

## 2019-12-16 PROCEDURE — G8417 CALC BMI ABV UP PARAM F/U: HCPCS | Performed by: NURSE PRACTITIONER

## 2019-12-16 PROCEDURE — 80048 BASIC METABOLIC PNL TOTAL CA: CPT

## 2019-12-16 PROCEDURE — 4040F PNEUMOC VAC/ADMIN/RCVD: CPT | Performed by: NURSE PRACTITIONER

## 2019-12-16 PROCEDURE — G8427 DOCREV CUR MEDS BY ELIG CLIN: HCPCS | Performed by: NURSE PRACTITIONER

## 2019-12-16 PROCEDURE — G8399 PT W/DXA RESULTS DOCUMENT: HCPCS | Performed by: NURSE PRACTITIONER

## 2019-12-16 PROCEDURE — 36415 COLL VENOUS BLD VENIPUNCTURE: CPT

## 2019-12-16 PROCEDURE — 1036F TOBACCO NON-USER: CPT | Performed by: NURSE PRACTITIONER

## 2019-12-16 PROCEDURE — 85025 COMPLETE CBC W/AUTO DIFF WBC: CPT

## 2019-12-16 PROCEDURE — G8482 FLU IMMUNIZE ORDER/ADMIN: HCPCS | Performed by: NURSE PRACTITIONER

## 2019-12-16 PROCEDURE — 99214 OFFICE O/P EST MOD 30 MIN: CPT | Performed by: NURSE PRACTITIONER

## 2019-12-16 PROCEDURE — 1090F PRES/ABSN URINE INCON ASSESS: CPT | Performed by: NURSE PRACTITIONER

## 2019-12-16 PROCEDURE — 1123F ACP DISCUSS/DSCN MKR DOCD: CPT | Performed by: NURSE PRACTITIONER

## 2019-12-16 RX ORDER — TRAZODONE HYDROCHLORIDE 50 MG/1
25 TABLET ORAL NIGHTLY
Qty: 15 TABLET | Refills: 0 | Status: SHIPPED | OUTPATIENT
Start: 2019-12-16 | End: 2020-01-13 | Stop reason: SDUPTHER

## 2019-12-19 ASSESSMENT — ENCOUNTER SYMPTOMS
SINUS PRESSURE: 0
CONSTIPATION: 0
DIARRHEA: 0
ABDOMINAL DISTENTION: 0
SHORTNESS OF BREATH: 1
SORE THROAT: 0
EYES NEGATIVE: 1
COUGH: 1

## 2019-12-23 ENCOUNTER — TELEPHONE (OUTPATIENT)
Dept: FAMILY MEDICINE CLINIC | Age: 74
End: 2019-12-23

## 2020-01-08 ENCOUNTER — HOSPITAL ENCOUNTER (OUTPATIENT)
Dept: PHARMACY | Age: 75
Setting detail: THERAPIES SERIES
Discharge: HOME | End: 2020-01-08
Payer: MEDICARE

## 2020-01-08 VITALS
DIASTOLIC BLOOD PRESSURE: 84 MMHG | HEART RATE: 86 BPM | BODY MASS INDEX: 49.76 KG/M2 | WEIGHT: 254.8 LBS | SYSTOLIC BLOOD PRESSURE: 121 MMHG

## 2020-01-08 LAB — INR BLD: 1.9

## 2020-01-08 PROCEDURE — 85610 PROTHROMBIN TIME: CPT

## 2020-01-08 PROCEDURE — 99211 OFF/OP EST MAY X REQ PHY/QHP: CPT

## 2020-01-13 ENCOUNTER — OFFICE VISIT (OUTPATIENT)
Dept: FAMILY MEDICINE CLINIC | Age: 75
End: 2020-01-13
Payer: MEDICARE

## 2020-01-13 VITALS
DIASTOLIC BLOOD PRESSURE: 78 MMHG | HEART RATE: 64 BPM | SYSTOLIC BLOOD PRESSURE: 114 MMHG | OXYGEN SATURATION: 98 % | BODY MASS INDEX: 46.56 KG/M2 | WEIGHT: 253 LBS | HEIGHT: 62 IN

## 2020-01-13 PROCEDURE — 1123F ACP DISCUSS/DSCN MKR DOCD: CPT | Performed by: NURSE PRACTITIONER

## 2020-01-13 PROCEDURE — G8482 FLU IMMUNIZE ORDER/ADMIN: HCPCS | Performed by: NURSE PRACTITIONER

## 2020-01-13 PROCEDURE — 4040F PNEUMOC VAC/ADMIN/RCVD: CPT | Performed by: NURSE PRACTITIONER

## 2020-01-13 PROCEDURE — G0438 PPPS, INITIAL VISIT: HCPCS | Performed by: NURSE PRACTITIONER

## 2020-01-13 RX ORDER — TRAZODONE HYDROCHLORIDE 50 MG/1
50 TABLET ORAL NIGHTLY
Qty: 30 TABLET | Refills: 1 | Status: SHIPPED | OUTPATIENT
Start: 2020-01-13 | End: 2020-02-10 | Stop reason: SDUPTHER

## 2020-01-13 RX ORDER — ALLOPURINOL 300 MG/1
300 TABLET ORAL DAILY
Qty: 90 TABLET | Refills: 1 | Status: SHIPPED | OUTPATIENT
Start: 2020-01-13 | End: 2020-01-20

## 2020-01-13 ASSESSMENT — LIFESTYLE VARIABLES
HOW OFTEN DURING THE LAST YEAR HAVE YOU NEEDED AN ALCOHOLIC DRINK FIRST THING IN THE MORNING TO GET YOURSELF GOING AFTER A NIGHT OF HEAVY DRINKING: 0
HAS A RELATIVE, FRIEND, DOCTOR, OR ANOTHER HEALTH PROFESSIONAL EXPRESSED CONCERN ABOUT YOUR DRINKING OR SUGGESTED YOU CUT DOWN: 0
HOW OFTEN DURING THE LAST YEAR HAVE YOU FOUND THAT YOU WERE NOT ABLE TO STOP DRINKING ONCE YOU HAD STARTED: 0
HOW MANY STANDARD DRINKS CONTAINING ALCOHOL DO YOU HAVE ON A TYPICAL DAY: 0
HOW OFTEN DURING THE LAST YEAR HAVE YOU BEEN UNABLE TO REMEMBER WHAT HAPPENED THE NIGHT BEFORE BECAUSE YOU HAD BEEN DRINKING: 0
AUDIT TOTAL SCORE: 1
HAVE YOU OR SOMEONE ELSE BEEN INJURED AS A RESULT OF YOUR DRINKING: 0
HOW OFTEN DURING THE LAST YEAR HAVE YOU HAD A FEELING OF GUILT OR REMORSE AFTER DRINKING: 0
AUDIT-C TOTAL SCORE: 1
HOW OFTEN DURING THE LAST YEAR HAVE YOU FAILED TO DO WHAT WAS NORMALLY EXPECTED FROM YOU BECAUSE OF DRINKING: 0
HOW OFTEN DO YOU HAVE A DRINK CONTAINING ALCOHOL: 1
HOW OFTEN DO YOU HAVE SIX OR MORE DRINKS ON ONE OCCASION: 0

## 2020-01-13 ASSESSMENT — PATIENT HEALTH QUESTIONNAIRE - PHQ9
SUM OF ALL RESPONSES TO PHQ QUESTIONS 1-9: 2
SUM OF ALL RESPONSES TO PHQ QUESTIONS 1-9: 2

## 2020-01-13 ASSESSMENT — VISUAL ACUITY
OD_CC: 20/50
OS_CC: 20/50

## 2020-01-13 NOTE — PATIENT INSTRUCTIONS
You may be receiving a survey from "[employer] regarding your visit today. You may get this in the mail, through your MyChart or in your email. Please complete the survey to enable us to provide the highest quality of care to you and your family. If you cannot score us as very good ( 5 Stars) on any question, please feel free to call the office to discuss how we could have made your experience exceptional.     Thank You! TYLOR Meng-CNP  [address] [place], [facility]    Preventing falls is a special concern due to an increase in the likelihood of fracturing a bone. Factors affecting FALLS include: environmental factors, impaired vision or balance, chronic diseases that affect mental or physical functioning and certain medications (sedatives, antidepressants, benzodiazepines, narcotics and alcohol). Here are some tips to eliminate environmental factors that can lead to falls    Outdoors:  -use a cane or walker for added stability  -wear rubber-soled shoes for traction  -walk on grass when sidewalks are slippery  -In winter, carry salt or nora litter to sprinkle on slippery sidewalks.   -be careful on highly polished floors that become slick and dangerous when wet  -walk on even or level ground when able    Indoors:  -Keep rooms free of clutter, especially the floors  -Keep floor surfaces smooth but not slippery  -Wear supportive, low-heeled shoes even at home  -Avoid walking in socks, stockings or slippers.  -Be sure carpets and area rugs have skid-proof backing or are tacked to the floor  -Install grab bars on the bathroom walls near tub, shower and toilet. -Use a rubber bath mat in shower or tub  -Keep a flashlight with fresh batteries beside your bed.   -If using a step stool for hard-to-reach places, use a sturdy one with a handrails on both sides. OR  As a family member to help.   -Add ceiling fixtures to rooms lit by lamps.    -Consider purchasing a cordless phone so that you do not have to rush to answer the phone when it rings, or so that you can call for help if you do fall.

## 2020-01-13 NOTE — PROGRESS NOTES
Medicare Annual Wellness Visit  Name: Deng Boswell Date: 2020   MRN: [id number] Sex: Female   Age: 66 y.o. Ethnicity: Non-/Non    : 1945 Race: Kolby Lee is here for Medicare AWV (AWV)    Screenings for behavioral, psychosocial and functional/safety risks, and cognitive dysfunction are all negative except as indicated below. These results, as well as other patient data from the [address] form, are documented in Flowsheets linked to this Encounter. Hx colonoscopy with Dr. Reinaldo Sims with polyps and tubulovillous adenoma in polyp due again in 1 year. Hx hemorrhage several times post colonoscopy. Allergies   Allergen Reactions    Clarithromycin Rash     Patient says she will \"never take this again\"     Merthiolate Glycerite [Thimerosal] Dermatitis     Redness, blisters    Tylenol With Codeine #3 [Acetaminophen-Codeine] Nausea And Vomiting         Prior to Visit Medications    Medication Sig Taking?  Authorizing Provider   traZODone (DESYREL) 50 MG tablet Take 1 tablet by mouth nightly Yes TYLOR Meng CNP   allopurinol (ZYLOPRIM) 300 MG tablet Take 1 tablet by mouth daily Yes TYLOR Meng CNP   warfarin (COUMADIN) 10 MG tablet Take 1 tablet on  and 1/2 tablet daily all other days or as managed by [address] [place] [address] [address] Yes Antonio Hawkins MD   furosemide (LASIX) 40 MG tablet Take 1 tablet by mouth 2 times daily Take 40 mg once daily in the morning and take 40 mg once in the evening if needed for swelling Yes Antonio Hawkins MD   diltiazem (CARDIZEM) 60 MG tablet TAKE 1 TABLET EVERY 8 HOURS Yes Antonio Hawkins MD   atenolol (TENORMIN) 50 MG tablet TAKE 1 TABLET EVERY DAY Yes Antonio Hawkins MD   spironolactone (ALDACTONE) 25 MG tablet TAKE 1 TABLET TWICE DAILY Yes Antonio Hawkins MD   aspirin 81 MG EC tablet Take 81 mg by mouth daily Yes Historical Provider, MD   diphenhydrAMINE-APAP, sleep, (ACETAMINOPHEN PM EX ST)  MG tablet Take 2 tablets by mouth nightly as needed for Sleep  Yes Historical Provider, MD   acetaminophen (TYLENOL) 500 MG tablet Take 500 mg by mouth every 6 hours as needed for Pain Yes Historical Provider, MD   Cholecalciferol (VITAMIN D) 2000 UNITS CAPS capsule Take 2,000 Units by mouth 2 times daily. Yes Historical Provider, MD         Past Medical History:   Diagnosis Date    Atrial fibrillation ([address]     CHF (congestive heart failure) [facility]     Gout     Hearing loss 2015    bilateral hearing aids     History of colon polyps 2016    Hypertension     Kidney stones     Mitral valve insufficiency     Obesity, morbid, BMI 40.0-49.9 (HCC)     Osteoarthritis     Primary osteoarthritis of both knees     Dr. Carballo Ok Pulmonary HTN [facility])        Past Surgical History:   Procedure Laterality Date    CARPAL TUNNEL RELEASE      bilaterally    COLONOSCOPY  11/28/2016    Surendra Au MD    COLON[facility]  05/2017    at [facility]. PYlori positive, adenoma, hyperplastic polyp    COLONOSCOPY  06/14/2018, 10/2019    DR. Catie Christian -tubulovillous adenoma repeat in 1 year 2020    JOINT REPLACEMENT Left 07/17/2019    knee    LITHOTRIPSY      TOTAL KNEE ARTHROPLASTY Left 7/17/2019    LEFT KNEE TOTAL ARTHROPLASTY- DEPUY performed by Leigh Issa DO at [address] ENDOSCOPY  11/28/2016    Surendra Au MD         Family History   Problem Relation Age of Onset    High Blood Pressure Mother     Diabetes Father        CareTeam (Including outside providers/suppliers regularly involved in providing care):   Patient Care Team:  TYLOR Cota CNP as PCP - General (Certified Nurse Practitioner)  TYLOR Cota CNP as PCP - [facility] Empaneled Provider  Nick Chavira MD as Surgeon (General Surgery)    Wt Readings from Last 3 Encounters:   01/13/20 253 lb (114.8 kg)   01/08/20 254 lb 12.8 oz (115.6 kg)   12/16/19 250 lb (113.4 kg)

## 2020-01-14 ENCOUNTER — TELEPHONE (OUTPATIENT)
Dept: PHARMACY | Age: 75
End: 2020-01-14

## 2020-01-20 RX ORDER — ATENOLOL 50 MG/1
TABLET ORAL
Qty: 90 TABLET | Refills: 3 | Status: SHIPPED | OUTPATIENT
Start: 2020-01-20 | End: 2021-02-17

## 2020-01-22 ENCOUNTER — HOSPITAL ENCOUNTER (OUTPATIENT)
Dept: PHARMACY | Age: 75
Setting detail: THERAPIES SERIES
Discharge: HOME | End: 2020-01-22
Payer: MEDICARE

## 2020-01-22 VITALS
BODY MASS INDEX: 46.9 KG/M2 | DIASTOLIC BLOOD PRESSURE: 84 MMHG | WEIGHT: 256.4 LBS | HEART RATE: 79 BPM | SYSTOLIC BLOOD PRESSURE: 117 MMHG

## 2020-01-22 LAB — INR BLD: 2.4

## 2020-01-22 PROCEDURE — 85610 PROTHROMBIN TIME: CPT

## 2020-01-22 PROCEDURE — 99211 OFF/OP EST MAY X REQ PHY/QHP: CPT

## 2020-01-22 NOTE — PROGRESS NOTES
Fingerstick INR drawn per clinic protocol. Patient states no visible blood in urine and no black tarry stool. Denies any missed doses of warfarin. Ashley Padilla saw Adalnancy Marte CNP on 1/13/2020 for AWV and her trazodone was increased from 25 mg to 50 mg nightly (which can decrease INR). Since Jo's INR remains therapeutic, we will continue current weekly warfarin regimen and recheck INR in 5 week(s). Patient acknowledges working in consult agreement with pharmacist as referred by his/her physician.

## 2020-01-22 NOTE — PROGRESS NOTES
Allopurinol is still ordered at 1/2 pill daily? ? Unsure where you get 1 tab daily.      Soraya Pennington

## 2020-01-24 ENCOUNTER — TELEPHONE (OUTPATIENT)
Dept: PRIMARY CARE CLINIC | Age: 75
End: 2020-01-24

## 2020-01-28 RX ORDER — SPIRONOLACTONE 25 MG/1
TABLET ORAL
Qty: 180 TABLET | Refills: 3 | Status: SHIPPED | OUTPATIENT
Start: 2020-01-28 | End: 2020-12-24

## 2020-02-10 ENCOUNTER — TELEPHONE (OUTPATIENT)
Dept: FAMILY MEDICINE CLINIC | Age: 75
End: 2020-02-10

## 2020-02-10 RX ORDER — TRAZODONE HYDROCHLORIDE 50 MG/1
50 TABLET ORAL NIGHTLY
Qty: 90 TABLET | Refills: 2 | Status: SHIPPED | OUTPATIENT
Start: 2020-02-10 | End: 2020-08-12

## 2020-02-10 NOTE — TELEPHONE ENCOUNTER
Patient needs new script for Trazodone sent to [facility] mail order    Health Maintenance   Topic Date Due    Shingles Vaccine (2 of 3) 09/01/2014    Potassium monitoring  12/16/2020    Creatinine monitoring  12/16/2020    Annual Wellness Visit (AWV)  01/13/2021    DTaP/Tdap/Td vaccine (2 - Td) 12/29/2028    DEXA (modify frequency per FRAX score)  Completed    Flu vaccine  Completed    Pneumococcal 65+ years Vaccine  Completed    Hepatitis A vaccine  Aged Out    Hepatitis B vaccine  Aged Out    Hib vaccine  Aged Out    Meningococcal (ACWY) vaccine  Aged Out             (applicable per patient's age: Cancer Screenings, Depression Screening, Fall Risk Screening, Immunizations)    Hemoglobin A1C (%)   Date Value   01/11/2013 6.4 (H)     LDL Cholesterol (mg/dL)   Date Value   09/10/2019 98     AST (U/L)   Date Value   09/10/2019 16     ALT (U/L)   Date Value   09/10/2019 18     BUN (mg/dL)   Date Value   12/16/2019 27 (H)      (goal A1C is < 7)   (goal LDL is <100) need 30-50% reduction from baseline     BP Readings from Last 3 Encounters:   01/22/20 117/84   01/13/20 114/78   01/08/20 121/84    (goal /80)      All Future Testing planned in [facility]:  Lab Frequency Next Occurrence   Basic Metabolic Panel Once 68/78/4984   PT eval and treat Once 07/19/2019   Protime-INR Once 07/22/2019   CBC Auto Differential Once 09/17/2020   Comprehensive Metabolic Panel Once 80/01/4457   Vitamin D 25 Hydroxy Once 09/17/2020   Lipid Panel Once 09/17/2020   TSH with Reflex Once 09/17/2020   Magnesium Once 09/17/2020   EKG 12 Lead Once 09/17/2020   XR CHEST STANDARD (2 VW) Once 09/10/2020   ECHO Complete 2D W Doppler W Color Once 09/10/2020       Next Visit Date:  Future Appointments   Date Time Provider Sj Fiorei   2/26/2020 11:00 AM [facility] MED MGMT Eva Prophet   7/13/2020  1:30 PM TYLOR Meng - CNP [facility]   9/10/2020  9:30 AM [facility] ECHO ROOM [facility] ECHO Eva Prophet 9/17/2020 10:00 AM MD Robb Akhtar [facility]            Patient Active Problem List:     Kidney stones     Localized osteoarthritis of left knee     Atrial fibrillation (HCC)     Pulmonary HTN (HCC)     Knee pain, chronic     Polyp of cecum     Acute gout of right foot     Iron deficiency anemia due to chronic blood loss     Rectal bleed     Mitral valve insufficiency     Anemia     Backache     CHF (congestive heart failure) (HCC)     CKD (chronic kidney disease)     Echocardiogram abnormal     Gout     HL (hearing loss)     Hyperlipidemia     Morbid obesity with BMI of 45.0-49.9, adult ([facility])     DJD (degenerative joint disease)

## 2020-02-26 ENCOUNTER — HOSPITAL ENCOUNTER (OUTPATIENT)
Dept: PHARMACY | Age: 75
Setting detail: THERAPIES SERIES
Discharge: HOME | End: 2020-02-26
Payer: MEDICARE

## 2020-02-26 VITALS
BODY MASS INDEX: 47.41 KG/M2 | DIASTOLIC BLOOD PRESSURE: 81 MMHG | WEIGHT: 259.2 LBS | SYSTOLIC BLOOD PRESSURE: 116 MMHG | HEART RATE: 78 BPM

## 2020-02-26 LAB — INR BLD: 2.7

## 2020-02-26 PROCEDURE — 85610 PROTHROMBIN TIME: CPT

## 2020-02-26 PROCEDURE — 99211 OFF/OP EST MAY X REQ PHY/QHP: CPT

## 2020-02-26 NOTE — PROGRESS NOTES
Fingerstick INR drawn per clinic protocol. Patient states no visible blood in urine and no black tarry stool. Odalis Hebert has a bruise on her forehead and on her left forearm. Denies any missed doses of warfarin. No change in other maintenance medications or in diet. Odalis Hebert has been using a elleptical/recumbant bike for 7 minutes ever other day at home. Odalis Hebert inquires about using cannabis salve for back pain. Since Jo's INR remains therapeutic, we will continue current weekly warfarin regimen and recheck INR in 5 week(s). Patient acknowledges working in consult agreement with pharmacist as referred by his/her physician.

## 2020-05-04 ENCOUNTER — TELEPHONE (OUTPATIENT)
Dept: PHARMACY | Age: 75
End: 2020-05-04

## 2020-05-06 ENCOUNTER — HOSPITAL ENCOUNTER (OUTPATIENT)
Dept: PHARMACY | Age: 75
Setting detail: THERAPIES SERIES
Discharge: HOME | End: 2020-05-06
Payer: MEDICARE

## 2020-05-06 VITALS — TEMPERATURE: 98.4 F

## 2020-05-06 LAB — INR BLD: 2.1

## 2020-05-06 PROCEDURE — 99211 OFF/OP EST MAY X REQ PHY/QHP: CPT

## 2020-05-06 PROCEDURE — 85610 PROTHROMBIN TIME: CPT

## 2020-06-09 ENCOUNTER — TELEPHONE (OUTPATIENT)
Dept: PHARMACY | Age: 75
End: 2020-06-09

## 2020-06-09 NOTE — TELEPHONE ENCOUNTER
COVID-19 phone screening     Call placed to screen patient prior to upcoming Medication Management visit for Anticoagulation. Does patient have fever and/or lower respiratory symptoms (SOB, difficulty breathing, cough)? [] Yes    [x] No    If yes, complete Travel Screening and ask the following:   [] [Fever OR s/sxs of lower respiratory illness]  AND  [close contact with lab-confirmed COVID-19 patient within 14 days of symptom onset   [] [Fever AND s/sxs of lower respiratory illness requiring hospitalization] AND [history of travel to [place], [place], [place], [facility], [place] within 14 days of sx onset]  [] [Fever with severe acute lower respiratory illness (I.e. PNA, ARDS) requiring hospitalization and without alternative explanatory diagnosis (I.e. Influenza)] AND [no source of exposure identified]    [x] None of the following; patient confirmed for regularly scheduled INR check and instructed to call the clinic immediately if any symptoms develop prior to upcoming appointment.

## 2020-06-10 ENCOUNTER — HOSPITAL ENCOUNTER (OUTPATIENT)
Dept: PHARMACY | Age: 75
Setting detail: THERAPIES SERIES
Discharge: HOME | End: 2020-06-10
Payer: MEDICARE

## 2020-06-10 VITALS — TEMPERATURE: 97.7 F

## 2020-06-10 LAB — INR BLD: 3.5

## 2020-06-10 PROCEDURE — 99211 OFF/OP EST MAY X REQ PHY/QHP: CPT

## 2020-06-10 PROCEDURE — 85610 PROTHROMBIN TIME: CPT

## 2020-06-10 NOTE — PROGRESS NOTES
Fingerstick INR drawn per clinic protocol. Patient states no visible blood in urine and no black tarry stool. Denies any missed doses of warfarin. No change in diet. Kev Arechiga states she take 3 tylenol whenever she needs it for pain and also 2 tylenol PM at bedtime (which can increase INR). Kev Arechiga denies any weight gain or extra fluid retention. Since her INR is supratherapeutic, but she has been on the stable weekly warfarin regimen x 1 year, we will HOLD x 1 dose, and Kev Arechiga will increase 1 to 2 servings of greens per week and will continue current weekly warfarin regimen and recheck INR in 5 week(s). Patient acknowledges working in consult agreement with pharmacist as referred by his/her physician.           CLINICAL PHARMACY CONSULT: MED RECONCILIATION/REVIEW ADDENDUM    For Pharmacy Admin Tracking Only    PHSO: Yes  Total # of Interventions Recommended: 0  - Maintenance Safety Lab Monitoring #: 1  Total Interventions Accepted: 0  Time Spent (min): 400 [facility], 251 [facility]

## 2020-07-10 ENCOUNTER — TELEPHONE (OUTPATIENT)
Dept: PHARMACY | Age: 75
End: 2020-07-10

## 2020-07-10 NOTE — TELEPHONE ENCOUNTER
COVID-19 phone screening     Call placed to screen patient prior to upcoming Medication Management visit for Anticoagulation. Does patient have fever and/or lower respiratory symptoms (SOB, difficulty breathing, cough)? [] Yes    [x] No    If yes, complete Travel Screening and ask the following:   [] [Fever OR s/sxs of lower respiratory illness]  AND  [close contact with lab-confirmed COVID-19 patient within 14 days of symptom onset   [] [Fever AND s/sxs of lower respiratory illness requiring hospitalization] AND [history of travel to [place], [place], [place], [facility], [place] [employer] [place] within 14 days of sx onset]  [] [Fever with severe acute lower respiratory illness (I.e. PNA, ARDS) requiring hospitalization and without alternative explanatory diagnosis (I.e. Influenza)] AND [no source of exposure identified]    [x] None of the following; patient confirmed for regularly scheduled INR check and instructed to call the clinic immediately if any symptoms develop prior to upcoming appointment.

## 2020-07-13 ENCOUNTER — HOSPITAL ENCOUNTER (OUTPATIENT)
Dept: PHARMACY | Age: 75
Setting detail: THERAPIES SERIES
Discharge: HOME | End: 2020-07-13
Payer: MEDICARE

## 2020-07-13 ENCOUNTER — OFFICE VISIT (OUTPATIENT)
Dept: FAMILY MEDICINE CLINIC | Age: 75
End: 2020-07-13
Payer: MEDICARE

## 2020-07-13 ENCOUNTER — HOSPITAL ENCOUNTER (OUTPATIENT)
Age: 75
Discharge: HOME | End: 2020-07-13
Payer: MEDICARE

## 2020-07-13 VITALS
BODY MASS INDEX: 44.83 KG/M2 | OXYGEN SATURATION: 97 % | DIASTOLIC BLOOD PRESSURE: 64 MMHG | TEMPERATURE: 98.2 F | HEIGHT: 63 IN | HEART RATE: 90 BPM | SYSTOLIC BLOOD PRESSURE: 100 MMHG | WEIGHT: 253 LBS

## 2020-07-13 VITALS — TEMPERATURE: 97.3 F

## 2020-07-13 LAB
ANION GAP SERPL CALCULATED.3IONS-SCNC: 15 MMOL/L (ref 9–17)
BUN BLDV-MCNC: 23 MG/DL (ref 8–23)
BUN/CREAT BLD: 21 (ref 9–20)
CALCIUM SERPL-MCNC: 10.3 MG/DL (ref 8.6–10.4)
CHLORIDE BLD-SCNC: 101 MMOL/L (ref 98–107)
CO2: 26 MMOL/L (ref 20–31)
CREAT SERPL-MCNC: 1.07 MG/DL (ref 0.5–0.9)
GFR AFRICAN AMERICAN: >60 ML/MIN
GFR NON-AFRICAN AMERICAN: 50 ML/MIN
GFR SERPL CREATININE-BSD FRML MDRD: ABNORMAL ML/MIN/{1.73_M2}
GFR SERPL CREATININE-BSD FRML MDRD: ABNORMAL ML/MIN/{1.73_M2}
GLUCOSE BLD-MCNC: 98 MG/DL (ref 70–99)
HCT VFR BLD CALC: 43.6 % (ref 36–46)
HEMOGLOBIN: 14.7 G/DL (ref 12–16)
INR BLD: 3.3
POTASSIUM SERPL-SCNC: 4 MMOL/L (ref 3.7–5.3)
SODIUM BLD-SCNC: 142 MMOL/L (ref 135–144)
TSH SERPL DL<=0.05 MIU/L-ACNC: 1.07 MIU/L (ref 0.3–5)

## 2020-07-13 PROCEDURE — 4040F PNEUMOC VAC/ADMIN/RCVD: CPT | Performed by: NURSE PRACTITIONER

## 2020-07-13 PROCEDURE — G8427 DOCREV CUR MEDS BY ELIG CLIN: HCPCS | Performed by: NURSE PRACTITIONER

## 2020-07-13 PROCEDURE — 85610 PROTHROMBIN TIME: CPT

## 2020-07-13 PROCEDURE — G8399 PT W/DXA RESULTS DOCUMENT: HCPCS | Performed by: NURSE PRACTITIONER

## 2020-07-13 PROCEDURE — 36415 COLL VENOUS BLD VENIPUNCTURE: CPT

## 2020-07-13 PROCEDURE — 1123F ACP DISCUSS/DSCN MKR DOCD: CPT | Performed by: NURSE PRACTITIONER

## 2020-07-13 PROCEDURE — G8417 CALC BMI ABV UP PARAM F/U: HCPCS | Performed by: NURSE PRACTITIONER

## 2020-07-13 PROCEDURE — 85014 HEMATOCRIT: CPT

## 2020-07-13 PROCEDURE — 80048 BASIC METABOLIC PNL TOTAL CA: CPT

## 2020-07-13 PROCEDURE — 1090F PRES/ABSN URINE INCON ASSESS: CPT | Performed by: NURSE PRACTITIONER

## 2020-07-13 PROCEDURE — 99211 OFF/OP EST MAY X REQ PHY/QHP: CPT

## 2020-07-13 PROCEDURE — 84443 ASSAY THYROID STIM HORMONE: CPT

## 2020-07-13 PROCEDURE — 85018 HEMOGLOBIN: CPT

## 2020-07-13 PROCEDURE — 99214 OFFICE O/P EST MOD 30 MIN: CPT | Performed by: NURSE PRACTITIONER

## 2020-07-13 PROCEDURE — 1036F TOBACCO NON-USER: CPT | Performed by: NURSE PRACTITIONER

## 2020-07-13 RX ORDER — WARFARIN SODIUM 10 MG/1
TABLET ORAL
Qty: 90 TABLET | Refills: 3 | Status: SHIPPED
Start: 2020-07-13 | End: 2020-09-17 | Stop reason: DRUGHIGH

## 2020-07-13 NOTE — PROGRESS NOTES
Fingerstick INR drawn per clinic protocol. Patient states no visible blood in urine and no black tarry stool. Denies any missed doses of warfarin. Upon reviewing her medications, Jayne Louise tells me that she has been using \"quite a few\" Tylenol for pain. She admits to taking \"3 of the ES Tylenol tablets\" (1500 mg) in the morning and then \"2 of the Tylenol PM\" tablets at night before going to bed even though \"it doesn't really help\". She has an appointment with Carol Vargas CNP after this visit and plans to discuss this further. She also says she wants to talk to Francyne Dance about getting off her Trazodone because she doesn't want to take it anymore. Since Trazodone can decrease INR, I have explained to her that stopping her Trazodone could potentially increase the INR and require closer INR monitoring. Jayne Louise mentions that she also uses OTC \"CBD oil gummies\" sometimes for pain relief (she says she took one on Saturday, most recently). Since this can increase INR, this could also be contributing to her supra-therapeutic INR today. No change in other maintenance medications or in diet. Since her INR remains slightly supra-therapeutic today, we will decrease current weekly warfarin regimen (12%) as noted on her dosing calendar and then recheck INR in 4 weeks. Patient acknowledges working in consult agreement with pharmacist as referred by his/her physician. CLINICAL PHARMACY CONSULT: MED RECONCILIATION/REVIEW ADDENDUM    For Pharmacy Admin Tracking Only    PHSO: Yes  Total # of Interventions Recommended: 1  - Decreased Dose #: 1  - Maintenance Safety Lab Monitoring #: 1  .   Total Interventions Accepted: 1  Time Spent (min): 30    Ashlyn Horner PharmD

## 2020-07-13 NOTE — PROGRESS NOTES
2020     Yazmin Sage (:  1945) is a 66 y.o. female, here for evaluation of the following medical concerns:    HPI   66year old female here today in wheelchair with c/o chronic back pain , weakness and fatigue. Pulmonary htn, atrial fib on coumadin managed by [facility] coumadin clinic. Chronic Back Pain: Pain is worse. On average, pain is perceived as moderate (4-6 pain scale). Change in quality of symptoms: no.  Associated symptoms: weakness- bilateral lower legs and change in gait- slow uses walker all the time in wheelchair today . She denies weakness, change in gait and no change in bowel or bladder habits. .  Current treatment: rest, acetaminophen- 500-1000 mg daily. Medication side effects: fatigue and drowsiness. Recent diagnostic testing: hx lumbar xray, hx pain management with Dr. Hanh Wells. 10/1/2018  Xray lumbar levoscoliosis, degenerative disc disease. Hx back injections    Treatment Adherence:   Medication compliance:  compliant all of the time  Diet compliance:  compliant all of the time  Weight trend: increasing  Current exercise: no regular exercise  Barriers: lack of motivation, stress and time constraints    Hypertension:  Home blood pressure monitoring: No. Patient denies chest pain, shortness of breath, headache, lightheadedness, blurred vision, peripheral edema, palpitations, dry cough and fatigue. Antihypertensive medication side effects: no medication side effects noted. Use of agents associated with hypertension: none.                                         Sodium (mmol/L)   Date Value   2020 142    BUN (mg/dL)   Date Value   2020 23    Glucose (mg/dL)   Date Value   2020 98   2012 102 (H)      Potassium (mmol/L)   Date Value   2020 4.0    CREATININE (mg/dL)   Date Value   2020 1.07 (H)         Hyperlipidemia:  No new myalgias or GI upset on     Lab Results   Component Value Date    CHOL 184 09/10/2019    TRIG 161 (H) 09/10/2019    HDL 54 09/10/2019     Lab Results   Component Value Date    ALT 18 09/10/2019    AST 16 09/10/2019          recumbant bike,   Hard to walk. -outside  Takes garbage to back alley. Uses walker inside and outside. Hx cane use but feels unsteady with cane. CBD oil and gummies tried. Hx injections and deadened nerves. Dr. Singh Precise referral discussed injections limited due to coumadin. Review of Systems   Constitutional: Positive for activity change and fatigue. Negative for appetite change and chills. HENT: Negative for congestion, postnasal drip, rhinorrhea and sore throat. Eyes: Negative. Respiratory: Negative for cough, shortness of breath and wheezing. Cardiovascular: Positive for leg swelling. Negative for chest pain and palpitations. Gastrointestinal: Negative for abdominal distention, constipation and diarrhea. Endocrine: Negative for cold intolerance and heat intolerance. Genitourinary: Negative for difficulty urinating. Musculoskeletal: Positive for arthralgias, back pain and gait problem. Negative for joint swelling, neck pain and neck stiffness. Skin: Negative for rash. Neurological: Negative for dizziness and headaches. Psychiatric/Behavioral: Positive for sleep disturbance. Negative for self-injury and suicidal ideas. The patient is not nervous/anxious. Prior to Visit Medications    Medication Sig Taking?  Authorizing Provider   warfarin (COUMADIN) 10 MG tablet Take 1/2 tablet daily every day of the week or as managed by Bessie Pereira Yes Hamilton Olszewski, MD   traZODone (DESYREL) 50 MG tablet Take 1 tablet by mouth nightly Yes TYLOR Keating CNP   spironolactone (ALDACTONE) 25 MG tablet TAKE 1 TABLET TWICE DAILY Yes Hamilton Olszewski, MD   atenolol (TENORMIN) 50 MG tablet TAKE 1 TABLET EVERY DAY Yes Hamilton Olszewski, MD   allopurinol (ZYLOPRIM) 300 MG tablet Take 0.5 tablets by mouth daily Yes TYLOR Keating CNP furosemide (LASIX) 40 MG tablet Take 1 tablet by mouth 2 times daily Take 40 mg once daily in the morning and take 40 mg once in the evening if needed for swelling Yes Gabriel Chase MD   diltiazem (CARDIZEM) 60 MG tablet TAKE 1 TABLET EVERY 8 HOURS Yes Gabriel Chase MD   aspirin 81 MG EC tablet Take 81 mg by mouth daily Yes Historical Provider, MD   Cholecalciferol (VITAMIN D) 2000 UNITS CAPS capsule Take 2,000 Units by mouth 2 times daily. Yes Historical Provider, MD        Social History     Tobacco Use    Smoking status: Former Smoker     Packs/day: 0.25     Years: 4.00     Pack years: 1.00     Types: Cigarettes     Last attempt to quit: 1981     Years since quittin.5    Smokeless tobacco: Never Used   Substance Use Topics    Alcohol use: No        Vitals:    20 1339   BP: 100/64   Pulse: 90   Temp: 98.2 °F (36.8 °C)   TempSrc: Oral   SpO2: 97%   Weight: 253 lb (114.8 kg)   Height: 5' 2.5\" (1.588 m)     Estimated body mass index is 45.54 kg/m² as calculated from the following:    Height as of this encounter: 5' 2.5\" (1.588 m). Weight as of this encounter: 253 lb (114.8 kg). Physical Exam  Vitals signs and nursing note reviewed. Constitutional:       General: She is not in acute distress. Appearance: Normal appearance. She is well-developed. Comments: BMI 45, sitting in wheelchair today   HENT:      Head: Normocephalic and atraumatic. Comments: Cerumen impaction right ear removed with cerumen stick pt tolerated well wears bilateral hearing aids. Right Ear: Tympanic membrane and external ear normal.      Left Ear: Tympanic membrane and external ear normal.      Nose: No congestion. Mouth/Throat:      Mouth: Mucous membranes are moist.   Eyes:      General: No scleral icterus. Pupils: Pupils are equal, round, and reactive to light. Neck:      Musculoskeletal: Normal range of motion and neck supple. Vascular: No carotid bruit (neg francisco). TYLOR - CNP on 7/14/2020 at 12:17 AM  On the basis of positive falls risk screening, assessment and plan is as follows: home safety tips provided.

## 2020-07-14 ASSESSMENT — ENCOUNTER SYMPTOMS
SORE THROAT: 0
EYES NEGATIVE: 1
WHEEZING: 0
BACK PAIN: 1
COUGH: 0
RHINORRHEA: 0
ABDOMINAL DISTENTION: 0
SHORTNESS OF BREATH: 0
CONSTIPATION: 0
DIARRHEA: 0

## 2020-07-16 RX ORDER — PREDNISONE 10 MG/1
TABLET ORAL
Qty: 9 TABLET | Refills: 0 | Status: SHIPPED | OUTPATIENT
Start: 2020-07-16 | End: 2020-08-12 | Stop reason: ALTCHOICE

## 2020-07-17 ENCOUNTER — TELEPHONE (OUTPATIENT)
Dept: PHARMACY | Age: 75
End: 2020-07-17

## 2020-07-17 NOTE — TELEPHONE ENCOUNTER
Received a call from Katelyn Jo after she had prescribed this patient Prednisone 20 mg daily x 3 days, then 10 mg daily x 3 days. Since she has been on Prednisone in the past and it has increased her INR, we will decrease her current weekly warfarin regimen by 14% while she is on the Prednisone. Currently, River Garza is taking 5 mg warfarin daily and \"thinks\" she will  the Prednisone and start taking it \"tomorrow\". I have asked her to skip her warfarin dose on the 3rd day of Prednisone regimen. If she starts the Prednisone tomorrow, she will skip her warfarin dose on Monday, 7-, and then resume 5 mg warfarin daily thereafter. We will keep her previously scheduled INR check for 8-.

## 2020-07-20 ENCOUNTER — HOSPITAL ENCOUNTER (OUTPATIENT)
Dept: PHYSICAL THERAPY | Age: 75
Setting detail: THERAPIES SERIES
Discharge: HOME | End: 2020-07-20
Payer: MEDICARE

## 2020-07-20 PROCEDURE — 97162 PT EVAL MOD COMPLEX 30 MIN: CPT

## 2020-07-20 PROCEDURE — 97110 THERAPEUTIC EXERCISES: CPT

## 2020-07-20 ASSESSMENT — PAIN SCALES - GENERAL: PAINLEVEL_OUTOF10: 8

## 2020-07-20 ASSESSMENT — PAIN DESCRIPTION - LOCATION: LOCATION: BACK;LEG

## 2020-07-20 ASSESSMENT — PAIN DESCRIPTION - ORIENTATION: ORIENTATION: LEFT;RIGHT

## 2020-07-20 NOTE — PROGRESS NOTES
Phone: [phone]         Fax: [phone]                      Outpatient Physical Therapy                                                                      Evaluation    Date: 2020  Patient: Starr Dave  : 1945  ACCT #: [de-identified]    Referring Practitioner: CONY Barger    Referral Date : 20    Diagnosis: Chronic bilateral low back pain with bilateral sciatica, generalized weakness    Treatment Diagnosis: Back Pain, generalized weakness  Onset Date: 20(Referral)  PT Insurance Information: [facility], [employer]  Total # of Visits Approved: 10 Per Physician Order  Total # of Visits to Date: 1     Subjective     Additional Pertinent Hx: Patient c/o chronic back pain for many years, but now having pain in both legs, down front of thighs and below knees. C/o numbness in left foot intermittent. C/o very limited ability to walk, uses rollator walker and regular w.walker. Has lift chair as has trouble getting out of regular chair. Had MRI of back 2018: DDD and some spinal stenosis. Patient reports sitting pain 2/10 but standing even a couple minutes pain gets to 8/10 and she needs to sit back down. Hx- left TKA, OA, atrial fib, HBP, obesity. Meds- steroid pack for back pain.   Pain Screening  Patient Currently in Pain: Yes  Pain Assessment  Pain Assessment: 0-10  Pain Level: 8  Pain Location: Back, Leg  Pain Orientation: Left, Right  Social/Functional History  Lives With: Alone  Type of Home: House  Occupation: Retired       Objective        Spine  Lumbar: ROM limited 25% alll planes  Joint Mobility  Spine: unable to assume prone position; assessed in sidelying with good lumbar facet movements  Strength RLE  Strength RLE: Exception  R Hip Flexion: 3+/5  R Hip ABduction: 3+/5  R Knee Flexion: 4/5  R Knee Extension: 4/5  R Ankle Dorsiflexion: 4+/5  Strength LLE  Strength LLE: Exception  L Hip Flexion: 3-/5  L Hip ABduction: 3-/5  L Knee Flexion: 4-/5  L Knee Extension: 4-/5  L Ankle Dorsiflexion: 4+/5                PROM LLE (degrees)  LLE PROM: WFL(left knee TKA, 95 degrees flexion)  PROM RLE (degrees)  RLE PROM: [facility]     Additional Measures  Special Tests: SLR- negative     Bed Mobility  Supine to Sit: Minimal assistance     Ambulation 1  Device: Rolling Walker  Quality of Gait: short distance only per patient due to pain  Gait Deviations: Slow Valeria, Decreased step length             Assessment  Body structures, Functions, Activity limitations: Decreased functional mobility , Decreased strength, Increased pain, Decreased posture  Assessment: Patient with hx of chronic back pain, but for several months now having bilateral leg pain that is severely limiting ability to walk. Patient completed therex per Doc Flow. Patient educated on and issued HEP handout. Plan to progress therex as tolerated, HEP and back education. Prognosis: Fair  Decision Making: Medium Complexity  History: as above  Exam: Oswestry score 23/45    Clinical Presentation:  Evolving  The Following Comorbities will impact the patients progression and Plan of Care:   Cardiac Disease/Pacemaker, Obesity and Previous Orthopedic Injury/Surgery       Education: ON POC and HEP        Goals  Short term goals  Time Frame for Short term goals: 8  Short term goal 1: Patient to be independent with HEP  Short term goal 2: Increase strength B knee extension to 4+/5 to transfer sit to stand without difficulty  Short term goal 3:  Increase strength Left hip flexion 4/5 to walk longer distances    Long term goals  Time Frame for Long term goals : 10  Long term goal 1: Improve functional mobility with Oswestry score <15/45  Long term goal 2: Patient to be able to walk with device with leg and back pain 4/10 at worst x3 days    Patient's Goal:   Be able to walk without back and leg pain interfering    Timed Code Treatment Minutes: 20 Minutes  Total Treatment Time: 50     Time In: 9:55  Time Out:

## 2020-07-20 NOTE — PRE-CERTIFICATION NOTE
Medicare Cap     [] Physical Therapy  [] Speech Therapy  [] Occupational therapy    *PT and Speech caps combine      $7537 Cap limit < kx modifier needed < $0559 requires pre-cert     Patient Name: Liliana Dexter  YOB: 1945     Date of [facility] Name $$$ charge Daily Charge YTD   Total $   7/20/20 [place], Ex 83.82, 30.08 113.90 113.90

## 2020-07-20 NOTE — PLAN OF CARE
[facility]       Phone: [phone]   Date: 2020                      Outpatient Physical Therapy  Fax: [phone]    ACCT #: [de-identified]                     Plan of Care  [facility]#: [id number]  Patient: Liliana Dexter  : 1945    Referring Practitioner: CONY Jama    Referral Date : 20    Diagnosis: Chronic bilateral low back pain with bilateral sciatica, generalized weakness  Onset Date: 20(Referral)  Treatment Diagnosis: Back Pain, generalized weakness    Assessment  Body structures, Functions, Activity limitations: Decreased functional mobility , Decreased strength, Increased pain, Decreased posture  Assessment: Patient with hx of chronic back pain, but for several months now having bilateral leg pain that is severely limiting ability to walk. Patient completed therex per Doc Flow. Patient educated on and issued HEP handout. Plan to progress therex as tolerated, HEP and back education. Prognosis: Fair    Treatment Plan   Days: 2 times per week Weeks: 5 weeks Total # of Visits Approved: 10    Patient Education/HEP, Back Education and Therapeutic Exercise     Goals  Short term goals  Time Frame for Short term goals: 8  Short term goal 1: Patient to be independent with HEP  Short term goal 2: Increase strength B knee extension to 4+/5 to transfer sit to stand without difficulty  Short term goal 3: Increase strength Left hip flexion 4/5 to walk longer distances  Long term goals  Time Frame for Long term goals : 10  Long term goal 1: Improve functional mobility with Oswestry score <15/45  Long term goal 2: Patient to be able to walk with device with leg and back pain 4/10 at worst x3 days     Brittanie Cloud, PT   Date: 2020    ______________________________________ Date: 2020  Physician Signature  By signing above or cosigning electronically, I have reviewed this Plan of Care and certify a need for medically necessary rehabilitation services.

## 2020-07-22 ENCOUNTER — HOSPITAL ENCOUNTER (OUTPATIENT)
Dept: PHYSICAL THERAPY | Age: 75
Setting detail: THERAPIES SERIES
Discharge: HOME | End: 2020-07-22
Payer: MEDICARE

## 2020-07-22 PROCEDURE — 97110 THERAPEUTIC EXERCISES: CPT

## 2020-07-22 ASSESSMENT — PAIN DESCRIPTION - ORIENTATION: ORIENTATION: RIGHT;LEFT

## 2020-07-22 ASSESSMENT — PAIN SCALES - GENERAL: PAINLEVEL_OUTOF10: 5

## 2020-07-22 ASSESSMENT — PAIN DESCRIPTION - LOCATION: LOCATION: LEG

## 2020-07-27 ENCOUNTER — HOSPITAL ENCOUNTER (OUTPATIENT)
Dept: PHYSICAL THERAPY | Age: 75
Setting detail: THERAPIES SERIES
Discharge: HOME | End: 2020-07-27
Payer: MEDICARE

## 2020-07-27 PROCEDURE — 97110 THERAPEUTIC EXERCISES: CPT

## 2020-07-27 NOTE — PROGRESS NOTES
Phone: Juan Topete      Fax: [phone]                            Outpatient Physical Therapy                                                                            Daily Note    Date: 2020  Patient Name: Didier Ward        MRN: [id number]   ACCT#:  [de-identified]  : 1945  (66 y.o.)    Referring Practitioner: CONY Hills    Referral Date : 20    Diagnosis: Chronic bilateral low back pain with bilateral sciatica, generalized weakness  Treatment Diagnosis: Back Pain, generalized weakness    Onset Date: 20(Referral)  PT Insurance Information: MCR, [employer]  Total # of Visits Approved: 10 Per Physician Order  Total # of Visits to Date: 3  No Show: 0  Canceled Appointment: 0  Plan of Care/Certification Expiration Date: 20    Pre-Treatment Pain:  0/10     Assessment  Assessment: Pt reports she had severe LE pain/soreness over the weekend, better today with no pain. Performed ex as outlined  for strengthening and flexibility. No increased pain with ex. Will cont and progress as kapil. Chart Reviewed: Yes    Plan  Plan: Continue with current plan    Exercises/Modalities/Manual:  See DocFlow Sheet              Goals  (Total # of Visits to Date: 3)   Short Term Goals - Time Frame for Short term goals: 8  Short term goal 1: Patient to be independent with HEP  Short term goal 2: Increase strength B knee extension to 4+/5 to transfer sit to stand without difficulty  Short term goal 3:  Increase strength Left hip flexion 4/5 to walk longer distances          Long Term Goals - Time Frame for Long term goals : 10  Long term goal 1: Improve functional mobility with Oswestry score <15/45  Long term goal 2: Patient to be able to walk with device with leg and back pain 4/10 at worst x3 days       Post Treatment Pain:  0/10    Time In: 1116    Time Out : 1156        Timed Code Treatment Minutes: 39 Minutes  Total Treatment Time: [address] [facility] [place]    PTA Date: 7/27/2020

## 2020-07-27 NOTE — PRE-CERTIFICATION NOTE
Medicare Cap     [] Physical Therapy  [] Speech Therapy  [] Occupational therapy    *PT and Speech caps combine      $8927 Cap limit < kx modifier needed < $0105 requires pre-cert     Patient Name: Axel Edmond  YOB: 1945     Date of [facility] 61 Name $$$ charge Daily Charge YTD   Total $   7/20/20 Eval, Ex 83.82, 30.08 113.90 113.90   7/22/20 Ex x 3 30.08 x 3 90.24 204.14   7/27/20 Ex x 3 30.08 x 3 90.24 294.38

## 2020-07-29 ENCOUNTER — HOSPITAL ENCOUNTER (OUTPATIENT)
Dept: PHYSICAL THERAPY | Age: 75
Setting detail: THERAPIES SERIES
Discharge: HOME | End: 2020-07-29
Payer: MEDICARE

## 2020-07-29 PROCEDURE — 97110 THERAPEUTIC EXERCISES: CPT

## 2020-07-29 ASSESSMENT — PAIN SCALES - GENERAL: PAINLEVEL_OUTOF10: 3

## 2020-07-29 NOTE — PRE-CERTIFICATION NOTE
Medicare Cap     [] Physical Therapy  [] Speech Therapy  [] Occupational therapy    *PT and Speech caps combine      $0868 Cap limit < kx modifier needed < $9580 requires pre-cert     Patient Name: Regan Guerrero  YOB: 1945     Date of [facility] [address] Name $$$ charge Daily Charge YTD   Total $   7/20/20 Eval, Ex 83.82, 30.08 113.90 113.90   7/22/20 Ex x 3 30.08 x 3 90.24 204.14   7/27/20 Ex x 3 30.08 x 3 90.24 294.38   7/29/20 Ex x 2

## 2020-08-10 ENCOUNTER — TELEPHONE (OUTPATIENT)
Dept: PRIMARY CARE CLINIC | Age: 75
End: 2020-08-10

## 2020-08-11 ENCOUNTER — TELEPHONE (OUTPATIENT)
Dept: PHARMACY | Age: 75
End: 2020-08-11

## 2020-08-11 NOTE — TELEPHONE ENCOUNTER
COVID-19 phone screening     Call placed to screen patient prior to upcoming Medication Management visit for Anticoagulation. Does patient have fever and/or lower respiratory symptoms (SOB, difficulty breathing, cough)? [] Yes    [x] No    If yes, complete Travel Screening and ask the following:   [] [Fever OR s/sxs of lower respiratory illness]  AND  [close contact with lab-confirmed COVID-19 patient within 14 days of symptom onset   [] [Fever AND s/sxs of lower respiratory illness requiring hospitalization] AND [history of travel to [place], [place], [place], [place], [place] within 14 days of sx onset]  [] [Fever with severe acute lower respiratory illness (I.e. PNA, ARDS) requiring hospitalization and without alternative explanatory diagnosis (I.e. Influenza)] AND [no source of exposure identified]    [x] None of the following; patient confirmed for regularly scheduled INR check and instructed to call the clinic immediately if any symptoms develop prior to upcoming appointment.

## 2020-08-11 NOTE — TELEPHONE ENCOUNTER
Inform patient referral placed to Dr. Luma Brown pain management at [facility] to discuss options. Tasha Dorantes do not prescribe medical marijuana but you can discuss the option.

## 2020-08-12 ENCOUNTER — HOSPITAL ENCOUNTER (OUTPATIENT)
Dept: PHARMACY | Age: 75
Setting detail: THERAPIES SERIES
Discharge: HOME | End: 2020-08-12
Payer: MEDICARE

## 2020-08-12 VITALS — TEMPERATURE: 97.3 F

## 2020-08-12 LAB — INR BLD: 2.2

## 2020-08-12 PROCEDURE — 85610 PROTHROMBIN TIME: CPT

## 2020-08-12 PROCEDURE — 99211 OFF/OP EST MAY X REQ PHY/QHP: CPT

## 2020-08-12 NOTE — PROGRESS NOTES
Fingerstick INR drawn per clinic protocol. Patient states no visible blood in urine and no black tarry stool. Denies any missed doses of warfarin. Felipa Fulling states she has not been eating salads lately but has been eating zuchinni and corn. Felipa Fulling states she is no longer on CBD gummies, trazodone or prednisone. Felipa Fulling states she thinks she pulled a muscle at PT and is refusing to go back. She is scheduled to see Dr. Giselle Montejo on 8/21 and is interested in finding out more about medical marijuana. Since Jo's INR is therapeutic, we will continue current weekly warfarin regimen and recheck INR in 4 week(s). Patient acknowledges working in consult agreement with pharmacist as referred by his/her physician.         CLINICAL PHARMACY CONSULT: MED RECONCILIATION/REVIEW ADDENDUM    For Pharmacy Admin Tracking Only    PHSO: Yes  Total # of Interventions Recommended: 2  - Discontinued Medication #: 2 Discontinue Reason(s): Acute Therapy Complete, No Longer Used  - Maintenance Safety Lab Monitoring #: 1  Total Interventions Accepted: 0  Time Spent (min): 400 [facility], 251 [facility]

## 2020-08-21 ENCOUNTER — OFFICE VISIT (OUTPATIENT)
Dept: PAIN MANAGEMENT | Age: 75
End: 2020-08-21
Payer: MEDICARE

## 2020-08-21 VITALS
HEART RATE: 86 BPM | WEIGHT: 254.8 LBS | SYSTOLIC BLOOD PRESSURE: 102 MMHG | OXYGEN SATURATION: 93 % | DIASTOLIC BLOOD PRESSURE: 80 MMHG | TEMPERATURE: 97.2 F | HEIGHT: 63 IN | BODY MASS INDEX: 45.15 KG/M2

## 2020-08-21 PROCEDURE — 4040F PNEUMOC VAC/ADMIN/RCVD: CPT | Performed by: PHYSICAL MEDICINE & REHABILITATION

## 2020-08-21 PROCEDURE — 1036F TOBACCO NON-USER: CPT | Performed by: PHYSICAL MEDICINE & REHABILITATION

## 2020-08-21 PROCEDURE — G8417 CALC BMI ABV UP PARAM F/U: HCPCS | Performed by: PHYSICAL MEDICINE & REHABILITATION

## 2020-08-21 PROCEDURE — G8399 PT W/DXA RESULTS DOCUMENT: HCPCS | Performed by: PHYSICAL MEDICINE & REHABILITATION

## 2020-08-21 PROCEDURE — G8427 DOCREV CUR MEDS BY ELIG CLIN: HCPCS | Performed by: PHYSICAL MEDICINE & REHABILITATION

## 2020-08-21 PROCEDURE — 1123F ACP DISCUSS/DSCN MKR DOCD: CPT | Performed by: PHYSICAL MEDICINE & REHABILITATION

## 2020-08-21 PROCEDURE — 99204 OFFICE O/P NEW MOD 45 MIN: CPT | Performed by: PHYSICAL MEDICINE & REHABILITATION

## 2020-08-21 PROCEDURE — 1090F PRES/ABSN URINE INCON ASSESS: CPT | Performed by: PHYSICAL MEDICINE & REHABILITATION

## 2020-08-21 NOTE — PROGRESS NOTES
Pain Management Consultation    [facility] [address] [place] [address] PHYSICAL MEDICINE & REHABILITATION  [address] [address]  Dept: [phone]    Marvirgil Mayco, APRN - CNP  [address] [facility], [address] [place] [address]       Veda Landaverde is a 66 y.o. female, who came today due to  Back pain and middle shoulder pain     Cause of the symptom(s): degenerative (arthritis)    Onset: 20 years     Quality of Symptoms: aches     Aggravating factors: lifting, twisting, bending forward, bending backward, coughing and sneezing    Relieving factors: resting    Red Flags: None     Treatment done: anti-inflammatory medication , physical therapy a month ago. Current pain level: 5 on the scale of 0-10 ( 10 being worst)       Allergies   Allergen Reactions    Clarithromycin Rash     Patient says she will \"never take this again\"     Merthiolate Glycerite [Thimerosal] Dermatitis     Redness, blisters    Tylenol With Codeine #3 [Acetaminophen-Codeine] Nausea And Vomiting       Social History     Socioeconomic History    Marital status:       Spouse name: Not on file    Number of children: Not on file    Years of education: Not on file    Highest education level: Not on file   Occupational History    Not on file   Social Needs    Financial resource strain: Not on file    Food insecurity     Worry: Not on file     Inability: Not on file    Transportation needs     Medical: Not on file     Non-medical: Not on file   Tobacco Use    Smoking status: Former Smoker     Packs/day: 0.25     Years: 4.00     Pack years: 1.00     Types: Cigarettes     Last attempt to quit: 1981     Years since quittin.6    Smokeless tobacco: Never Used   Substance and Sexual Activity    Alcohol use: No    Drug use: No    Sexual activity: Not on file   Lifestyle    Physical activity     Days per week: Not on file     Minutes per session: Not on file    Stress: Not on file   Relationships    Social connections     Talks on phone: Not on file     Gets together: Not on file     Attends Jainism service: Not on file     Active member of club or organization: Not on file     Attends meetings of clubs or organizations: Not on file     Relationship status: Not on file    Intimate partner violence     Fear of current or ex partner: Not on file     Emotionally abused: Not on file     Physically abused: Not on file     Forced sexual activity: Not on file   Other Topics Concern    Not on file   Social History Narrative    Not on file       Past Medical History:   Diagnosis Date    Atrial fibrillation ([address]     CHF (congestive heart failure) ([address]     Gout     Hearing loss 2015    bilateral hearing aids     History of colon polyps 2016    Hypertension     Kidney stones     Mitral valve insufficiency     Obesity, morbid, BMI 40.0-49.9 ([facility]     Osteoarthritis     Primary osteoarthritis of both knees     Dr. Gustabo Cornejo Pulmonary HTN [facility])        Past Surgical History:   Procedure Laterality Date    CARPAL TUNNEL RELEASE      bilaterally    COLONOSCOPY  11/28/2016    Miesha Licona MD    COLONOSCOPY  05/2017    at [address] [place] [address]. PYlori positive, adenoma, hyperplastic polyp    COLONOSCOPY  06/14/2018, 10/2019    DR. Sonia Zhang -tubulovillous adenoma repeat in 1 year 2020    JOINT REPLACEMENT Left 07/17/2019    knee    LITHOTRIPSY      TOTAL KNEE ARTHROPLASTY Left 7/17/2019    LEFT KNEE TOTAL ARTHROPLASTY- DEPUY performed by Rod Melo DO at [facility] ENDOSCOPY  11/28/2016    Miesha Licona MD       Prior to Visit Medications    Medication Sig Taking?  Authorizing Provider   warfarin (COUMADIN) 10 MG tablet Take 1/2 tablet daily every day of the week or as managed by Jany Whittington Yes Shubham Quiroz MD   spironolactone (ALDACTONE) 25 MG tablet TAKE 1 TABLET TWICE DAILY Yes Shubham Quiroz MD   atenolol (TENORMIN) 50 MG tablet TAKE 1 Vance Foster MD   allopurinol (ZYLOPRIM) 300 MG tablet Take 0.5 tablets by mouth daily Yes TYLOR Ruelas - CNP   furosemide (LASIX) 40 MG tablet Take 1 tablet by mouth 2 times daily Take 40 mg once daily in the morning and take 40 mg once in the evening if needed for swelling Yes Jori Bowen MD   diltiazem (CARDIZEM) 60 MG tablet TAKE 1 TABLET EVERY 8 HOURS Yes Jori Bowen MD   aspirin 81 MG EC tablet Take 81 mg by mouth daily Yes Historical Provider, MD   Cholecalciferol (VITAMIN D) 2000 UNITS CAPS capsule Take 2,000 Units by mouth 2 times daily. Yes Historical Provider, MD       Family History   Problem Relation Age of Onset    High Blood Pressure Mother     Diabetes Father        Review of Systems : All systems reviewed, all unremarkable other than HPI/subjective. No change since last visit. Denies  fever, chills, infection or non healing wound. /80 (Site: Right Upper Arm, Position: Sitting, Cuff Size: Medium Adult)   Pulse 86   Temp 97.2 °F (36.2 °C)   Ht 5' 3\" (1.6 m)   Wt 254 lb 12.8 oz (115.6 kg)   SpO2 93%   BMI 45.14 kg/m²       Physical Exam  Constitutional:       General: She is not in acute distress. Appearance: Normal appearance. HENT:      Head: Atraumatic. Pulmonary:      Effort: Pulmonary effort is normal.      Breath sounds: Normal breath sounds. Musculoskeletal:         General: Tenderness present. Comments: Lumbar facet loading   Skin:     General: Skin is dry. Neurological:      Mental Status: She is alert and oriented to person, place, and time. Psychiatric:         Behavior: Behavior normal.         Assessment and Plan:      Diagnosis Orders   1.  Lumbosacral spondylosis without myelopathy  XR LUMBAR SPINE (2-3 VIEWS)    MS INJ DX/THER AGNT PARAVERT FACET JOINT, LUMBAR/SAC, 1ST LEVEL    MS INJ DX/THER AGNT PARAVERT FACET JOINT, LUMBAR/SAC, 2ND LEVEL       X ray today to check severity of arthritis       Trial of bilateral L3-4 medial branch and L5 dorsal rami injection to address the L4-5 and L5-S1 joint(s). Schedule injection in 2 weeks     Schedule ff up post injection virtually 2 weeks post op       I have reviewed the chief complaint and HPI including the [facility] and Vital documentation by my staff and I agree with their documentation and have added where applicable. Time spent with patient was 45  minutes. More than 50% was spent counseling/coordinating the patient's care.      Ally Granado MD   Spine Medicine/PM&R

## 2020-08-27 ENCOUNTER — TELEPHONE (OUTPATIENT)
Dept: PHARMACY | Age: 75
End: 2020-08-27

## 2020-08-27 NOTE — TELEPHONE ENCOUNTER
Fausto Mendenhall called and wondered Jolly Mayor was going on\" for her upcoming procedure with Dr. Katelyn Villasenor on 9/4/2020. After electronic chart review I see the procedure scheduled. Fausto Mendenhall states that she was instructed to hold warfarin x 3 days, ASA x 5 days, and I did call Dr. Katelyn Villasenor office and spoke with [place] who verified that [facility] is supposed to hold warfarin x 3 days and ASA x 5 days. I will contact Dr. Luann Sanchez and double check if it is okay for Fausto Mendenhall to hold coumadin and not bridge with lovenox. I told Fausto Mendenhall we would call her back once we got clarification from Dr. Luann Sanchez.

## 2020-08-27 NOTE — TELEPHONE ENCOUNTER
Valentino Sayers called back and states Dr. Jonas Quinteros okay no lovenox bridge. Called Jayne Louise and she verbally verified understanding.

## 2020-09-01 ENCOUNTER — HOSPITAL ENCOUNTER (OUTPATIENT)
Age: 75
Discharge: HOME | End: 2020-09-03
Payer: MEDICARE

## 2020-09-01 ENCOUNTER — HOSPITAL ENCOUNTER (OUTPATIENT)
Dept: GENERAL RADIOLOGY | Age: 75
Discharge: HOME | End: 2020-09-03
Payer: MEDICARE

## 2020-09-01 ENCOUNTER — HOSPITAL ENCOUNTER (OUTPATIENT)
Age: 75
Discharge: HOME | End: 2020-09-01
Payer: MEDICARE

## 2020-09-01 PROCEDURE — 72110 X-RAY EXAM L-2 SPINE 4/>VWS: CPT

## 2020-09-01 PROCEDURE — 93005 ELECTROCARDIOGRAM TRACING: CPT

## 2020-09-02 LAB
EKG ATRIAL RATE: 73 BPM
EKG Q-T INTERVAL: 384 MS
EKG QRS DURATION: 90 MS
EKG QTC CALCULATION (BAZETT): 411 MS
EKG R AXIS: -53 DEGREES
EKG T AXIS: 45 DEGREES
EKG VENTRICULAR RATE: 69 BPM

## 2020-09-02 PROCEDURE — 93010 ELECTROCARDIOGRAM REPORT: CPT | Performed by: INTERNAL MEDICINE

## 2020-09-04 ENCOUNTER — APPOINTMENT (OUTPATIENT)
Dept: GENERAL RADIOLOGY | Age: 75
End: 2020-09-04
Attending: PHYSICAL MEDICINE & REHABILITATION
Payer: MEDICARE

## 2020-09-04 ENCOUNTER — HOSPITAL ENCOUNTER (OUTPATIENT)
Dept: PREADMISSION TESTING | Age: 75
Setting detail: SPECIMEN
Discharge: HOME | End: 2020-09-04
Payer: MEDICARE

## 2020-09-04 ENCOUNTER — HOSPITAL ENCOUNTER (OUTPATIENT)
Age: 75
Setting detail: OUTPATIENT SURGERY
Discharge: HOME | End: 2020-09-04
Attending: PHYSICAL MEDICINE & REHABILITATION | Admitting: PHYSICAL MEDICINE & REHABILITATION
Payer: MEDICARE

## 2020-09-04 VITALS
HEIGHT: 63 IN | OXYGEN SATURATION: 98 % | BODY MASS INDEX: 45.23 KG/M2 | DIASTOLIC BLOOD PRESSURE: 78 MMHG | RESPIRATION RATE: 18 BRPM | WEIGHT: 255.3 LBS | TEMPERATURE: 98.1 F | SYSTOLIC BLOOD PRESSURE: 113 MMHG | HEART RATE: 77 BPM

## 2020-09-04 LAB
INR BLD: 1.3
PROTHROMBIN TIME: 15.1 SEC (ref 11.5–14.2)
SARS-COV-2, PCR: NORMAL
SARS-COV-2, RAPID: NOT DETECTED
SARS-COV-2: NORMAL
SOURCE: NORMAL

## 2020-09-04 PROCEDURE — 99152 MOD SED SAME PHYS/QHP 5/>YRS: CPT | Performed by: PHYSICAL MEDICINE & REHABILITATION

## 2020-09-04 PROCEDURE — 2500000003 HC RX 250 WO HCPCS: Performed by: PHYSICAL MEDICINE & REHABILITATION

## 2020-09-04 PROCEDURE — 6360000002 HC RX W HCPCS: Performed by: PHYSICAL MEDICINE & REHABILITATION

## 2020-09-04 PROCEDURE — C9803 HOPD COVID-19 SPEC COLLECT: HCPCS

## 2020-09-04 PROCEDURE — 3600000054 HC PAIN LEVEL 3 BASE: Performed by: PHYSICAL MEDICINE & REHABILITATION

## 2020-09-04 PROCEDURE — 64493 INJ PARAVERT F JNT L/S 1 LEV: CPT | Performed by: PHYSICAL MEDICINE & REHABILITATION

## 2020-09-04 PROCEDURE — 7100000011 HC PHASE II RECOVERY - ADDTL 15 MIN: Performed by: PHYSICAL MEDICINE & REHABILITATION

## 2020-09-04 PROCEDURE — 2709999900 HC NON-CHARGEABLE SUPPLY: Performed by: PHYSICAL MEDICINE & REHABILITATION

## 2020-09-04 PROCEDURE — 7100000010 HC PHASE II RECOVERY - FIRST 15 MIN: Performed by: PHYSICAL MEDICINE & REHABILITATION

## 2020-09-04 PROCEDURE — 85610 PROTHROMBIN TIME: CPT

## 2020-09-04 PROCEDURE — U0002 COVID-19 LAB TEST NON-CDC: HCPCS

## 2020-09-04 PROCEDURE — 64494 INJ PARAVERT F JNT L/S 2 LEV: CPT | Performed by: PHYSICAL MEDICINE & REHABILITATION

## 2020-09-04 PROCEDURE — 76000 FLUOROSCOPY <1 HR PHYS/QHP: CPT

## 2020-09-04 PROCEDURE — 3600000055 HC PAIN LEVEL 3 ADDL 15 MIN: Performed by: PHYSICAL MEDICINE & REHABILITATION

## 2020-09-04 PROCEDURE — 2580000003 HC RX 258: Performed by: PHYSICAL MEDICINE & REHABILITATION

## 2020-09-04 RX ORDER — SODIUM CHLORIDE, SODIUM LACTATE, POTASSIUM CHLORIDE, CALCIUM CHLORIDE 600; 310; 30; 20 MG/100ML; MG/100ML; MG/100ML; MG/100ML
INJECTION, SOLUTION INTRAVENOUS CONTINUOUS
Status: DISCONTINUED | OUTPATIENT
Start: 2020-09-04 | End: 2020-09-04 | Stop reason: HOSPADM

## 2020-09-04 RX ORDER — LIDOCAINE HYDROCHLORIDE 10 MG/ML
INJECTION, SOLUTION EPIDURAL; INFILTRATION; INTRACAUDAL; PERINEURAL PRN
Status: DISCONTINUED | OUTPATIENT
Start: 2020-09-04 | End: 2020-09-04 | Stop reason: ALTCHOICE

## 2020-09-04 RX ORDER — MIDAZOLAM HYDROCHLORIDE 1 MG/ML
INJECTION INTRAMUSCULAR; INTRAVENOUS PRN
Status: DISCONTINUED | OUTPATIENT
Start: 2020-09-04 | End: 2020-09-04 | Stop reason: ALTCHOICE

## 2020-09-04 RX ORDER — TRIAMCINOLONE ACETONIDE 40 MG/ML
INJECTION, SUSPENSION INTRA-ARTICULAR; INTRAMUSCULAR PRN
Status: DISCONTINUED | OUTPATIENT
Start: 2020-09-04 | End: 2020-09-04 | Stop reason: ALTCHOICE

## 2020-09-04 RX ADMIN — SODIUM CHLORIDE, POTASSIUM CHLORIDE, SODIUM LACTATE AND CALCIUM CHLORIDE: 600; 310; 30; 20 INJECTION, SOLUTION INTRAVENOUS at 12:47

## 2020-09-04 ASSESSMENT — PAIN - FUNCTIONAL ASSESSMENT: PAIN_FUNCTIONAL_ASSESSMENT: 0-10

## 2020-09-04 ASSESSMENT — PAIN SCALES - GENERAL
PAINLEVEL_OUTOF10: 0
PAINLEVEL_OUTOF10: 0

## 2020-09-04 NOTE — H&P
Minda Lucero MD      Ember Mares is a 66 y.o. female, who came in for a procedure,  Bilateral L3-4 medial branch and L5 dorsal rami injection. No change since last visit    Aching, throbbing pain     Worse with activity     Treatment done: physical therapy, anti-inflammatory medication and muscle relaxant    Allergies   Allergen Reactions    Clarithromycin Rash     Patient says she will \"never take this again\"     Merthiolate Glycerite [Thimerosal] Dermatitis     Redness, blisters    Tylenol With Codeine #3 [Acetaminophen-Codeine] Nausea And Vomiting       Social History     Socioeconomic History    Marital status:       Spouse name: Not on file    Number of children: Not on file    Years of education: Not on file    Highest education level: Not on file   Occupational History    Not on file   Social Needs    Financial resource strain: Not on file    Food insecurity     Worry: Not on file     Inability: Not on file    Transportation needs     Medical: Not on file     Non-medical: Not on file   Tobacco Use    Smoking status: Former Smoker     Packs/day: 0.25     Years: 4.00     Pack years: 1.00     Types: Cigarettes     Last attempt to quit: 1981     Years since quittin.7    Smokeless tobacco: Never Used   Substance and Sexual Activity    Alcohol use: No    Drug use: No    Sexual activity: Not on file   Lifestyle    Physical activity     Days per week: Not on file     Minutes per session: Not on file    Stress: Not on file   Relationships    Social connections     Talks on phone: Not on file     Gets together: Not on file     Attends Baptism service: Not on file     Active member of club or organization: Not on file     Attends meetings of clubs or organizations: Not on file     Relationship status: Not on file    Intimate partner violence     Fear of current or ex partner: Not on file     Emotionally abused: Not on file     Physically abused: Not on file Forced sexual activity: Not on file   Other Topics Concern    Not on file   Social History Narrative    Not on file       Past Medical History:   Diagnosis Date    Atrial fibrillation [facility]     CHF (congestive heart failure) ([facility]     Gout     Hearing loss 2015    bilateral hearing aids     History of colon polyps 2016    Hypertension     Kidney stones     Mitral valve insufficiency     Obesity, morbid, BMI 40.0-49.9 (HCC)     Osteoarthritis     Primary osteoarthritis of both knees     Dr. Luis Reza Pulmonary HTN [facility])        Past Surgical History:   Procedure Laterality Date    CARPAL TUNNEL RELEASE      bilaterally    COLONOSCOPY  11/28/2016    Mayito Herrera MD    COLONOSCOPY  05/2017    at [address] [place] [address]. PYlori positive, adenoma, hyperplastic polyp    COLONOSCOPY  06/14/2018, 10/2019    DR. Dakota Franks -tubulovillous adenoma repeat in 1 year 2020    JOINT REPLACEMENT Left 07/17/2019    knee    LITHOTRIPSY      TOTAL KNEE ARTHROPLASTY Left 7/17/2019    LEFT KNEE TOTAL ARTHROPLASTY- DEPUY performed by Priya Rowe DO at [address] [place] [address] ENDOSCOPY  11/28/2016    Mayito Herrera MD       Prior to Visit Medications    Medication Sig Taking?  Authorizing Provider   warfarin (COUMADIN) 10 MG tablet Take 1/2 tablet daily every day of the week or as managed by Adelia Cabrera MD   spironolactone (ALDACTONE) 25 MG tablet TAKE 1 TABLET TWICE DAILY  Abhishek Toure MD   atenolol (TENORMIN) 50 MG tablet TAKE 1 TABLET EVERY DAY  Abhishek Toure MD   allopurinol (ZYLOPRIM) 300 MG tablet Take 0.5 tablets by mouth daily  TYLOR Sorto - CNP   furosemide (LASIX) 40 MG tablet Take 1 tablet by mouth 2 times daily Take 40 mg once daily in the morning and take 40 mg once in the evening if needed for swelling  Abhishek Toure MD   diltiazem (CARDIZEM) 60 MG tablet TAKE 1 TABLET EVERY 8 HOURS  Abhishek Toure MD   aspirin 81 MG EC

## 2020-09-04 NOTE — SEDATION DOCUMENTATION
Conscious Sedation Pre and Post Procedure Note    Indication: procedural pain management    Consent: I have discussed with the patient and/or the patient representative the indication, alternatives, and the possible risks and/or complications of the planned procedure and the anesthesia methods. The patient and/or patient representative appear to understand and agree to proceed. Physician Involvement: The attending physician was present and supervising this procedure. Pre-Sedation Documentation and Exam: I have personally completed a history, physical exam & review of systems for this patient (see notes). Airway Assessment: Mallampati Class II - (soft palate, fauces & uvula are visible)    Prior History of Anesthesia Complications: none    ASA Classification: Class 3 - A patient with severe systemic disease that limits activity but is not incapacitating    Sedation/ Anesthesia Plan: intravenous sedation    Medications Used: midazolam (Versed) intravenously     Monitoring and Safety: The patient was placed on a cardiac monitor and vital signs, pulse oximetry and level of consciousness were continuously evaluated throughout the procedure. The patient was closely monitored until recovery from the medications was complete and the patient had returned to baseline status. Respiratory therapy was on standby at all times during the procedure. (The following sections must be completed)    Post-Sedation Vital Signs: Vital signs were reviewed and were stable after the procedure (see flow sheet for vitals)            Post-Sedation Exam: Neurologically intact. No cardiovascular compromise post injection.               Complications: none

## 2020-09-04 NOTE — PROGRESS NOTES

## 2020-09-09 ENCOUNTER — TELEPHONE (OUTPATIENT)
Dept: PHARMACY | Age: 75
End: 2020-09-09

## 2020-09-09 NOTE — TELEPHONE ENCOUNTER
COVID-19 phone screening     Call placed to screen patient prior to upcoming Medication Management visit for Anticoagulation. Does patient have fever and/or lower respiratory symptoms (SOB, difficulty breathing, cough)? [] Yes    [x] No    If yes, complete Travel Screening and ask the following:   [] [Fever OR s/sxs of lower respiratory illness]  AND  [close contact with lab-confirmed COVID-19 patient within 14 days of symptom onset   [] [Fever AND s/sxs of lower respiratory illness requiring hospitalization] AND [history of travel to [place], [place], [place], [facility], [place] within 14 days of sx onset]  [] [Fever with severe acute lower respiratory illness (I.e. PNA, ARDS) requiring hospitalization and without alternative explanatory diagnosis (I.e. Influenza)] AND [no source of exposure identified]    [x] None of the following; patient confirmed for regularly scheduled INR check and instructed to call the clinic immediately if any symptoms develop prior to upcoming appointment. Felipa Peralta called back to say her appointment card said September 17th at 11am. She states her appt was supposed to be out one more week since she was not taking her warfarin for a procedure last Friday. Moved her appt back to the 17th and said we would give her a call back if she needed to be seen tomorrow or if her time needed to be changed.

## 2020-09-10 ENCOUNTER — HOSPITAL ENCOUNTER (OUTPATIENT)
Dept: NON INVASIVE DIAGNOSTICS | Age: 75
Discharge: HOME | End: 2020-09-10
Payer: MEDICARE

## 2020-09-10 ENCOUNTER — APPOINTMENT (OUTPATIENT)
Dept: PHARMACY | Age: 75
End: 2020-09-10
Payer: MEDICARE

## 2020-09-10 ENCOUNTER — HOSPITAL ENCOUNTER (OUTPATIENT)
Dept: GENERAL RADIOLOGY | Age: 75
Discharge: HOME | End: 2020-09-12
Payer: MEDICARE

## 2020-09-10 ENCOUNTER — HOSPITAL ENCOUNTER (OUTPATIENT)
Age: 75
Discharge: HOME | End: 2020-09-12
Payer: MEDICARE

## 2020-09-10 ENCOUNTER — HOSPITAL ENCOUNTER (OUTPATIENT)
Age: 75
Discharge: HOME | End: 2020-09-10
Payer: MEDICARE

## 2020-09-10 LAB
ABSOLUTE EOS #: 0 K/UL (ref 0–0.4)
ABSOLUTE IMMATURE GRANULOCYTE: ABNORMAL K/UL (ref 0–0.3)
ABSOLUTE LYMPH #: 1 K/UL (ref 1–4.8)
ABSOLUTE MONO #: 0.9 K/UL (ref 0–1)
ALBUMIN SERPL-MCNC: 4.4 G/DL (ref 3.5–5.2)
ALBUMIN/GLOBULIN RATIO: ABNORMAL (ref 1–2.5)
ALP BLD-CCNC: 92 U/L (ref 35–104)
ALT SERPL-CCNC: 21 U/L (ref 5–33)
ANION GAP SERPL CALCULATED.3IONS-SCNC: 11 MMOL/L (ref 9–17)
AST SERPL-CCNC: 16 U/L
BASOPHILS # BLD: 0 % (ref 0–2)
BASOPHILS ABSOLUTE: 0 K/UL (ref 0–0.2)
BILIRUB SERPL-MCNC: 0.76 MG/DL (ref 0.3–1.2)
BUN BLDV-MCNC: 27 MG/DL (ref 8–23)
BUN/CREAT BLD: 32 (ref 9–20)
CALCIUM SERPL-MCNC: 9.4 MG/DL (ref 8.6–10.4)
CHLORIDE BLD-SCNC: 103 MMOL/L (ref 98–107)
CHOLESTEROL/HDL RATIO: 2.3
CHOLESTEROL: 161 MG/DL
CO2: 22 MMOL/L (ref 20–31)
CREAT SERPL-MCNC: 0.84 MG/DL (ref 0.5–0.9)
DIFFERENTIAL TYPE: YES
EOSINOPHILS RELATIVE PERCENT: 0 % (ref 0–5)
GFR AFRICAN AMERICAN: >60 ML/MIN
GFR NON-AFRICAN AMERICAN: >60 ML/MIN
GFR SERPL CREATININE-BSD FRML MDRD: ABNORMAL ML/MIN/{1.73_M2}
GFR SERPL CREATININE-BSD FRML MDRD: ABNORMAL ML/MIN/{1.73_M2}
GLUCOSE BLD-MCNC: 107 MG/DL (ref 70–99)
HCT VFR BLD CALC: 43.2 % (ref 36–46)
HDLC SERPL-MCNC: 69 MG/DL
HEMOGLOBIN: 14.3 G/DL (ref 12–16)
IMMATURE GRANULOCYTES: ABNORMAL %
LDL CHOLESTEROL: 77 MG/DL (ref 0–130)
LV EF: 45 %
LVEF MODALITY: NORMAL
LYMPHOCYTES # BLD: 10 % (ref 15–40)
MAGNESIUM: 2.6 MG/DL (ref 1.6–2.6)
MCH RBC QN AUTO: 29.7 PG (ref 26–34)
MCHC RBC AUTO-ENTMCNC: 33.2 G/DL (ref 31–37)
MCV RBC AUTO: 89.7 FL (ref 80–100)
MONOCYTES # BLD: 8 % (ref 4–8)
NRBC AUTOMATED: ABNORMAL PER 100 WBC
PATIENT FASTING?: YES
PDW BLD-RTO: 15.5 % (ref 12.1–15.2)
PLATELET # BLD: 203 K/UL (ref 140–450)
PLATELET ESTIMATE: ABNORMAL
PMV BLD AUTO: ABNORMAL FL (ref 6–12)
POTASSIUM SERPL-SCNC: 4.3 MMOL/L (ref 3.7–5.3)
RBC # BLD: 4.82 M/UL (ref 4–5.2)
RBC # BLD: ABNORMAL 10*6/UL
SEG NEUTROPHILS: 82 % (ref 47–75)
SEGMENTED NEUTROPHILS ABSOLUTE COUNT: 8.7 K/UL (ref 2.5–7)
SODIUM BLD-SCNC: 136 MMOL/L (ref 135–144)
TOTAL PROTEIN: 8.8 G/DL (ref 6.4–8.3)
TRIGL SERPL-MCNC: 76 MG/DL
TSH SERPL DL<=0.05 MIU/L-ACNC: 1.05 MIU/L (ref 0.3–5)
VITAMIN D 25-HYDROXY: 65.2 NG/ML (ref 30–100)
VLDLC SERPL CALC-MCNC: NORMAL MG/DL (ref 1–30)
WBC # BLD: 10.7 K/UL (ref 3.5–11)
WBC # BLD: ABNORMAL 10*3/UL

## 2020-09-10 PROCEDURE — 83735 ASSAY OF MAGNESIUM: CPT

## 2020-09-10 PROCEDURE — 82306 VITAMIN D 25 HYDROXY: CPT

## 2020-09-10 PROCEDURE — 93306 TTE W/DOPPLER COMPLETE: CPT

## 2020-09-10 PROCEDURE — 80061 LIPID PANEL: CPT

## 2020-09-10 PROCEDURE — 84443 ASSAY THYROID STIM HORMONE: CPT

## 2020-09-10 PROCEDURE — 36415 COLL VENOUS BLD VENIPUNCTURE: CPT

## 2020-09-10 PROCEDURE — 80053 COMPREHEN METABOLIC PANEL: CPT

## 2020-09-10 PROCEDURE — 85025 COMPLETE CBC W/AUTO DIFF WBC: CPT

## 2020-09-10 PROCEDURE — 71046 X-RAY EXAM CHEST 2 VIEWS: CPT

## 2020-09-16 ENCOUNTER — TELEPHONE (OUTPATIENT)
Dept: PHARMACY | Age: 75
End: 2020-09-16

## 2020-09-17 ENCOUNTER — OFFICE VISIT (OUTPATIENT)
Dept: CARDIOLOGY CLINIC | Age: 75
End: 2020-09-17
Payer: MEDICARE

## 2020-09-17 ENCOUNTER — HOSPITAL ENCOUNTER (OUTPATIENT)
Dept: PHARMACY | Age: 75
Setting detail: THERAPIES SERIES
Discharge: HOME | End: 2020-09-17
Payer: MEDICARE

## 2020-09-17 VITALS
DIASTOLIC BLOOD PRESSURE: 70 MMHG | WEIGHT: 242 LBS | HEART RATE: 80 BPM | SYSTOLIC BLOOD PRESSURE: 120 MMHG | OXYGEN SATURATION: 97 % | BODY MASS INDEX: 42.87 KG/M2

## 2020-09-17 VITALS — TEMPERATURE: 97.5 F

## 2020-09-17 LAB — INR BLD: 1.7

## 2020-09-17 PROCEDURE — 1036F TOBACCO NON-USER: CPT | Performed by: INTERNAL MEDICINE

## 2020-09-17 PROCEDURE — 1123F ACP DISCUSS/DSCN MKR DOCD: CPT | Performed by: INTERNAL MEDICINE

## 2020-09-17 PROCEDURE — G8427 DOCREV CUR MEDS BY ELIG CLIN: HCPCS | Performed by: INTERNAL MEDICINE

## 2020-09-17 PROCEDURE — 85610 PROTHROMBIN TIME: CPT

## 2020-09-17 PROCEDURE — 4040F PNEUMOC VAC/ADMIN/RCVD: CPT | Performed by: INTERNAL MEDICINE

## 2020-09-17 PROCEDURE — G8417 CALC BMI ABV UP PARAM F/U: HCPCS | Performed by: INTERNAL MEDICINE

## 2020-09-17 PROCEDURE — 99214 OFFICE O/P EST MOD 30 MIN: CPT | Performed by: INTERNAL MEDICINE

## 2020-09-17 PROCEDURE — 99212 OFFICE O/P EST SF 10 MIN: CPT

## 2020-09-17 PROCEDURE — G8399 PT W/DXA RESULTS DOCUMENT: HCPCS | Performed by: INTERNAL MEDICINE

## 2020-09-17 PROCEDURE — 1090F PRES/ABSN URINE INCON ASSESS: CPT | Performed by: INTERNAL MEDICINE

## 2020-09-17 RX ORDER — WARFARIN SODIUM 10 MG/1
TABLET ORAL
Qty: 90 TABLET | Refills: 3 | Status: SHIPPED
Start: 2020-09-17 | End: 2020-12-10

## 2020-09-17 RX ORDER — FUROSEMIDE 40 MG/1
40 TABLET ORAL DAILY
COMMUNITY
End: 2020-10-26

## 2020-09-17 RX ORDER — DILTIAZEM HYDROCHLORIDE 60 MG/1
TABLET, FILM COATED ORAL
Qty: 270 TABLET | Refills: 3 | Status: SHIPPED | OUTPATIENT
Start: 2020-09-17 | End: 2021-10-05 | Stop reason: SDUPTHER

## 2020-09-17 NOTE — LETTER
Diamante Swanson M.D. [address] [place] [address]  [phone]        2020        Elijah Oneill, CNP  [address] [address]    RE:   Alice Hernandez  :  1945    Dear Malissa Wolff:    CHIEF COMPLAINT:  1. Chronic atrial fibrillation. 2.  On Coumadin. 3.  Mitral regurgitation. 4.  Mild LV dysfunction. HISTORY OF PRESENT ILLNESS:  I had the pleasure of seeing Mrs. Doreen Cuello in our office on 2020. She is a pleasant 68-year-old female, who has chronic atrial fibrillation on Coumadin with good rate control. She has moderate-to-severe mitral regurgitation, with an EF in the 45% to 50% range. She overall has done well. She had her left knee replaced by Dr. Black Lebron in 2019. She overall is increasing her activity. She still uses a walker but it seems to be more for her back pain than it does for her knee pain. She does have chronic pain in her back. She had an injection in her lower back. She was having neck pain and right shoulder pain that disappeared after the injection in her lower back. She has had no syncope or near syncope. No lightheadedness or dizziness. She is trying to do more activity. She does some hoeing outside. Her granddaughter, who works for [employer], helps her with the yard work. Granddaughter just moved into Mrs. Spann's parent's house. She has never had a myocardial infarction. CARDIAC RISK FACTORS:  Hypertension:  Positive. Hyperlipidemia:  Positive. Peripheral Vascular Disease:  Negative. Smoking:  Negative. Diabetes:  Negative. Other Family Members:  Negative. MEDICATIONS AT HOME:  She is currently on Zyloprim 300 mg half a tablet daily, aspirin 81 mg daily, Tenormin 50 mg daily, vitamin D 2000 units b.i.d., Cardizem 60 mg t.i.d., Lasix 40 mg daily, Aldactone 25 mg b.i.d., Coumadin as directed. PAST MEDICAL AND SURGICAL HISTORY:  1. Tubal ligation many years ago. 2.  Tonsillectomy many years ago. 3.  Hypertension. 4.  Loud snoring history and has probable sleep apnea although she will not do a sleep study. 5.  Chronic atrial fibrillation, on Coumadin and rate control. 6.  History of pulmonary hypertension with a PA pressure in the 40 to 50 range. 7.  Carpal tunnel release, 6 years ago. 8.  Colonoscopy in 2017.  9.  Left knee replacement by Dr. Duey Boast in 07/2019. FAMILY HISTORY:  No significant heart disease in the family. SOCIAL HISTORY:  She is 66years old,  for 6-1/2 years. Three children. Does smoke or drink alcohol. Very inactive. Has chronic back pain. She has a 15year-old Magdalene Maljamar by the name of Alere. Her granddaughter is approximately 27years old, works for [employer] as does her sister. Granddaughter will be getting  soon and is moving into the house that Mrs. Lolis Noguera was raised in. REVIEW OF SYSTEMS:  Cardiac as above. Other systems reviewed including constitutional, eyes, ears, nose and throat, cardiovascular, respiratory, GI, , musculoskeletal, integumentary, neurologic, endocrine, hematologic and allergic/immunologic are negative except for what is described above. No weight loss or weight gain. No change in bowel habits, no blood in stools. No fevers, sweats or chills. PHYSICAL EXAMINATION:  VITAL SIGNS:  Her blood pressure was 120/70 with a heart rate of 80 and irregularly irregular. Respiration were 18. O2 sat was 94%. GENERAL:  She is a very pleasant 47-year-old female. Denied pain. She was oriented to person, place and time. Answered questions appropriately. SKIN:  No unusual skin changes. HEENT:  The pupils are equally round and reactive to light and accommodation. Extraocular movements were intact. Mucous membranes were dry. NECK:  No JVD. Good carotid pulses. No carotid bruits.   No lymphadenopathy or thyromegaly. CARDIOVASCULAR EXAM:  S1 and S2 were normal.  No S3 or S4. Soft systolic blowing type murmur. No diastolic murmur. PMI was normal.  No lift, thrust, or pericardial friction rub. LUNGS:  Quite clear to auscultation and percussion. ABDOMEN:  Soft and nontender. Good bowel sounds. EXTREMITIES:  Good femoral pulses. Good pedal pulses. No pedal edema. Skin was warm and dry. No calf tenderness. Nail beds pink. Good cap refill. PULSES:  Bilateral symmetrical radial, brachial and carotid pulses. No carotid bruits. Good femoral and pedal pulses. NEUROLOGIC EXAM:  Within normal limits. PSYCHIATRIC EXAM:  Within normal limits. LABORATORY DATA:  Her sodium was 136, potassium 4.3, BUN 27, creatinine 0.84, GFR greater than 60, calcium was 9.4. Cholesterol 161, with an HDL of 69, LDL of 77, triglycerides 76. ALT was 21, AST was 16. TSH 1.05. Vitamin D 65. White count 10.7, hemoglobin 14.3 with a platelet count of 447,694. EKG showed atrial fibrillation with a controlled ventricular response. There is no change from previous EKGs. Echocardiogram on 09/10/2020, showed EF in the 45% range, which is essentially unchanged from previous echocardiograms. Left and right atrium were severely dilated with mildly dilated right ventricle. Her PA pressure was estimated at 45 to 50 mmHg. She had mild-to-moderate mitral regurgitation on this echocardiogram.    Chest x-ray was unremarkable. IMPRESSION:  1. Chronic atrial fibrillation, on Coumadin and rate control. 2.  Shortness of breath due to COPD and deconditioning, which has been stable. 3.  Mild LV dysfunction, EF of 45%. 4.  Moderate mitral regurgitation. 5.  Hypertension, very well controlled. 6.  Hyperlipidemia, well controlled. 7.  Status post left knee replacement in 07/2019.  8.  Chronic back pain, with a recent injection in her lower back, with her trying to do more activity outside.     PLAN: 1.  We will continue same medications. 2.  See in 1 year. DISCUSSION:  Mrs. Yasmine Pyle overall is doing well. She has had no unusual shortness of breath and she has had no chest pain or chest discomfort. She actually looks about the best I have seen her look for many years. Her cardiac status appears stable. She has very wide fluctuations in her mitral regurgitation really depending upon her blood pressure. Recently, her blood pressure has been in the 120 range, and therefore, her mitral regurgitation has been less. Thank you very much for allowing me the privilege of seeing Mrs. Yasmine Pyle. If you have any questions on my thoughts, please do not hesitate to contact me.      Sincerely,        Gino Macedo    D: 09/17/2020 10:38:32     T: 09/17/2020 10:46:26     [email or web address]  Job#: [id number]   Doc#: [id number]

## 2020-09-17 NOTE — PROGRESS NOTES
Fingerstick INR drawn per clinic protocol. Patient states no visible blood in urine and no black tarry stool. Felipa Peralta missed 3 doses of warfarin (and 5 days of ASA) prior to her \"6\" back injections by Dr. Giselle Montejo on 9/4/2020 and did not have to bridge with lovenox per Dr. Dolores Cui. No change in other maintenance medications or in diet. Felipa Peralta saw Dr. Dolores Cui today prior to our visit. Felipa Peralta states that since her back injections, her shoulder and neck feel better and she is able to \"do some things\" around the house. Since Jo's INR is subtherapeutic today, we will increase current weekly warfarin regimen by 14% and recheck INR in 4 week(s). Patient acknowledges working in consult agreement with pharmacist as referred by his/her physician.           CLINICAL PHARMACY CONSULT: MED RECONCILIATION/REVIEW ADDENDUM    For Pharmacy Admin Tracking Only    PHSO: Yes  Total # of Interventions Recommended: 1  - Increased Dose #: 1  - Maintenance Safety Lab Monitoring #: 1  Total Interventions Accepted: 1  Time Spent (min): [facility], [facility]

## 2020-09-17 NOTE — PROGRESS NOTES
Ov DR Kimmy Sanders 1 year follow up   No chest pain. Has sob not new. 2 weeks ago had   Inj. In back   Has a dog Olya age 15  Shitzu. Grand daughter works  For [employer]  Getting  soon.

## 2020-09-18 ENCOUNTER — OFFICE VISIT (OUTPATIENT)
Dept: PAIN MANAGEMENT | Age: 75
End: 2020-09-18
Payer: MEDICARE

## 2020-09-18 VITALS
WEIGHT: 248 LBS | BODY MASS INDEX: 43.94 KG/M2 | OXYGEN SATURATION: 93 % | TEMPERATURE: 98.2 F | HEIGHT: 63 IN | SYSTOLIC BLOOD PRESSURE: 132 MMHG | HEART RATE: 85 BPM | DIASTOLIC BLOOD PRESSURE: 80 MMHG

## 2020-09-18 PROCEDURE — 4040F PNEUMOC VAC/ADMIN/RCVD: CPT | Performed by: PHYSICAL MEDICINE & REHABILITATION

## 2020-09-18 PROCEDURE — 1090F PRES/ABSN URINE INCON ASSESS: CPT | Performed by: PHYSICAL MEDICINE & REHABILITATION

## 2020-09-18 PROCEDURE — G8417 CALC BMI ABV UP PARAM F/U: HCPCS | Performed by: PHYSICAL MEDICINE & REHABILITATION

## 2020-09-18 PROCEDURE — 1123F ACP DISCUSS/DSCN MKR DOCD: CPT | Performed by: PHYSICAL MEDICINE & REHABILITATION

## 2020-09-18 PROCEDURE — 1036F TOBACCO NON-USER: CPT | Performed by: PHYSICAL MEDICINE & REHABILITATION

## 2020-09-18 PROCEDURE — G8399 PT W/DXA RESULTS DOCUMENT: HCPCS | Performed by: PHYSICAL MEDICINE & REHABILITATION

## 2020-09-18 PROCEDURE — 99213 OFFICE O/P EST LOW 20 MIN: CPT | Performed by: PHYSICAL MEDICINE & REHABILITATION

## 2020-09-18 PROCEDURE — G8427 DOCREV CUR MEDS BY ELIG CLIN: HCPCS | Performed by: PHYSICAL MEDICINE & REHABILITATION

## 2020-09-18 NOTE — PATIENT INSTRUCTIONS
SURVEY:    You may be receiving a survey from [email or web address] regarding your visit today. Please complete the survey to enable us to provide the highest quality of care to you and your family. If you cannot score us a very good on any question, please call the office to discuss how we could of made your experience a very good one. Thank you.

## 2020-09-18 NOTE — PROGRESS NOTES
FOLLOW UP APPOINTMENT:         Raysa Figueredo MD    2020       Kadi Kaiser is a 66 y.o. female, who came for a   post injection follow up    Cause of the symptom(s): degenerative (arthritis) - had a lumbar medial branch block 2020 - relief was noted. Onset: chronic     Prior reatment done: physical therapy, injection and anti-inflammatory medication    Current pain level: 0 on the scale of 0-10 ( 10 being worst),  With minimal to moderate pain with activity. Quality of Symptoms: aching and throbbing    Denies side effects from the injection(s). Allergies   Allergen Reactions    Clarithromycin Rash     Patient says she will \"never take this again\"     Gabapentin Swelling     Pt states that her body swelled up, not her airway, no troubles breathing    Merthiolate Glycerite [Thimerosal] Dermatitis     Redness, blisters    Tylenol With Codeine #3 [Acetaminophen-Codeine] Nausea And Vomiting       Social History     Socioeconomic History    Marital status:       Spouse name: Not on file    Number of children: Not on file    Years of education: Not on file    Highest education level: Not on file   Occupational History    Not on file   Social Needs    Financial resource strain: Not on file    Food insecurity     Worry: Not on file     Inability: Not on file    Transportation needs     Medical: Not on file     Non-medical: Not on file   Tobacco Use    Smoking status: Former Smoker     Packs/day: 0.25     Years: 4.00     Pack years: 1.00     Types: Cigarettes     Last attempt to quit: 1981     Years since quittin.7    Smokeless tobacco: Never Used   Substance and Sexual Activity    Alcohol use: Yes     Comment: rare, states she hasn't had a drink in months    Drug use: No    Sexual activity: Not on file   Lifestyle    Physical activity     Days per week: Not on file     Minutes per session: Not on file    Stress: Not on file   Relationships    Social connections     Talks on phone: Not on file     Gets together: Not on file     Attends [employer] service: Not on file     Active member of club or organization: Not on file     Attends meetings of clubs or organizations: Not on file     Relationship status: Not on file    Intimate partner violence     Fear of current or ex partner: Not on file     Emotionally abused: Not on file     Physically abused: Not on file     Forced sexual activity: Not on file   Other Topics Concern    Not on file   Social History Narrative    Not on file       Past Medical History:   Diagnosis Date    Atrial fibrillation ([address]     CH[facility]congestive [facility] failure) [facility]     Gout     Hearing loss 2015    bilateral hearing aids     History of colon polyps 2016    Hypertension     Kidney stones     Mitral valve insufficiency     Obesity, morbid, BMI 40.0-49.9 ([facility]     Osteoarthritis     Primary osteoarthritis of both knees     Dr. Rolando Mcdaniel [facility])        Past Surgical History:   Procedure Laterality Date    CARPAL TUNNEL RELEASE      bilaterally    COLONOSCOPY  11/28/2016    Stuart Go MD    COLONOSCOPY  05/2017    at [facility]. PYlori positive, adenoma, hyperplastic polyp    COLONOSCOPY  06/14/2018, 10/2019    DR. Irene Lu -tubulovillous adenoma repeat in 1 year 2020    JOINT REPLACEMENT Left 07/17/2019    knee    LITHOTRIPSY      PAIN MANAGEMENT PROCEDURE N/A 9/4/2020    L3,L4 MEDIAL BRANCH BLOCK , L5 DORSAL RAMI INJECTION performed by Abebe Lam MD at [facility] Left 7/17/2019    LEFT KNEE TOTAL ARTHROPLASTY- [facility] [address] performed by Leslye Moss DO at [address] [place] [address] UPPER GASTROINTESTINAL ENDOSCOPY  11/28/2016    Stuart Go MD       Family History   Problem Relation Age of Onset    High Blood Pressure Mother     Diabetes Father         Prior to Visit Medications    Medication Sig Taking?  Authorizing Provider   dilTIAZem (CARDIZEM) 60 MG tablet Take 1 tab Three times a day Yes Julio San MD   furosemide (LASIX) 40 MG tablet Take 40 mg by mouth daily Yes Historical Provider, MD   warfarin (COUMADIN) 10 MG tablet Take 1 tablet on Thursdays and take 1/2 tablet daily all other days of the week or as managed by Jenni Mabry Yes Julio San MD   spironolactone (ALDACTONE) 25 MG tablet TAKE 1 TABLET TWICE DAILY Yes Julio San MD   atenolol (TENORMIN) 50 MG tablet TAKE 1 TABLET EVERY DAY Yes Julio San MD   allopurinol (ZYLOPRIM) 300 MG tablet Take 0.5 tablets by mouth daily Yes TYLOR Varner CNP   aspirin 81 MG EC tablet Take 81 mg by mouth daily Yes Historical Provider, MD   Cholecalciferol (VITAMIN D) 2000 UNITS CAPS capsule Take 2,000 Units by mouth 2 times daily. Yes Historical Provider, MD         Review of Systems : All systems reviewed, all unremarkable other than HPI/subjective. No change since last visit. Denies  fever, chills, infection or non healing wound. /80 (Site: Left Upper Arm, Position: Sitting, Cuff Size: Large Adult)   Pulse 85   Temp 98.2 °F (36.8 °C) (Infrared)   Ht 5' 3\" (1.6 m)   Wt 248 lb (112.5 kg)   SpO2 93%   BMI 43.93 kg/m²       Physical Exam  Constitutional:       General: She is not in acute distress. Appearance: Normal appearance. HENT:      Head: Atraumatic. Pulmonary:      Effort: Pulmonary effort is normal.   Musculoskeletal:         General: Tenderness present. No swelling. Comments: Lumbar facet loading bilateral    Skin:     General: Skin is dry. Neurological:      Mental Status: She is alert and oriented to person, place, and time. Psychiatric:         Behavior: Behavior normal.         Assessment and Plan:      Diagnosis Orders   1. Lumbosacral spondylosis without myelopathy  NJ INJ DX/THER AGNT PARAVERT FACET JOINT, LUMBAR/SAC, 1ST LEVEL    NJ INJ DX/THER AGNT PARAVERT FACET JOINT, LUMBAR/SAC, 2ND LEVEL   2.  Anticoagulant long-term use         Trial of bilateral  L3-4 medial branch and L5 dorsal rami injection to address the L4-5 and L5-S1 joint(s). She was able to tolerate activity, including walking and standing with less pain. Schedule injection in 2 weeks     Schedule ff up post injection virtually 2 weeks post op     Will do another injection to qualify for radiofrequency ablation later. Inform PCP re: temporary discontinuation of coumadin. I am OK injecting with ASA 81 mg per day. All questions were answered and imaging studies reviewed with the patient. I have reviewed the chief complaint and HPI including the [facility] [address] and Vital documentation by my staff and I agree with their documentation and have added where applicable. Time spent with patient was 15  minutes. More than 50% was spent counseling/coordinating the patient's care.          Tom Cai MD   Spine Medicine/PM&R

## 2020-09-24 ENCOUNTER — TELEPHONE (OUTPATIENT)
Dept: PHARMACY | Age: 75
End: 2020-09-24

## 2020-09-30 NOTE — PROGRESS NOTES
Alvarado Ndiaye M.D. [address] [place] [address], [facility]  [phone]        2020        Erik Mesa, CNP  [address] [facility]    RE:   Yazmin Sage  :  1945    Dear Farzana Lima:    CHIEF COMPLAINT:  1. Chronic atrial fibrillation. 2.  On Coumadin. 3.  Mitral regurgitation. 4.  Mild LV dysfunction. HISTORY OF PRESENT ILLNESS:  I had the pleasure of seeing Mrs. Silvio Dominguez in our office on 2020. She is a pleasant 42-year-old female, who has chronic atrial fibrillation on Coumadin with good rate control. She has moderate-to-severe mitral regurgitation, with an EF in the 45% to 50% range. She overall has done well. She had her left knee replaced by Dr. Mk Jones in 2019. She overall is increasing her activity. She still uses a walker but it seems to be more for her back pain than it does for her knee pain. She does have chronic pain in her back. She had an injection in her lower back. She was having neck pain and right shoulder pain that disappeared after the injection in her lower back. She has had no syncope or near syncope. No lightheadedness or dizziness. She is trying to do more activity. She does some hoeing outside. Her granddaughter, who works for [employer], helps her with the yard work. Granddaughter just moved into Mrs. Spann's parent's house. She has never had a myocardial infarction. CARDIAC RISK FACTORS:  Hypertension:  Positive. Hyperlipidemia:  Positive. Peripheral Vascular Disease:  Negative. Smoking:  Negative. Diabetes:  Negative. Other Family Members:  Negative. MEDICATIONS AT HOME:  She is currently on Zyloprim 300 mg half a tablet daily, aspirin 81 mg daily, Tenormin 50 mg daily, vitamin D 2000 units b.i.d., Cardizem 60 mg t.i.d., Lasix 40 mg daily, Aldactone 25 mg b.i.d., Coumadin as directed.     PAST MEDICAL AND SURGICAL HISTORY:  1. Tubal ligation many years ago. 2.  Tonsillectomy many years ago. 3.  Hypertension. 4.  Loud snoring history and has probable sleep apnea although she will not do a sleep study. 5.  Chronic atrial fibrillation, on Coumadin and rate control. 6.  History of pulmonary hypertension with a PA pressure in the 40 to 50 range. 7.  Carpal tunnel release, 6 years ago. 8.  Colonoscopy in 2017.  9.  Left knee replacement by Dr. Reynaldo Jeffers in 07/2019. FAMILY HISTORY:  No significant heart disease in the family. SOCIAL HISTORY:  She is 66years old,  for 6-1/2 years. Three children. Does smoke or drink alcohol. Very inactive. Has chronic back pain. She has a 15year-old Virl Pham by the name of Zilliant. Her granddaughter is approximately 27years old, works for [employer] as does her sister. Granddaughter will be getting  soon and is moving into the house that Mrs. Subha Murray was raised in. REVIEW OF SYSTEMS:  Cardiac as above. Other systems reviewed including constitutional, eyes, ears, nose and throat, cardiovascular, respiratory, GI, , musculoskeletal, integumentary, neurologic, endocrine, hematologic and allergic/immunologic are negative except for what is described above. No weight loss or weight gain. No change in bowel habits, no blood in stools. No fevers, sweats or chills. PHYSICAL EXAMINATION:  VITAL SIGNS:  Her blood pressure was 120/70 with a heart rate of 80 and irregularly irregular. Respiration were 18. O2 sat was 94%. GENERAL:  She is a very pleasant 53-year-old female. Denied pain. She was oriented to person, place and time. Answered questions appropriately. SKIN:  No unusual skin changes. HEENT:  The pupils are equally round and reactive to light and accommodation. Extraocular movements were intact. Mucous membranes were dry. NECK:  No JVD. Good carotid pulses. No carotid bruits. No lymphadenopathy or thyromegaly.   CARDIOVASCULAR EXAM:  S1 year.    DISCUSSION:  Mrs. Neftali Dorantes overall is doing well. She has had no unusual shortness of breath and she has had no chest pain or chest discomfort. She actually looks about the best I have seen her look for many years. Her cardiac status appears stable. She has very wide fluctuations in her mitral regurgitation really depending upon her blood pressure. Recently, her blood pressure has been in the 120 range, and therefore, her mitral regurgitation has been less. Thank you very much for allowing me the privilege of seeing Mrs. Neftali Dorantes. If you have any questions on my thoughts, please do not hesitate to contact me.      Sincerely,        Veronica Ocasio    D: 09/17/2020 10:38:32     T: 09/17/2020 10:46:26     [email or web address]  Job#: [id number]   Doc#: [id number]

## 2020-10-02 ENCOUNTER — HOSPITAL ENCOUNTER (OUTPATIENT)
Age: 75
Setting detail: OUTPATIENT SURGERY
Discharge: HOME | End: 2020-10-02
Attending: PHYSICAL MEDICINE & REHABILITATION | Admitting: PHYSICAL MEDICINE & REHABILITATION
Payer: MEDICARE

## 2020-10-02 ENCOUNTER — HOSPITAL ENCOUNTER (OUTPATIENT)
Age: 75
Discharge: HOME | End: 2020-10-02
Attending: PHYSICAL MEDICINE & REHABILITATION
Payer: MEDICARE

## 2020-10-02 ENCOUNTER — HOSPITAL ENCOUNTER (OUTPATIENT)
Dept: PREADMISSION TESTING | Age: 75
Setting detail: SPECIMEN
Discharge: HOME | End: 2020-10-02
Payer: MEDICARE

## 2020-10-02 ENCOUNTER — APPOINTMENT (OUTPATIENT)
Dept: GENERAL RADIOLOGY | Age: 75
End: 2020-10-02
Attending: PHYSICAL MEDICINE & REHABILITATION
Payer: MEDICARE

## 2020-10-02 VITALS
WEIGHT: 249 LBS | HEIGHT: 63 IN | BODY MASS INDEX: 44.12 KG/M2 | HEART RATE: 82 BPM | OXYGEN SATURATION: 97 % | DIASTOLIC BLOOD PRESSURE: 63 MMHG | TEMPERATURE: 97.9 F | RESPIRATION RATE: 16 BRPM | SYSTOLIC BLOOD PRESSURE: 113 MMHG

## 2020-10-02 LAB
INR BLD: 1.2
PROTHROMBIN TIME: 14.8 SEC (ref 11.5–14.2)
SARS-COV-2, RAPID: NOT DETECTED
SARS-COV-2: NORMAL
SARS-COV-2: NORMAL
SOURCE: NORMAL

## 2020-10-02 PROCEDURE — 2500000003 HC RX 250 WO HCPCS: Performed by: PHYSICAL MEDICINE & REHABILITATION

## 2020-10-02 PROCEDURE — C9803 HOPD COVID-19 SPEC COLLECT: HCPCS

## 2020-10-02 PROCEDURE — 85610 PROTHROMBIN TIME: CPT

## 2020-10-02 PROCEDURE — 3600000056 HC PAIN LEVEL 4 BASE: Performed by: PHYSICAL MEDICINE & REHABILITATION

## 2020-10-02 PROCEDURE — 99152 MOD SED SAME PHYS/QHP 5/>YRS: CPT | Performed by: PHYSICAL MEDICINE & REHABILITATION

## 2020-10-02 PROCEDURE — 7100000010 HC PHASE II RECOVERY - FIRST 15 MIN: Performed by: PHYSICAL MEDICINE & REHABILITATION

## 2020-10-02 PROCEDURE — 64493 INJ PARAVERT F JNT L/S 1 LEV: CPT | Performed by: PHYSICAL MEDICINE & REHABILITATION

## 2020-10-02 PROCEDURE — 2709999900 HC NON-CHARGEABLE SUPPLY: Performed by: PHYSICAL MEDICINE & REHABILITATION

## 2020-10-02 PROCEDURE — 7100000011 HC PHASE II RECOVERY - ADDTL 15 MIN: Performed by: PHYSICAL MEDICINE & REHABILITATION

## 2020-10-02 PROCEDURE — 2580000003 HC RX 258: Performed by: PHYSICAL MEDICINE & REHABILITATION

## 2020-10-02 PROCEDURE — 6360000002 HC RX W HCPCS: Performed by: PHYSICAL MEDICINE & REHABILITATION

## 2020-10-02 PROCEDURE — 36415 COLL VENOUS BLD VENIPUNCTURE: CPT

## 2020-10-02 PROCEDURE — 64494 INJ PARAVERT F JNT L/S 2 LEV: CPT | Performed by: PHYSICAL MEDICINE & REHABILITATION

## 2020-10-02 PROCEDURE — 76000 FLUOROSCOPY <1 HR PHYS/QHP: CPT

## 2020-10-02 PROCEDURE — U0002 COVID-19 LAB TEST NON-CDC: HCPCS

## 2020-10-02 RX ORDER — TRIAMCINOLONE ACETONIDE 40 MG/ML
INJECTION, SUSPENSION INTRA-ARTICULAR; INTRAMUSCULAR PRN
Status: DISCONTINUED | OUTPATIENT
Start: 2020-10-02 | End: 2020-10-02 | Stop reason: ALTCHOICE

## 2020-10-02 RX ORDER — MIDAZOLAM HYDROCHLORIDE 1 MG/ML
INJECTION INTRAMUSCULAR; INTRAVENOUS PRN
Status: DISCONTINUED | OUTPATIENT
Start: 2020-10-02 | End: 2020-10-02 | Stop reason: ALTCHOICE

## 2020-10-02 RX ORDER — LIDOCAINE HYDROCHLORIDE 10 MG/ML
INJECTION, SOLUTION EPIDURAL; INFILTRATION; INTRACAUDAL; PERINEURAL PRN
Status: DISCONTINUED | OUTPATIENT
Start: 2020-10-02 | End: 2020-10-02 | Stop reason: ALTCHOICE

## 2020-10-02 RX ORDER — SODIUM CHLORIDE, SODIUM LACTATE, POTASSIUM CHLORIDE, CALCIUM CHLORIDE 600; 310; 30; 20 MG/100ML; MG/100ML; MG/100ML; MG/100ML
INJECTION, SOLUTION INTRAVENOUS CONTINUOUS
Status: DISCONTINUED | OUTPATIENT
Start: 2020-10-02 | End: 2020-10-02 | Stop reason: HOSPADM

## 2020-10-02 RX ADMIN — SODIUM CHLORIDE, POTASSIUM CHLORIDE, SODIUM LACTATE AND CALCIUM CHLORIDE: 600; 310; 30; 20 INJECTION, SOLUTION INTRAVENOUS at 12:10

## 2020-10-02 ASSESSMENT — PAIN SCALES - GENERAL
PAINLEVEL_OUTOF10: 0

## 2020-10-02 NOTE — OP NOTE
Operative Note      Patient: Salena Rob  YOB: 1945  MRN: [id number]    Date of Procedure: 10/2/2020    Pre-Op Diagnosis: LUMBOSACRAL SPONDYLOSIS WITHOUT MYELOPATHY    Post-Op Diagnosis: Same       Procedure(s):  BILATERAL L3-4 MEDIAL BRANCH AND L5 DORSAL RAMI INJECTION    Surgeon(s):  Washington Ponce MD    Assistant:   * No surgical staff found *      Anesthesia: Moderate sedation using 1 mg IV versed . CONSENT:     The risks, benefits, alternatives, plan, complications, and personnel were discussed prior to the procedure. The patient understood and agreed to proceed. The patient was positioned prone. Sign-in and time-out was performed. Identifying the   site, in this case, both sides L3 L4 medial branches and L5 dorsal ramus/rami  to address the L4-5 and L5-S1 joints     Sterile technique was done in the usual manner using chlorhexidine. This was followed by infiltration of a 9 ml of 1% preservative-free lidocaine. A 3.5 inch  22-gauge spinal needle(s)  were positioned under fluoroscopic guidance. A total of  6 needles were positioned. Upon confirmation that the needle was properly positioned, a solution of 80 mg of triamcinolone and  7 ml of 1% preservative-free lidocaine was injected without difficulty. The patient tolerated the procedure well and went to the recovery room with stable vital signs. Estimated Blood Loss (mL): Minimal    Complications: None      Moderate Sedation Time: > 15 minutes    PLAN:     Home instructions were provided. The patient will follow up in 4 to 6 weeks or earlier if needed.       Electronically signed by Washington Ponce MD on 10/2/2020 at 1:09 PM

## 2020-10-02 NOTE — PROGRESS NOTES
Discharge Criteria  Outpatients must meet criteria 1 through 7.      1.  Minimum 30 minutes after last dose of sedative medication, minimum 120 minutes after last dose of reversal agent. Yes    2. Systolic BP stable within 20 mmHg for 30 minutes & systolic BP between 90 & 262 or within 10 mmHg of baseline. Yes    3. Pulse between 60 and 100 or within 10 bpm of baseline. Yes    4. Spontaneous respiratory rate >/= 10 per minute. Yes    5. SaO2 >/= 95 or  >/= baseline. Yes    6. Able to cough and swallow or return to baseline function. Yes    7. Alert and oriented or return to baseline mental status. Yes    8. Demonstrates controlled, coordinated movements, ambulates with steady gait, or return to baseline activity function. Yes    9. Minimal or no pain or nausea, or at a level tolerable and acceptable to patient. Yes    10. Takes and retains oral fluids as allowed. Yes    11. Procedural / perioperative site stable. Minimal or no bleeding. Yes, 6 bandaids noted to lower back and are dry and intact;         12. If GI endoscopy procedure, minimal or no abdominal distention or passing flatus. Yes    13. Written discharge instructions and emergency telephone number provided. Yes    14. Accompanied by a responsible adult. Yes    Adult patient discharged from facility without responsible person meets above criteria plus the following:   a) remains awake without stimulus for 30 minutes     b) oriented appropriate for age      c) all vital signs stable   d) no significant risk of losing protective reflexes      e) able to maintain pre-procedure mobility without assistance   f) no nausea or dizziness      g) transportation arrangements that do not require patient to operate motor   Vehicle.   Yes

## 2020-10-02 NOTE — H&P
organizations: Not on file     Relationship status: Not on file    Intimate partner violence     Fear of current or ex partner: Not on file     Emotionally abused: Not on file     Physically abused: Not on file     Forced sexual activity: Not on file   Other Topics Concern    Not on file   Social History Narrative    Not on file       Past Medical History:   Diagnosis Date    [facility]     CHF (congestive heart failure) ([facility]     Gout     Hearing loss 2015    bilateral hearing aids     History of colon polyps 2016    Hypertension     Kidney stones     Mitral valve insufficiency     Obesity, morbid, BMI 40.0-49.9 ([facility]     Osteoarthritis     Primary osteoarthritis of both knees     Dr. Ceci Petit Pulmonary HTN [facility])        Past Surgical History:   Procedure Laterality Date    CARPAL TUNNEL RELEASE      bilaterally    COLONOSCOPY  11/28/2016    Genia Werner MD    COLONOSCOPY  05/2017    at [address] [facility]. PYlori positive, adenoma, hyperplastic polyp    COLONOSCOPY  06/14/2018, 10/2019    DR. Thuy Morales -tubulovillous adenoma repeat in 1 year 2020    JOINT REPLACEMENT Left 07/17/2019    knee    LITHOTRIPSY      PAIN MANAGEMENT PROCEDURE N/A 9/4/2020    L3,L4 MEDIAL BRANCH BLOCK , L5 DORSAL RAMI INJECTION performed by Gloria Trejo MD at [address] [address] Left 7/17/2019    LEFT KNEE TOTAL ARTHROPLASTY- DEPUY performed by Alexandra Mendoza DO at [facility] [address] ENDOSCOPY  11/28/2016    Genia Werner MD       Prior to Visit Medications    Medication Sig Taking?  Authorizing Provider   dilTIAZem (CARDIZEM) 60 MG tablet Take 1 tab Three times a day Yes Tiana Munson MD   furosemide (LASIX) 40 MG tablet Take 40 mg by mouth daily Yes Historical Provider, MD   spironolactone (ALDACTONE) 25 MG tablet TAKE 1 TABLET TWICE DAILY Yes Tiana Munson MD   atenolol (TENORMIN) 50 MG tablet TAKE 1 TABLET EVERY DAY Yes Tiana Munson MD allopurinol (ZYLOPRIM) 300 MG tablet Take 0.5 tablets by mouth daily Yes Sixto Model, APRN - CNP   aspirin 81 MG EC tablet Take 81 mg by mouth daily Yes Historical Provider, MD   Cholecalciferol (VITAMIN D) 2000 UNITS CAPS capsule Take 2,000 Units by mouth 2 times daily. Yes Historical Provider, MD   warfarin (COUMADIN) 10 MG tablet Take 1 tablet on Thursdays and take 1/2 tablet daily all other days of the week or as managed by Kerrie Bird MD         Review of Systems : All systems reviewed, all unremarkable other than HPI. No change since last visit. Physical Exam  Constitutional:       Appearance: Normal appearance. HENT:      Head: Atraumatic. Cardiovascular:      Rate and Rhythm: Normal rate. Pulmonary:      Effort: Pulmonary effort is normal. No respiratory distress. Skin:     General: Skin is dry. Neurological:      Mental Status: She is alert and oriented to person, place, and time. Psychiatric:         Behavior: Behavior normal.     :    Cervical Spine Exam: Full ROM, without limitation     Access: Adequate mouth opening       Assessment:   Lumbar spondylosis      PLAN:     As advertised. Planned duration of treatment: 4 weeks. Further assessment and followup in  4 weeks for follow up post injection.        Electronically signed by Melissa Pang MD on 10/2/2020 at 12:38 PM

## 2020-10-15 ENCOUNTER — TELEPHONE (OUTPATIENT)
Dept: PHARMACY | Age: 75
End: 2020-10-15

## 2020-10-15 NOTE — TELEPHONE ENCOUNTER
COVID-19 phone screening     Call placed to screen patient prior to upcoming Medication Management visit for Anticoagulation. Does patient have fever and/or lower respiratory symptoms (SOB, difficulty breathing, cough)? [] Yes    [x] No    If yes, complete Travel Screening and ask the following:   [] [Fever OR s/sxs of lower respiratory illness]  AND  [close contact with lab-confirmed COVID-19 patient within 14 days of symptom onset   [] [Fever AND s/sxs of lower respiratory illness requiring hospitalization] AND [history of travel to [place], [place], [place], [facility], [place] within 14 days of sx onset]  [] [Fever with severe acute lower respiratory illness (I.e. PNA, ARDS) requiring hospitalization and without alternative explanatory diagnosis (I.e. Influenza)] AND [no source of exposure identified]    [x] None of the following; patient confirmed for regularly scheduled INR check and instructed to call the clinic immediately if any symptoms develop prior to upcoming appointment.

## 2020-10-16 ENCOUNTER — HOSPITAL ENCOUNTER (OUTPATIENT)
Dept: PHARMACY | Age: 75
Setting detail: THERAPIES SERIES
Discharge: HOME | End: 2020-10-16
Payer: MEDICARE

## 2020-10-16 VITALS — TEMPERATURE: 96.9 F

## 2020-10-16 LAB — INR BLD: 1.9

## 2020-10-16 PROCEDURE — 99211 OFF/OP EST MAY X REQ PHY/QHP: CPT

## 2020-10-16 PROCEDURE — 85610 PROTHROMBIN TIME: CPT

## 2020-10-16 NOTE — PROGRESS NOTES
Fingerstick INR drawn per clinic protocol. Patient states no visible blood in urine and no black tarry stool. Denies any missed doses of warfarin. No change in other maintenance medications or in diet. Will continue current warfarin regimen and recheck INR in 4 weeks. Patient acknowledges working in consult agreement with pharmacist as referred by his/her physician.       CLINICAL PHARMACY CONSULT: MED RECONCILIATION/REVIEW ADDENDUM    For Pharmacy Admin Tracking Only    PHSO: Yes  Total # of Interventions Recommended: 0    - Maintenance Safety Lab Monitoring #: 1    Total Interventions Accepted: 0  Time Spent (min): 30    Alex Hills, PharmD

## 2020-10-22 ENCOUNTER — OFFICE VISIT (OUTPATIENT)
Dept: PAIN MANAGEMENT | Age: 75
End: 2020-10-22
Payer: MEDICARE

## 2020-10-22 VITALS
SYSTOLIC BLOOD PRESSURE: 148 MMHG | DIASTOLIC BLOOD PRESSURE: 102 MMHG | OXYGEN SATURATION: 97 % | HEIGHT: 63 IN | HEART RATE: 77 BPM | BODY MASS INDEX: 44.3 KG/M2 | WEIGHT: 250 LBS | TEMPERATURE: 97.5 F

## 2020-10-22 PROCEDURE — 1090F PRES/ABSN URINE INCON ASSESS: CPT | Performed by: PHYSICAL MEDICINE & REHABILITATION

## 2020-10-22 PROCEDURE — 4040F PNEUMOC VAC/ADMIN/RCVD: CPT | Performed by: PHYSICAL MEDICINE & REHABILITATION

## 2020-10-22 PROCEDURE — G8417 CALC BMI ABV UP PARAM F/U: HCPCS | Performed by: PHYSICAL MEDICINE & REHABILITATION

## 2020-10-22 PROCEDURE — G8484 FLU IMMUNIZE NO ADMIN: HCPCS | Performed by: PHYSICAL MEDICINE & REHABILITATION

## 2020-10-22 PROCEDURE — G8427 DOCREV CUR MEDS BY ELIG CLIN: HCPCS | Performed by: PHYSICAL MEDICINE & REHABILITATION

## 2020-10-22 PROCEDURE — 99214 OFFICE O/P EST MOD 30 MIN: CPT | Performed by: PHYSICAL MEDICINE & REHABILITATION

## 2020-10-22 PROCEDURE — 1036F TOBACCO NON-USER: CPT | Performed by: PHYSICAL MEDICINE & REHABILITATION

## 2020-10-22 PROCEDURE — 1123F ACP DISCUSS/DSCN MKR DOCD: CPT | Performed by: PHYSICAL MEDICINE & REHABILITATION

## 2020-10-22 PROCEDURE — G8399 PT W/DXA RESULTS DOCUMENT: HCPCS | Performed by: PHYSICAL MEDICINE & REHABILITATION

## 2020-10-22 NOTE — PATIENT INSTRUCTIONS
SURVEY:    You may be receiving a survey from [employer] regarding your visit today. Please complete the survey to enable us to provide the highest quality of care to you and your family. If you cannot score us a very good on any question, please call the office to discuss how we could have made your experience a very good one. Thank you.

## 2020-10-22 NOTE — PROGRESS NOTES
FOLLOW UP APPOINTMENT:         Omi Bonner MD    10/22/2020       Kena Gallagher is a 66 y.o. female, who came for a   post injection follow up    Cause of the symptom(s): degenerative (arthritis)    Onset: chronic      Prior reatment done: physical therapy, injection and anti-inflammatory medication    Current pain level: legs painful with walking on the scale of 0-10 ( 10 being worst)     Quality of Symptoms: aching    Aggravating factors: activity    Relieving factors: sitiing down, rest and lying down    Post injection ( bilateral L3-4 medial branch and L5 dorsal rami injection )  - at least over 50% relief. Allergies   Allergen Reactions    Clarithromycin Rash     Patient says she will \"never take this again\"     Gabapentin Swelling     Pt states that her body swelled up, not her airway, no troubles breathing    Merthiolate Glycerite [Thimerosal] Dermatitis     Redness, blisters    Tylenol With Codeine #3 [Acetaminophen-Codeine] Nausea And Vomiting       Social History     Socioeconomic History    Marital status:       Spouse name: Not on file    Number of children: Not on file    Years of education: Not on file    Highest education level: Not on file   Occupational History    Not on file   Social Needs    Financial resource strain: Not on file    Food insecurity     Worry: Not on file     Inability: Not on file    Transportation needs     Medical: Not on file     Non-medical: Not on file   Tobacco Use    Smoking status: Former Smoker     Packs/day: 0.25     Years: 4.00     Pack years: 1.00     Types: Cigarettes     Last attempt to quit: 1981     Years since quittin.8    Smokeless tobacco: Never Used   Substance and Sexual Activity    Alcohol use: Yes     Comment: rare, states she hasn't had a drink in months    Drug use: No    Sexual activity: Not on file   Lifestyle    Physical activity     Days per week: Not on file     Minutes per session: Not on file  Stress: Not on file   Relationships    Social connections     Talks on phone: Not on file     Gets together: Not on file     Attends Mandaeism service: Not on file     Active member of club or organization: Not on file     Attends meetings of clubs or organizations: Not on file     Relationship status: Not on file    Intimate partner violence     Fear of current or ex partner: Not on file     Emotionally abused: Not on file     Physically abused: Not on file     Forced sexual activity: Not on file   Other Topics Concern    Not on file   Social History Narrative    Not on file       Past Medical History:   Diagnosis Date    Atrial fibrillation ([address]     CHF (congestive heart failure) ([address]     Gout     Hearing loss 2015    bilateral hearing aids     History of colon polyps 2016    Hypertension     Kidney stones     Mitral valve insufficiency     Obesity, morbid, BMI 40.0-49.9 ([address]     Osteoarthritis     Primary osteoarthritis of both knees     Dr. Leary Fazal Pulmonary HTN [facility])        Past Surgical History:   Procedure Laterality Date    CARPAL TUNNEL RELEASE      bilaterally    COLONOSCOPY  11/28/2016    Lynn Mosley MD    COLONOSCOPY  05/2017    at [facility] [facility]. PYlori positive, adenoma, hyperplastic polyp    COLONOSCOPY  06/14/2018, 10/2019    DR. Candis Vargas -tubulovillous adenoma repeat in 1 year 2020    JOINT REPLACEMENT Left 07/17/2019    knee    LITHOTRIPSY      PAIN MANAGEMENT PROCEDURE N/A 9/4/2020    L3,L4 MEDIAL BRANCH BLOCK , L5 DORSAL RAMI INJECTION performed by Sumit Coronado MD at [address] [place] [address] Bilateral 10/2/2020    BILATERAL L3-4 MEDIAL BRANCH AND L5 DORSAL RAMI INJECTION performed by Sumit Coronado MD at [address] Left 7/17/2019    LEFT KNEE TOTAL ARTHROPLASTY- DEPUY performed by Vinh Connor DO at [address] [place] [address] GASTROINTESTINAL ENDOSCOPY  11/28/2016    Lynn Mosley MD Family History   Problem Relation Age of Onset    High Blood Pressure Mother     Diabetes Father         Prior to Visit Medications    Medication Sig Taking? Authorizing Provider   dilTIAZem (CARDIZEM) 60 MG tablet Take 1 tab Three times a day Yes Genette Bloch, MD   furosemide (LASIX) 40 MG tablet Take 40 mg by mouth daily Yes Historical Provider, MD   warfarin (COUMADIN) 10 MG tablet Take 1 tablet on Thursdays and take 1/2 tablet daily all other days of the week or as managed by Danis Alvarez Yes Genette Bloch, MD   spironolactone (ALDACTONE) 25 MG tablet TAKE 1 TABLET TWICE DAILY Yes Genette Bloch, MD   atenolol (TENORMIN) 50 MG tablet TAKE 1 TABLET EVERY DAY Yes Genette Bloch, MD   allopurinol (ZYLOPRIM) 300 MG tablet Take 0.5 tablets by mouth daily Yes TYLOR High - CNP   aspirin 81 MG EC tablet Take 81 mg by mouth daily Yes Historical Provider, MD   Cholecalciferol (VITAMIN D) 2000 UNITS CAPS capsule Take 2,000 Units by mouth 2 times daily. Yes Historical Provider, MD         Review of Systems      BP (!) 148/102 (Site: Right Upper Arm, Position: Sitting, Cuff Size: Medium Adult)   Pulse 77   Temp 97.5 °F (36.4 °C) (Infrared)   Ht 5' 3\" (1.6 m)   Wt 250 lb (113.4 kg)   SpO2 97%   BMI 44.29 kg/m²       Physical Exam      Assessment and Plan:      Diagnosis Orders   1. Arthritis of knee     2. Lumbar spondylosis       Doing well post lumbar medial branch block. Will hold off further injection at this time. The next step, if pain comes back is lumbar RFA. For right knee  - will schedule for ff up appt. to [facility]. For the left knee S/P TKR - schedule for genicular nerve block in 4 weeks     All questions were answered and imaging studies reviewed with the patient. I have reviewed the chief complaint and HPI including the [address] and Vital documentation by my staff and I agree with their documentation and have added where applicable.       Time spent with patient was 25 minutes. More than 50% was spent counseling/coordinating the patient's care.          Emily Mckay MD   Spine Medicine/PM&R

## 2020-10-26 RX ORDER — FUROSEMIDE 40 MG/1
TABLET ORAL
Qty: 180 TABLET | Refills: 3 | Status: SHIPPED | OUTPATIENT
Start: 2020-10-26 | End: 2022-08-22 | Stop reason: SDUPTHER

## 2020-11-06 ENCOUNTER — HOSPITAL ENCOUNTER (OUTPATIENT)
Age: 75
Setting detail: OUTPATIENT SURGERY
Discharge: HOME | End: 2020-11-06
Attending: PHYSICAL MEDICINE & REHABILITATION | Admitting: PHYSICAL MEDICINE & REHABILITATION
Payer: MEDICARE

## 2020-11-06 ENCOUNTER — HOSPITAL ENCOUNTER (OUTPATIENT)
Dept: PREADMISSION TESTING | Age: 75
Setting detail: SPECIMEN
Discharge: HOME | End: 2020-11-06
Payer: MEDICARE

## 2020-11-06 ENCOUNTER — APPOINTMENT (OUTPATIENT)
Dept: GENERAL RADIOLOGY | Age: 75
End: 2020-11-06
Attending: PHYSICAL MEDICINE & REHABILITATION
Payer: MEDICARE

## 2020-11-06 VITALS
SYSTOLIC BLOOD PRESSURE: 104 MMHG | TEMPERATURE: 97.6 F | HEIGHT: 63 IN | BODY MASS INDEX: 44.47 KG/M2 | OXYGEN SATURATION: 98 % | DIASTOLIC BLOOD PRESSURE: 66 MMHG | HEART RATE: 76 BPM | WEIGHT: 251 LBS | RESPIRATION RATE: 16 BRPM

## 2020-11-06 LAB
INR BLD: 1.3
PROTHROMBIN TIME: 15.2 SEC (ref 11.5–14.2)
SARS-COV-2, RAPID: NOT DETECTED
SARS-COV-2: NORMAL
SARS-COV-2: NORMAL
SOURCE: NORMAL

## 2020-11-06 PROCEDURE — 3600000055 HC PAIN LEVEL 3 ADDL 15 MIN: Performed by: PHYSICAL MEDICINE & REHABILITATION

## 2020-11-06 PROCEDURE — 6360000002 HC RX W HCPCS: Performed by: PHYSICAL MEDICINE & REHABILITATION

## 2020-11-06 PROCEDURE — 99152 MOD SED SAME PHYS/QHP 5/>YRS: CPT | Performed by: PHYSICAL MEDICINE & REHABILITATION

## 2020-11-06 PROCEDURE — 64454 NJX AA&/STRD GNCLR NRV BRNCH: CPT | Performed by: PHYSICAL MEDICINE & REHABILITATION

## 2020-11-06 PROCEDURE — 85610 PROTHROMBIN TIME: CPT

## 2020-11-06 PROCEDURE — 76000 FLUOROSCOPY <1 HR PHYS/QHP: CPT

## 2020-11-06 PROCEDURE — 3600000054 HC PAIN LEVEL 3 BASE: Performed by: PHYSICAL MEDICINE & REHABILITATION

## 2020-11-06 PROCEDURE — 7100000010 HC PHASE II RECOVERY - FIRST 15 MIN: Performed by: PHYSICAL MEDICINE & REHABILITATION

## 2020-11-06 PROCEDURE — C9803 HOPD COVID-19 SPEC COLLECT: HCPCS

## 2020-11-06 PROCEDURE — 36415 COLL VENOUS BLD VENIPUNCTURE: CPT

## 2020-11-06 PROCEDURE — 2500000003 HC RX 250 WO HCPCS: Performed by: PHYSICAL MEDICINE & REHABILITATION

## 2020-11-06 PROCEDURE — U0002 COVID-19 LAB TEST NON-CDC: HCPCS

## 2020-11-06 PROCEDURE — 2709999900 HC NON-CHARGEABLE SUPPLY: Performed by: PHYSICAL MEDICINE & REHABILITATION

## 2020-11-06 PROCEDURE — 7100000011 HC PHASE II RECOVERY - ADDTL 15 MIN: Performed by: PHYSICAL MEDICINE & REHABILITATION

## 2020-11-06 RX ORDER — SODIUM CHLORIDE 0.9 % (FLUSH) 0.9 %
10 SYRINGE (ML) INJECTION EVERY 12 HOURS SCHEDULED
Status: DISCONTINUED | OUTPATIENT
Start: 2020-11-06 | End: 2020-11-06 | Stop reason: HOSPADM

## 2020-11-06 RX ORDER — MIDAZOLAM HYDROCHLORIDE 1 MG/ML
INJECTION INTRAMUSCULAR; INTRAVENOUS PRN
Status: DISCONTINUED | OUTPATIENT
Start: 2020-11-06 | End: 2020-11-06 | Stop reason: ALTCHOICE

## 2020-11-06 RX ORDER — LIDOCAINE HYDROCHLORIDE 10 MG/ML
INJECTION, SOLUTION INFILTRATION; PERINEURAL PRN
Status: DISCONTINUED | OUTPATIENT
Start: 2020-11-06 | End: 2020-11-06 | Stop reason: ALTCHOICE

## 2020-11-06 RX ORDER — SODIUM CHLORIDE 0.9 % (FLUSH) 0.9 %
10 SYRINGE (ML) INJECTION PRN
Status: DISCONTINUED | OUTPATIENT
Start: 2020-11-06 | End: 2020-11-06 | Stop reason: HOSPADM

## 2020-11-06 RX ORDER — SODIUM CHLORIDE, SODIUM LACTATE, POTASSIUM CHLORIDE, CALCIUM CHLORIDE 600; 310; 30; 20 MG/100ML; MG/100ML; MG/100ML; MG/100ML
INJECTION, SOLUTION INTRAVENOUS CONTINUOUS
Status: DISCONTINUED | OUTPATIENT
Start: 2020-11-06 | End: 2020-11-06 | Stop reason: HOSPADM

## 2020-11-06 ASSESSMENT — PAIN - FUNCTIONAL ASSESSMENT: PAIN_FUNCTIONAL_ASSESSMENT: 0-10

## 2020-11-06 ASSESSMENT — PAIN DESCRIPTION - DESCRIPTORS: DESCRIPTORS: ACHING;THROBBING

## 2020-11-06 NOTE — H&P
MD Davon Peralta Maura is a 66 y.o. female, who came in for a procedure,  Left genicular nerve block. No change since last visit. Aching, throbbing pain. Worse with activity. Treatment done: physical therapy, anti-inflammatory medication and muscle relaxant    Allergies   Allergen Reactions    Clarithromycin Rash     Patient says she will \"never take this again\"     Gabapentin Swelling     Pt states that her body swelled up, not her airway, no troubles breathing    Merthiolate Glycerite [Thimerosal] Dermatitis     Redness, blisters    Tylenol With Codeine #3 [Acetaminophen-Codeine] Nausea And Vomiting       Social History     Socioeconomic History    Marital status:       Spouse name: Not on file    Number of children: Not on file    Years of education: Not on file    Highest education level: Not on file   Occupational History    Not on file   Social Needs    Financial resource strain: Not on file    Food insecurity     Worry: Not on file     Inability: Not on file    Transportation needs     Medical: Not on file     Non-medical: Not on file   Tobacco Use    Smoking status: Former Smoker     Packs/day: 0.25     Years: 4.00     Pack years: 1.00     Types: Cigarettes     Last attempt to quit: 1981     Years since quittin.8    Smokeless tobacco: Never Used   Substance and Sexual Activity    Alcohol use: Yes     Comment: rare, states she hasn't had a drink in months    Drug use: No    Sexual activity: Not on file   Lifestyle    Physical activity     Days per week: Not on file     Minutes per session: Not on file    Stress: Not on file   Relationships    Social connections     Talks on phone: Not on file     Gets together: Not on file     Attends Bahai service: Not on file     Active member of club or organization: Not on file     Attends meetings of clubs or organizations: Not on file     Relationship status: Not on file    Intimate partner violence     Fear of current or ex partner: Not on file     Emotionally abused: Not on file     Physically abused: Not on file     Forced sexual activity: Not on file   Other Topics Concern    Not on file   Social History Narrative    Not on file       Past Medical History:   Diagnosis Date    [facility]     CHF (congestive heart failure) ([facility]     Gout     Hearing loss 2015    bilateral hearing aids     History of colon polyps 2016    Hypertension     Kidney stones     Mitral valve insufficiency     Obesity, morbid, BMI 40.0-49.9 ([facility]     Osteoarthritis     Primary osteoarthritis of both knees     Dr. Frankey Arnt Pulmonary HTN [facility])        Past Surgical History:   Procedure Laterality Date    CARPAL TUNNEL RELEASE      bilaterally    COLONOSCOPY  11/28/2016    Dorothy Hung MD    COLONOSCOPY  05/2017    at [address] [facility]. PYlori positive, adenoma, hyperplastic polyp    COLONOSCOPY  06/14/2018, 10/2019    DR. Kari Mcburney -tubulovillous adenoma repeat in 1 year 2020    JOINT REPLACEMENT Left 07/17/2019    knee    LITHOTRIPSY      PAIN MANAGEMENT PROCEDURE N/A 9/4/2020    L3,L4 MEDIAL BRANCH BLOCK , L5 DORSAL RAMI INJECTION performed by Marylee Junker, MD at [address] Bilateral 10/2/2020    BILATERAL L3-4 MEDIAL BRANCH AND L5 DORSAL RAMI INJECTION performed by Marylee Junker, MD at [facility] Left 7/17/2019    LEFT KNEE TOTAL ARTHROPLASTY- DEPUY performed by Kleber Urbina DO at [address] ENDOSCOPY  11/28/2016    Dorothy Hung MD       Prior to Visit Medications    Medication Sig Taking?  Authorizing Provider   furosemide (LASIX) 40 MG tablet TAKE 1 TABLET DAILY, TAKE EXTRA TABLET IF NEEDED FOR EDEMA  Michael Hale MD   dilTIAZem (CARDIZEM) 60 MG tablet Take 1 tab Three times a day  Michael Hale MD   warfarin (COUMADIN) 10 MG tablet Take 1 tablet on Thursdays and take 1/2 tablet daily all other days of the week or as managed by Casitllo Cole MD   spironolactone (ALDACTONE) 25 MG tablet TAKE 1 TABLET TWICE DAILY  Deandre Evans MD   atenolol (TENORMIN) 50 MG tablet TAKE 1 TABLET EVERY DAY  Deandre Evans MD   allopurinol (ZYLOPRIM) 300 MG tablet Take 0.5 tablets by mouth daily  Iris Wesley APRN - CNP   aspirin 81 MG EC tablet Take 81 mg by mouth daily  Historical Provider, MD   Cholecalciferol (VITAMIN D) 2000 UNITS CAPS capsule Take 2,000 Units by mouth 2 times daily. Historical Provider, MD         Review of Systems : All systems reviewed, all unremarkable other than HPI. No change since last visit. Physical Exam  Constitutional:       General: She is not in acute distress. HENT:      Head: Atraumatic. Cardiovascular:      Rate and Rhythm: Normal rate. Pulmonary:      Effort: Pulmonary effort is normal. No respiratory distress. Skin:     General: Skin is dry. Neurological:      Mental Status: She is alert and oriented to person, place, and time. Psychiatric:         Behavior: Behavior normal.     :    Cervical Spine Exam: Full ROM, without limitation   Access: Adequate mouth opening     Assessment:   Knee pain     PLAN:     As advertised. Planned duration of treatment: 4 weeks. Further assessment and followup in  4 weeks for follow up post injection.        Electronically signed by Mai Carlos MD on 11/6/2020 at 1:29 PM

## 2020-11-06 NOTE — OP NOTE
Operative Note      Patient: Tangela Stinson  YOB: 1945  MRN: [id number]    Date of Procedure: 11/6/2020    Pre-Op Diagnosis: ARTHRITIS LEFT KNEE    Post-Op Diagnosis: Same       Procedure(s):  GENICULAR NERVE BLOCK LEFT KNEE    Surgeon(s):  Malu Morales MD    Assistant:   * No surgical staff found *    Anesthesia: Moderate sedation using 1 mg IV versed     CONSENT:     The risks, benefits, alternatives, plan, complications, and personnel were discussed prior to the procedure. The patient understood and agreed to proceed. The patient was counseled at length about the risks of kena Covid-19 during their perioperative period and any recovery window from their procedure. The patient was made aware that kena Covid-19  may worsen their prognosis for recovering from their procedure  and lend to a higher morbidity and/or mortality risk. All material risks, benefits, and reasonable alternatives including postponing the procedure were discussed. The patient does wish to proceed with the procedure at this time. The patient was positioned supine. Sign-in and time-out was performed. Identifying the site, in this case,  left side knee joint. Sterile technique was done in the usual manner using chlorhexidine. This was followed by infiltration of a 10 ml of 1% preservative-free lidocaine. A 3.5 -inch, 22-gauge spinal needle was positioned under fluoroscopic guidance. Total of 3 needles positioned along the distal third medial and lateral superior aspect of the femur and along the proximal aspect of the medial aspection of tibia. This was followed by injecting solution of 40 mg of triamcinolone and  3.5 ml of 1% preservative-free lidocaine was injected without difficulty. The patient tolerated the procedure well and went to the recovery room with stable vital signs.     Estimated Blood Loss (mL): Minimal    Complications: None    Moderate Sedation Time: > 15 minutes    PLAN:     Home instructions were provided. The patient will follow up in 4 to 6 weeks or earlier if needed.        Electronically signed by Sebastian Pardo MD on 11/6/2020 at 3:13 PM

## 2020-11-06 NOTE — PROGRESS NOTES
Pt drinking pepsi. IV removed and pt up to bathroom with stand-by assist. Gait steady. Dresses. Instructions given.

## 2020-11-06 NOTE — PROGRESS NOTES
This nurse assisted pt to the bathroom with one assist, pt voided in commode and then assisted back to bed, call light remains in reach;

## 2020-11-06 NOTE — PROGRESS NOTES

## 2020-11-09 ENCOUNTER — TELEPHONE (OUTPATIENT)
Dept: PHARMACY | Age: 75
End: 2020-11-09

## 2020-11-09 ENCOUNTER — OFFICE VISIT (OUTPATIENT)
Dept: FAMILY MEDICINE CLINIC | Age: 75
End: 2020-11-09
Payer: MEDICARE

## 2020-11-09 VITALS
HEART RATE: 88 BPM | WEIGHT: 253 LBS | DIASTOLIC BLOOD PRESSURE: 80 MMHG | SYSTOLIC BLOOD PRESSURE: 120 MMHG | BODY MASS INDEX: 44.82 KG/M2 | OXYGEN SATURATION: 98 %

## 2020-11-09 PROCEDURE — 1123F ACP DISCUSS/DSCN MKR DOCD: CPT | Performed by: NURSE PRACTITIONER

## 2020-11-09 PROCEDURE — G8427 DOCREV CUR MEDS BY ELIG CLIN: HCPCS | Performed by: NURSE PRACTITIONER

## 2020-11-09 PROCEDURE — 1090F PRES/ABSN URINE INCON ASSESS: CPT | Performed by: NURSE PRACTITIONER

## 2020-11-09 PROCEDURE — G8482 FLU IMMUNIZE ORDER/ADMIN: HCPCS | Performed by: NURSE PRACTITIONER

## 2020-11-09 PROCEDURE — G0008 ADMIN INFLUENZA VIRUS VAC: HCPCS | Performed by: NURSE PRACTITIONER

## 2020-11-09 PROCEDURE — G8399 PT W/DXA RESULTS DOCUMENT: HCPCS | Performed by: NURSE PRACTITIONER

## 2020-11-09 PROCEDURE — G8417 CALC BMI ABV UP PARAM F/U: HCPCS | Performed by: NURSE PRACTITIONER

## 2020-11-09 PROCEDURE — 90686 IIV4 VACC NO PRSV 0.5 ML IM: CPT | Performed by: NURSE PRACTITIONER

## 2020-11-09 PROCEDURE — 4040F PNEUMOC VAC/ADMIN/RCVD: CPT | Performed by: NURSE PRACTITIONER

## 2020-11-09 PROCEDURE — 1036F TOBACCO NON-USER: CPT | Performed by: NURSE PRACTITIONER

## 2020-11-09 PROCEDURE — 99214 OFFICE O/P EST MOD 30 MIN: CPT | Performed by: NURSE PRACTITIONER

## 2020-11-09 RX ORDER — DULOXETIN HYDROCHLORIDE 30 MG/1
30 CAPSULE, DELAYED RELEASE ORAL DAILY
Qty: 30 CAPSULE | Refills: 1 | Status: ON HOLD | OUTPATIENT
Start: 2020-11-09 | End: 2021-01-15

## 2020-11-09 NOTE — TELEPHONE ENCOUNTER
Jeannette Sushma called to let us know that she had a procedure on 11/6/2020 and was off warfarin x 3 days (T,W,R) and restarted warfarin on Friday at previously stable dosage. Rescheduled Jo's appointment on 11/13 for 11/20 so that she will be back on warfarin x 2 weeks.

## 2020-11-09 NOTE — PROGRESS NOTES
Take 1 tablet on  and take 1/2 tablet daily all other days of the week or as managed by Manpreet Must Yes Miley Mancera MD   spironolactone (ALDACTONE) 25 MG tablet TAKE 1 TABLET TWICE DAILY Yes Miley Mancera MD   atenolol (TENORMIN) 50 MG tablet TAKE 1 TABLET EVERY DAY Yes Miley Mancera MD   allopurinol (ZYLOPRIM) 300 MG tablet Take 0.5 tablets by mouth daily Yes Christie London, APRN - CNP   aspirin 81 MG EC tablet Take 81 mg by mouth daily Yes Historical Provider, MD   Cholecalciferol (VITAMIN D) 2000 UNITS CAPS capsule Take 2,000 Units by mouth 2 times daily. Yes Historical Provider, MD        Social History     Tobacco Use    Smoking status: Former Smoker     Packs/day: 0.25     Years: 4.00     Pack years: 1.00     Types: Cigarettes     Last attempt to quit: 1981     Years since quittin.8    Smokeless tobacco: Never Used   Substance Use Topics    Alcohol use: Yes     Comment: rare, states she hasn't had a drink in months        Vitals:    20 1507   BP: 120/80   Site: Left Upper Arm   Position: Sitting   Cuff Size: Medium Adult   Pulse: 88   SpO2: 98%   Weight: 253 lb (114.8 kg)     Estimated body mass index is 44.82 kg/m² as calculated from the following:    Height as of 20: 5' 3\" (1.6 m). Weight as of this encounter: 253 lb (114.8 kg). Physical Exam  Vitals signs and nursing note reviewed. Constitutional:       General: She is not in acute distress. Appearance: Normal appearance. She is well-developed. HENT:      Head: Normocephalic and atraumatic. Right Ear: Tympanic membrane and external ear normal.      Left Ear: Tympanic membrane and external ear normal.      Nose: No congestion. Mouth/Throat:      Mouth: Mucous membranes are moist.   Eyes:      General: No scleral icterus. Pupils: Pupils are equal, round, and reactive to light. Neck:      Musculoskeletal: Normal range of motion and neck supple.       Vascular: No carotid bruit (neg francisco). Cardiovascular:      Rate and Rhythm: Normal rate and regular rhythm. Heart sounds: Normal heart sounds. No murmur. Pulmonary:      Effort: Pulmonary effort is normal. No respiratory distress. Breath sounds: Normal breath sounds. No wheezing. Abdominal:      Palpations: Abdomen is soft. Tenderness: There is no abdominal tenderness. Musculoskeletal: Normal range of motion. General: Tenderness (LUmbar tenderness) present. Right lower leg: Edema (generalized non pitting) present. Left lower leg: Edema present. Lymphadenopathy:      Cervical: No cervical adenopathy. Skin:     General: Skin is warm and dry. Neurological:      Mental Status: She is alert and oriented to person, place, and time. Psychiatric:         Behavior: Behavior normal.         Thought Content: Thought content normal.         Judgment: Judgment normal.         ASSESSMENT/PLAN:  1. Pulmonary HTN ([address]    2. Morbid obesity with BMI of 45.0-49.9, adult (HCC)    3. Persistent atrial fibrillation (HCC)  Anticoagulated coumadin  Rate and rhythm control    4. Chronic bilateral low back pain with bilateral sciatica  Continue with pain management. High risk for sleep apnea, declined testing. 5. Need for influenza vaccination  Given today. - INFLUENZA, QUADV, 3 YRS AND OLDER, IM PF, PREFILL SYR OR SDV, 0.5ML (AFLURIA QUADV, PF)    6. Colon cancer screening  Dr. Tong hooks    7. Hx of colonic polyps  '    Return in about 6 months (around 5/9/2021) for 1 month phone visit for cymbalta start , medicare visit-AWV- April 2021. An electronic signature was used to authenticate this note.     --TYLOR Garcia - CNP on 11/12/2020 at 7:44 PM

## 2020-11-09 NOTE — PROGRESS NOTES
After obtaining consent, and per orders of Nayla DONNELLY injection of Flu vaccine given in Left deltoid by Jeff Lo. Patient instructed to remain in clinic for 20 minutes afterwards, and to report any adverse reaction to me immediately. Immunizations Administered     Name Date Dose Route    Influenza, Quadv, IM, PF (6 mo and older Fluzone, Flulaval, Fluarix, and 3 yrs and older Afluria) 11/9/2020 0.5 mL Intramuscular    Site: Deltoid- Left    Lot: [id number]    NDC: 04062-156-83          Vaccine Information Sheet, \"Influenza - Inactivated\"  given to [place] Mark, or parent/legal guardian of  [place] and verbalized understanding. Patient responses:    Have you ever had a reaction to a flu vaccine? No  Are you able to eat eggs without adverse effects? Yes  Do you have any current illness? No  Have you ever had Guillian Cranbury Syndrome? No    Flu vaccine given per order. Please see immunization tab.

## 2020-11-09 NOTE — PATIENT INSTRUCTIONS
You may be receiving a survey from [employer] regarding your visit today. You may get this in the mail, through your MyChart or in your email. Please complete the survey to enable us to provide the highest quality of care to you and your family. If you cannot score us as very good ( 5 Stars) on any question, please feel free to call the office to discuss how we could have made your experience exceptional.     Thank You! TYLOR High-CNP  [address]  [place], [place]

## 2020-11-12 PROBLEM — K62.5 RECTAL BLEED: Status: RESOLVED | Noted: 2018-06-17 | Resolved: 2020-11-12

## 2020-11-12 PROBLEM — M10.9 ACUTE GOUT OF RIGHT FOOT: Status: RESOLVED | Noted: 2017-06-01 | Resolved: 2020-11-12

## 2020-11-12 PROBLEM — D64.9 ANEMIA: Status: RESOLVED | Noted: 2017-05-16 | Resolved: 2020-11-12

## 2020-11-12 ASSESSMENT — ENCOUNTER SYMPTOMS
SHORTNESS OF BREATH: 0
COUGH: 0
EYES NEGATIVE: 1

## 2020-11-20 ENCOUNTER — TELEPHONE (OUTPATIENT)
Dept: PHARMACY | Age: 75
End: 2020-11-20

## 2020-11-20 ENCOUNTER — HOSPITAL ENCOUNTER (OUTPATIENT)
Dept: PHARMACY | Age: 75
Setting detail: THERAPIES SERIES
Discharge: HOME | End: 2020-11-20
Payer: MEDICARE

## 2020-11-20 ENCOUNTER — OFFICE VISIT (OUTPATIENT)
Dept: PAIN MANAGEMENT | Age: 75
End: 2020-11-20
Payer: MEDICARE

## 2020-11-20 VITALS
OXYGEN SATURATION: 93 % | BODY MASS INDEX: 43.77 KG/M2 | SYSTOLIC BLOOD PRESSURE: 110 MMHG | HEART RATE: 88 BPM | DIASTOLIC BLOOD PRESSURE: 68 MMHG | WEIGHT: 247 LBS | TEMPERATURE: 98 F | HEIGHT: 63 IN

## 2020-11-20 VITALS — DIASTOLIC BLOOD PRESSURE: 72 MMHG | HEART RATE: 76 BPM | SYSTOLIC BLOOD PRESSURE: 109 MMHG

## 2020-11-20 LAB — INR BLD: 2.3

## 2020-11-20 PROCEDURE — G8399 PT W/DXA RESULTS DOCUMENT: HCPCS | Performed by: PHYSICAL MEDICINE & REHABILITATION

## 2020-11-20 PROCEDURE — 1123F ACP DISCUSS/DSCN MKR DOCD: CPT | Performed by: PHYSICAL MEDICINE & REHABILITATION

## 2020-11-20 PROCEDURE — G8417 CALC BMI ABV UP PARAM F/U: HCPCS | Performed by: PHYSICAL MEDICINE & REHABILITATION

## 2020-11-20 PROCEDURE — 1036F TOBACCO NON-USER: CPT | Performed by: PHYSICAL MEDICINE & REHABILITATION

## 2020-11-20 PROCEDURE — 1090F PRES/ABSN URINE INCON ASSESS: CPT | Performed by: PHYSICAL MEDICINE & REHABILITATION

## 2020-11-20 PROCEDURE — 4040F PNEUMOC VAC/ADMIN/RCVD: CPT | Performed by: PHYSICAL MEDICINE & REHABILITATION

## 2020-11-20 PROCEDURE — 99213 OFFICE O/P EST LOW 20 MIN: CPT | Performed by: PHYSICAL MEDICINE & REHABILITATION

## 2020-11-20 PROCEDURE — G8427 DOCREV CUR MEDS BY ELIG CLIN: HCPCS | Performed by: PHYSICAL MEDICINE & REHABILITATION

## 2020-11-20 PROCEDURE — 85610 PROTHROMBIN TIME: CPT

## 2020-11-20 PROCEDURE — G8482 FLU IMMUNIZE ORDER/ADMIN: HCPCS | Performed by: PHYSICAL MEDICINE & REHABILITATION

## 2020-11-20 PROCEDURE — 99211 OFF/OP EST MAY X REQ PHY/QHP: CPT

## 2020-11-20 RX ORDER — TRAMADOL HYDROCHLORIDE 50 MG/1
50 TABLET ORAL EVERY 8 HOURS PRN
Qty: 21 TABLET | Refills: 0 | Status: SHIPPED | OUTPATIENT
Start: 2020-11-20 | End: 2020-11-27

## 2020-11-20 NOTE — PROGRESS NOTES
Fingerstick INR drawn per clinic protocol. Patient states no visible blood in urine and no black tarry stool. Leila Dakins says she was off her warfarin for 3 days prior to an injection in her left knee per Dr. Rachel Adhikari on 11-6-2020. She restarted her warfarin on 11-7-2020 post-procedurally according to her previously stable warfarin regimen and denies any missed doses since that time. She also says she started taking Cymbalta 30 mg daily on 11- (which can potentially increase INR). No change in other maintenance medications or in diet. Given her therapeutic INR today, we will continue current warfarin regimen and recheck INR in 4 weeks.  Patient acknowledges working in consult agreement with pharmacist as referred by his/her physician.       CLINICAL PHARMACY CONSULT: MED RECONCILIATION/REVIEW ADDENDUM     For Pharmacy Admin Tracking Only     PHSO: Yes  Total # of Interventions Recommended: 0     - Maintenance Safety Lab Monitoring #: 1     Total Interventions Accepted: 0  Time Spent (min): 30     Issa Frederick PharmD

## 2020-11-20 NOTE — PROGRESS NOTES
FOLLOW UP APPOINTMENT:          Gloria Trejo MD    2020       Kwabena Cee is a 66 y.o. female, who came for a  follow up to discuss treatment options    Cause of the symptom(s): degenerative (arthritis)    Onset: 2months      Prior reatment done: injection    Current pain level: 8 on the scale of 0-10 ( 10 being worst) , in the groin and low back. Quality of Symptoms: tightening up pain, aching and all of the above    Aggravating factors: activity, lifting, twisting, bending forward and bending backward    Relieving factors: sitting and rest     Of note, the left knee pain is better. Had a genicular nerve block, left 2020. Allergies   Allergen Reactions    Clarithromycin Rash     Patient says she will \"never take this again\"     Gabapentin Swelling     Pt states that her body swelled up, not her airway, no troubles breathing    Merthiolate Glycerite [Thimerosal] Dermatitis     Redness, blisters    Tylenol With Codeine #3 [Acetaminophen-Codeine] Nausea And Vomiting       Social History     Socioeconomic History    Marital status:       Spouse name: Not on file    Number of children: Not on file    Years of education: Not on file    Highest education level: Not on file   Occupational History    Not on file   Social Needs    Financial resource strain: Not on file    Food insecurity     Worry: Not on file     Inability: Not on file    Transportation needs     Medical: Not on file     Non-medical: Not on file   Tobacco Use    Smoking status: Former Smoker     Packs/day: 0.25     Years: 4.00     Pack years: 1.00     Types: Cigarettes     Last attempt to quit: 1981     Years since quittin.9    Smokeless tobacco: Never Used   Substance and Sexual Activity    Alcohol use: Yes     Comment: rare, states she hasn't had a drink in months    Drug use: No    Sexual activity: Not on file   Lifestyle    Physical activity     Days per week: Not on file Minutes per session: Not on file    Stress: Not on file   Relationships    Social connections     Talks on phone: Not on file     Gets together: Not on file     Attends [employer] service: Not on file     Active member of club or organization: Not on file     Attends meetings of clubs or organizations: Not on file     Relationship status: Not on file    Intimate partner violence     Fear of current or ex partner: Not on file     Emotionally abused: Not on file     Physically abused: Not on file     Forced sexual activity: Not on file   Other Topics Concern    Not on file   Social History Narrative    Not on file       Past Medical History:   Diagnosis Date    Atrial fibrillation ([address]     CHF (congestive heart failure) ([address]     Gout     Hearing loss 2015    bilateral hearing aids     History of colon polyps 2016    Hypertension     Kidney stones     Mitral valve insufficiency     Obesity, morbid, BMI 40.0-49.9 ([address]     Osteoarthritis     Primary osteoarthritis of both knees     Dr. Екатерина Hagan Pulmonary [facility])        Past Surgical History:   Procedure Laterality Date    CARPAL TUNNEL RELEASE      bilaterally    COLONOSCOPY  11/28/2016    Alexandria Soto MD    COLONOSCOPY  05/2017    at [facility]. PYlori positive, adenoma, hyperplastic polyp    COLONOSCOPY  06/14/2018, 10/2019    DR. Ally Rebollar -tubulovillous adenoma repeat in 1 year 2020    JOINT REPLACEMENT Left 07/17/2019    knee    LITHOTRIPSY      NERVE BLOCK Left 11/6/2020    GENICULAR NERVE BLOCK LEFT KNEE performed by Bree Iniguez MD at [address] [place] [id number] N/A 9/4/2020    L3,L4 MEDIAL BRANCH BLOCK , L5 DORSAL RAMI INJECTION performed by Bree Iniguez MD at [address] [place] [address] Bilateral 10/2/2020    BILATERAL L3-4 MEDIAL BRANCH AND L5 DORSAL RAMI INJECTION performed by Bree Iniguez MD at [facility] Left 7/17/2019    LEFT KNEE TOTAL ARTHROPLASTY- JADE performed by Екатерина Woods DO at [place] [address] UPPER GASTROINTESTINAL ENDOSCOPY  11/28/2016    Nuha Tijerina MD       Family History   Problem Relation Age of Onset    High Blood Pressure Mother     Diabetes Father         Prior to Visit Medications    Medication Sig Taking? Authorizing Provider   DULoxetine (CYMBALTA) 30 MG extended release capsule Take 1 capsule by mouth daily Chronic musculoskeletal pain, francisco knee pain Yes TYLOR Lozada CNP   furosemide (LASIX) 40 MG tablet TAKE 1 TABLET DAILY, TAKE EXTRA TABLET IF NEEDED FOR EDEMA Yes Aliyah Schumacher MD   dilTIAZem (CARDIZEM) 60 MG tablet Take 1 tab Three times a day Yes Aliyah Schumacher MD   warfarin (COUMADIN) 10 MG tablet Take 1 tablet on Thursdays and take 1/2 tablet daily all other days of the week or as managed by Lj Rai Yes Aliyah Schumacher MD   spironolactone (ALDACTONE) 25 MG tablet TAKE 1 TABLET TWICE DAILY Yes Aliyah Schumacher MD   atenolol (TENORMIN) 50 MG tablet TAKE 1 TABLET EVERY DAY Yes Aliyah Schumacher MD   allopurinol (ZYLOPRIM) 300 MG tablet Take 0.5 tablets by mouth daily Yes TYLOR Lozada CNP   aspirin 81 MG EC tablet Take 81 mg by mouth daily Yes Historical Provider, MD   Cholecalciferol (VITAMIN D) 2000 UNITS CAPS capsule Take 2,000 Units by mouth 2 times daily. Yes Historical Provider, MD         Review of Systems ; All systems reviewed, all unremarkable other than HPI/subjective. No change since last visit. Denies  fever, chills, infection or non healing wound. /68 (Site: Left Lower Arm, Position: Sitting, Cuff Size: Large Adult)   Pulse 88   Temp 98 °F (36.7 °C) (Infrared)   Ht 5' 3\" (1.6 m)   Wt 247 lb (112 kg)   LMP  (LMP Unknown)   SpO2 93%   Breastfeeding No   BMI 43.75 kg/m²       Physical Exam  Constitutional:       General: She is not in acute distress. Appearance: Normal appearance. She is obese. HENT:      Head: Atraumatic. Pulmonary:      Effort: Pulmonary effort is normal. No respiratory distress. Musculoskeletal:         General: Tenderness present. Comments: Tender, lumbar paraspinals. Positive lumbar facet loading   Neurological:      Mental Status: She is alert and oriented to person, place, and time. Psychiatric:         Behavior: Behavior normal.           Assessment and Plan:      Diagnosis Orders   1. Lumbar spondylosis  CT RADIOFREQUENCY NEUROTOMY LUMBAR OR SACRAL, W IMAGE GUIDANCE, SINGLE    CT RADIOFREQ NEUROTOMY LUMBAR OR SACRAL, W IMAGE GUIDE,EA ADDL LEVEL       Had bilateral  L3-4 medial branch and L5 dorsal rami injection providing at least 80% relief. She wants to pursue RFA. Schedule for right L3-4 medial branch and L5 dorsal rami radiofrequency ablation. Schedule injection in 2 weeks     Schedule ff up post injection virtually 2 weeks post op     All questions were answered and imaging studies reviewed with the patient. I have reviewed the chief complaint and HPI including the [facility] and Vital documentation by my staff and I agree with their documentation and have added where applicable. Time spent with patient was 15  minutes. More than 50% was spent counseling/coordinating the patient's care.          Glenny Morales MD   Spine Medicine/PM&R

## 2020-11-20 NOTE — TELEPHONE ENCOUNTER
COVID-19 phone screening     Call placed to screen patient prior to upcoming Medication Management visit for Anticoagulation. Does patient have fever and/or lower respiratory symptoms (SOB, difficulty breathing, cough)? [] Yes    [x] No    If yes, complete Travel Screening and ask the following:   [] [Fever OR s/sxs of lower respiratory illness]  AND  [close contact with lab-confirmed COVID-19 patient within 14 days of symptom onset   [] [Fever AND s/sxs of lower respiratory illness requiring hospitalization] AND [history of travel to [place], [place], [place], [facility], [place] within 14 days of sx onset]  [] [Fever with severe acute lower respiratory illness (I.e. PNA, ARDS) requiring hospitalization and without alternative explanatory diagnosis (I.e. Influenza)] AND [no source of exposure identified]    [x] None of the following; patient confirmed for regularly scheduled INR check and instructed to call the clinic immediately if any symptoms develop prior to upcoming appointment.

## 2020-11-20 NOTE — PATIENT INSTRUCTIONS
SURVEY:    You may be receiving a survey from [employer] regarding your visit today. Please complete the survey to enable us to provide the highest quality of care to you and your family. If you cannot score us a very good on any question, please call the office to discuss how we could have made your experience a very good one. Thank you.

## 2020-12-04 RX ORDER — ALLOPURINOL 300 MG/1
TABLET ORAL
Qty: 45 TABLET | Refills: 1 | Status: SHIPPED | OUTPATIENT
Start: 2020-12-04 | End: 2021-09-13 | Stop reason: SDUPTHER

## 2020-12-09 ENCOUNTER — VIRTUAL VISIT (OUTPATIENT)
Dept: PRIMARY CARE CLINIC | Age: 75
End: 2020-12-09
Payer: MEDICARE

## 2020-12-09 PROCEDURE — G2025 DIS SITE TELE SVCS RHC/FQHC: HCPCS | Performed by: NURSE PRACTITIONER

## 2020-12-09 NOTE — PATIENT INSTRUCTIONS
You may be receiving a survey from [employer] regarding your visit today. You may get this in the mail, through your MyChart or in your email. Please complete the survey to enable us to provide the highest quality of care to you and your family. If you cannot score us as very good ( 5 Stars) on any question, please feel free to call the office to discuss how we could have made your experience exceptional.     Thank You!     TYLOR Mann-[place] Foster

## 2020-12-10 RX ORDER — WARFARIN SODIUM 10 MG/1
TABLET ORAL
Qty: 90 TABLET | Refills: 3 | Status: SHIPPED | OUTPATIENT
Start: 2020-12-10 | End: 2020-12-18 | Stop reason: DRUGHIGH

## 2020-12-17 ENCOUNTER — TELEPHONE (OUTPATIENT)
Dept: PHARMACY | Age: 75
End: 2020-12-17

## 2020-12-18 ENCOUNTER — HOSPITAL ENCOUNTER (OUTPATIENT)
Dept: PHARMACY | Age: 75
Setting detail: THERAPIES SERIES
Discharge: HOME | End: 2020-12-18
Payer: MEDICARE

## 2020-12-18 VITALS
WEIGHT: 252.3 LBS | HEART RATE: 77 BPM | BODY MASS INDEX: 44.69 KG/M2 | DIASTOLIC BLOOD PRESSURE: 71 MMHG | SYSTOLIC BLOOD PRESSURE: 116 MMHG

## 2020-12-18 LAB — INR BLD: 3

## 2020-12-18 PROCEDURE — 99211 OFF/OP EST MAY X REQ PHY/QHP: CPT

## 2020-12-18 PROCEDURE — 85610 PROTHROMBIN TIME: CPT

## 2020-12-18 RX ORDER — WARFARIN SODIUM 10 MG/1
TABLET ORAL
Qty: 90 TABLET | Refills: 3 | Status: SHIPPED
Start: 2020-12-18 | End: 2021-03-09 | Stop reason: DRUGHIGH

## 2020-12-18 NOTE — PROGRESS NOTES
Fingerstick INR drawn per clinic protocol. Patient states no visible blood in urine and no black tarry stool. Denies any missed doses since that time. As discussed previously, Tosha Jain had started taking Cymbalta 30 mg daily on 11- (which can potentially increase INR). No change in other maintenance medications or in diet, although she hasn't been eating any salads or other \"greens\" (including \"spinach\") and says she will start incorporating there back into her diet as she had been previously.  Given her therapeutic INR today, we will continue current warfarin regimen and recheck INR in 5 weeks. Patient acknowledges working in consult agreement with pharmacist as referred by his/her physician.       CLINICAL PHARMACY CONSULT: MED RECONCILIATION/REVIEW ADDENDUM     For Pharmacy Admin Tracking Only     PHSO: Yes  Total # of Interventions Recommended: 0     - Maintenance Safety Lab Monitoring #: 1     Total Interventions Accepted: 0  Time Spent (min): 30     Nay Bergman PharmD

## 2020-12-24 RX ORDER — SPIRONOLACTONE 25 MG/1
TABLET ORAL
Qty: 180 TABLET | Refills: 3 | Status: SHIPPED | OUTPATIENT
Start: 2020-12-24 | End: 2021-11-29

## 2021-01-13 ENCOUNTER — TELEPHONE (OUTPATIENT)
Dept: PAIN MANAGEMENT | Age: 76
End: 2021-01-13

## 2021-01-13 NOTE — TELEPHONE ENCOUNTER
Patient is scheduled for right L3-4 medial branch and L5 dorsal rami radiofrequency ablation at [facility] this Friday, 01/15/21. I have a message out to the rep, Odette Albrecht, at [address] [place] [address]. Wanted to clarify if this to be a cooled RFA.

## 2021-01-15 ENCOUNTER — APPOINTMENT (OUTPATIENT)
Dept: GENERAL RADIOLOGY | Age: 76
End: 2021-01-15
Attending: PHYSICAL MEDICINE & REHABILITATION
Payer: MEDICARE

## 2021-01-15 ENCOUNTER — HOSPITAL ENCOUNTER (OUTPATIENT)
Age: 76
Setting detail: OUTPATIENT SURGERY
Discharge: HOME | End: 2021-01-15
Attending: PHYSICAL MEDICINE & REHABILITATION | Admitting: PHYSICAL MEDICINE & REHABILITATION
Payer: MEDICARE

## 2021-01-15 ENCOUNTER — HOSPITAL ENCOUNTER (OUTPATIENT)
Dept: PREADMISSION TESTING | Age: 76
Setting detail: SPECIMEN
Discharge: HOME | End: 2021-01-15
Payer: MEDICARE

## 2021-01-15 VITALS
DIASTOLIC BLOOD PRESSURE: 75 MMHG | HEART RATE: 85 BPM | OXYGEN SATURATION: 96 % | BODY MASS INDEX: 45.18 KG/M2 | RESPIRATION RATE: 18 BRPM | SYSTOLIC BLOOD PRESSURE: 113 MMHG | TEMPERATURE: 97.7 F | HEIGHT: 63 IN | WEIGHT: 255 LBS

## 2021-01-15 LAB
INR BLD: 1.3
PROTHROMBIN TIME: 15.9 SEC (ref 11.5–14.2)
SARS-COV-2, RAPID: NOT DETECTED
SARS-COV-2: NORMAL
SARS-COV-2: NORMAL
SOURCE: NORMAL

## 2021-01-15 PROCEDURE — 85610 PROTHROMBIN TIME: CPT

## 2021-01-15 PROCEDURE — 3600000054 HC PAIN LEVEL 3 BASE: Performed by: PHYSICAL MEDICINE & REHABILITATION

## 2021-01-15 PROCEDURE — 64636 DESTROY L/S FACET JNT ADDL: CPT | Performed by: PHYSICAL MEDICINE & REHABILITATION

## 2021-01-15 PROCEDURE — 99152 MOD SED SAME PHYS/QHP 5/>YRS: CPT | Performed by: PHYSICAL MEDICINE & REHABILITATION

## 2021-01-15 PROCEDURE — 7100000011 HC PHASE II RECOVERY - ADDTL 15 MIN: Performed by: PHYSICAL MEDICINE & REHABILITATION

## 2021-01-15 PROCEDURE — 2500000003 HC RX 250 WO HCPCS: Performed by: PHYSICAL MEDICINE & REHABILITATION

## 2021-01-15 PROCEDURE — C9803 HOPD COVID-19 SPEC COLLECT: HCPCS

## 2021-01-15 PROCEDURE — 7100000010 HC PHASE II RECOVERY - FIRST 15 MIN: Performed by: PHYSICAL MEDICINE & REHABILITATION

## 2021-01-15 PROCEDURE — 3600000055 HC PAIN LEVEL 3 ADDL 15 MIN: Performed by: PHYSICAL MEDICINE & REHABILITATION

## 2021-01-15 PROCEDURE — 2709999900 HC NON-CHARGEABLE SUPPLY: Performed by: PHYSICAL MEDICINE & REHABILITATION

## 2021-01-15 PROCEDURE — 36415 COLL VENOUS BLD VENIPUNCTURE: CPT

## 2021-01-15 PROCEDURE — 64635 DESTROY LUMB/SAC FACET JNT: CPT | Performed by: PHYSICAL MEDICINE & REHABILITATION

## 2021-01-15 PROCEDURE — 6360000002 HC RX W HCPCS: Performed by: PHYSICAL MEDICINE & REHABILITATION

## 2021-01-15 PROCEDURE — 76000 FLUOROSCOPY <1 HR PHYS/QHP: CPT

## 2021-01-15 PROCEDURE — 2580000003 HC RX 258: Performed by: PHYSICAL MEDICINE & REHABILITATION

## 2021-01-15 PROCEDURE — 99153 MOD SED SAME PHYS/QHP EA: CPT | Performed by: PHYSICAL MEDICINE & REHABILITATION

## 2021-01-15 PROCEDURE — U0002 COVID-19 LAB TEST NON-CDC: HCPCS

## 2021-01-15 RX ORDER — LIDOCAINE HYDROCHLORIDE 20 MG/ML
INJECTION, SOLUTION EPIDURAL; INFILTRATION; INTRACAUDAL; PERINEURAL PRN
Status: DISCONTINUED | OUTPATIENT
Start: 2021-01-15 | End: 2021-01-15 | Stop reason: ALTCHOICE

## 2021-01-15 RX ORDER — SODIUM CHLORIDE 0.9 % (FLUSH) 0.9 %
10 SYRINGE (ML) INJECTION PRN
Status: DISCONTINUED | OUTPATIENT
Start: 2021-01-15 | End: 2021-01-15 | Stop reason: HOSPADM

## 2021-01-15 RX ORDER — MIDAZOLAM HYDROCHLORIDE 1 MG/ML
INJECTION INTRAMUSCULAR; INTRAVENOUS PRN
Status: DISCONTINUED | OUTPATIENT
Start: 2021-01-15 | End: 2021-01-15 | Stop reason: ALTCHOICE

## 2021-01-15 RX ORDER — SODIUM CHLORIDE, SODIUM LACTATE, POTASSIUM CHLORIDE, CALCIUM CHLORIDE 600; 310; 30; 20 MG/100ML; MG/100ML; MG/100ML; MG/100ML
INJECTION, SOLUTION INTRAVENOUS CONTINUOUS
Status: DISCONTINUED | OUTPATIENT
Start: 2021-01-15 | End: 2021-01-15 | Stop reason: HOSPADM

## 2021-01-15 RX ORDER — LIDOCAINE HYDROCHLORIDE 10 MG/ML
INJECTION, SOLUTION EPIDURAL; INFILTRATION; INTRACAUDAL; PERINEURAL PRN
Status: DISCONTINUED | OUTPATIENT
Start: 2021-01-15 | End: 2021-01-15 | Stop reason: ALTCHOICE

## 2021-01-15 RX ORDER — TRIAMCINOLONE ACETONIDE 40 MG/ML
INJECTION, SUSPENSION INTRA-ARTICULAR; INTRAMUSCULAR PRN
Status: DISCONTINUED | OUTPATIENT
Start: 2021-01-15 | End: 2021-01-15 | Stop reason: ALTCHOICE

## 2021-01-15 RX ADMIN — SODIUM CHLORIDE, POTASSIUM CHLORIDE, SODIUM LACTATE AND CALCIUM CHLORIDE: 600; 310; 30; 20 INJECTION, SOLUTION INTRAVENOUS at 06:52

## 2021-01-15 ASSESSMENT — PAIN SCALES - GENERAL
PAINLEVEL_OUTOF10: 0
PAINLEVEL_OUTOF10: 0

## 2021-01-15 NOTE — H&P
Jace Guillaume MD      Shirley Gaytan is a 66 y.o. female, who came in for a procedure,  Right L3, L4 medial branch and L5 dorsal rami RFA. No change since last visit. Pain is moderate to severe. Treatment done: physical therapy, anti-inflammatory medication and muscle relaxant    Allergies   Allergen Reactions    Clarithromycin Rash     Patient says she will \"never take this again\"     Gabapentin Swelling     Pt states that her body swelled up, not her airway, no troubles breathing    Merthiolate Glycerite [Thimerosal] Dermatitis     Redness, blisters    Tylenol With Codeine #3 [Acetaminophen-Codeine] Nausea And Vomiting       Social History     Socioeconomic History    Marital status:       Spouse name: Not on file    Number of children: Not on file    Years of education: Not on file    Highest education level: Not on file   Occupational History    Not on file   Social Needs    Financial resource strain: Not on file    Food insecurity     Worry: Not on file     Inability: Not on file    Transportation needs     Medical: Not on file     Non-medical: Not on file   Tobacco Use    Smoking status: Former Smoker     Packs/day: 0.25     Years: 4.00     Pack years: 1.00     Types: Cigarettes     Quit date: 1981     Years since quittin.0    Smokeless tobacco: Never Used   Substance and Sexual Activity    Alcohol use: Yes     Comment: rare, states she hasn't had a drink in months    Drug use: No    Sexual activity: Not on file   Lifestyle    Physical activity     Days per week: Not on file     Minutes per session: Not on file    Stress: Not on file   Relationships    Social connections     Talks on phone: Not on file     Gets together: Not on file     Attends [employer] service: Not on file     Active member of club or organization: Not on file     Attends meetings of clubs or organizations: Not on file     Relationship status: Not on file  Intimate partner violence     Fear of current or ex partner: Not on file     Emotionally abused: Not on file     Physically abused: Not on file     Forced sexual activity: Not on file   Other Topics Concern    Not on file   Social History Narrative    Not on file       Past Medical History:   Diagnosis Date    [facility] ([facility]     CHF (congestive heart failure) ([facility]     Gout     Hearing loss 2015    bilateral hearing aids     History of colon polyps 2016    Hypertension     Kidney stones     Mitral valve insufficiency     Obesity, morbid, BMI 40.0-49.9 ([facility]     Osteoarthritis     Primary osteoarthritis of both knees     Dr. Rick Husbands Pulmonary HTN [facility])        Past Surgical History:   Procedure Laterality Date    CARPAL TUNNEL RELEASE      bilaterally    COLONOSCOPY  11/28/2016    Daljit Gabriel MD    COLONOSCOPY  05/2017    at [address] [place] [address]. PYlori positive, adenoma, hyperplastic polyp    COLONOSCOPY  06/14/2018, 10/2019    DR. Syd Kenney -tubulovillous adenoma repeat in 1 year 2020    JOINT REPLACEMENT Left 07/17/2019    knee    LITHOTRIPSY      NERVE BLOCK Left 11/6/2020    GENICULAR NERVE BLOCK LEFT KNEE performed by Davonte Wheeler MD at [address] [place] [address] N/A 9/4/2020    L3,L4 MEDIAL BRANCH BLOCK , L5 DORSAL RAMI INJECTION performed by Davonte Wheeler MD at [address] [place] [address] Bilateral 10/2/2020    BILATERAL L3-4 MEDIAL BRANCH AND L5 DORSAL RAMI INJECTION performed by Davonte Wheeler MD at [address] [place] [address] Left 7/17/2019    LEFT KNEE TOTAL ARTHROPLASTY- DEPUY performed by Severiano Rios DO at [place] [address] ENDOSCOPY  11/28/2016    Daljit Gabriel MD       Prior to Visit Medications    Medication Sig Taking?  Authorizing Provider   spironolactone (ALDACTONE) 25 MG tablet TAKE 1 TABLET TWICE DAILY Yes Job Garza MD allopurinol (ZYLOPRIM) 300 MG tablet TAKE 1/2 TABLET EVERY DAY Yes TYLOR Evans CNP   furosemide (LASIX) 40 MG tablet TAKE 1 TABLET DAILY, TAKE EXTRA TABLET IF NEEDED FOR EDEMA Yes Connor Lea MD   dilTIAZem (CARDIZEM) 60 MG tablet Take 1 tab Three times a day Yes Connor Lea MD   atenolol (TENORMIN) 50 MG tablet TAKE 1 TABLET EVERY DAY Yes Connor Lea MD   Cholecalciferol (VITAMIN D) 2000 UNITS CAPS capsule Take 2,000 Units by mouth 2 times daily. Yes Historical Provider, MD   warfarin (COUMADIN) 10 MG tablet TAKE 1 TABLET ON THURSDAYS AND TAKE 1/2 TABLET ON ALL OTHER DAYS OF THE WEEK OR AS DIRECTED  Connor Lea MD   aspirin 81 MG EC tablet Take 81 mg by mouth daily  Historical Provider, MD         Review of Systems : All systems reviewed, all unremarkable other than HPI. No change since last visit. Physical Exam  Constitutional:       Appearance: She is obese. HENT:      Head: Atraumatic. Cardiovascular:      Rate and Rhythm: Normal rate. Pulses: Normal pulses. Pulmonary:      Effort: Pulmonary effort is normal. No respiratory distress. Neurological:      Mental Status: She is alert and oriented to person, place, and time. Psychiatric:         Behavior: Behavior normal.       Cervical Spine Exam: Full ROM, without limitation     Access: Adequate mouth opening     Assessment:   Lumbar spondylosis     PLAN:     As advertised. Planned duration of treatment: 4 weeks. Further assessment and followup in  4 weeks for follow up post injection.        Electronically signed by Burt Boudreaux MD on 1/15/2021 at 8:00 AM

## 2021-01-15 NOTE — OP NOTE
Operative Note      Patient: Pooja Rice  YOB: 1945  MRN: [id number]    Date of Procedure: 1/15/2021    Pre-Op Diagnosis: LUMBAR SPONDYLOSIS    Post-Op Diagnosis: Same       Procedure(s):  RFA, L3-L4 MEDIAL BRANCH AND L-5 DORSEL RAMI INJECTION RIGHT    Surgeon(s):  Trupti Power MD    Assistant:   * No surgical staff found *    Anesthesia: IV Sedation    Estimated Blood Loss (mL): Minimal    Complications: None    Moderate Sedation Time: > 15 minutes    CONSENT:     The risks, benefits, alternatives, plan, complications, and personnel were discussed prior to the procedure. The patient understood and agreed to proceed. PROCEDURE:     Anesthesia: Moderate sedation using 2 mg IV versed    The patient was positioned prone. Sign-in and time-out was performed. Identifying the site, in this case, right side     L3 L4 medial branches and L5 dorsal ramus/rami  to address the L4-5 and L5-S1 joints     Sterile technique was done in the usual manner using chlorhexidine. Thiis was followed by infiltration of a 10 ml of 1% preservative-free lidocaine. A 18 g 145 mm  radiofrequency needles were positioned under fluoroscopic  guidance. A total of 3 radiofrequency needles were positioned. Upon confirmation that the needle was properly positioned, a series of a stimulation was performed as follows: In both motor and sensory stimulation, there was no evidence of radicular pain pattern. Prior to lesioning, 1 mL of 2% lidocaine was injected to the sites identified. Total of 3 ml was injected. 90° for 90 seconds continuous thermal ablation was performed. This was followed by injecting solution of 40 mg of triamcinolone and  2 ml of 1% preservative-free lidocaine was injected without difficulty. The patient tolerated the procedure well and went to the recovery room with stable vital signs. PLAN:   Home instructions were provided.

## 2021-01-15 NOTE — SEDATION DOCUMENTATION
Conscious Sedation Pre and Post Procedure Note    Indication: procedural pain management    Consent: I have discussed with the patient and/or the patient representative the indication, alternatives, and the possible risks and/or complications of the planned procedure and the anesthesia methods. The patient and/or patient representative appear to understand and agree to proceed. Physician Involvement: The attending physician was present and supervising this procedure. Pre-Sedation Documentation and Exam: I have personally completed a history, physical exam & review of systems for this patient (see notes). Airway Assessment: Mallampati Class II - (soft palate, fauces & uvula are visible)    Prior History of Anesthesia Complications: none    ASA Classification: Class 2 - A normal healthy patient with mild systemic disease    Sedation/ Anesthesia Plan: intravenous sedation    Medications Used: midazolam (Versed) intravenously    Monitoring and Safety: The patient was placed on a cardiac monitor and vital signs, pulse oximetry and level of consciousness were continuously evaluated throughout the procedure. The patient was closely monitored until recovery from the medications was complete and the patient had returned to baseline status. Respiratory therapy was on standby at all times during the procedure. (The following sections must be completed)    Post-Sedation Vital Signs: Vital signs were reviewed and were stable after the procedure (see flow sheet for vitals)            Post-Sedation Exam: Neurologically intact. No cardiovascular compromise post injection.               Complications: none

## 2021-01-15 NOTE — PROGRESS NOTES
Pt dressed, in wc. No bleeding noted from injection site. Pt eats and drinks. No nausea or vomiting. Pt reports \"I don't have to pee right now\". Discharge Criteria  Outpatients must meet criteria 1 through 7. Up to restroom, void sufficient amount. Yes    1. Minimum 30 minutes after last dose of sedative medication, minimum 120 minutes after last dose of reversal agent. Yes    2. Systolic BP stable within 20 mmHg for 30 minutes & systolic BP between 90 & 037 or within 10 mmHg of baseline. Yes    3. Pulse between 60 and 100 or within 10 bpm of baseline. Yes    4. Spontaneous respiratory rate >/= 10 per minute. Yes    5. SaO2 >/= 95 or  >/= baseline. Yes    6. Able to cough and swallow or return to baseline function. Yes    7. Alert and oriented or return to baseline mental status. Yes    8. Demonstrates controlled, coordinated movements, ambulates with steady gait, or return to baseline activity function. Yes    9. Minimal or no pain or nausea, or at a level tolerable and acceptable to patient. Yes    10. Takes and retains oral fluids as allowed. Yes    11. Procedural / perioperative site stable. Minimal or no bleeding. No        12. If GI endoscopy procedure, minimal or no abdominal distention or passing flatus. Yes    13. Written discharge instructions and emergency telephone number provided. Yes    14. Accompanied by a responsible adult. Yes    Adult patient discharged from facility without responsible person meets above criteria plus the following:   a) remains awake without stimulus for 30 minutes     b) oriented appropriate for age      c) all vital signs stable   d) no significant risk of losing protective reflexes      e) able to maintain pre-procedure mobility without assistance   f) no nausea or dizziness      g) transportation arrangements that do not require patient to operate motor   Vehicle.   Yes

## 2021-01-29 ENCOUNTER — OFFICE VISIT (OUTPATIENT)
Dept: PAIN MANAGEMENT | Age: 76
End: 2021-01-29
Payer: MEDICARE

## 2021-01-29 ENCOUNTER — HOSPITAL ENCOUNTER (OUTPATIENT)
Dept: PHARMACY | Age: 76
Setting detail: THERAPIES SERIES
Discharge: HOME | End: 2021-01-29
Payer: MEDICARE

## 2021-01-29 VITALS
DIASTOLIC BLOOD PRESSURE: 80 MMHG | OXYGEN SATURATION: 97 % | TEMPERATURE: 97.3 F | BODY MASS INDEX: 45.54 KG/M2 | HEIGHT: 63 IN | WEIGHT: 257 LBS | SYSTOLIC BLOOD PRESSURE: 128 MMHG | HEART RATE: 71 BPM

## 2021-01-29 VITALS — HEART RATE: 83 BPM | DIASTOLIC BLOOD PRESSURE: 75 MMHG | SYSTOLIC BLOOD PRESSURE: 123 MMHG

## 2021-01-29 DIAGNOSIS — I48.20 CHRONIC ATRIAL FIBRILLATION (HCC): ICD-10-CM

## 2021-01-29 DIAGNOSIS — M47.816 LUMBAR SPONDYLOSIS: Primary | ICD-10-CM

## 2021-01-29 LAB — INR BLD: 1.9

## 2021-01-29 PROCEDURE — 1036F TOBACCO NON-USER: CPT | Performed by: PHYSICAL MEDICINE & REHABILITATION

## 2021-01-29 PROCEDURE — G8417 CALC BMI ABV UP PARAM F/U: HCPCS | Performed by: PHYSICAL MEDICINE & REHABILITATION

## 2021-01-29 PROCEDURE — G8482 FLU IMMUNIZE ORDER/ADMIN: HCPCS | Performed by: PHYSICAL MEDICINE & REHABILITATION

## 2021-01-29 PROCEDURE — 1090F PRES/ABSN URINE INCON ASSESS: CPT | Performed by: PHYSICAL MEDICINE & REHABILITATION

## 2021-01-29 PROCEDURE — G8427 DOCREV CUR MEDS BY ELIG CLIN: HCPCS | Performed by: PHYSICAL MEDICINE & REHABILITATION

## 2021-01-29 PROCEDURE — G8399 PT W/DXA RESULTS DOCUMENT: HCPCS | Performed by: PHYSICAL MEDICINE & REHABILITATION

## 2021-01-29 PROCEDURE — 4040F PNEUMOC VAC/ADMIN/RCVD: CPT | Performed by: PHYSICAL MEDICINE & REHABILITATION

## 2021-01-29 PROCEDURE — 99211 OFF/OP EST MAY X REQ PHY/QHP: CPT

## 2021-01-29 PROCEDURE — 1123F ACP DISCUSS/DSCN MKR DOCD: CPT | Performed by: PHYSICAL MEDICINE & REHABILITATION

## 2021-01-29 PROCEDURE — 85610 PROTHROMBIN TIME: CPT

## 2021-01-29 PROCEDURE — 99213 OFFICE O/P EST LOW 20 MIN: CPT | Performed by: PHYSICAL MEDICINE & REHABILITATION

## 2021-01-29 NOTE — PROGRESS NOTES
FOLLOW UP APPOINTMENT:         Jorge Luis Raphael MD    2021       Lavonne Orellana is a 66 y.o. female, who came for a   post injection follow up    Cause of the symptom(s): degenerative (arthritis)    Onset: 3months      Prior reatment done: injection    Current pain level: 0 on the scale of 0-10 ( 10 being worst)     Quality of Symptoms: tightening up throughout the body and aching    Aggravating factors: walking, standing, activity, lifting, twisting, bending forward and bending backward    Relieving factors: sitting and rest        Allergies   Allergen Reactions    Clarithromycin Rash     Patient says she will \"never take this again\"     Gabapentin Swelling     Pt states that her body swelled up, not her airway, no troubles breathing    Merthiolate Glycerite [Thimerosal] Dermatitis     Redness, blisters    Tylenol With Codeine #3 [Acetaminophen-Codeine] Nausea And Vomiting       Social History     Socioeconomic History    Marital status:       Spouse name: Not on file    Number of children: Not on file    Years of education: Not on file    Highest education level: Not on file   Occupational History    Not on file   Social Needs    Financial resource strain: Not on file    Food insecurity     Worry: Not on file     Inability: Not on file    Transportation needs     Medical: Not on file     Non-medical: Not on file   Tobacco Use    Smoking status: Former Smoker     Packs/day: 0.25     Years: 4.00     Pack years: 1.00     Types: Cigarettes     Quit date: 1981     Years since quittin.1    Smokeless tobacco: Never Used   Substance and Sexual Activity    Alcohol use: Yes     Comment: rare, states she hasn't had a drink in months    Drug use: No    Sexual activity: Not on file   Lifestyle    Physical activity     Days per week: Not on file     Minutes per session: Not on file    Stress: Not on file   Relationships    Social connections     Talks on phone: Not on file Gets together: Not on file     Attends Sabianist service: Not on file     Active member of club or organization: Not on file     Attends meetings of clubs or organizations: Not on file     Relationship status: Not on file    Intimate partner violence     Fear of current or ex partner: Not on file     Emotionally abused: Not on file     Physically abused: Not on file     Forced sexual activity: Not on file   Other Topics Concern    Not on file   Social History Narrative    Not on file       Past Medical History:   Diagnosis Date    Atrial fibrillation ([address]     CHF (congestive heart failure) ([address]     Gout     Hearing loss 2015    bilateral hearing aids     History of colon polyps 2016    Hypertension     Kidney stones     Mitral valve insufficiency     Obesity, morbid, BMI 40.0-49.9 ([facility]     Osteoarthritis     Primary osteoarthritis of both knees     Dr. Mena Grover Pulmonary HTN [facility])        Past Surgical History:   Procedure Laterality Date    CARPAL TUNNEL RELEASE      bilaterally    COLONOSCOPY  11/28/2016    Mima Ordaz MD    COLONOSCOPY  05/2017    at [address] [place] [address]. PYlori positive, adenoma, hyperplastic polyp    COLONOSCOPY  06/14/2018, 10/2019    DR. Corona Brittle -tubulovillous adenoma repeat in 1 year 2020    JOINT REPLACEMENT Left 07/17/2019    knee    LITHOTRIPSY      NERVE BLOCK Left 11/6/2020    GENICULAR NERVE BLOCK LEFT KNEE performed by Talha Jerome MD at [address] [place] [address] N/A 9/4/2020    L3,L4 MEDIAL BRANCH BLOCK , L5 DORSAL RAMI INJECTION performed by Talha Jerome MD at [address] [place] [address] Bilateral 10/2/2020    BILATERAL L3-4 MEDIAL BRANCH AND L5 DORSAL RAMI INJECTION performed by Talha Jerome MD at [address] [place] [address] Street Right 1/15/2021    RFA, L3-L4 MEDIAL BRANCH AND L-5 DORSEL RAMI INJECTION RIGHT performed by Talha Jerome MD at [address] [place] [address] Left 7/17/2019 I have reviewed the chief complaint and HPI including the [facility] [place] GARCIA and Vital documentation by my staff and I agree with their documentation and have added where applicable. Time spent with patient was 15 minutes. More than 50% was spent counseling/coordinating the patient's care.          José Miguel Parker MD   Spine Medicine/PM&R

## 2021-01-29 NOTE — PROGRESS NOTES
Fingerstick INR drawn per clinic protocol. Patient states no visible blood in urine and no black tarry stool. Trini Morales says she was off her warfarin for 3 days prior to an injection in the right lumbar region of her back per Dr. Nida Canavan on 1-. She restarted her warfarin on 1- post procedurally according to her previously stable warfarin regimen and denies any missed doses since that time. As discussed previously, Trini Morales had started taking Cymbalta 30 mg daily on 11- (which can potentially increase INR).  She tells me today that she stopped taking this medication \"at least 3 weeks ago\" because it \"was not helping\". No change in other maintenance medications. She says she has gotten back to eating salads and \"greens\" (including \"spinach\") and plans to continue incorporating these into her diet as she had been previously. Although her INR is slightly sub-therapeutic today, she had been off her warfarin for 3 days recently (1/12 through 1/14).  For now, we will continue current warfarin regimen and recheck INR in 5 weeks. Patient acknowledges working in consult agreement with pharmacist as referred by his/her physician.       CLINICAL PHARMACY CONSULT: MED RECONCILIATION/REVIEW ADDENDUM     For Pharmacy Admin Tracking Only     PHSO: Yes  Total # of Interventions Recommended: 1     - Maintenance Safety Lab Monitoring #: 1     Total Interventions Accepted: 1  Time Spent (min): 30     Ho Scott PharmD

## 2021-01-29 NOTE — PATIENT INSTRUCTIONS
SURVEY:    You may be receiving a survey from [employer] regarding your visit today. Please complete the survey to enable us to provide the highest quality of care to you and your family. If you cannot score us a very good on any question, please call the office to discuss how we could have made your experience a very good one. Thank you.

## 2021-02-15 ENCOUNTER — HOSPITAL ENCOUNTER (EMERGENCY)
Age: 76
Discharge: HOME | End: 2021-02-16
Attending: EMERGENCY MEDICINE
Payer: MEDICARE

## 2021-02-15 VITALS
OXYGEN SATURATION: 94 % | DIASTOLIC BLOOD PRESSURE: 69 MMHG | SYSTOLIC BLOOD PRESSURE: 111 MMHG | HEART RATE: 110 BPM | TEMPERATURE: 98.6 F | RESPIRATION RATE: 20 BRPM

## 2021-02-15 DIAGNOSIS — S81.811A LACERATION OF RIGHT LOWER EXTREMITY, INITIAL ENCOUNTER: Primary | ICD-10-CM

## 2021-02-15 PROCEDURE — 99283 EMERGENCY DEPT VISIT LOW MDM: CPT

## 2021-02-15 PROCEDURE — 85610 PROTHROMBIN TIME: CPT

## 2021-02-15 PROCEDURE — 85027 COMPLETE CBC AUTOMATED: CPT

## 2021-02-15 PROCEDURE — 12002 RPR S/N/AX/GEN/TRNK2.6-7.5CM: CPT

## 2021-02-15 RX ORDER — DULOXETIN HYDROCHLORIDE 30 MG/1
30 CAPSULE, DELAYED RELEASE ORAL DAILY
COMMUNITY
End: 2021-03-09

## 2021-02-16 LAB
HCT VFR BLD CALC: 38.8 % (ref 36–46)
HEMOGLOBIN: 13.2 G/DL (ref 12–16)
INR BLD: 2.6
MCH RBC QN AUTO: 31.7 PG (ref 26–34)
MCHC RBC AUTO-ENTMCNC: 34 G/DL (ref 31–37)
MCV RBC AUTO: 93 FL (ref 80–100)
NRBC AUTOMATED: NORMAL PER 100 WBC
PDW BLD-RTO: 14.5 % (ref 12.1–15.2)
PLATELET # BLD: 223 K/UL (ref 140–450)
PMV BLD AUTO: NORMAL FL (ref 6–12)
PROTHROMBIN TIME: 27.5 SEC (ref 11.5–14.2)
RBC # BLD: 4.17 M/UL (ref 4–5.2)
WBC # BLD: 10.4 K/UL (ref 3.5–11)

## 2021-02-16 NOTE — ED PROVIDER NOTES
eMERGENCY dEPARTMENT eNCOUnter        [address] [facility]    Chief Complaint   Patient presents with    Laceration     pt scraped her right lower leg on her walker around 5pm tonight. pt was unable to get the bleeding to stop. HPI    Jony Singh is a 66 y.o. female who presents to ED from home. By EMS. With complaint of r leg laceration. Onset around 5 pm.  Patient states that she is not able to stop the bleeding. Location of symptoms right leg. Patient states that she scraped her right leg on her walker today. Patient was not able to stop the bleeding. Patient is on Coumadin for atrial fibrillation. REVIEW OF SYSTEMS    All systems reviewed and positives are in the HPI      PAST MEDICAL HISTORY    Past Medical History:   Diagnosis Date    Atrial fibrillation ([address]     CHF (congestive heart failure) ([address]     Gout     Hearing loss 2015    bilateral hearing aids     History of colon polyps 2016    Hypertension     Kidney stones     Mitral valve insufficiency     Obesity, morbid, BMI 40.0-49.9 (HCC)     Osteoarthritis     Primary osteoarthritis of both knees     Dr. Essence Ramirez Pulmonary HTN [facility])        SURGICAL HISTORY    Past Surgical History:   Procedure Laterality Date    CARPAL TUNNEL RELEASE      bilaterally    COLONOSCOPY  11/28/2016    Randy Virgen MD    COLONOSCOPY  05/2017    at [facility]. PYlori positive, adenoma, hyperplastic polyp    COLONOSCOPY  06/14/2018, 10/2019    DR. Juan Lee -tubulovillous adenoma repeat in 1 year 2020    JOINT REPLACEMENT Left 07/17/2019    knee    LITHOTRIPSY      NERVE BLOCK Left 11/6/2020    GENICULAR NERVE BLOCK LEFT KNEE performed by Linda Simpson MD at [address] [place] [address] N/A 9/4/2020    L3,L4 MEDIAL BRANCH BLOCK , L5 DORSAL RAMI INJECTION performed by Linda Simpson MD at [address] [place] [address] Bilateral 10/2/2020 BILATERAL L3-4 MEDIAL BRANCH AND L5 DORSAL RAMI INJECTION performed by Gladis Calloway MD at [address] [facility] Right 1/15/2021    RFA, L3-L4 MEDIAL BRANCH AND L-5 DORSEL RAMI INJECTION RIGHT performed by Gladis Calloway MD at [facility] Left 7/17/2019    LEFT KNEE TOTAL ARTHROPLASTY- [address] performed by Samir Duran DO at [address] [facility] GASTROINTESTINAL ENDOSCOPY  11/28/2016    Oral Rosenbaum MD       CURRENT MEDICATIONS    Current Outpatient Rx   Medication Sig Dispense Refill    DULoxetine (CYMBALTA) 30 MG extended release capsule Take 30 mg by mouth daily      spironolactone (ALDACTONE) 25 MG tablet TAKE 1 TABLET TWICE DAILY 180 tablet 3    warfarin (COUMADIN) 10 MG tablet TAKE 1 TABLET ON THURSDAYS AND TAKE 1/2 TABLET ON ALL OTHER DAYS OF THE WEEK OR AS DIRECTED 90 tablet 3    allopurinol (ZYLOPRIM) 300 MG tablet TAKE 1/2 TABLET EVERY DAY 45 tablet 1    furosemide (LASIX) 40 MG tablet TAKE 1 TABLET DAILY, TAKE EXTRA TABLET IF NEEDED FOR EDEMA 180 tablet 3    dilTIAZem (CARDIZEM) 60 MG tablet Take 1 tab Three times a day 270 tablet 3    atenolol (TENORMIN) 50 MG tablet TAKE 1 TABLET EVERY DAY 90 tablet 3    aspirin 81 MG EC tablet Take 81 mg by mouth daily      Cholecalciferol (VITAMIN D) 2000 UNITS CAPS capsule Take 2,000 Units by mouth 2 times daily.          ALLERGIES    Allergies   Allergen Reactions    Clarithromycin Rash     Patient says she will \"never take this again\"     Gabapentin Swelling     Pt states that her body swelled up, not her airway, no troubles breathing    Merthiolate Glycerite [Thimerosal] Dermatitis     Redness, blisters    Tylenol With Codeine #3 [Acetaminophen-Codeine] Nausea And Vomiting       FAMILY HISTORY    Family History   Problem Relation Age of Onset    High Blood Pressure Mother     Diabetes Father        SOCIAL HISTORY    Social History     Socioeconomic History  Marital status:      Spouse name: None    Number of children: None    Years of education: None    Highest education level: None   Occupational History    None   Social Needs    Financial resource strain: None    Food insecurity     Worry: None     Inability: None    Transportation needs     Medical: None     Non-medical: None   Tobacco Use    Smoking status: Former Smoker     Packs/day: 0.25     Years: 4.00     Pack years: 1.00     Types: Cigarettes     Quit date: 1981     Years since quittin.1    Smokeless tobacco: Never Used   Substance and Sexual Activity    Alcohol use: Yes     Comment: rare, states she hasn't had a drink in months    Drug use: No    Sexual activity: None   Lifestyle    Physical activity     Days per week: None     Minutes per session: None    Stress: None   Relationships    Social connections     Talks on phone: None     Gets together: None     Attends Protestant service: None     Active member of club or organization: None     Attends meetings of clubs or organizations: None     Relationship status: None    Intimate partner violence     Fear of current or ex partner: None     Emotionally abused: None     Physically abused: None     Forced sexual activity: None   Other Topics Concern    None   Social History Narrative    None       PHYSICAL EXAM    VITAL SIGNS: /69   Pulse 110   Temp 98.6 °F (37 °C) (Oral)   Resp 20   LMP  (LMP Unknown)   SpO2 94%   Constitutional:  Well developed, well nourished, no acute distress, non-toxic appearance   HENT:  Atraumatic, external ears normal, nose normal, oropharynx moist.  Neck- normal range of motion, no tenderness, supple   Respiratory:  No respiratory distress, normal breath sounds.    Cardiovascular:  Normal rate, normal rhythm, no murmurs, no gallops, no rubs   GI:  Soft, nondistended, normal bowel sounds, nontender

## 2021-02-16 NOTE — ED NOTES
Writer changes pts dressing as bright red blood is showing on the kerlex. There is a small trickle of blood coming from in between the sutures. Writer holds pressure and then changes dressing. Writer instructs pt to leave the dressing on for 24 hours, only remove it if it starts bleeding and then she would need to hold pressure. Pt is given a bag with supplies for changing dressing. Writer then takes pt to restroom via wheelchair.      Xuan Camacho RN  02/16/21 3330

## 2021-02-17 RX ORDER — ATENOLOL 50 MG/1
TABLET ORAL
Qty: 90 TABLET | Refills: 3 | Status: SHIPPED | OUTPATIENT
Start: 2021-02-17 | End: 2022-02-16

## 2021-02-23 NOTE — PROGRESS NOTES
Patient refused appointment. Stated her granddaughter is taking her sutures out in 2 weeks because shes an RN and that's what she said to do.      I advised her its best to have a physician look at it and she got upset stating she can not get around good and can not do this

## 2021-03-09 ENCOUNTER — HOSPITAL ENCOUNTER (OUTPATIENT)
Dept: PHARMACY | Age: 76
Setting detail: THERAPIES SERIES
Discharge: HOME | End: 2021-03-09
Payer: MEDICARE

## 2021-03-09 VITALS
DIASTOLIC BLOOD PRESSURE: 79 MMHG | WEIGHT: 262.6 LBS | SYSTOLIC BLOOD PRESSURE: 127 MMHG | HEART RATE: 73 BPM | BODY MASS INDEX: 46.52 KG/M2

## 2021-03-09 LAB — INR BLD: 3.9

## 2021-03-09 PROCEDURE — 85610 PROTHROMBIN TIME: CPT

## 2021-03-09 PROCEDURE — 99212 OFFICE O/P EST SF 10 MIN: CPT

## 2021-03-09 RX ORDER — WARFARIN SODIUM 10 MG/1
TABLET ORAL
Qty: 90 TABLET | Refills: 3 | Status: SHIPPED
Start: 2021-03-09 | End: 2022-03-01 | Stop reason: SDUPTHER

## 2021-03-09 NOTE — PROGRESS NOTES
Fingerstick INR drawn per clinic protocol. Patient states no visible blood in urine and no black tarry stool, although sometime she sees blood on the toilet tissue she states from her hemorrhoids (last seen last week). Denies any missed doses of warfarin. Sam Schwab came to [facility] ED on 2/15/21 for RLL laceration (INR=2.6) and states she received \"6 stitches. \" Sam Schwab discontinued cymbalta. Sam Schwab also states she has not been eating \"enough vegetables. \" Jo's weight has increased 10.3 pounds over the last 3 months, but denies any swelling in her legs \"more than normal.\" Sam Schwab has not and doesn't intend to receive the covid vaccine. Since Jo's INR is supratherapeutic today, we will HOLD x 1 dose tonight, then will decrease current weekly warfarin regimen by 12.5% and recheck INR in 1 week(s). Patient acknowledges working in consult agreement with pharmacist as referred by his/her physician.           CLINICAL PHARMACY CONSULT: MED RECONCILIATION/REVIEW ADDENDUM    For Pharmacy Admin Tracking Only    PHSO: Yes  Total # of Interventions Recommended: 2  - Decreased Dose #: 1  - Discontinued Medication #: 1 Discontinue Reason(s): Patient Preference  - Maintenance Safety Lab Monitoring #: 1  Total Interventions Accepted: 1  Time Spent (min): 400 [facility], [address] [place] [address]

## 2021-03-12 ENCOUNTER — TELEPHONE (OUTPATIENT)
Dept: PAIN MANAGEMENT | Age: 76
End: 2021-03-12

## 2021-03-15 ENCOUNTER — TELEPHONE (OUTPATIENT)
Dept: PHARMACY | Age: 76
End: 2021-03-15

## 2021-03-15 NOTE — TELEPHONE ENCOUNTER
COVID-19 phone screening     Call placed to screen patient prior to upcoming Medication Management visit for Anticoagulation. Does patient have fever and/or lower respiratory symptoms (SOB, difficulty breathing, cough)? [] Yes    [x] No    If yes, complete Travel Screening and ask the following:   [] [Fever OR s/sxs of lower respiratory illness]  AND  [close contact with lab-confirmed COVID-19 patient within 14 days of symptom onset   [] [Fever AND s/sxs of lower respiratory illness requiring hospitalization] AND [history of travel to [place], [place], [place], [facility], [place] within 14 days of sx onset]  [] [Fever with severe acute lower respiratory illness (I.e. PNA, ARDS) requiring hospitalization and without alternative explanatory diagnosis (I.e. Influenza)] AND [no source of exposure identified]    [x] None of the following; patient confirmed for regularly scheduled INR check and instructed to call the clinic immediately if any symptoms develop prior to upcoming appointment.

## 2021-03-16 ENCOUNTER — HOSPITAL ENCOUNTER (OUTPATIENT)
Dept: PHARMACY | Age: 76
Setting detail: THERAPIES SERIES
Discharge: HOME | End: 2021-03-16
Payer: MEDICARE

## 2021-03-16 VITALS — DIASTOLIC BLOOD PRESSURE: 78 MMHG | SYSTOLIC BLOOD PRESSURE: 124 MMHG | HEART RATE: 78 BPM

## 2021-03-16 DIAGNOSIS — I48.91 ATRIAL FIBRILLATION, UNSPECIFIED TYPE (HCC): ICD-10-CM

## 2021-03-16 LAB — INR BLD: 2.8

## 2021-03-16 PROCEDURE — 85610 PROTHROMBIN TIME: CPT

## 2021-03-16 PROCEDURE — 99211 OFF/OP EST MAY X REQ PHY/QHP: CPT

## 2021-03-16 NOTE — PROGRESS NOTES
Fingerstick INR drawn per clinic protocol. Patient states no visible blood in urine and no black tarry stool. Denies any missed doses of warfarin. No change in other maintenance medications or in diet. Will continue current warfarin regimen and recheck INR in 3 weeks. Patient acknowledges working in consult agreement with pharmacist as referred by his/her physician.       CLINICAL PHARMACY CONSULT: MED RECONCILIATION/REVIEW ADDENDUM    For Pharmacy Admin Tracking Only    PHSO: Yes  Total # of Interventions Recommended: 1  - Maintenance Safety Lab Monitoring #: 1    Total Interventions Accepted: 1  Time Spent (min): 30    Jonatan Andrews, CarynD

## 2021-04-05 ENCOUNTER — TELEPHONE (OUTPATIENT)
Dept: PHARMACY | Age: 76
End: 2021-04-05

## 2021-04-05 NOTE — TELEPHONE ENCOUNTER
COVID-19 phone screening     Call placed to screen patient prior to upcoming Medication Management visit for Anticoagulation. Does patient have fever and/or lower respiratory symptoms (SOB, difficulty breathing, cough)? [] Yes    [x] No    If yes, complete Travel Screening and ask the following:   [] [Fever OR s/sxs of lower respiratory illness]  AND  [close contact with lab-confirmed COVID-19 patient within 14 days of symptom onset   [] [Fever AND s/sxs of lower respiratory illness requiring hospitalization] AND [history of travel to [place], [place], [place], [facility], [place] within 14 days of sx onset]  [] [Fever with severe acute lower respiratory illness (I.e. PNA, ARDS) requiring hospitalization and without alternative explanatory diagnosis (I.e. Influenza)] AND [no source of exposure identified]    [x] None of the following; patient confirmed for regularly scheduled INR check and instructed to call the clinic immediately if any symptoms develop prior to upcoming appointment.

## 2021-04-06 ENCOUNTER — HOSPITAL ENCOUNTER (OUTPATIENT)
Dept: PHARMACY | Age: 76
Setting detail: THERAPIES SERIES
Discharge: HOME | End: 2021-04-06
Payer: MEDICARE

## 2021-04-06 VITALS
SYSTOLIC BLOOD PRESSURE: 126 MMHG | BODY MASS INDEX: 46.27 KG/M2 | HEART RATE: 83 BPM | DIASTOLIC BLOOD PRESSURE: 87 MMHG | WEIGHT: 261.2 LBS

## 2021-04-06 LAB — INR BLD: 2.9

## 2021-04-06 PROCEDURE — 85610 PROTHROMBIN TIME: CPT

## 2021-04-06 PROCEDURE — 99211 OFF/OP EST MAY X REQ PHY/QHP: CPT

## 2021-04-06 NOTE — PROGRESS NOTES
[facility]  Medication Management  ANTICOAGULATION    Referring Doctor: Dr. Wayne Guzman INR: 2.0-3.0    TODAY'S INR: 2.9    WARFARIN Dosage: 5 mg daily    INR (no units)   Date Value   04/06/2021 2.9   03/16/2021 2.8   03/09/2021 3.9   02/15/2021 2.6   01/29/2021 1.9   01/15/2021 1.3   12/18/2020 3       Medication changes:  none  Notes:    Fingerstick INR drawn per clinic protocol. Patient states no visible blood in urine and no black tarry stool. Denies any missed doses of warfarin. No change in other maintenance medications or in diet. Marcello Cruz received her first dose of moderna covid vaccine on 3/29/21, immunization record updated. Marcello Cruz is overdue for a colonoscopy. She was due for an annual in November 2020, she states she will contact Dr. Serene Boeck soon. Since Jo's INR remains therapeutic, we will continue currently weekly warfarin and will recheck INR in 4 weeks. I have also encouraged Marcello Cruz to continue to eat 1-2  servings of greens per week in moderation. Patient acknowledges working in consult agreement with pharmacist as referred by his/her physician. CLINICAL PHARMACY CONSULT: MED RECONCILIATION/REVIEW ADDENDUM    For Pharmacy Admin Tracking Only    PHSO: Yes  Total # of Interventions Recommended: 1  - Updated Order #: 1 Updated Order Reason(s):  Other  - Maintenance Safety Lab Monitoring #: 1  Total Interventions Accepted: 0  Time Spent (min): 66 Nickolas Ricardo, PharmD

## 2021-04-07 NOTE — TELEPHONE ENCOUNTER
Pt notified and ok to wait for f/u appt 4/30/21
She will need to have a ff up appointment.
Appointments   Date Time Provider Sj Hailee   3/16/2021 11:00 AM HOLLIE MEDICATION [facility]   4/30/2021 10:00 AM [facility]   5/10/2021 10:00 AM TYLOR Chin CNP [facility]   9/13/2021 10:30 AM MD Rowena Bryan W   11/15/2021  1:00 PM TYLOR Chin CNP [facility] MED W            Patient Active Problem List:     Kidney stones     Localized osteoarthritis of left knee     Atrial fibrillation ([address]     Pulmonary HTN ([address]     Knee pain, chronic     Iron deficiency anemia due to chronic blood loss     Mitral valve insufficiency     Backache     CHF (congestive heart failure) (HCC)     CKD (chronic kidney disease)     Echocardiogram abnormal     Gout     HL (hearing loss)     Hyperlipidemia     Morbid obesity with BMI of 45.0-49.9, adult (HCC)     DJD (degenerative joint disease)

## 2021-04-30 ENCOUNTER — TELEMEDICINE (OUTPATIENT)
Dept: PAIN MANAGEMENT | Age: 76
End: 2021-04-30
Payer: MEDICARE

## 2021-04-30 DIAGNOSIS — M48.061 SPINAL STENOSIS OF LUMBAR REGION, UNSPECIFIED WHETHER NEUROGENIC CLAUDICATION PRESENT: Primary | ICD-10-CM

## 2021-04-30 PROCEDURE — 4040F PNEUMOC VAC/ADMIN/RCVD: CPT | Performed by: PHYSICAL MEDICINE & REHABILITATION

## 2021-04-30 PROCEDURE — 1090F PRES/ABSN URINE INCON ASSESS: CPT | Performed by: PHYSICAL MEDICINE & REHABILITATION

## 2021-04-30 PROCEDURE — 1123F ACP DISCUSS/DSCN MKR DOCD: CPT | Performed by: PHYSICAL MEDICINE & REHABILITATION

## 2021-04-30 PROCEDURE — G8399 PT W/DXA RESULTS DOCUMENT: HCPCS | Performed by: PHYSICAL MEDICINE & REHABILITATION

## 2021-04-30 PROCEDURE — 99213 OFFICE O/P EST LOW 20 MIN: CPT | Performed by: PHYSICAL MEDICINE & REHABILITATION

## 2021-04-30 PROCEDURE — G8428 CUR MEDS NOT DOCUMENT: HCPCS | Performed by: PHYSICAL MEDICINE & REHABILITATION

## 2021-04-30 RX ORDER — METHYLPREDNISOLONE 4 MG/1
TABLET ORAL
Qty: 1 KIT | Refills: 0 | Status: SHIPPED | OUTPATIENT
Start: 2021-04-30 | End: 2021-05-06

## 2021-04-30 NOTE — PROGRESS NOTES
FOLLOW UP APPOINTMENT:         Simona Dumas MD    2021       Darin Peralta is a 64 y.o. female, who came for a  follow up to discuss treatment options    Cause of the symptom(s): degenerative (arthritis)    Onset: chronic pain     Trialed tramadol - no relief with the medication. Prior reatment done: physical therapy, injection, anti-inflammatory medication, muscle relaxant, steroid and opioid    Current pain level: minimal pain with rest. Increase with activity     Quality of Symptoms: aching and throbbing      Allergies   Allergen Reactions    Clarithromycin Rash     Patient says she will \"never take this again\"     Gabapentin Swelling     Pt states that her body swelled up, not her airway, no troubles breathing    Merthiolate Glycerite [Thimerosal] Dermatitis     Redness, blisters    Tylenol With Codeine #3 [Acetaminophen-Codeine] Nausea And Vomiting       Social History     Socioeconomic History    Marital status:       Spouse name: Not on file    Number of children: Not on file    Years of education: Not on file    Highest education level: Not on file   Occupational History    Not on file   Social Needs    Financial resource strain: Not on file    Food insecurity     Worry: Not on file     Inability: Not on file    Transportation needs     Medical: Not on file     Non-medical: Not on file   Tobacco Use    Smoking status: Former Smoker     Packs/day: 0.25     Years: 4.00     Pack years: 1.00     Types: Cigarettes     Quit date: 1981     Years since quittin.3    Smokeless tobacco: Never Used   Substance and Sexual Activity    Alcohol use: Yes     Comment: rare, states she hasn't had a drink in months    Drug use: No    Sexual activity: Not on file   Lifestyle    Physical activity     Days per week: Not on file     Minutes per session: Not on file    Stress: Not on file   Relationships    Social connections     Talks on phone: Not on file     Gets together: Not on file     Attends Confucianist service: Not on file     Active member of club or organization: Not on file     Attends meetings of clubs or organizations: Not on file     Relationship status: Not on file    Intimate partner violence     Fear of current or ex partner: Not on file     Emotionally abused: Not on file     Physically abused: Not on file     Forced sexual activity: Not on file   Other Topics Concern    Not on file   Social History Narrative    Not on file       Past Medical History:   Diagnosis Date    [facility]     CHF (congestive heart failure) [facility]     Gout     Hearing loss 2015    bilateral hearing aids     History of colon polyps 2016    Hypertension     Kidney stones     Mitral valve insufficiency     Obesity, morbid, BMI 40.0-49.9 ([facility]     Osteoarthritis     Primary osteoarthritis of both knees     Dr. Mohini Rahman Pulmonary HTN [facility])        Past Surgical History:   Procedure Laterality Date    CARPAL TUNNEL RELEASE      bilaterally    COLONOSCOPY  11/28/2016    Zurdo Burrows MD    COLONOSCOPY  05/2017    at [address] [place] [address]. PYlori positive, adenoma, hyperplastic polyp    COLONOSCOPY  06/14/2018, 10/2019    DR. Lien Miranda -tubulovillous adenoma repeat in 1 year 2020    JOINT REPLACEMENT Left 07/17/2019    knee    LITHOTRIPSY      NERVE BLOCK Left 11/6/2020    GENICULAR NERVE BLOCK LEFT KNEE performed by Ngoc Agustin MD at [address] [place] [address] N/A 9/4/2020    L3,L4 MEDIAL BRANCH BLOCK , L5 DORSAL RAMI INJECTION performed by Ngoc Agustin MD at [address] [place] [address] Bilateral 10/2/2020    BILATERAL L3-4 MEDIAL BRANCH AND L5 DORSAL RAMI INJECTION performed by Ngoc Agustin MD at [address] [place] [address] Right 1/15/2021    RFA, L3-L4 MEDIAL BRANCH AND L-5 DORSEL RAMI INJECTION RIGHT performed by Ngoc Agustin MD at [address] [place] [address] Tae Le Left 7/17/2019    LEFT KNEE TOTAL ARTHROPLASTY- DEPUY performed by Dell Mayen DO at [address] [place] [address] GASTROINTESTINAL ENDOSCOPY  11/28/2016    Rafa Price MD       Family History   Problem Relation Age of Onset    High Blood Pressure Mother     Diabetes Father         Prior to Visit Medications    Medication Sig Taking? Authorizing Provider   methylPREDNISolone (MEDROL DOSEPACK) 4 MG tablet Take by mouth. Yes Jarvis Shore MD   warfarin (COUMADIN) 10 MG tablet TAKE 1/2 TABLET daily ALL DAYS OF THE WEEK OR AS DIRECTED  Hilaria Curling, MD   atenolol (TENORMIN) 50 MG tablet TAKE 1 TABLET EVERY DAY  Hilaria Curling, MD   spironolactone (ALDACTONE) 25 MG tablet TAKE 1 TABLET TWICE DAILY  Hilaria Curling, MD   allopurinol (ZYLOPRIM) 300 MG tablet TAKE 1/2 TABLET EVERY DAY  TYLOR Zepeda CNP   furosemide (LASIX) 40 MG tablet TAKE 1 TABLET DAILY, TAKE EXTRA TABLET IF NEEDED FOR EDEMA  Hilaria Curling, MD   dilTIAZem (CARDIZEM) 60 MG tablet Take 1 tab Three times a day  Hilaria Curling, MD   aspirin 81 MG EC tablet Take 81 mg by mouth daily  Historical Provider, MD   Cholecalciferol (VITAMIN D) 2000 UNITS CAPS capsule Take 2,000 Units by mouth 2 times daily. Historical Provider, MD         Review of Systems : All systems reviewed, all unremarkable other than HPI/subjective. No change since last visit. Denies  fever, chills, infection or non healing wound. LMP  (LMP Unknown)       Physical Exam  Constitutional:       General: She is not in acute distress. Pulmonary:      Effort: Pulmonary effort is normal. No respiratory distress. Neurological:      Mental Status: She is alert and oriented to person, place, and time. Psychiatric:         Behavior: Behavior normal.           Assessment and Plan:      Diagnosis Orders   1.  Spinal stenosis of lumbar region, unspecified whether neurogenic claudication present  MRI LUMBAR SPINE WO CONTRAST       Schedule for MRI of the LS spine to check for severity

## 2021-05-03 ENCOUNTER — HOSPITAL ENCOUNTER (OUTPATIENT)
Dept: MRI IMAGING | Age: 76
Discharge: HOME | End: 2021-05-05
Payer: MEDICARE

## 2021-05-03 ENCOUNTER — TELEPHONE (OUTPATIENT)
Dept: PHARMACY | Age: 76
End: 2021-05-03

## 2021-05-03 DIAGNOSIS — M48.061 SPINAL STENOSIS OF LUMBAR REGION, UNSPECIFIED WHETHER NEUROGENIC CLAUDICATION PRESENT: ICD-10-CM

## 2021-05-03 PROCEDURE — 72148 MRI LUMBAR SPINE W/O DYE: CPT

## 2021-05-04 ENCOUNTER — HOSPITAL ENCOUNTER (OUTPATIENT)
Dept: PHARMACY | Age: 76
Setting detail: THERAPIES SERIES
Discharge: HOME | End: 2021-05-04
Payer: MEDICARE

## 2021-05-04 VITALS
DIASTOLIC BLOOD PRESSURE: 83 MMHG | BODY MASS INDEX: 45.49 KG/M2 | SYSTOLIC BLOOD PRESSURE: 138 MMHG | HEART RATE: 78 BPM | WEIGHT: 256.8 LBS

## 2021-05-04 LAB — INR BLD: 2.3

## 2021-05-04 PROCEDURE — 99211 OFF/OP EST MAY X REQ PHY/QHP: CPT

## 2021-05-04 PROCEDURE — 85610 PROTHROMBIN TIME: CPT

## 2021-05-04 NOTE — PROGRESS NOTES
[facility]  Medication Management  ANTICOAGULATION    Referring Provider: Dr. Darylene Sabal INR: 2.0-3.0    TODAY'S INR: 2.3    WARFARIN Dosage: 5 mg daily    INR (no units)   Date Value   2021 2.3   2021 2.9   2021 2.8   2021 3.9   02/15/2021 2.6   2021 1.9   01/15/2021 1.3       Medication changes:  2nd moderna covid vaccine, medrol dose pack started on 5/1/2021 x 6 days will finish on 2021  Notes:    Fingerstick INR drawn per clinic protocol. Patient states no visible blood in urine and no black tarry stool. Amee Moise has multiple bruises on her left arm that she doesn't remember bumping. Denies any missed doses of warfarin. Amee Moise received her 2nd moderna covid vaccine on 21 and immunization record updated. Amee Moise had a virtual visit with Dr. Ana Cristina Troy on  and a medrol dose pack was started on 5/1/2021 x 6 days will finish on 2021. Amee Moise states she had an MRI yesterday. Amee Moise has lost 4.4 pounds over the last 4 weeks. She states she is not cooking as much and is eating more frozen dinners every other day and sandwiches the other days. Since Jo's INR remains therapeutic, we will continue current weekly warfarin regimen and will recheck INR in 5 weeks. Patient acknowledges working in consult agreement with pharmacist as referred by his/her physician.                   CLINICAL PHARMACY CONSULT: MED RECONCILIATION/REVIEW ADDENDUM    For Pharmacy [address] Tracking Only     Intervention Detail: Adherence Monitorin and Vaccine Recommended/Administered   Total # of Interventions Recommended: 1   Total # of Interventions Accepted: 1   Time Spent (min): 66 Nickolas Ricardo, PharmD

## 2021-05-10 ENCOUNTER — OFFICE VISIT (OUTPATIENT)
Dept: FAMILY MEDICINE CLINIC | Age: 76
End: 2021-05-10
Payer: MEDICARE

## 2021-05-10 VITALS
OXYGEN SATURATION: 97 % | SYSTOLIC BLOOD PRESSURE: 118 MMHG | BODY MASS INDEX: 44.46 KG/M2 | WEIGHT: 251 LBS | HEART RATE: 78 BPM | DIASTOLIC BLOOD PRESSURE: 80 MMHG

## 2021-05-10 DIAGNOSIS — Z86.010 HX OF COLONIC POLYPS: ICD-10-CM

## 2021-05-10 DIAGNOSIS — M85.851 OSTEOPENIA OF NECKS OF BOTH FEMURS: ICD-10-CM

## 2021-05-10 DIAGNOSIS — R73.01 IFG (IMPAIRED FASTING GLUCOSE): ICD-10-CM

## 2021-05-10 DIAGNOSIS — I27.20 PULMONARY HTN (HCC): Primary | ICD-10-CM

## 2021-05-10 DIAGNOSIS — I48.19 PERSISTENT ATRIAL FIBRILLATION (HCC): ICD-10-CM

## 2021-05-10 DIAGNOSIS — Z78.0 POSTMENOPAUSAL ESTROGEN DEFICIENCY: ICD-10-CM

## 2021-05-10 DIAGNOSIS — Z13.820 SCREENING FOR OSTEOPOROSIS: ICD-10-CM

## 2021-05-10 DIAGNOSIS — Z12.31 BREAST CANCER SCREENING BY MAMMOGRAM: ICD-10-CM

## 2021-05-10 DIAGNOSIS — E66.01 MORBID OBESITY WITH BMI OF 45.0-49.9, ADULT (HCC): ICD-10-CM

## 2021-05-10 DIAGNOSIS — M85.852 OSTEOPENIA OF NECKS OF BOTH FEMURS: ICD-10-CM

## 2021-05-10 DIAGNOSIS — Z12.11 ENCOUNTER FOR SCREENING COLONOSCOPY: ICD-10-CM

## 2021-05-10 PROCEDURE — 1123F ACP DISCUSS/DSCN MKR DOCD: CPT | Performed by: NURSE PRACTITIONER

## 2021-05-10 PROCEDURE — G8417 CALC BMI ABV UP PARAM F/U: HCPCS | Performed by: NURSE PRACTITIONER

## 2021-05-10 PROCEDURE — 99214 OFFICE O/P EST MOD 30 MIN: CPT | Performed by: NURSE PRACTITIONER

## 2021-05-10 PROCEDURE — 4040F PNEUMOC VAC/ADMIN/RCVD: CPT | Performed by: NURSE PRACTITIONER

## 2021-05-10 PROCEDURE — G8399 PT W/DXA RESULTS DOCUMENT: HCPCS | Performed by: NURSE PRACTITIONER

## 2021-05-10 PROCEDURE — G8427 DOCREV CUR MEDS BY ELIG CLIN: HCPCS | Performed by: NURSE PRACTITIONER

## 2021-05-10 PROCEDURE — 1090F PRES/ABSN URINE INCON ASSESS: CPT | Performed by: NURSE PRACTITIONER

## 2021-05-10 PROCEDURE — 1036F TOBACCO NON-USER: CPT | Performed by: NURSE PRACTITIONER

## 2021-05-10 SDOH — ECONOMIC STABILITY: FOOD INSECURITY: WITHIN THE PAST 12 MONTHS, YOU WORRIED THAT YOUR FOOD WOULD RUN OUT BEFORE YOU GOT MONEY TO BUY MORE.: NEVER TRUE

## 2021-05-10 SDOH — ECONOMIC STABILITY: FOOD INSECURITY: WITHIN THE PAST 12 MONTHS, THE FOOD YOU BOUGHT JUST DIDN'T LAST AND YOU DIDN'T HAVE MONEY TO GET MORE.: NEVER TRUE

## 2021-05-10 SDOH — ECONOMIC STABILITY: INCOME INSECURITY: HOW HARD IS IT FOR YOU TO PAY FOR THE VERY BASICS LIKE FOOD, HOUSING, MEDICAL CARE, AND HEATING?: NOT HARD AT ALL

## 2021-05-10 SDOH — ECONOMIC STABILITY: TRANSPORTATION INSECURITY
IN THE PAST 12 MONTHS, HAS LACK OF TRANSPORTATION KEPT YOU FROM MEETINGS, WORK, OR FROM GETTING THINGS NEEDED FOR DAILY LIVING?: NO

## 2021-05-10 SDOH — ECONOMIC STABILITY: TRANSPORTATION INSECURITY
IN THE PAST 12 MONTHS, HAS THE LACK OF TRANSPORTATION KEPT YOU FROM MEDICAL APPOINTMENTS OR FROM GETTING MEDICATIONS?: NO

## 2021-05-10 ASSESSMENT — PATIENT HEALTH QUESTIONNAIRE - PHQ9
2. FEELING DOWN, DEPRESSED OR HOPELESS: 0
SUM OF ALL RESPONSES TO PHQ QUESTIONS 1-9: 0
1. LITTLE INTEREST OR PLEASURE IN DOING THINGS: 0

## 2021-05-10 NOTE — PATIENT INSTRUCTIONS
You may be receiving a survey from [employer] regarding your visit today. You may get this in the mail, through your Jostlet or in your email. Please complete the survey to enable us to provide the highest quality of care to you and your family. If you cannot score us as very good ( 5 Stars) on any question, please feel free to call the office to discuss how we could have made your experience exceptional.     Thank You! CONY Hernandez  [address] [place], [employer]    Phone: [phone]  Fax: [phone] Office Hours:  Monday: [facility] office location 8-5 ([phone]) Offering additional late hours the first Monday of the month until 7 pm.   Tuesday: 8-5 Wednesday: 8-5 Thursday:  Additional hours offered 2 Thursdays a month. Please call to inquire those dates. Fridays: 7:30-4:30        You may be receiving a survey from [employer] regarding your visit today. You may get this in the mail, through your Jostlet or in your email. Please complete the survey to enable us to provide the highest quality of care to you and your family. If you cannot score us as very good ( 5 Stars) on any question, please feel free to call the office to discuss how we could have made your experience exceptional.     Thank You! CONY Hernandez  [address] [place], [employer]    Phone: [phone]  Fax: [phone] [facility] Office Hours:  Monday: [facility] office location 8-5 ([phone]) Offering additional late hours the first Monday of the month until 7 pm.   Tuesday: 8-5 Wednesday: 8-5 Thursday:  Additional hours offered 2 Thursdays a month. Please call to inquire those dates.    Fridays: 7:30-4:30

## 2021-05-10 NOTE — PROGRESS NOTES
[facility]  [facility]  [address] [place] [address]  Dept: [phone]  Dept Fax: [phone]    Last encounter 11/9/2020    Diabetes, Hypertension, Hyperlipidemia, and Other (on [address] gap- Persistent atrial fibrillation ([address] Pulmonary HTN , Morbid obesity with BMI of 45.0-49.9, adult.)       HPI:   Liliana Reza is a 64 y.o. female who presentstoday for her medical conditions/complaints as noted below. Liliana Reza is c/o of Diabetes, Hypertension, Hyperlipidemia, and Other (on [address] gap- Persistent atrial fibrillation ([address] Pulmonary HTN , Morbid obesity with BMI of 45.0-49.9, adult.)      HPI  Established patient    Treatment Adherence:   Medication compliance:  compliant all of the time  Diet compliance:  compliant all of the time  Weight trend: increasing  Current exercise: no regular exercise  Barriers: lack of motivation, lack of support, stress and time constraints    Atrial fibrillation on coumadin, atenolol, cardizem, lasix and spironolactone  Generalized swelling in ankles  Activity is very limited to back pain , seeing pain management, sedentary lifestyle. Hypertension:  Home blood pressure monitoring: No.  She is adherent to a low sodium diet. Patient denies chest pain, headache, lightheadedness and blurred vision. Antihypertensive medication side effects: swelling in ankles and weight gain. Use of agents associated with hypertension: none.        Lab Results   Component Value Date    LABA1C 6.4 (H) 01/11/2013     Lab Results   Component Value Date    CREATININE 0.84 09/10/2020     Lab Results   Component Value Date    ALT 21 09/10/2020    AST 16 09/10/2020     Lab Results   Component Value Date    CHOL 161 09/10/2020    TRIG 76 09/10/2020    HDL 69 09/10/2020          Reviewed prior notes Cardiology  Reviewed previous Labs, Imaging and Hospital Records    Past Medical History:   Diagnosis Date    Atrial fibrillation (HCC)     [facility]est[facility]) [facility]     Gout     Hearing loss     bilateral hearing aids     History of colon polyps 2016    Hypertension     Kidney stones     Mitral valve insufficiency     Obesity, morbid, BMI 40.0-49.9 (HCC)     Osteoarthritis     Primary osteoarthritis of both knees     Dr. Kane Thompson Pulmonary HTN [facility])       Past Surgical History:   Procedure Laterality Date    CARPAL TUNNEL RELEASE      bilaterally    COLONOSCOPY  2016    Renee Lira MD    COLONOSCOPY  2017    at [address] [facility]. PYlori positive, adenoma, hyperplastic polyp    COLONOSCOPY  2018, 10/2019    DR. Amaya Mtz -tubulovillous adenoma repeat in 1 year     JOINT REPLACEMENT Left 2019    knee    LITHOTRIPSY      NERVE BLOCK Left 2020    GENICULAR NERVE BLOCK LEFT KNEE performed by Kareem Tijerina MD at [address] [place] [address] N/A 2020    L3,L4 MEDIAL BRANCH BLOCK , L5 DORSAL RAMI INJECTION performed by Kareem Tijerina MD at [address] [place] [address] Bilateral 10/2/2020    BILATERAL L3-4 MEDIAL BRANCH AND L5 DORSAL RAMI INJECTION performed by Kareem Tijerina MD at [address] [place] [address] Right 1/15/2021    RFA, L3-L4 MEDIAL BRANCH AND L-5 DORSEL RAMI INJECTION RIGHT performed by Kareem Tijerina MD at [id number] [place] [address] Left 2019    LEFT KNEE TOTAL ARTHROPLASTY- [address] performed by Richard Vasquez DO at [address] [place] [address] UPPER GASTROINTESTINAL ENDOSCOPY  2016    Renee Lira MD       Family History   Problem Relation Age of Onset    High Blood Pressure Mother     Diabetes Father        Social History     Tobacco Use    Smoking status: Former Smoker     Packs/day: 0.25     Years: 4.00     Pack years: 1.00     Types: Cigarettes     Quit date: 1981     Years since quittin.3    Smokeless tobacco: Never Used   Substance Use Topics    Alcohol use: Yes     Comment: rare, states she hasn't had a Behavior: Behavior normal.         Thought Content: Thought content normal.         Judgment: Judgment normal.       /80 (Site: Left Upper Arm, Position: Sitting, Cuff Size: Large Adult)   Pulse 78   Wt 251 lb (113.9 kg)   LMP  (LMP Unknown)   SpO2 97%   BMI 44.46 kg/m²     Data:     Lab Results   Component Value Date     09/10/2020    K 4.3 09/10/2020     09/10/2020    CO2 22 09/10/2020    BUN 27 09/10/2020    CREATININE 0.84 09/10/2020    GLUCOSE 107 09/10/2020    GLUCOSE 102 04/27/2012    PROT 8.8 09/10/2020    LABALBU 4.4 09/10/2020    LABALBU 4.4 04/10/2012    BILITOT 0.76 09/10/2020    ALKPHOS 92 09/10/2020    AST 16 09/10/2020    ALT 21 09/10/2020     Lab Results   Component Value Date    WBC 10.4 02/15/2021    RBC 4.17 02/15/2021    RBC 4.72 04/10/2012    HGB 13.2 02/15/2021    HCT 38.8 02/15/2021    MCV 93.0 02/15/2021    MCH 31.7 02/15/2021    MCHC 34.0 02/15/2021    RDW 14.5 02/15/2021     02/15/2021     04/10/2012    MPV NOT REPORTED 02/15/2021     Lab Results   Component Value Date    TSH 1.05 09/10/2020     Lab Results   Component Value Date    CHOL 161 09/10/2020    HDL 69 09/10/2020    LABA1C 6.4 01/11/2013          Assessment & Plan       1. Pulmonary HTN ([facility]  stable    2. IFG (impaired fasting glucose)  Lab Results   Component Value Date    LABA1C 6.4 (H) 01/11/2013     Lab Results   Component Value Date     01/11/2013         3. Persistent atrial fibrillation ([address]  Rate and rhythm controlled  Blood thinner coumadin controlled though [facility]madin clinic at [facility]. 4. Osteopenia of necks of both femurs    - DEXA BONE DENSITY 2 SITES; Future    5. Morbid obesity with BMI of 45.0-49.9, adult ([facility]    6. Encounter for screening colonoscopy  Due for repeat colonoscopy, pt concerned with hx hemorrhage after last 2 colonoscopies. - External Referral To General Surgery    7.  Hx of colonic polyps  Dr. Delmer Szymanski MD (Attending, Admitting)  [phone] (Work)  [phone] (Fax)  [address] [facility], [facility]  Gastroenterology Oct. 22, 2019October 22, 2019       - External Referral To General Surgery    8. Breast cancer screening by mammogram  Declines clinical breast exam.   - ARMANDO MICHELE DIGITAL SCREEN BILATERAL; Future    9. Screening for osteoporosis    - DEXA BONE DENSITY 2 SITES; Future    10. Postmenopausal estrogen deficiency    - DEXA BONE DENSITY 2 SITES; Future                  Completed Refills   Requested Prescriptions      No prescriptions requested or ordered in this encounter     Return in about 3 months (around 8/10/2021) for medicare visit-AWV. No orders of the defined types were placed in this encounter. Orders Placed This Encounter   Procedures    ARMANDO MICHELE DIGITAL SCREEN BILATERAL     Standing Status:   Future     Standing Expiration Date:   7/10/2022     Order Specific Question:   Reason for exam:     Answer:   screening, maternal aunt with breast cancer.  DEXA BONE DENSITY 2 SITES     Standing Status:   Future     Standing Expiration Date:   5/10/2022     Order Specific Question:   Reason for exam:     Answer:   osteopenia francisco femoral necks, rule out osteoporosis.  External Referral To General Surgery     Referral Priority:   Routine     Referral Type:   Eval and Treat     Referral Reason:   Specialty Services Required     Referred to Provider:   Ravindra Hebert MD     Requested Specialty:   General Surgery     Number of Visits Requested:   1         Jonathan Dawkins received counseling on the following healthy behaviors: nutrition, exercise and medication adherence  Reviewed prior labs and health maintenance. Continue current medications, diet and exercise. Discussed use, benefit, and side effects of prescribed medications. Barriers to medication compliance addressed. Patient given educational materials - see patient instructions. All patient questions answered.   Patient voiced understanding. On this date 5/10/2021 I have spent 33 minutes reviewing previous notes, test results and face to face with the patient discussing the diagnosis and importance of compliance with the treatment plan as well as documenting on the day of the visit.      Electronically signed by TYLOR Cooper CNP on 5/13/2021 at 9:16 PM

## 2021-05-13 ASSESSMENT — ENCOUNTER SYMPTOMS
EYES NEGATIVE: 1
ABDOMINAL DISTENTION: 0
COUGH: 0
BACK PAIN: 1
SHORTNESS OF BREATH: 0
RHINORRHEA: 0

## 2021-05-14 ENCOUNTER — VIRTUAL VISIT (OUTPATIENT)
Dept: PAIN MANAGEMENT | Age: 76
End: 2021-05-14
Payer: MEDICARE

## 2021-05-14 DIAGNOSIS — M48.061 SPINAL STENOSIS OF LUMBAR REGION, UNSPECIFIED WHETHER NEUROGENIC CLAUDICATION PRESENT: Primary | ICD-10-CM

## 2021-05-14 PROCEDURE — 99441 PR PHYS/QHP TELEPHONE EVALUATION 5-10 MIN: CPT | Performed by: PHYSICAL MEDICINE & REHABILITATION

## 2021-05-14 NOTE — PROGRESS NOTES
Roby Hernandez is a 64 y.o. female evaluated via telephone on 5/14/2021. Consent:    She and/or health care decision maker is aware that that she may receive a bill for this telephone service, depending on her insurance coverage, and has provided verbal consent to proceed: Yes      Documentation:  I communicated with the patient and/or health care decision maker about the MRI result.         1. Severe neural foraminal stenosis on the right at L2/L3, L3/L4, and L4/L5. Moderate neural foraminal stenosis on the right at L1/L2 and L5/S1.       2. Severe neural foraminal stenosis on the left at L4/L5 and L5/S1. Moderate    neural foraminal stenosis on the left at L1/L2, L2/L3, and L3/L4.       3. No significant lumbar central canal stenosis. Slight effacement of the    ventral thecal sac at L1/L2, L2/L3, and L3/L4.       4. No acute osseous abnormality.       5. There are 2 small nonspecific subcentimeter lesions within the posterior    aspect of L5 demonstrating decreased T1, T2, and STIR signal, compatible with    bone islands. Sclerotic metastasis cannot be excluded. Short-term followup    noncontrast enhanced MRI within 3-6 months is recommended. If there is any    history of or concern for malignancy, further evaluation with a bone scan would    be recommended. I affirm this is a Patient Initiated Episode with a Patient who has not had a related appointment within my department in the past 7 days or scheduled within the next 24 hours. Patient identification was verified at the start of the visit: Yes    Total Time: minutes: 5-10 minutes    The visit was conducted pursuant to the emergency declaration under the [address] [place] [address], [address] [place] [address] authority and the [employer] General Act. Patient identification was verified, and a caregiver was present when appropriate.  The patient was located in a state where the provider was credentialed to provide care.     Note: not billable if this call serves to triage the patient into an appointment for the relevant concern      Jennifer Goldberg

## 2021-05-17 ENCOUNTER — TELEPHONE (OUTPATIENT)
Dept: PAIN MANAGEMENT | Age: 76
End: 2021-05-17

## 2021-05-17 NOTE — TELEPHONE ENCOUNTER
Patient called the office and stated she had a follow up with Dr. Jerris Aase on 05/14 and mention that the injections did not last long,  Dr. Jerris Aase suggest she see a surgeon. Patient stated she did not want to see a surgeon  at the time. Patient asked if she can use a OTC  herb called cranium red borem? Patient just wanted to make sure if was safe for her to use. Please Advise, Thank you!

## 2021-05-21 NOTE — TELEPHONE ENCOUNTER
Although herbal medicine might be beneficial, it may not be safe and effective. I have no opinion on the particular herb in question.

## 2021-06-07 ENCOUNTER — TELEPHONE (OUTPATIENT)
Dept: PHARMACY | Age: 76
End: 2021-06-07

## 2021-06-07 NOTE — TELEPHONE ENCOUNTER
COVID-19 phone screening     Call placed to screen patient prior to upcoming Medication Management visit for Anticoagulation. Does patient have fever and/or lower respiratory symptoms (SOB, difficulty breathing, cough)? [] Yes    [x] No    If yes, complete Travel Screening and ask the following:   [] [Fever OR s/sxs of lower respiratory illness]  AND  [close contact with lab-confirmed COVID-19 patient within 14 days of symptom onset   [] [Fever AND s/sxs of lower respiratory illness requiring hospitalization] AND [history of travel to [place], [place], [place], [facility], [place] within 14 days of sx onset]  [] [Fever with severe acute lower respiratory illness (I.e. PNA, ARDS) requiring hospitalization and without alternative explanatory diagnosis (I.e. Influenza)] AND [no source of exposure identified]    [x] None of the following; patient confirmed for regularly scheduled INR check and instructed to call the clinic immediately if any symptoms develop prior to upcoming appointment.

## 2021-06-08 ENCOUNTER — HOSPITAL ENCOUNTER (OUTPATIENT)
Dept: PHARMACY | Age: 76
Setting detail: THERAPIES SERIES
Discharge: HOME | End: 2021-06-08
Payer: MEDICARE

## 2021-06-08 DIAGNOSIS — I48.91 ATRIAL FIBRILLATION, UNSPECIFIED TYPE (HCC): Primary | ICD-10-CM

## 2021-06-08 LAB — INR BLD: 2.9

## 2021-06-08 PROCEDURE — 85610 PROTHROMBIN TIME: CPT

## 2021-06-08 PROCEDURE — 99211 OFF/OP EST MAY X REQ PHY/QHP: CPT

## 2021-06-08 NOTE — PROGRESS NOTES
LVM to return call. CONSULT: MED RECONCILIATION/REVIEW ADDENDUM    For Pharmacy Admin Tracking Only     Intervention Detail: Adherence Monitorin   Total # of Interventions Recommended: 1   Total # of Interventions Accepted: 1   Time Spent (min): [address] [facility] Davion [facility], PharmD

## 2021-06-28 ENCOUNTER — TELEPHONE (OUTPATIENT)
Dept: PHARMACY | Age: 76
End: 2021-06-28

## 2021-06-28 NOTE — TELEPHONE ENCOUNTER
COVID-19 phone screening     Call placed to screen patient prior to upcoming Medication Management visit for Anticoagulation. Does patient have fever and/or lower respiratory symptoms (SOB, difficulty breathing, cough)? [] Yes    [x] No    If yes, complete Travel Screening and ask the following:   [] [Fever OR s/sxs of lower respiratory illness]  AND  [close contact with lab-confirmed COVID-19 patient within 14 days of symptom onset   [] [Fever AND s/sxs of lower respiratory illness requiring hospitalization] AND [history of travel to [place], [place], [place], [facility], [place] within 14 days of sx onset]  [] [Fever with severe acute lower respiratory illness (I.e. PNA, ARDS) requiring hospitalization and without alternative explanatory diagnosis (I.e. Influenza)] AND [no source of exposure identified]    [x] None of the following; patient confirmed for regularly scheduled INR check and instructed to call the clinic immediately if any symptoms develop prior to upcoming appointment.

## 2021-06-29 ENCOUNTER — HOSPITAL ENCOUNTER (OUTPATIENT)
Dept: PHARMACY | Age: 76
Setting detail: THERAPIES SERIES
Discharge: HOME | End: 2021-06-29
Payer: MEDICARE

## 2021-06-29 VITALS
WEIGHT: 251.4 LBS | BODY MASS INDEX: 44.53 KG/M2 | SYSTOLIC BLOOD PRESSURE: 131 MMHG | DIASTOLIC BLOOD PRESSURE: 72 MMHG | HEART RATE: 87 BPM

## 2021-06-29 LAB — INR BLD: 3

## 2021-06-29 PROCEDURE — 99211 OFF/OP EST MAY X REQ PHY/QHP: CPT

## 2021-06-29 PROCEDURE — 85610 PROTHROMBIN TIME: CPT

## 2021-06-29 NOTE — PROGRESS NOTES
[address]/[place]  Medication Management  ANTICOAGULATION    Referring Provider: Dr. Savannah Thompson INR: 2.0-3.0    TODAY'S INR: 3.0    WARFARIN Dosage: 5 mg daily    INR (no units)   Date Value   2021 3   2021 2.9   2021 2.3   2021 2.9   2021 2.8   2021 3.9   02/15/2021 2.6       Medication changes:  Kratom red borneo 500 mg: started taking \"3\" capsules daily around  for pain relief through  when ran out. Ordered more and will restart when receives it  Notes:    Fingerstick INR drawn per clinic protocol. Patient states no visible blood in urine and no black tarry stool. Orlin Gallego has multiple bruises around her right elbow and a few scattered bruises on her left elbow. Denies any missed doses of warfarin. No change in diet, Orlin Gallego states she ate a big salad yesterday. Orlin Gallego has lost 5.4 pounds in the last 8 weeks, 10 pounds total in the last 12 weeks, although she states she is not trying to. Orlin Gallego started taking Kratom red borneo 500 mg \"3\" capsules daily around  for pain relief through  when she ran out. She states she ordered more and will restart when receives it. Orlin Gallego saw Dr. Ankita Minaya on  and will have a colonoscopy on . She was instructed to hold warfarin x 3 days prior to the procedure and aspirin x 7 days prior to. Since Jo's INR remains therapeutic, but on the upper limit, we will continue current weekly warfarin regimen and will recheck INR in 3 weeks, 1 week prior to colonoscopy to make sure INR is close to range prior to stopping warfarin x 3 days. Patient acknowledges working in consult agreement with pharmacist as referred by his/her physician.                   CLINICAL PHARMACY CONSULT: MED RECONCILIATION/REVIEW ADDENDUM    For Pharmacy Admin Tracking Only     Intervention Detail: Adherence Monitorin   Total # of Interventions Recommended: 0   Total # of Interventions Accepted: 0   Time Spent (min):

## 2021-06-30 ENCOUNTER — HOSPITAL ENCOUNTER (OUTPATIENT)
Dept: BONE DENSITY | Age: 76
Discharge: HOME | End: 2021-07-02
Payer: MEDICARE

## 2021-06-30 ENCOUNTER — HOSPITAL ENCOUNTER (OUTPATIENT)
Dept: MAMMOGRAPHY | Age: 76
Discharge: HOME | End: 2021-07-02
Payer: MEDICARE

## 2021-06-30 DIAGNOSIS — Z78.0 POSTMENOPAUSAL ESTROGEN DEFICIENCY: ICD-10-CM

## 2021-06-30 DIAGNOSIS — M85.852 OSTEOPENIA OF NECKS OF BOTH FEMURS: ICD-10-CM

## 2021-06-30 DIAGNOSIS — M85.851 OSTEOPENIA OF NECKS OF BOTH FEMURS: ICD-10-CM

## 2021-06-30 DIAGNOSIS — Z13.820 SCREENING FOR OSTEOPOROSIS: ICD-10-CM

## 2021-06-30 DIAGNOSIS — Z12.31 BREAST CANCER SCREENING BY MAMMOGRAM: ICD-10-CM

## 2021-06-30 PROCEDURE — 77080 DXA BONE DENSITY AXIAL: CPT

## 2021-06-30 PROCEDURE — 77063 BREAST TOMOSYNTHESIS BI: CPT

## 2021-07-20 ENCOUNTER — HOSPITAL ENCOUNTER (OUTPATIENT)
Dept: PHARMACY | Age: 76
Setting detail: THERAPIES SERIES
Discharge: HOME | End: 2021-07-20
Payer: MEDICARE

## 2021-07-20 VITALS
HEART RATE: 72 BPM | SYSTOLIC BLOOD PRESSURE: 119 MMHG | BODY MASS INDEX: 44.78 KG/M2 | WEIGHT: 252.8 LBS | DIASTOLIC BLOOD PRESSURE: 78 MMHG

## 2021-07-20 LAB — INR BLD: 2.9

## 2021-07-20 PROCEDURE — 99211 OFF/OP EST MAY X REQ PHY/QHP: CPT

## 2021-07-20 PROCEDURE — 85610 PROTHROMBIN TIME: CPT

## 2021-07-20 NOTE — PROGRESS NOTES
[address]/[place]  Medication Management  ANTICOAGULATION    Referring Provider: Dr. Jaron Wheeler INR: 2.0-3.0    TODAY'S INR: 2.9    WARFARIN Dosage: 5 mg daily    INR (no units)   Date Value   2021 2.9   2021 3   2021 2.9   2021 2.3   2021 2.9   2021 2.8   2021 3.9       Medication changes:  Kratom red borneo 500 mg taking \"4\" capsules daily  Notes:    Fingerstick INR drawn per clinic protocol. Patient states no visible blood in urine and no black tarry stool. Lyle Clements has a tear on her left upper forearm she states from her dog and states it bled for \"3 days. \" Denies any missed doses of warfarin. No change in diet. Kratom red borneo 500 mg taking \"4\" capsules daily. Lyle Clements saw Dr. Flaca Aguilar on  and will have a colonoscopy on . She was instructed to hold warfarin x 3 days prior to the procedure and aspirin x 7 days prior to. Since Jo's INR remains therapeutic, we will continue current weekly warfarin regimen and will recheck INR in 4 weeks. Patient acknowledges working in consult agreement with pharmacist as referred by his/her physician.                   For Pharmacy Admin Tracking Only     Intervention Detail: Adherence Monitorin and Dose Adjustment: 1, reason: Therapy Optimization   Total # of Interventions Recommended: 1   Total # of Interventions Accepted: 1   Time Spent (min): [address], [facility], PharmD

## 2021-08-04 ENCOUNTER — VIRTUAL VISIT (OUTPATIENT)
Dept: PRIMARY CARE CLINIC | Age: 76
End: 2021-08-04
Payer: MEDICARE

## 2021-08-04 ENCOUNTER — HOSPITAL ENCOUNTER (EMERGENCY)
Age: 76
Discharge: HOME | End: 2021-08-04
Attending: FAMILY MEDICINE
Payer: MEDICARE

## 2021-08-04 VITALS
TEMPERATURE: 99.2 F | DIASTOLIC BLOOD PRESSURE: 76 MMHG | RESPIRATION RATE: 18 BRPM | OXYGEN SATURATION: 97 % | BODY MASS INDEX: 50.85 KG/M2 | SYSTOLIC BLOOD PRESSURE: 126 MMHG | HEIGHT: 60 IN | HEART RATE: 78 BPM | WEIGHT: 259 LBS

## 2021-08-04 DIAGNOSIS — Z79.01 LONG TERM (CURRENT) USE OF ANTICOAGULANTS: ICD-10-CM

## 2021-08-04 DIAGNOSIS — Z86.010 HX OF COLONIC POLYPS: ICD-10-CM

## 2021-08-04 DIAGNOSIS — K92.1 BLOODY STOOL: Primary | ICD-10-CM

## 2021-08-04 DIAGNOSIS — I48.91 ON COUMADIN FOR ATRIAL FIBRILLATION (HCC): ICD-10-CM

## 2021-08-04 DIAGNOSIS — K62.5 BLOOD PER RECTUM: Primary | ICD-10-CM

## 2021-08-04 DIAGNOSIS — I48.20 CHRONIC ATRIAL FIBRILLATION (HCC): ICD-10-CM

## 2021-08-04 DIAGNOSIS — Z79.01 ON COUMADIN FOR ATRIAL FIBRILLATION (HCC): ICD-10-CM

## 2021-08-04 LAB
ABO/RH: NORMAL
ABSOLUTE EOS #: 0.4 K/UL (ref 0–0.4)
ABSOLUTE IMMATURE GRANULOCYTE: ABNORMAL K/UL (ref 0–0.3)
ABSOLUTE LYMPH #: 1.6 K/UL (ref 1–4.8)
ABSOLUTE MONO #: 0.9 K/UL (ref 0–1)
ANION GAP SERPL CALCULATED.3IONS-SCNC: 12 MMOL/L (ref 9–17)
ANTIBODY SCREEN: NEGATIVE
ARM BAND NUMBER: NORMAL
BASOPHILS # BLD: 0 % (ref 0–2)
BASOPHILS ABSOLUTE: 0 K/UL (ref 0–0.2)
BUN BLDV-MCNC: 30 MG/DL (ref 8–23)
BUN/CREAT BLD: 25 (ref 9–20)
CALCIUM SERPL-MCNC: 8.7 MG/DL (ref 8.6–10.4)
CHLORIDE BLD-SCNC: 104 MMOL/L (ref 98–107)
CHP ED QC CHECK: NORMAL
CO2: 22 MMOL/L (ref 20–31)
CREAT SERPL-MCNC: 1.18 MG/DL (ref 0.5–0.9)
DIFFERENTIAL TYPE: YES
EOSINOPHILS RELATIVE PERCENT: 3 % (ref 0–5)
EXPIRATION DATE: NORMAL
GFR AFRICAN AMERICAN: 54 ML/MIN
GFR NON-AFRICAN AMERICAN: 44 ML/MIN
GFR SERPL CREATININE-BSD FRML MDRD: ABNORMAL ML/MIN/{1.73_M2}
GFR SERPL CREATININE-BSD FRML MDRD: ABNORMAL ML/MIN/{1.73_M2}
GLUCOSE BLD-MCNC: 121 MG/DL (ref 70–99)
HCT VFR BLD CALC: 34.5 % (ref 36–46)
HEMOCCULT STL QL: POSITIVE
HEMOGLOBIN: 11.8 G/DL (ref 12–16)
IMMATURE GRANULOCYTES: ABNORMAL %
INR BLD: 1.4
LYMPHOCYTES # BLD: 14 % (ref 15–40)
MCH RBC QN AUTO: 29.5 PG (ref 26–34)
MCHC RBC AUTO-ENTMCNC: 34.2 G/DL (ref 31–37)
MCV RBC AUTO: 86.4 FL (ref 80–100)
MONOCYTES # BLD: 8 % (ref 4–8)
NRBC AUTOMATED: ABNORMAL PER 100 WBC
PDW BLD-RTO: 16.6 % (ref 12.1–15.2)
PLATELET # BLD: 236 K/UL (ref 140–450)
PLATELET ESTIMATE: ABNORMAL
PMV BLD AUTO: ABNORMAL FL (ref 6–12)
POTASSIUM SERPL-SCNC: 4 MMOL/L (ref 3.7–5.3)
PROTHROMBIN TIME: 16.4 SEC (ref 11.5–14.2)
RBC # BLD: 3.99 M/UL (ref 4–5.2)
RBC # BLD: ABNORMAL 10*6/UL
SEG NEUTROPHILS: 75 % (ref 47–75)
SEGMENTED NEUTROPHILS ABSOLUTE COUNT: 9.2 K/UL (ref 2.5–7)
SODIUM BLD-SCNC: 138 MMOL/L (ref 135–144)
WBC # BLD: 12.2 K/UL (ref 3.5–11)
WBC # BLD: ABNORMAL 10*3/UL

## 2021-08-04 PROCEDURE — 80048 BASIC METABOLIC PNL TOTAL CA: CPT

## 2021-08-04 PROCEDURE — 85025 COMPLETE CBC W/AUTO DIFF WBC: CPT

## 2021-08-04 PROCEDURE — 99283 EMERGENCY DEPT VISIT LOW MDM: CPT

## 2021-08-04 PROCEDURE — 86901 BLOOD TYPING SEROLOGIC RH(D): CPT

## 2021-08-04 PROCEDURE — 86850 RBC ANTIBODY SCREEN: CPT

## 2021-08-04 PROCEDURE — 86900 BLOOD TYPING SEROLOGIC ABO: CPT

## 2021-08-04 PROCEDURE — 85610 PROTHROMBIN TIME: CPT

## 2021-08-04 PROCEDURE — G2025 DIS SITE TELE SVCS RHC/FQHC: HCPCS | Performed by: NURSE PRACTITIONER

## 2021-08-04 NOTE — ED NOTES
Pt semi fowlers in bed. Pt requests pillow. Pt given 2nd pillow. Pt denies any other needs at this time. Pt call light within reach. Josh POLANCO RN  08/04/21 6824

## 2021-08-04 NOTE — PROGRESS NOTES
conducted pursuant to the emergency declaration under the [facility], [facility] authority and the [employer] General Act. Patient identification was verified, and a caregiver was present when appropriate. The patient was located in a state where the provider was credentialed to provide care.     Note: not billable if this call serves to triage the patient into an appointment for the relevant concern      TYLOR Singh - CNP

## 2021-08-05 ENCOUNTER — TELEPHONE (OUTPATIENT)
Dept: PRIMARY CARE CLINIC | Age: 76
End: 2021-08-05

## 2021-08-05 ASSESSMENT — ENCOUNTER SYMPTOMS
ANAL BLEEDING: 1
BLOOD IN STOOL: 1

## 2021-08-05 NOTE — ED PROVIDER NOTES
[facility]  eMERGENCY dEPARTMENT eNCOUnter          [facility]       Chief Complaint   Patient presents with    GI Bleeding     Pt had large polyp one week ago from colon. Today arrives with maroon color blood in stool. Has increased in amount of blood today. Nurses Notes reviewed and I agree except as noted in the HPI. HISTORY OF PRESENT ILLNESS    Davon Lorenzo is a 64 y.o. female who presents to the emergency room with concern for blood per rectum. Patient states had colonoscopy with a polyp removed 8 days ago, states was told to hold her Coumadin for a few days but actually took 1 yesterday, is worried about bleeding. Patient states initially she had some bright red blood after the procedure, however improved Saturday Sunday Monday, however again again yesterday and is worsened today. Patient describes everything from bright red to maroon dark-colored blood, sometimes in clots. Denies trauma falls denies lightheadedness or dizziness denies dysuria. Denies other blood thinners. PCP: REYES Yao  GI: Dr. Magdalene Larson in [place], [place]    REVIEW OF SYSTEMS     Review of Systems   Gastrointestinal: Positive for anal bleeding and blood in stool. All other systems reviewed and are negative. PAST MEDICAL HISTORY    has a past medical history of Atrial fibrillation ([address] CHF (congestive heart failure) ([address] Diverticulosis, Gout, Hearing loss, History of colon polyps, Hypertension, Kidney stones, Mitral valve insufficiency, Obesity, morbid, BMI 40.0-49.9 ([address] Osteoarthritis, Primary osteoarthritis of both knees, and Pulmonary HTN ([address] SURGICAL HISTORY      has a past surgical history that includes Tubal ligation; Carpal tunnel release; Lithotripsy; Upper gastrointestinal endoscopy (11/28/2016); Colonoscopy (11/28/2016); Colonoscopy (05/2017); Colonoscopy (06/14/2018, 10/2019, 10/27/2021); joint replacement (Left, 07/17/2019);  Total knee arthroplasty (Left, 2019); Pain management procedure (N/A, 2020); Pain management procedure (Bilateral, 10/02/2020); Nerve Block (Left, 2020); and Pain management procedure (Right, 01/15/2021). CURRENT MEDICATIONS       Discharge Medication List as of 2021  6:17 PM      CONTINUE these medications which have NOT CHANGED    Details   warfarin (COUMADIN) 10 MG tablet TAKE 1/2 TABLET daily ALL DAYS OF THE WEEK OR AS DIRECTED, Disp-90 tablet, R-3Adjust Sig      atenolol (TENORMIN) 50 MG tablet TAKE 1 TABLET EVERY DAY, Disp-90 tablet, R-3Normal      spironolactone (ALDACTONE) 25 MG tablet TAKE 1 TABLET TWICE DAILY, Disp-180 tablet, R-3Normal      allopurinol (ZYLOPRIM) 300 MG tablet TAKE 1/2 TABLET EVERY DAY, Disp-45 tablet,R-1Normal      furosemide (LASIX) 40 MG tablet TAKE 1 TABLET DAILY, TAKE EXTRA TABLET IF NEEDED FOR EDEMA, Disp-180 tablet,R-3Normal      dilTIAZem (CARDIZEM) 60 MG tablet Take 1 tab Three times a day, Disp-270 tablet,R-3Normal      aspirin 81 MG EC tablet Take 81 mg by mouth dailyHistorical Med      Cholecalciferol (VITAMIN D) 2000 UNITS CAPS capsule Take 2,000 Units by mouth 2 times daily. NONFORMULARY Take 4 capsules by mouth daily Kratom red borneo 500 mg capsules Historical Med             ALLERGIES     is allergic to clarithromycin, gabapentin, merthiolate glycerite [thimerosal], and tylenol with codeine #3 [acetaminophen-codeine]. FAMILY HISTORY     She indicated that her mother is . She indicated that her father is . She indicated that her brother is . She indicated that her maternal grandmother is . She indicated that her maternal grandfather is . She indicated that her paternal grandmother is . She indicated that her paternal grandfather is . She indicated that her daughter is alive. She indicated that both of her sons are alive.    family history includes Diabetes in her father; High Blood Pressure in her mother. SOCIAL HISTORY      reports that she quit smoking about 40 years ago. Her smoking use included cigarettes. She has a 1.00 pack-year smoking history. She has never used smokeless tobacco. She reports current alcohol use. She reports that she does not use drugs. PHYSICAL EXAM     INITIAL VITALS:  height is 5' (1.524 m) and weight is 259 lb (117.5 kg). Her temperature is 99.2 °F (37.3 °C). Her blood pressure is 126/76 and her pulse is 78. Her respiration is 18 and oxygen saturation is 97%. Physical Exam   Constitutional: Patient is oriented to person, place, and time. Patient appears well-developed and well-nourished. Patient is active and cooperative. HENT:   Head: Normocephalic and atraumatic. Head is without contusion. Right Ear: Hearing and external ear normal. No drainage. Left Ear: Hearing and external ear normal. No drainage. Nose: Nose normal. No nasal deformity. No epistaxis. Mouth/Throat: Mucous membranes are not dry. Eyes: EOMI. Conjunctivae, sclera, and lids are normal. Right eye exhibits no discharge. Left eye exhibits no discharge. Neck: Full passive range of motion without pain and phonation normal.   Cardiovascular:  Normal rate, regular rhythm and intact distal pulses. Pulses: Right radial pulse  2+   Pulmonary/Chest: Effort normal. No tachypnea and no bradypnea. No wheezes, rhonchi, or rales. Abdominal: Soft. Patient without distension or tenderness  Rectal: Noted external hemorrhoids that are none appear thrombosed, no abrasions or fissures or fistulas, RUBÉN grossly bloody with maroon-colored products, no gross masses appreciated, FOBT positive with normal controls  Musculoskeletal:   Negative acute trauma or deformity,  apparent full range of motion and normal strength all extremities appropriate to age. Neurological: Patient is alert and oriented to person, place, and time. patient displays no tremor. Patient displays no seizure activity.  .    Skin: Skin is warm and dry. Patient is not diaphoretic. Psychiatric: Patient has a normal mood and affect.  Patient speech is normal and behavior is normal. Cognition and memory are normal.    DIFFERENTIAL DIAGNOSIS:   GI bleed, postoperative bleed    DIAGNOSTIC RESULTS           RADIOLOGY: non-plain film images(s) such as CT, Ultrasound and MRI are read by the radiologist.  No orders to display       LABS:   Labs Reviewed   CBC WITH AUTO DIFFERENTIAL - Abnormal; Notable for the following components:       Result Value    WBC 12.2 (*)     RBC 3.99 (*)     Hemoglobin 11.8 (*)     Hematocrit 34.5 (*)     RDW 16.6 (*)     Lymphocytes 14 (*)     Segs Absolute 9.20 (*)     All other components within normal limits   BASIC METABOLIC PANEL W/ REFLEX TO MG FOR LOW K - Abnormal; Notable for the following components:    Glucose 121 (*)     BUN 30 (*)     CREATININE 1.18 (*)     Bun/Cre Ratio 25 (*)     GFR Non- 44 (*)     GFR  54 (*)     All other components within normal limits   PROTIME-INR - Abnormal; Notable for the following components:    Protime 16.4 (*)     All other components within normal limits   POCT OCCULT BLOOD STOOL NON CA SCREEN - Normal   TYPE AND SCREEN       EMERGENCY DEPARTMENT COURSE:   Vitals:    Vitals:    08/04/21 1658 08/04/21 1828   BP: 119/65 126/76   Pulse: 76 78   Resp: 18 18   Temp: 99.2 °F (37.3 °C)    SpO2: 95% 97%   Weight: 259 lb (117.5 kg)    Height: 5' (1.524 m)      Patient history and physical exam taken at bedside, discussed patient symptoms and exam findings, discussed initial work-up to include blood work, IV access, rectal exam.  Patient sitting bed semi-Fowlers, acknowledged    Rectal exam shows some external hemorrhoids did not appear thrombosed, no fissures or abrasions, RUBÉN was grossly bloody with a maroon blood-like product, FOBT was positive with normal controls    Lab work-up reviewed    Using the epic care everywhere tab, was trying to find more recent blood work for comparison, however there is nothing more recent than [facility] 5/2021 CBC    Discussed with patient overall work-up, other to try to contact her GI group had performed a colonoscopy a few days prior, patient acknowledges    Case was discussed with [facility] gastroenterology NP Jane Lester, regarding patient's presentation current work-up, agrees with plan of holding patient's Coumadin through at least Saturday, outpatient blood draw, she states they will have office call her tomorrow to arrange for close follow-up    Discussed with patient my conversation with her gastroenterology group, discussed holding her Coumadin through at least Saturday night, soft diet, importance of hydration, outpatient blood test in 2 days, and that the GI group will contact her directly to arrange for close follow-up, patient vies if anything worsens changes or other concerns return to the nearest emergency room, acknowledges work-up and plan    FINAL IMPRESSION      1. Blood per rectum    2. Hx of colonic polyps    3.  Long term (current) use of anticoagulants          DISPOSITION/PLAN   D/c    PATIENT REFERRED TO:  Follow up with your established GI Group out of [place]    Call       TYLOR Chin CNP  [place] [address]  [phone]    Call   As needed    [facility] ED  [address] [facility] [address]  [phone]    As needed, If symptoms worsen      DISCHARGE MEDICATIONS:  Discharge Medication List as of 8/4/2021  6:17 PM              Summation      Patient Course:  D/c    ED Medications administered this visit:  Medications - No data to display    New Prescriptions from this visit:    Discharge Medication List as of 8/4/2021  6:17 PM          Follow-up:  Follow up with your established GI Group out of [place]    Call       TYLOR Chin CNP  [place] [address]  [phone]    Call   As needed    [facility] GENERAL [facility] ED  [address] [place] [address]

## 2021-08-05 NOTE — TELEPHONE ENCOUNTER
I spoke with patient this morning following up ER regarding maroon stool with some rectal bleeding post colonoscopy and overall she has done well overnight she did have one episode of dark stool at 11:30 PM last night and then did not go until this morning at 830 which was just a small amount    She will continue to hold her Coumadin for 2 more days she does have a planned recheck of a pro time and a CBC on Friday morning    She said she is taking it easy and will pay attention she denies any shortness of breath    Continue to follow closely Coumadin clinic was updated

## 2021-08-06 ENCOUNTER — HOSPITAL ENCOUNTER (OUTPATIENT)
Age: 76
Discharge: HOME | End: 2021-08-06
Payer: MEDICARE

## 2021-08-06 ENCOUNTER — TELEPHONE (OUTPATIENT)
Dept: PRIMARY CARE CLINIC | Age: 76
End: 2021-08-06

## 2021-08-06 LAB
HCT VFR BLD CALC: 30.2 % (ref 36–46)
HEMOGLOBIN: 10.2 G/DL (ref 12–16)
MCH RBC QN AUTO: 29.4 PG (ref 26–34)
MCHC RBC AUTO-ENTMCNC: 33.7 G/DL (ref 31–37)
MCV RBC AUTO: 87.2 FL (ref 80–100)
NRBC AUTOMATED: ABNORMAL PER 100 WBC
PDW BLD-RTO: 17.5 % (ref 12.1–15.2)
PLATELET # BLD: 223 K/UL (ref 140–450)
PMV BLD AUTO: ABNORMAL FL (ref 6–12)
RBC # BLD: 3.47 M/UL (ref 4–5.2)
WBC # BLD: 11.6 K/UL (ref 3.5–11)

## 2021-08-06 PROCEDURE — 85027 COMPLETE CBC AUTOMATED: CPT

## 2021-08-06 PROCEDURE — 36415 COLL VENOUS BLD VENIPUNCTURE: CPT

## 2021-08-06 NOTE — TELEPHONE ENCOUNTER
[facility]) ED Follow up Call    Reason for ED visit:  Rectal Bleeding     8/6/2021     Alhaji Bliss , this is Fabby Hook from. [place] office, just calling to see how you are doing after your recent ED visit. Did you receive discharge instructions? Yes  Do you understand the discharge instructions? Yes  Did the ED give you any new prescriptions? No: NA  Were you able to fill your prescriptions? No: NA      Do you have one of our red, yellow and green  Zone sheets that help you to determine when you should go to the ED? No      Do you need or want to make a follow up appt with your PCP? No    Do you have any further needs in the home, e.g. equipment? No    Pt dong well.       FU appts/Provider:    Future Appointments   Date Time Provider Sj Stephen   8/18/2021 11:00 AM HOLLIE MEDICATION [facility]   9/13/2021 10:30 AM MD Xochilt Coughlin [facility]   11/15/2021  1:00 PM TYLOR Jenkins - JUSTIN Freire [facility] MED [facility]

## 2021-08-17 ENCOUNTER — TELEPHONE (OUTPATIENT)
Dept: PHARMACY | Age: 76
End: 2021-08-17

## 2021-08-18 ENCOUNTER — HOSPITAL ENCOUNTER (OUTPATIENT)
Dept: PHARMACY | Age: 76
Setting detail: THERAPIES SERIES
Discharge: HOME | End: 2021-08-18
Payer: MEDICARE

## 2021-08-18 VITALS
WEIGHT: 253.6 LBS | HEART RATE: 72 BPM | DIASTOLIC BLOOD PRESSURE: 74 MMHG | BODY MASS INDEX: 49.53 KG/M2 | SYSTOLIC BLOOD PRESSURE: 128 MMHG

## 2021-08-18 DIAGNOSIS — I48.11 LONGSTANDING PERSISTENT ATRIAL FIBRILLATION (HCC): Primary | ICD-10-CM

## 2021-08-18 LAB — INR BLD: 1.7

## 2021-08-18 PROCEDURE — 99212 OFFICE O/P EST SF 10 MIN: CPT | Performed by: FAMILY MEDICINE

## 2021-08-18 PROCEDURE — 85610 PROTHROMBIN TIME: CPT | Performed by: FAMILY MEDICINE

## 2021-08-18 RX ORDER — ACETAMINOPHEN/DIPHENHYDRAMINE 500MG-25MG
2 TABLET ORAL NIGHTLY
COMMUNITY

## 2021-08-18 NOTE — PROGRESS NOTES
[address] [place] [address]/[place]  Medication Management  ANTICOAGULATION    Referring Provider: Dr Dave Barahona INR: 2.0-3.0    TODAY'S INR: 1.7    WARFARIN Dosage: 10mg today then resume 5mg daily    INR (no units)   Date Value   08/18/2021 1.7   08/04/2021 1.4   07/20/2021 2.9   06/29/2021 3   06/08/2021 2.9   05/04/2021 2.3   04/06/2021 2.9       Medication changes:  No medication changes    Notes:    Fingerstick INR drawn per clinic protocol. Patient states no visible blood in urine and no black tarry stool. Denies any missed doses of warfarin. No change in other maintenance medications or in diet. Will recheck INR in 3 weeks. Patient had colonoscopy 7/27 and held warfarin 3 days prior as instructed. Patient had rectal bleeding and held warfarin on her own for Fabby Holstein couple of days\" post procedure. Rectal bleeding continued and she was seen in [facility] ER on 8/4 where she was instructed to hold warfarin 2 days; patient held for 3 days. Patient will take 10mg warfarin today then resume warfarin 5mg po daily. Patient acknowledges working in consult agreement with pharmacist as referred by his/her physician.                   For Pharmacy Admin Tracking Only     Intervention Detail: Dose Adjustment: 1, reason: Improve Adherence   Total # of Interventions Recommended: 2   Total # of Interventions Accepted: 2   Time Spent (min): 30      Kalyani Zhong R.Ph., 8/18/2021,12:04 PM

## 2021-08-18 NOTE — PATIENT INSTRUCTIONS
Please take 10mg (1 tablet) warfarin today then return to your regular dosing of 5mg (1/2 tablet) every day. Continue to monitor for signs of bleeding. Return to coumadin clinic in 3 weeks.

## 2021-09-03 ENCOUNTER — ANTI-COAG VISIT (OUTPATIENT)
Dept: PHARMACY | Age: 76
End: 2021-09-03

## 2021-09-07 ENCOUNTER — TELEPHONE (OUTPATIENT)
Dept: PHARMACY | Age: 76
End: 2021-09-07

## 2021-09-07 NOTE — TELEPHONE ENCOUNTER
COVID-19 phone screening     Call placed to screen patient prior to upcoming Medication Management visit for Anticoagulation. Does patient have fever and/or lower respiratory symptoms (SOB, difficulty breathing, cough)? [] Yes    [x] No    If yes, complete Travel Screening and ask the following:   [] [Fever OR s/sxs of lower respiratory illness]  AND  [close contact with lab-confirmed COVID-19 patient within 14 days of symptom onset   [] [Fever AND s/sxs of lower respiratory illness requiring hospitalization] AND [history of travel to [place], [place], [place], [facility], [place] within 14 days of sx onset]  [] [Fever with severe acute lower respiratory illness (I.e. PNA, ARDS) requiring hospitalization and without alternative explanatory diagnosis (I.e. Influenza)] AND [no source of exposure identified]    [x] None of the following; patient confirmed for regularly scheduled INR check and instructed to call the clinic immediately if any symptoms develop prior to upcoming appointment.

## 2021-09-08 ENCOUNTER — HOSPITAL ENCOUNTER (OUTPATIENT)
Dept: PHARMACY | Age: 76
Setting detail: THERAPIES SERIES
Discharge: HOME | End: 2021-09-08
Payer: MEDICARE

## 2021-09-08 VITALS — SYSTOLIC BLOOD PRESSURE: 136 MMHG | DIASTOLIC BLOOD PRESSURE: 78 MMHG | HEART RATE: 77 BPM

## 2021-09-08 LAB — INR BLD: 2.6

## 2021-09-08 PROCEDURE — 99211 OFF/OP EST MAY X REQ PHY/QHP: CPT

## 2021-09-08 PROCEDURE — 85610 PROTHROMBIN TIME: CPT

## 2021-09-08 NOTE — PROGRESS NOTES
[facility]  [facility]  ANTICOAGULATION    Referring Provider: Dr Lauren Thompson     GOAL INR: 2.0-3.0     TODAY'S INR: 2.6     WARFARIN Dosage: 5mg daily    INR (no units)   Date Value   2021 2.6   2021 1.7   2021 1.4   2021 2.9   2021 3   2021 2.9   2021 2.3       Medication changes:  none  Notes:    Fingerstick INR drawn per clinic protocol. Patient states no visible blood in urine and no black tarry stool. Denies any missed doses of warfarin. No change in other maintenance medications or in diet. Since Jo's INR is therapeutic, we will continue current weekly warfarin regimen and will recheck INR in 5 weeks. Patient acknowledges working in consult agreement with pharmacist as referred by his/her physician.                   For Pharmacy Admin Tracking Only     Intervention Detail: Adherence Monitorin   Total # of Interventions Recommended: 0   Total # of Interventions Accepted: 0   Time Spent (min): [address], [facility], PharmD

## 2021-09-09 ENCOUNTER — HOSPITAL ENCOUNTER (OUTPATIENT)
Dept: GENERAL RADIOLOGY | Age: 76
Discharge: HOME | End: 2021-09-11
Payer: MEDICARE

## 2021-09-09 ENCOUNTER — HOSPITAL ENCOUNTER (OUTPATIENT)
Age: 76
Discharge: HOME | End: 2021-09-09
Payer: MEDICARE

## 2021-09-09 ENCOUNTER — HOSPITAL ENCOUNTER (OUTPATIENT)
Age: 76
Discharge: HOME | End: 2021-09-11
Payer: MEDICARE

## 2021-09-09 DIAGNOSIS — I34.0 MITRAL VALVE INSUFFICIENCY, UNSPECIFIED ETIOLOGY: ICD-10-CM

## 2021-09-09 DIAGNOSIS — E78.5 HYPERLIPIDEMIA, UNSPECIFIED HYPERLIPIDEMIA TYPE: ICD-10-CM

## 2021-09-09 DIAGNOSIS — I27.20 PULMONARY HTN (HCC): ICD-10-CM

## 2021-09-09 DIAGNOSIS — I48.20 CHRONIC ATRIAL FIBRILLATION (HCC): ICD-10-CM

## 2021-09-09 DIAGNOSIS — I50.9 CONGESTIVE HEART FAILURE, UNSPECIFIED HF CHRONICITY, UNSPECIFIED HEART FAILURE TYPE (HCC): ICD-10-CM

## 2021-09-09 DIAGNOSIS — E55.9 VITAMIN D DEFICIENCY: ICD-10-CM

## 2021-09-09 LAB
ABSOLUTE EOS #: 1.61 K/UL (ref 0–0.4)
ABSOLUTE IMMATURE GRANULOCYTE: ABNORMAL K/UL (ref 0–0.3)
ABSOLUTE LYMPH #: 1.61 K/UL (ref 1–4.8)
ABSOLUTE MONO #: 0.86 K/UL (ref 0–1)
ALBUMIN SERPL-MCNC: 4.2 G/DL (ref 3.5–5.2)
ALBUMIN/GLOBULIN RATIO: ABNORMAL (ref 1–2.5)
ALP BLD-CCNC: 113 U/L (ref 35–104)
ALT SERPL-CCNC: 17 U/L (ref 5–33)
ANION GAP SERPL CALCULATED.3IONS-SCNC: 13 MMOL/L (ref 9–17)
AST SERPL-CCNC: 16 U/L
BASOPHILS # BLD: 1 % (ref 0–2)
BASOPHILS ABSOLUTE: 0.11 K/UL (ref 0–0.2)
BILIRUB SERPL-MCNC: 0.3 MG/DL (ref 0.3–1.2)
BUN BLDV-MCNC: 29 MG/DL (ref 8–23)
BUN/CREAT BLD: 30 (ref 9–20)
CALCIUM SERPL-MCNC: 9.3 MG/DL (ref 8.6–10.4)
CHLORIDE BLD-SCNC: 102 MMOL/L (ref 98–107)
CHOLESTEROL/HDL RATIO: 3.1
CHOLESTEROL: 162 MG/DL
CO2: 24 MMOL/L (ref 20–31)
CREAT SERPL-MCNC: 0.98 MG/DL (ref 0.5–0.9)
DIFFERENTIAL TYPE: ABNORMAL
EOSINOPHILS RELATIVE PERCENT: 15 % (ref 0–5)
GFR AFRICAN AMERICAN: >60 ML/MIN
GFR NON-AFRICAN AMERICAN: 55 ML/MIN
GFR SERPL CREATININE-BSD FRML MDRD: ABNORMAL ML/MIN/{1.73_M2}
GFR SERPL CREATININE-BSD FRML MDRD: ABNORMAL ML/MIN/{1.73_M2}
GLUCOSE BLD-MCNC: 111 MG/DL (ref 70–99)
HCT VFR BLD CALC: 32.8 % (ref 36–46)
HDLC SERPL-MCNC: 53 MG/DL
HEMOGLOBIN: 10.7 G/DL (ref 12–16)
IMMATURE GRANULOCYTES: ABNORMAL %
LDL CHOLESTEROL: 84 MG/DL (ref 0–130)
LYMPHOCYTES # BLD: 15 % (ref 15–40)
MAGNESIUM: 2.2 MG/DL (ref 1.6–2.6)
MCH RBC QN AUTO: 27.3 PG (ref 26–34)
MCHC RBC AUTO-ENTMCNC: 32.7 G/DL (ref 31–37)
MCV RBC AUTO: 83.4 FL (ref 80–100)
MONOCYTES # BLD: 8 % (ref 4–8)
MORPHOLOGY: ABNORMAL
MORPHOLOGY: ABNORMAL
NRBC AUTOMATED: ABNORMAL PER 100 WBC
PATIENT FASTING?: YES
PDW BLD-RTO: 16.9 % (ref 12.1–15.2)
PLATELET # BLD: 296 K/UL (ref 140–450)
PLATELET ESTIMATE: ABNORMAL
PMV BLD AUTO: ABNORMAL FL (ref 6–12)
POTASSIUM SERPL-SCNC: 3.9 MMOL/L (ref 3.7–5.3)
RBC # BLD: 3.94 M/UL (ref 4–5.2)
RBC # BLD: ABNORMAL 10*6/UL
SEG NEUTROPHILS: 61 % (ref 47–75)
SEGMENTED NEUTROPHILS ABSOLUTE COUNT: 6.51 K/UL (ref 2.5–7)
SODIUM BLD-SCNC: 139 MMOL/L (ref 135–144)
TOTAL PROTEIN: 7.4 G/DL (ref 6.4–8.3)
TRIGL SERPL-MCNC: 125 MG/DL
TSH SERPL DL<=0.05 MIU/L-ACNC: 1.34 MIU/L (ref 0.3–5)
VITAMIN D 25-HYDROXY: 50.9 NG/ML (ref 30–100)
VLDLC SERPL CALC-MCNC: NORMAL MG/DL (ref 1–30)
WBC # BLD: 10.7 K/UL (ref 3.5–11)
WBC # BLD: ABNORMAL 10*3/UL

## 2021-09-09 PROCEDURE — 36415 COLL VENOUS BLD VENIPUNCTURE: CPT

## 2021-09-09 PROCEDURE — 83735 ASSAY OF MAGNESIUM: CPT

## 2021-09-09 PROCEDURE — 71046 X-RAY EXAM CHEST 2 VIEWS: CPT

## 2021-09-09 PROCEDURE — 93005 ELECTROCARDIOGRAM TRACING: CPT

## 2021-09-09 PROCEDURE — 85025 COMPLETE CBC W/AUTO DIFF WBC: CPT

## 2021-09-09 PROCEDURE — 80061 LIPID PANEL: CPT

## 2021-09-09 PROCEDURE — 80053 COMPREHEN METABOLIC PANEL: CPT

## 2021-09-09 PROCEDURE — 84443 ASSAY THYROID STIM HORMONE: CPT

## 2021-09-09 PROCEDURE — 82306 VITAMIN D 25 HYDROXY: CPT

## 2021-09-12 LAB
EKG ATRIAL RATE: 202 BPM
EKG Q-T INTERVAL: 418 MS
EKG QRS DURATION: 94 MS
EKG QTC CALCULATION (BAZETT): 451 MS
EKG R AXIS: -31 DEGREES
EKG T AXIS: 72 DEGREES
EKG VENTRICULAR RATE: 70 BPM

## 2021-09-12 PROCEDURE — 93010 ELECTROCARDIOGRAM REPORT: CPT | Performed by: INTERNAL MEDICINE

## 2021-09-13 ENCOUNTER — OFFICE VISIT (OUTPATIENT)
Dept: CARDIOLOGY CLINIC | Age: 76
End: 2021-09-13
Payer: MEDICARE

## 2021-09-13 VITALS
HEART RATE: 74 BPM | DIASTOLIC BLOOD PRESSURE: 70 MMHG | OXYGEN SATURATION: 94 % | BODY MASS INDEX: 49.41 KG/M2 | SYSTOLIC BLOOD PRESSURE: 120 MMHG | WEIGHT: 253 LBS

## 2021-09-13 DIAGNOSIS — E78.5 HYPERLIPIDEMIA, UNSPECIFIED HYPERLIPIDEMIA TYPE: ICD-10-CM

## 2021-09-13 DIAGNOSIS — I48.20 CHRONIC ATRIAL FIBRILLATION (HCC): Primary | ICD-10-CM

## 2021-09-13 DIAGNOSIS — I50.9 CONGESTIVE HEART FAILURE, UNSPECIFIED HF CHRONICITY, UNSPECIFIED HEART FAILURE TYPE (HCC): ICD-10-CM

## 2021-09-13 DIAGNOSIS — I34.0 MITRAL VALVE INSUFFICIENCY, UNSPECIFIED ETIOLOGY: ICD-10-CM

## 2021-09-13 DIAGNOSIS — E55.9 VITAMIN D DEFICIENCY: ICD-10-CM

## 2021-09-13 DIAGNOSIS — I27.20 PULMONARY HTN (HCC): ICD-10-CM

## 2021-09-13 PROCEDURE — 1090F PRES/ABSN URINE INCON ASSESS: CPT | Performed by: INTERNAL MEDICINE

## 2021-09-13 PROCEDURE — G8417 CALC BMI ABV UP PARAM F/U: HCPCS | Performed by: INTERNAL MEDICINE

## 2021-09-13 PROCEDURE — G8399 PT W/DXA RESULTS DOCUMENT: HCPCS | Performed by: INTERNAL MEDICINE

## 2021-09-13 PROCEDURE — 4040F PNEUMOC VAC/ADMIN/RCVD: CPT | Performed by: INTERNAL MEDICINE

## 2021-09-13 PROCEDURE — 1123F ACP DISCUSS/DSCN MKR DOCD: CPT | Performed by: INTERNAL MEDICINE

## 2021-09-13 PROCEDURE — 99214 OFFICE O/P EST MOD 30 MIN: CPT | Performed by: INTERNAL MEDICINE

## 2021-09-13 PROCEDURE — 1036F TOBACCO NON-USER: CPT | Performed by: INTERNAL MEDICINE

## 2021-09-13 PROCEDURE — G8427 DOCREV CUR MEDS BY ELIG CLIN: HCPCS | Performed by: INTERNAL MEDICINE

## 2021-09-13 RX ORDER — ALLOPURINOL 300 MG/1
TABLET ORAL
Qty: 45 TABLET | Refills: 0 | Status: SHIPPED | OUTPATIENT
Start: 2021-09-13 | End: 2021-11-09

## 2021-09-13 NOTE — LETTER
pounds. GENERAL:  She is a pleasant 33-year-old female. Denied pain. She was oriented to person, place and time. Answered questions appropriately. She had audible wheezing. SKIN:  No unusual skin changes. HEENT:  The pupils are equally round and intact. Mucous membranes were dry. NECK:  No JVD. Good carotid pulses. No carotid bruits. No lymphadenopathy or thyromegaly. CARDIOVASCULAR EXAM:  S1 and S2 were normal.  No S3 or S4. Soft systolic blowing type murmur. No diastolic murmur. PMI was normal.  No lift, thrust, or pericardial friction rub. LUNGS:  Quite clear to auscultation and percussion. ABDOMEN:  Soft and nontender. Good bowel sounds. EXTREMITIES:  Good femoral pulses. Good pedal pulses. She had mild pedal edema. Skin was warm and dry. No calf tenderness. Nail beds pink. Good cap refill. PULSES:  Bilateral symmetrical radial, brachial and carotid pulses. No carotid bruits. Good femoral and pedal pulses. NEUROLOGIC EXAM:  Within normal limits. PSYCHIATRIC EXAM:  Within normal limits. LABORATORY DATA:  From 09/09/2021, sodium 139, potassium 3.9, BUN 29, creatinine 0.98, GFR was 55. Magnesium 2.2, calcium 9.3. Cholesterol 162, HDL 53, LDL 84, triglycerides 125. ALT was 17, AST was 16. Her TSH was 1.34. Vitamin D 50.9. White count 10.7, hemoglobin 10.7, platelet count was 465,804. EKG showed atrial fibrillation with an old anterior myocardial infarction, with no significant change from previous EKGs. Her last echocardiogram was on 09/10/2020, that showed an EF of 45%, with moderate mitral regurgitation. Chest x-ray was unchanged and unremarkable. IMPRESSION:  1. History of moderate to moderate-to-severe mitral regurgitation, which is very blood pressure dependent. 2.  Long history of paroxysmal atrial fibrillation, with her now in chronic atrial fibrillation for approximately 6 to 7 years, on Coumadin and rate control.   3.  Chronic shortness of breath due to COPD and deconditioning. 4.  History of mild LV dysfunction, EF in the 45% to 50% range. 5.  Hypertension, well controlled. 6.  Hyperlipidemia, well controlled. 7.  Severe arthritis in both knees, with left knee replacement in 07/2019, with her extremely inactive. 8.  Chronic back pain. 9.  Colonoscopy on 07/27/2021, with adenomatous polyp, 40 mm in size, which was removed. PLAN:  1. No change in medications. 2.  We will see in one year with an echocardiogram in one year to reassess her mitral regurgitation. DISCUSSION:  Mrs. Chris Gonzalez continues to do well from a cardiac standpoint. She unfortunately is extremely inactive and therefore has chronic shortness of breath with any activity. She has a long history of snoring and certainly sleep apnea most likely plays into her shortness of breath but she has, over the years, refused any sleep studies. Her cardiac status appears stable. Her mitral regurgitation is very pressure dependent and on her last echo one year ago, it was in the moderate range. We will recheck it again in one year. She has no evidence of any CHF, etc.    She does have mild LV dysfunction historically but this is not secondary to her mitral regurgitation. She does have a history of mild pulmonary hypertension, which has been in the 45% to 50% range and has been stable. There is no indication to do any testing such as Lexiscan stress test that she gives no history of chest pain and again is extremely inactive. Thank you very much for allowing me the privilege of seeing Mrs. Chris Gonzalez. If you have any questions on my thoughts, please do not hesitate to contact me.      Sincerely,        Jackie Xavier    D: 09/14/2021 5:00:21     T: 09/14/2021 5:07:02     [email or web address]  Job#: [id number]   Doc#: [id number]      <>

## 2021-09-13 NOTE — PROGRESS NOTES
Ov DR Palmira Maddox 1 year follow up  [address] daughter stop daily to help  With meal.  C/o sob same as before   Audible wheezing noted   In Ed on 8/4 GI bleed. Had colonoscopy done  7/27 per DR Mena  Removed polyp. Grand daughter was    Couple weeks ago at pt [facility]. Will see in 1 year with echo.

## 2021-09-17 NOTE — PROGRESS NOTES
Mikal Jones M.D. [address] [place] [address]  [phone]        2021        Lida Self, JUSTIN  [address] [facility] [address]    RE:   Patricia Hoover  :  1945    Dear Sky Stager:    CHIEF COMPLAINT:  1. Chronic atrial fibrillation. 2.  On Coumadin. 3.  Severe mitral regurgitation. HISTORY OF PRESENT ILLNESS:  I had the pleasure of seeing Mrs. Lopez Boudreaux in our office on 2021. She is a pleasant 70-year-old female who is very inactive because of severe arthritis and chronic back pain. She does have a history of chronic atrial fibrillation and has been on Coumadin and rate control for many years. She initially had paroxysmal atrial fibrillation but went into chronic atrial fibrillation and because it was totally asymptomatic, we did not attempt cardioversion but instead had rate control. She has a long history of mitral regurgitation and has been in the moderate to moderate-to-severe category. She has had an ejection fraction in the 45% to 50% range. She has never had a myocardial infarction or cardiac catheterization. She does have a long history of shortness of breath. She lives alone but her granddaughter stops daily to help with meals and with her house cleaning. She did have a rectal bleeding and had a colonoscopy on 2021, by Dr. Cherrie Handley at [facility] in [place] on 2021, that showed one 40 mm polyp in the ascending colon that was removed. She denies any chest pain or chest discomfort. Her shortness of breath is about the same as before. She does have shortness of breath with any activity, but again she is extremely inactive. She has had no syncope or near syncope. Denies any chest pain or chest discomfort and no PND or orthopnea. She does have severe arthritis in her knees. She has a long history of mild edema.     CARDIAC RISK FACTORS:  Hypertension: Positive. Hyperlipidemia:  Positive. Peripheral Vascular Disease:  Negative. Smoking:  Negative. Diabetes:  Negative. Other Family Members:  Negative. MEDICATIONS AT HOME:  She is currently on allopurinol 300 mg half a tablet daily, aspirin 81 mg daily, Tenormin 50 mg daily, Cardizem 60 mg t.i.d., Lasix 40 mg daily with an extra tablet if she develops more edema, Aldactone 25 mg b.i.d. and Coumadin as directed. PAST MEDICAL AND SURGICAL HISTORY:  1. She had a tubal ligation many years ago. 2.  Tonsillectomy many years ago. 3.  Hypertension, well controlled. 4.  Probable sleep apnea with a long snoring history although she refused a sleep study. 5.  Chronic atrial fibrillation, on Coumadin and rate control. 6.  History of pulmonary hypertension. 7.  Carpal tunnel release, 6 years ago. 8.  Left knee replacement by Dr. Ketan Hernandez in 07/2019.  9.  Colonoscopy in 2017 and another colonoscopy on 07/27/2021, for adenomatous polyps. FAMILY HISTORY:  No significant heart disease in the family. SOCIAL HISTORY:  She is 64years old.  for 7-1/2 years. Three children. She had to put her 15year-old Ziliko down recently, which was traumatic. Granddaughter is 32years old, works for [employer], has gotten  and moved into a house that Mrs. Neeta Cisneros was raised in.  Granddaughter stops almost daily to help her with meals. REVIEW OF SYSTEMS:  Cardiac as above. Other systems reviewed including constitutional, eyes, ears, nose and throat, cardiovascular, respiratory, GI, , musculoskeletal, integumentary, neurologic, endocrine, hematologic and allergic/immunologic are negative except for what is described above. No weight loss or weight gain. No change in bowel habits, no blood in stools. No fevers, sweats or chills. PHYSICAL EXAMINATION:  VITAL SIGNS:  Her blood pressure was 120/70 with a heart rate of 74 and irregular. Respiratory rate 18. O2 sat 94%.   Weight 253 pounds. GENERAL:  She is a pleasant 61-year-old female. Denied pain. She was oriented to person, place and time. Answered questions appropriately. She had audible wheezing. SKIN:  No unusual skin changes. HEENT:  The pupils are equally round and intact. Mucous membranes were dry. NECK:  No JVD. Good carotid pulses. No carotid bruits. No lymphadenopathy or thyromegaly. CARDIOVASCULAR EXAM:  S1 and S2 were normal.  No S3 or S4. Soft systolic blowing type murmur. No diastolic murmur. PMI was normal.  No lift, thrust, or pericardial friction rub. LUNGS:  Quite clear to auscultation and percussion. ABDOMEN:  Soft and nontender. Good bowel sounds. EXTREMITIES:  Good femoral pulses. Good pedal pulses. She had mild pedal edema. Skin was warm and dry. No calf tenderness. Nail beds pink. Good cap refill. PULSES:  Bilateral symmetrical radial, brachial and carotid pulses. No carotid bruits. Good femoral and pedal pulses. NEUROLOGIC EXAM:  Within normal limits. PSYCHIATRIC EXAM:  Within normal limits. LABORATORY DATA:  From 09/09/2021, sodium 139, potassium 3.9, BUN 29, creatinine 0.98, GFR was 55. Magnesium 2.2, calcium 9.3. Cholesterol 162, HDL 53, LDL 84, triglycerides 125. ALT was 17, AST was 16. Her TSH was 1.34. Vitamin D 50.9. White count 10.7, hemoglobin 10.7, platelet count was 613,217. EKG showed atrial fibrillation with an old anterior myocardial infarction, with no significant change from previous EKGs. Her last echocardiogram was on 09/10/2020, that showed an EF of 45%, with moderate mitral regurgitation. Chest x-ray was unchanged and unremarkable. IMPRESSION:  1. History of moderate to moderate-to-severe mitral regurgitation, which is very blood pressure dependent. 2.  Long history of paroxysmal atrial fibrillation, with her now in chronic atrial fibrillation for approximately 6 to 7 years, on Coumadin and rate control.   3.  Chronic shortness of breath due to COPD and deconditioning. 4.  History of mild LV dysfunction, EF in the 45% to 50% range. 5.  Hypertension, well controlled. 6.  Hyperlipidemia, well controlled. 7.  Severe arthritis in both knees, with left knee replacement in 07/2019, with her extremely inactive. 8.  Chronic back pain. 9.  Colonoscopy on 07/27/2021, with adenomatous polyp, 40 mm in size, which was removed. PLAN:  1. No change in medications. 2.  We will see in one year with an echocardiogram in one year to reassess her mitral regurgitation. DISCUSSION:  Mrs. Guanaco Manuel continues to do well from a cardiac standpoint. She unfortunately is extremely inactive and therefore has chronic shortness of breath with any activity. She has a long history of snoring and certainly sleep apnea most likely plays into her shortness of breath but she has, over the years, refused any sleep studies. Her cardiac status appears stable. Her mitral regurgitation is very pressure dependent and on her last echo one year ago, it was in the moderate range. We will recheck it again in one year. She has no evidence of any CHF, etc.    She does have mild LV dysfunction historically but this is not secondary to her mitral regurgitation. She does have a history of mild pulmonary hypertension, which has been in the 45% to 50% range and has been stable. There is no indication to do any testing such as Lexiscan stress test that she gives no history of chest pain and again is extremely inactive. Thank you very much for allowing me the privilege of seeing Mrs. Guanaco Manuel. If you have any questions on my thoughts, please do not hesitate to contact me.      Sincerely,        Filomena Boyd    D: 09/14/2021 5:00:21     T: 09/14/2021 5:07:02     [email or web address]  Job#: [id number]   Doc#: [id number]      <>

## 2021-10-05 RX ORDER — DILTIAZEM HYDROCHLORIDE 60 MG/1
TABLET, FILM COATED ORAL
Qty: 270 TABLET | Refills: 3 | Status: SHIPPED | OUTPATIENT
Start: 2021-10-05 | End: 2022-07-11

## 2021-10-13 ENCOUNTER — HOSPITAL ENCOUNTER (OUTPATIENT)
Dept: PHARMACY | Age: 76
Setting detail: THERAPIES SERIES
Discharge: HOME | End: 2021-10-13
Payer: MEDICARE

## 2021-10-13 VITALS
HEART RATE: 78 BPM | SYSTOLIC BLOOD PRESSURE: 140 MMHG | DIASTOLIC BLOOD PRESSURE: 67 MMHG | WEIGHT: 253 LBS | BODY MASS INDEX: 49.41 KG/M2

## 2021-10-13 LAB — INR BLD: 2.2

## 2021-10-13 PROCEDURE — 85610 PROTHROMBIN TIME: CPT

## 2021-10-13 PROCEDURE — 99211 OFF/OP EST MAY X REQ PHY/QHP: CPT

## 2021-10-13 NOTE — PROGRESS NOTES
[facility] [address] [place] [address]/[place]  Medication Management  ANTICOAGULATION    Referring Provider: Dr Henrdon     GOAL INR: 2.0-3.0     TODAY'S INR: 2.2     WARFARIN Dosage: 5mg daily    INR (no units)   Date Value   10/13/2021 2.2   2021 2.6   2021 1.7   2021 1.4   2021 2.9   2021 3   2021 2.9       Medication changes:  none  Notes:    Fingerstick INR drawn per clinic protocol. Patient states no visible blood in urine and no black tarry stool. Denies any missed doses of warfarin. No change in other maintenance medications or in diet. Noe Amezcua saw Dr. Alley Beltran on . Since Jo's INR remains therapeutic, we will continue current weekly warfarin regimen and will recheck INR in 4 weeks. Patient acknowledges working in consult agreement with pharmacist as referred by his/her physician.                   For Pharmacy Admin Tracking Only     Intervention Detail: Adherence Monitorin   Total # of Interventions Recommended: 0   Total # of Interventions Accepted: 0   Time Spent (min): [address], [facility], PharmD

## 2021-11-09 RX ORDER — ALLOPURINOL 300 MG/1
TABLET ORAL
Qty: 45 TABLET | Refills: 0 | Status: SHIPPED | OUTPATIENT
Start: 2021-11-09 | End: 2022-01-24

## 2021-11-10 ENCOUNTER — HOSPITAL ENCOUNTER (OUTPATIENT)
Dept: PHARMACY | Age: 76
Setting detail: THERAPIES SERIES
Discharge: HOME | End: 2021-11-10
Payer: MEDICARE

## 2021-11-10 VITALS
HEART RATE: 78 BPM | BODY MASS INDEX: 50.21 KG/M2 | DIASTOLIC BLOOD PRESSURE: 77 MMHG | WEIGHT: 257.1 LBS | SYSTOLIC BLOOD PRESSURE: 125 MMHG

## 2021-11-10 DIAGNOSIS — I48.91 ATRIAL FIBRILLATION, UNSPECIFIED TYPE (HCC): Primary | ICD-10-CM

## 2021-11-10 LAB — INR BLD: 1.7

## 2021-11-10 PROCEDURE — 85610 PROTHROMBIN TIME: CPT | Performed by: FAMILY MEDICINE

## 2021-11-10 PROCEDURE — 99212 OFFICE O/P EST SF 10 MIN: CPT | Performed by: FAMILY MEDICINE

## 2021-11-10 NOTE — PATIENT INSTRUCTIONS
Please take 10mg warfarin today then return to your regular dosing of 5mg (1/2 tablet) every day. Your last day of warfarin prior to colonoscopy will be 12/2/21. Please do not restart warfarin until physician tells you to restart. Please continue to monitor for signs of bleeding. Return to coumadin clinic on 12/16/21 at 1:30.

## 2021-11-15 ENCOUNTER — OFFICE VISIT (OUTPATIENT)
Dept: FAMILY MEDICINE CLINIC | Age: 76
End: 2021-11-15
Payer: MEDICARE

## 2021-11-15 VITALS
BODY MASS INDEX: 46.25 KG/M2 | WEIGHT: 261 LBS | HEART RATE: 78 BPM | DIASTOLIC BLOOD PRESSURE: 74 MMHG | SYSTOLIC BLOOD PRESSURE: 102 MMHG | HEIGHT: 63 IN | OXYGEN SATURATION: 95 %

## 2021-11-15 DIAGNOSIS — Z23 INFLUENZA VACCINE NEEDED: ICD-10-CM

## 2021-11-15 DIAGNOSIS — E66.01 MORBID OBESITY WITH BMI OF 45.0-49.9, ADULT (HCC): ICD-10-CM

## 2021-11-15 DIAGNOSIS — Z00.00 ROUTINE GENERAL MEDICAL EXAMINATION AT A HEALTH CARE FACILITY: Primary | ICD-10-CM

## 2021-11-15 DIAGNOSIS — I48.20 CHRONIC ATRIAL FIBRILLATION (HCC): ICD-10-CM

## 2021-11-15 DIAGNOSIS — I27.20 PULMONARY HTN (HCC): ICD-10-CM

## 2021-11-15 PROCEDURE — 4040F PNEUMOC VAC/ADMIN/RCVD: CPT | Performed by: NURSE PRACTITIONER

## 2021-11-15 PROCEDURE — G8484 FLU IMMUNIZE NO ADMIN: HCPCS | Performed by: NURSE PRACTITIONER

## 2021-11-15 PROCEDURE — 90694 VACC AIIV4 NO PRSRV 0.5ML IM: CPT | Performed by: NURSE PRACTITIONER

## 2021-11-15 PROCEDURE — 1123F ACP DISCUSS/DSCN MKR DOCD: CPT | Performed by: NURSE PRACTITIONER

## 2021-11-15 PROCEDURE — G0008 ADMIN INFLUENZA VIRUS VAC: HCPCS | Performed by: NURSE PRACTITIONER

## 2021-11-15 PROCEDURE — G0439 PPPS, SUBSEQ VISIT: HCPCS | Performed by: NURSE PRACTITIONER

## 2021-11-15 ASSESSMENT — PATIENT HEALTH QUESTIONNAIRE - PHQ9
SUM OF ALL RESPONSES TO PHQ QUESTIONS 1-9: 0
SUM OF ALL RESPONSES TO PHQ9 QUESTIONS 1 & 2: 0
SUM OF ALL RESPONSES TO PHQ QUESTIONS 1-9: 0
SUM OF ALL RESPONSES TO PHQ QUESTIONS 1-9: 0
1. LITTLE INTEREST OR PLEASURE IN DOING THINGS: 0
2. FEELING DOWN, DEPRESSED OR HOPELESS: 0

## 2021-11-15 ASSESSMENT — LIFESTYLE VARIABLES: HOW OFTEN DO YOU HAVE A DRINK CONTAINING ALCOHOL: 0

## 2021-11-15 NOTE — PATIENT INSTRUCTIONS
Personalized Preventive Plan for Cordell Closs - 11/15/2021  Medicare offers a range of preventive health benefits. Some of the tests and screenings are paid in full while other may be subject to a deductible, co-insurance, and/or copay. Some of these benefits include a comprehensive review of your medical history including lifestyle, illnesses that may run in your family, and various assessments and screenings as appropriate. After reviewing your medical record and screening and assessments performed today your provider may have ordered immunizations, labs, imaging, and/or referrals for you. A list of these orders (if applicable) as well as your Preventive Care list are included within your After Visit Summary for your review. Other Preventive Recommendations:    · A preventive eye exam performed by an eye specialist is recommended every 1-2 years to screen for glaucoma; cataracts, macular degeneration, and other eye disorders. · A preventive dental visit is recommended every 6 months. · Try to get at least 150 minutes of exercise per week or 10,000 steps per day on a pedometer . · Order or download the FREE \"Exercise & Physical Activity: Your Everyday Guide\" from [employer]. Call 9-364.685.4623 or search [employer] online. · You need 3240-6259 mg of calcium and 9515-1143 IU of vitamin D per day. It is possible to meet your calcium requirement with diet alone, but a vitamin D supplement is usually necessary to meet this goal.  · When exposed to the sun, use a sunscreen that protects against both UVA and UVB radiation with an SPF of 30 or greater. Reapply every 2 to 3 hours or after sweating, drying off with a towel, or swimming. · Always wear a seat belt when traveling in a car. Always wear a helmet when riding a bicycle or motorcycle.

## 2021-11-15 NOTE — PROGRESS NOTES
Medicare Annual Wellness Visit  Name: Chely Duran Date: 2021   MRN: [id number] Sex: Female   Age: 64 y.o. Ethnicity: Non- / Non    : 1945 Race: White (non-)      Jose Bocanegra is here for Medicare AWV    Screenings for behavioral, psychosocial and functional/safety risks, and cognitive dysfunction are all negative except as indicated below. These results, as well as other patient data from the [address] form, are documented in Flowsheets linked to this Encounter. Allergies   Allergen Reactions    Clarithromycin Rash     Patient says she will \"never take this again\"     Gabapentin Swelling     Pt states that her body swelled up, not her airway, no troubles breathing    Merthiolate Glycerite [Thimerosal] Dermatitis     Redness, blisters    Tylenol With Codeine #3 [Acetaminophen-Codeine] Nausea And Vomiting         Prior to Visit Medications    Medication Sig Taking?  Authorizing Provider   allopurinol (ZYLOPRIM) 300 MG tablet TAKE 1/2 TABLET EVERY DAY Yes Omi Gong MD   dilTIAZem (CARDIZEM) 60 MG tablet Take 1 tab Three times a day Yes Omi Gong MD   diphenhydrAMINE-APAP, sleep, (TYLENOL PM EXTRA STRENGTH)  MG tablet Take 2 tablets by mouth nightly Yes Historical Provider, MD   NONFORMULARY Take 4 capsules by mouth daily Kratom red borneo 500 mg capsules  Yes Historical Provider, MD   warfarin (COUMADIN) 10 MG tablet TAKE 1/2 TABLET daily ALL DAYS OF THE WEEK OR AS DIRECTED Yes Omi Gong MD   atenolol (TENORMIN) 50 MG tablet TAKE 1 TABLET EVERY DAY Yes Omi Gong MD   spironolactone (ALDACTONE) 25 MG tablet TAKE 1 TABLET TWICE DAILY Yes Omi Gong MD   furosemide (LASIX) 40 MG tablet TAKE 1 TABLET DAILY, TAKE EXTRA TABLET IF NEEDED FOR EDEMA Yes Omi Gong MD   aspirin 81 MG EC tablet Take 81 mg by mouth daily Yes Historical Provider, MD   Cholecalciferol (VITAMIN D) 2000 UNITS CAPS capsule Take 2,000 Units by mouth 2 times daily. Yes Historical Provider, MD         Past Medical History:   Diagnosis Date    Atrial fibrillation ([facility]     CHF (congestive heart failure) ([facility]     Diverticulosis 2021    per Colonoscopy per Dr. Albret Baird Gout     Hearing loss     bilateral hearing aids     History of colon polyps 2016    Hypertension     Kidney stones     Mitral valve insufficiency     Obesity, morbid, BMI 40.0-49.9 ([facility]     Osteoarthritis     Primary osteoarthritis of both knees     Dr. Tylor Arora Pulmonary HTN [facility])        Past Surgical History:   Procedure Laterality Date    CARPAL TUNNEL RELEASE      bilaterally    COLONOSCOPY  2016    Vijay Landaverde MD    COLONOSCOPY  2017    at [facility]. PYlori positive, adenoma, hyperplastic polyp    COLONOSCOPY  2018, 10/2019, 10/27/2021    DR. Boles -polyp x 1, diverticulosis    JOINT REPLACEMENT Left 2019    knee    LITHOTRIPSY      NERVE BLOCK Left 2020    GENICULAR NERVE BLOCK LEFT KNEE performed by Kristen Wu MD at [facility] N/A 2020    L3,L4 MEDIAL BRANCH BLOCK , L5 DORSAL RAMI INJECTION performed by Kristen Wu [facility] at [facility] Bilateral 10/02/2020    BILATERAL L3-4 MEDIAL BRANCH AND L5 DORSAL RAMI INJECTION performed by Kristen [facility] at [facility] Right 01/15/2021    RFA, L3-L4 MEDIAL BRANCH AND L-5 DORSEL RAMI INJECTION RIGHT performed by Kristen Wu MD at [address] Left 2019    LEFT KNEE TOTAL ARTHROPLASTY- [address] [place] [address] performed by Westly Apgar, DO at [address] [place] [address] UPPER GASTROINTESTINAL ENDOSCOPY  2016    Vijay Landaverde MD         Family History   Problem Relation Age of Onset    High Blood Pressure Mother     Diabetes Father        CareTeam (Including outside providers/suppliers regularly involved in providing care):   Patient Care Team:  TYLOR Phillip CNP as PCP - General (Certified Nurse Practitioner)  TYLOR Phillip CNP as PCP - [facility]  Eder Santizo MD as Surgeon (General Surgery)  Peace Sutton, [facility] as Pharmacist (Pharmacist)  Az Bravo, [facility] as Pharmacist (Pharmacist)    Wt Readings from Last 3 Encounters:   11/15/21 261 lb (118.4 kg)   11/10/21 257 lb 1.6 oz (116.6 kg)   10/13/21 253 lb (114.8 kg)     Vitals:    11/15/21 1257   BP: 102/74   Pulse: 78   SpO2: 95%   Weight: 261 lb (118.4 kg)   Height: 5' 3\" (1.6 m)     Body mass index is 46.23 kg/m². Based upon direct observation of the patient, evaluation of cognition reveals recent and remote memory intact. General Appearance: alert and oriented to person, place and time, well developed and well- nourished, in no acute distress  Skin: warm and dry, no rash or erythema  Head: normocephalic and atraumatic  Eyes: pupils equal, round, and reactive to light, extraocular eye movements intact, conjunctivae normal  ENT: tympanic membrane, external ear and ear canal normal bilaterally, nose without deformity, nasal mucosa and turbinates normal without polyps  Neck: supple and non-tender without mass, no thyromegaly or thyroid nodules, no cervical lymphadenopathy  Pulmonary/Chest: clear to auscultation bilaterally- no wheezes, rales or rhonchi, normal air movement, no respiratory distress  Cardiovascular: normal rate, regular rhythm, normal S1 and S2, no murmurs, rubs, clicks, or gallops, distal pulses intact, no carotid bruits  Abdomen: soft, non-tender, non-distended, normal bowel sounds  Extremities: no cyanosis, clubbing or edema  Musculoskeletal: normal range of motion, no joint swelling, deformity or tenderness  Neurologic:  gait, coordination and speech normal    Patient's complete Health Risk Assessment and screening values have been reviewed and are found in Flowsheets.  The following problems were reviewed today and where indicated follow up appointments were made and/or referrals ordered. Positive Risk Factor Screenings with Interventions:     Fall Risk:  Timed Up and Go Test > 12 seconds? (Complete if either Fall Risk answers are Yes): (!) yes  2 or more falls in past year?: no  Fall with injury in past year?: no  Fall Risk Interventions:    · Home safety tips provided          General Health and ACP:  General  In general, how would you say your health is?: Good  In the past 7 days, have you experienced any of the following? New or Increased Pain, New or Increased Fatigue, Loneliness, Social Isolation, Stress or Anger?: None of These  Do you get the social and emotional support that you need?: Yes  Do you have a Living Will?: Yes  Advance Directives     Power of  Living Will ACP-Advance Directive ACP-Power of     Not on File Not on File Not on File Not on File      General Health Risk Interventions:  · No Living Will: Junior Angella Venegas [phone]  son as secondary decision maker. Health Habits/Nutrition:  Health Habits/Nutrition  Do you exercise for at least 20 minutes 2-3 times per week?: (!) No  Have you lost any weight without trying in the past 3 months?: No  Do you eat only one meal per day?: (!) Yes  Have you seen the dentist within the past year?: Yes  Body mass index: (!) 46.23  Health Habits/Nutrition Interventions:  · Inadequate physical activity:  around the house with walker limited due to back pain. · Nutritional issues:  educational materials for healthy, well-balanced diet provided  · Dental exam overdue:  patient encouraged to make appointment with his/her dentist, partial in lower    · Cretom red corneo taking also daily.  2 in afternoon , \"cally makes the pain less\" otherwise cannot stand at bathroom sink to brush teeth wiithout sitting down\"   ·     Hearing/Vision:  No exam data present  Hearing/Vision  Do you or your family notice any trouble with your hearing that hasn't been managed with hearing aids?: (!) Yes  Do you have difficulty driving, watching TV, or doing any of your daily activities because of your eyesight?: No  Have you had an eye exam within the past year?: Yes  Hearing/Vision Interventions:  · Hearing concerns:  patient declines any further evaluation/treatment for hearing issues, wears bilateral hearing aids. portillo by [facility] department   · Vision concerns:  patient encouraged to make appointment with his/her eye specialist     ADL:  ADLs  In the past 7 days, did you need help from others to perform any of the following everyday activities? Eating, dressing, grooming, bathing, toileting, or walking/balance?: (!) Walking/Balance  In the past 7 days, did you need help from others to take care of any of the following? Laundry, housekeeping, banking/finances, shopping, telephone use, food preparation, transportation, or taking medications?: [employer], Housekeeping  ADL Interventions:  · wears glasses all the time.  DR. Elisabet Pascual   ·     Personalized Preventive Plan   Current Health Maintenance Status  Immunization History   Administered Date(s) Administered    KATHLEENID-19Dipak, Primary or Immunocompromised, PF, 100mcg/0.5mL 03/29/2021, 04/26/2021    Influenza Vaccine, unspecified formulation 10/01/2016    Influenza Virus Vaccine 10/16/2014, 10/21/2015, 11/02/2018    Influenza Whole 10/02/2009, 10/16/2014    Influenza, Quadv, IM, PF (6 mo and older Fluzone, Flulaval, Fluarix, and 3 yrs and older Afluria) 10/20/2016, 11/07/2017, 05/23/2018, 11/09/2020    Influenza, Alvaro Sarah, adjuvanted, 65 yrs +, IM, PF (Fluad) 11/15/2021    Influenza, Triv, inactivated, subunit, adjuvanted, IM (Fluad 65 yrs and older) 11/02/2018, 10/30/2019    Pneumococcal Conjugate 13-valent ([id number]) 04/21/2016    Pneumococcal Polysaccharide ([id number]) 12/22/2010    Tdap (Boostrix, Adacel) 12/29/2018    Zoster Live (Zostavax) 07/07/2014        Health Maintenance   Topic Date Due    Hepatitis C screen  Never done    Shingles Vaccine (2 of 3) 09/01/2014    Annual Wellness Visit (AWV)  01/13/2021    COVID-19 Vaccine (3 - Booster for Moderna series) 10/26/2021    Potassium monitoring  09/09/2022    Creatinine monitoring  09/09/2022    DTaP/Tdap/Td vaccine (2 - Td or Tdap) 12/29/2028    DEXA (modify frequency per FRAX score)  Completed    Flu vaccine  Completed    Pneumococcal 65+ years Vaccine  Completed    Hepatitis A vaccine  Aged Out    Hepatitis B vaccine  Aged Out    Hib vaccine  Aged Out    Meningococcal (ACWY) vaccine  Aged Out     Recommendations for Regenes[place] Due: see orders and patient instructions/AVS.  . Recommended screening schedule for the next 5-10 years is provided to the patient in written form: see Patient Instructions/AVS.    Kati Bass was seen today for medicare awv.     Diagnoses and all orders for this visit:    Routine general medical examination at a health care facility    Influenza vaccine needed  -     INFLUENZA, QUADV, ADJUVANTED, 72 YRS =, IM, PF, PREFILL SYR, 0.5ML (FLUAD)    Chronic atrial fibrillation (HCC)    Morbid obesity with BMI of 45.0-49.9, adult ([address]    Pulmonary HTN ([address]

## 2021-11-29 RX ORDER — SPIRONOLACTONE 25 MG/1
TABLET ORAL
Qty: 180 TABLET | Refills: 3 | Status: SHIPPED | OUTPATIENT
Start: 2021-11-29

## 2022-01-24 RX ORDER — ALLOPURINOL 300 MG/1
TABLET ORAL
Qty: 45 TABLET | Refills: 0 | Status: SHIPPED | OUTPATIENT
Start: 2022-01-24 | End: 2022-04-04

## 2022-02-02 ENCOUNTER — HOSPITAL ENCOUNTER (OUTPATIENT)
Dept: PHARMACY | Age: 77
Setting detail: THERAPIES SERIES
Discharge: HOME | End: 2022-02-02
Payer: MEDICARE

## 2022-02-02 VITALS — SYSTOLIC BLOOD PRESSURE: 130 MMHG | HEART RATE: 79 BPM | DIASTOLIC BLOOD PRESSURE: 65 MMHG

## 2022-02-02 LAB — INR BLD: 2

## 2022-02-02 PROCEDURE — 99211 OFF/OP EST MAY X REQ PHY/QHP: CPT

## 2022-02-02 PROCEDURE — 85610 PROTHROMBIN TIME: CPT

## 2022-02-02 NOTE — PROGRESS NOTES
[address]/[place]  Medication Management  ANTICOAGULATION    Referring Provider: Dr Meek Arguello     GOAL INR: 2.0-3.0     TODAY'S INR: 2.0     WARFARIN Dosage: 5 mg daily    INR (no units)   Date Value   2022 2   11/10/2021 1.7   10/13/2021 2.2   2021 2.6   2021 1.7   2021 1.4   2021 2.9       Medication changes:  None    Notes:    Fingerstick INR drawn per clinic protocol. Patient states no visible blood in urine and no black tarry stool. Denies any missed doses of warfarin. No change in other maintenance medications or in diet. Will recheck INR in 6 weeks. Patient acknowledges working in consult agreement with pharmacist as referred by his/her physician.                   For Pharmacy Admin Tracking Only     Intervention Detail: Adherence Monitorin   Total # of Interventions Recommended: 1   Total # of Interventions Accepted: 1   Time Spent (min): [address] [place] [address], [facility], PharmD

## 2022-02-16 RX ORDER — ATENOLOL 50 MG/1
TABLET ORAL
Qty: 90 TABLET | Refills: 3 | Status: SHIPPED | OUTPATIENT
Start: 2022-02-16

## 2022-03-01 RX ORDER — WARFARIN SODIUM 10 MG/1
TABLET ORAL
Qty: 90 TABLET | Refills: 3 | Status: SHIPPED | OUTPATIENT
Start: 2022-03-01 | End: 2022-06-21 | Stop reason: DRUGHIGH

## 2022-03-16 ENCOUNTER — HOSPITAL ENCOUNTER (OUTPATIENT)
Dept: PHARMACY | Age: 77
Setting detail: THERAPIES SERIES
Discharge: HOME | End: 2022-03-16
Payer: MEDICARE

## 2022-03-16 VITALS — SYSTOLIC BLOOD PRESSURE: 122 MMHG | DIASTOLIC BLOOD PRESSURE: 74 MMHG | HEART RATE: 67 BPM

## 2022-03-16 LAB — INR BLD: 1.8

## 2022-03-16 PROCEDURE — 85610 PROTHROMBIN TIME: CPT

## 2022-03-16 PROCEDURE — 99211 OFF/OP EST MAY X REQ PHY/QHP: CPT

## 2022-03-16 NOTE — PROGRESS NOTES
[facility] [address] [place] [address]/[place]  Medication Management  ANTICOAGULATION    Referring Provider: Dr Herndon     GOAL INR: 2.0-3.0     TODAY'S INR: 1.8     WARFARIN Dosage: 10 mg x 1 booster dose, then resume 5 mg daily    INR (no units)   Date Value   2022 1.8   2022 2   11/10/2021 1.7   10/13/2021 2.2   2021 2.6   2021 1.7   2021 1.4       Medication changes:  Started [employer] for pain \"about 2 or 3 weeks ago\"    Notes:    Fingerstick INR drawn per clinic protocol. Patient states no visible blood in urine and no black tarry stool. Denies any missed doses of warfarin. Pb Rob says she restarted \"Red [place]\" supplement for pain \"about 2 or 3 weeks ago\", but isn't sure it really helps with her pain \"all that much\". She says she has been thinking about looking into starting \"medical marijuana\" and would like to discuss this option with Jorge Dooley CNP at her next appointment in May. No change in other maintenance medications or in diet. Since her INR has dropped slightly sub-therapeutic today, we will have her take 10 mg warfarin tonight x 1 booster dose, but then continue current weekly warfarin regimen thereafter as noted on her dosing calendar. We will recheck INR in 6 weeks. Patient acknowledges working in consult agreement with pharmacist as referred by his/her physician.                   For Pharmacy Admin Tracking Only     Intervention Detail: Adherence Monitorin and Dose Adjustment: 1, reason: Therapy Optimization   Total # of Interventions Recommended: 2   Total # of Interventions Accepted: 2   Time Spent (min): [address] Ella Pineda [facility], PharmD

## 2022-04-04 RX ORDER — ALLOPURINOL 300 MG/1
TABLET ORAL
Qty: 45 TABLET | Refills: 0 | Status: SHIPPED | OUTPATIENT
Start: 2022-04-04 | End: 2022-06-09

## 2022-04-11 ENCOUNTER — TELEPHONE (OUTPATIENT)
Dept: FAMILY MEDICINE CLINIC | Age: 77
End: 2022-04-11

## 2022-04-11 DIAGNOSIS — J33.9 NASAL POLYPS: Primary | ICD-10-CM

## 2022-04-11 NOTE — TELEPHONE ENCOUNTER
----- Message from Giorgio Soto sent at 4/11/2022 10:06 AM EDT -----  Subject: Referral Request    QUESTIONS   Reason for referral request? ZAHIDA Ceballos stated the [facility] at [facility]   building, not sure of the name   Has the physician seen you for this condition before? No   Preferred Specialist (if applicable)? Do you already have an appointment scheduled? No  Additional Information for Provider?   ---------------------------------------------------------------------------  --------------  CALL BACK INFO  What is the best way for the office to contact you? Do not leave any   message, patient will call back for answer  Preferred Call Back Phone Number? [phone]  ---------------------------------------------------------------------------  --------------  SCRIPT ANSWERS  Relationship to Patient?  Self

## 2022-04-11 NOTE — TELEPHONE ENCOUNTER
Clarify what this is being requested for? ?    Why does she need to see ENT  DR. Mcdonald Never is available in [facility]. Let me know details so I can provide referral if appropriate.      Cynthia Ferro

## 2022-04-27 ENCOUNTER — HOSPITAL ENCOUNTER (OUTPATIENT)
Dept: PHARMACY | Age: 77
Setting detail: THERAPIES SERIES
Discharge: HOME | End: 2022-04-27
Payer: MEDICARE

## 2022-04-27 VITALS — HEART RATE: 78 BPM | DIASTOLIC BLOOD PRESSURE: 71 MMHG | SYSTOLIC BLOOD PRESSURE: 128 MMHG

## 2022-04-27 DIAGNOSIS — I48.91 ATRIAL FIBRILLATION, UNSPECIFIED TYPE (HCC): Primary | ICD-10-CM

## 2022-04-27 LAB — INR BLD: 2

## 2022-04-27 PROCEDURE — 85610 PROTHROMBIN TIME: CPT

## 2022-04-27 PROCEDURE — 99211 OFF/OP EST MAY X REQ PHY/QHP: CPT

## 2022-04-27 NOTE — PROGRESS NOTES
[facility] [address] [place] [address]/[place]  Medication Management  ANTICOAGULATION    Referring Provider: Dr Herndon     GOAL INR: 2.0-3.0     TODAY'S INR: 2.0     WARFARIN Dosage: 5 mg daily    INR (no units)   Date Value   2022 2   2022 1.8   2022 2   11/10/2021 1.7   10/13/2021 2.2   2021 2.6   2021 1.7       Medication changes:  None    Notes:    Fingerstick INR drawn per clinic protocol. Patient states no visible blood in urine and no black tarry stool. Denies any missed doses of warfarin. Britt Baptiste says she is no longer using [address], but has started using medical marijuana for pain and says it is helping \"some\". No change in other maintenance medications or in diet. Will recheck INR in 6 weeks. Patient acknowledges working in consult agreement with pharmacist as referred by his/her physician.                   For Pharmacy Admin Tracking Only     Intervention Detail: Adherence Monitorin   Total # of Interventions Recommended: 1   Total # of Interventions Accepted: 1   Time Spent (min): [address], [facility], PharmD

## 2022-05-04 ENCOUNTER — OFFICE VISIT (OUTPATIENT)
Dept: ENT CLINIC | Age: 77
End: 2022-05-04
Payer: MEDICARE

## 2022-05-04 VITALS
SYSTOLIC BLOOD PRESSURE: 122 MMHG | TEMPERATURE: 98.1 F | OXYGEN SATURATION: 95 % | DIASTOLIC BLOOD PRESSURE: 84 MMHG | WEIGHT: 252.6 LBS | HEART RATE: 80 BPM | RESPIRATION RATE: 18 BRPM | BODY MASS INDEX: 44.75 KG/M2

## 2022-05-04 DIAGNOSIS — J34.89 NASAL VESTIBULITIS: Primary | ICD-10-CM

## 2022-05-04 DIAGNOSIS — J34.89 NASAL DRYNESS: ICD-10-CM

## 2022-05-04 PROCEDURE — G8399 PT W/DXA RESULTS DOCUMENT: HCPCS | Performed by: OTOLARYNGOLOGY

## 2022-05-04 PROCEDURE — 99203 OFFICE O/P NEW LOW 30 MIN: CPT | Performed by: OTOLARYNGOLOGY

## 2022-05-04 PROCEDURE — 1036F TOBACCO NON-USER: CPT | Performed by: OTOLARYNGOLOGY

## 2022-05-04 PROCEDURE — G8417 CALC BMI ABV UP PARAM F/U: HCPCS | Performed by: OTOLARYNGOLOGY

## 2022-05-04 PROCEDURE — G8427 DOCREV CUR MEDS BY ELIG CLIN: HCPCS | Performed by: OTOLARYNGOLOGY

## 2022-05-04 PROCEDURE — 1090F PRES/ABSN URINE INCON ASSESS: CPT | Performed by: OTOLARYNGOLOGY

## 2022-05-04 PROCEDURE — 1123F ACP DISCUSS/DSCN MKR DOCD: CPT | Performed by: OTOLARYNGOLOGY

## 2022-05-04 PROCEDURE — 4040F PNEUMOC VAC/ADMIN/RCVD: CPT | Performed by: OTOLARYNGOLOGY

## 2022-05-04 ASSESSMENT — ENCOUNTER SYMPTOMS
ALLERGIC/IMMUNOLOGIC NEGATIVE: 1
GASTROINTESTINAL NEGATIVE: 1
RESPIRATORY NEGATIVE: 1
EYES NEGATIVE: 1

## 2022-05-04 NOTE — PATIENT INSTRUCTIONS
NASAL SALINE (SALTWATER) RINSES     Your doctor has recommended the use of saline (salt water) nasal rinses. Nasal rinses have been used for centuries for the treatment of nasal and sinus infection, bothersome secretions, allergy, and nasal dryness. It is safe and effective for use in both children and adults. Daily rinsing of the nasal passages with saline promotes nasal and sinus health by washing away dried mucus, infected secretions, dust, pollen, and mold spores, by moisturizing the nasal lining, promoting normal mucus flow, and naturally decongesting inflamed tissues. Rinses also help with clearing dried blood, mucus, and infection after sinus surgery and promote rapid healing of tissue in this setting. In the beginning it is normal to have some difficulty with performing nasal rinses, but with practice, most people find that it becomes part of their normal routine just as much as brushing teeth or showering, and often, most wouldnt think of leaving home without it. RINSE INGREDIENTS:  No special tools are required for nasal saline rinses, and everything that you need can be picked up at your local pharmacy and grocery. Commercial saline rinses and syringe systems such as NeilMed SINUS RINSE, Ayr Saline Nasal Rinse, or Neti Pot are available, but these may contain preservatives or other irritants, and it is often less expensive and more convenient to make it at home.   You will need the following:  Distilled water (tap water contains irritating minerals and bacteria, but may be used if boiled)  Kosher, pickling, or non-iodized table salt (regular table salt contains iodine, an irritant)  Baking soda (not baking powder)  Corn syrup (optional, for additional moisturizing effect)  White vinegar (for antiseptic effect, if recommended by your doctor)  An airtight container for mixing saline rinse  Rubber bulb syringe (like those used to clear infant noses and ears)  Household bleach (for cleaning container and bulb syringe)    PREPARATION:  To prepare the rinses, measure out 1 quart (1 liter) of distilled or boiled tap water into the mixing container. Then add 2-3 heaping teaspoons of salt and 1 teaspoon of baking soda, mix to dissolve, and place in refrigerator for 1-2 days of storage. You may add 3-4 tablespoons of corn syrup if you experience nasal dryness. If your doctor recommends it, add 1 teaspoon of white vinegar to this mixture as well. STORAGE & CLEANING:  It is advised that you make up fresh rinse every day or two, and that you keep the unused rinse in the refrigerator in an airtight container to prevent bacterial growth. Set out enough rinse for your next use well in advance to let it come to room temperature before use. Wash the container and bulb syringe at least once a week in a quart of water with 2 tablespoons of bleach, rinse and dry to prevent bacterial growth    USING SALINE RINSES:  To perform nasal saline rinses, plan on using at least 1 pint (2 cups) twice daily. Lean over a sink or other basin (some people prefer to do this in the shower as it can be messy, especially when you first start) to catch the rinse as it drains from your nose and mouth. Fill the bulb syringe with the rinse solution and place it into the nostril on one side. Gently squeeze the bulb to flush the nasal passage until the rinse drains clear. Repeat on the other side. You should plan on using the rinses twice a day, which should take about 10 minutes to perform. OTHER MEDICATIONS:  If your doctor has prescribed other nasal spray medications, such as a nasal steroid, it is best to apply these after the nasal rinse so that you do not wash the medication away. It is also safe to continue with your other antihistamine or decongestant medications that your doctor may have prescribed in addition to the saline rinses.   If you have an allergy or adverse reaction to any of the ingredients do not use it in the preparation of the saline rinses. WHEN TO CALL US:  Stop the rinses and notify your doctor if you experience severe pain in the nose or ears, bleeding, or any other problems with the use of the nasal saline rinses. It is normal, especially in the beginning, to experience drainage of saline and nasal secretions into the throat. This is best avoided by holding your breath and leaning forward when using the rinses. Experienced users often will have the rinse exit the opposite nostril without drainage into the throat at all, and this can be achieved by most people with practice. Please call us if you have any additional questions or concerns. NOTICE:  THIS DOCUMENT IS INTENDED SOLELY FOR PATIENT EDUCATIONAL PURPOSES AND IS PROVIDED AS A COURTESY TO PATIENTS OF DR. Yadira Steward MD AND Luca Alexander PA-C. IT IS NOT INTENDED AS A SUBSTITUTE FOR PROFESSIONAL MEDICAL CARE OR ADVICE. THE PATIENT SHOULD SEEK ADVICE FROM THE PHYSICIAN IF THERE ARE ANY QUESTIONS ABOUT THE CONTENTS OR DIRECTIONS PROVIDED IN THIS DOCUMENT.

## 2022-05-04 NOTE — PROGRESS NOTES
[facility]  [address] [facility] [address]  Dept: [phone]  MD Kena Kwon Post 76 y.o. female     Patient presents with a chief complaint of Facial Pain (sores in nose. )       /84   Pulse 80   Temp 98.1 °F (36.7 °C) (Temporal)   Resp 18   Wt 252 lb 9.6 oz (114.6 kg)   LMP  (LMP Unknown)   SpO2 95%   BMI 44.75 kg/m²       History of Presenting Illness: The patient/caregiver reports a history of complaint with the following features: Onset: started a year or so ago  Timing: ongoing  Duration: year  Quality: nasal crusts and obstruction  Location: inside of both nostrils  Severity: pain none  Risk factors: no trauma to area  Alleviating factors: nothing gives relief tried an ointment in the past without relief  Aggravating factors: nothing makes it worse  Associated factors: no bleeding    Review of systems covering 10 systems is reviewed as staff entry in other note and pertinent positives and negatives noted. Past Medical History:   Diagnosis Date    Atrial fibrillation ([facility]     CHF (congestive heart failure) ([facility]     Diverticulosis 07/27/2021    per Colonoscopy per Dr. Iris Singer Gout     Hearing loss 2015    bilateral hearing aids     History of colon polyps 2016    Hypertension     Kidney stones     Mitral valve insufficiency     Obesity, morbid, BMI 40.0-49.9 (HCC)     Osteoarthritis     Primary osteoarthritis of both knees     Dr. Haleigh Alegria [facility])        Current Outpatient Medications:     mupirocin (BACTROBAN NASAL) 2 % nasal ointment, Apply pea sized amount to nasal septum 3 times daily. , Disp: 1 g, Rfl: 3    medical marijuana, Take 1 each by mouth daily. , Disp: , Rfl:     allopurinol (ZYLOPRIM) 300 MG tablet, TAKE 1/2 TABLET EVERY DAY, Disp: 45 tablet, Rfl: 0    warfarin (COUMADIN) 10 MG tablet, TAKE 1/2 TABLET daily ALL DAYS OF THE WEEK OR AS DIRECTED, Disp: 90 tablet, Rfl: 3    atenolol (TENORMIN) 50 MG tablet, TAKE 1 TABLET EVERY DAY, Disp: 90 tablet, Rfl: 3    spironolactone (ALDACTONE) 25 MG tablet, TAKE 1 TABLET TWICE DAILY, Disp: 180 tablet, Rfl: 3    dilTIAZem (CARDIZEM) 60 MG tablet, Take 1 tab Three times a day, Disp: 270 tablet, Rfl: 3    diphenhydrAMINE-APAP, sleep, (TYLENOL PM EXTRA STRENGTH)  MG tablet, Take 2 tablets by mouth nightly, Disp: , Rfl:     furosemide (LASIX) 40 MG tablet, TAKE 1 TABLET DAILY, TAKE EXTRA TABLET IF NEEDED FOR EDEMA, Disp: 180 tablet, Rfl: 3    aspirin 81 MG EC tablet, Take 81 mg by mouth daily, Disp: , Rfl:     Cholecalciferol (VITAMIN D) 2000 UNITS CAPS capsule, Take 2,000 Units by mouth 2 times daily. , Disp: , Rfl:    Allergies   Allergen Reactions    Clarithromycin Rash     Patient says she will \"never take this again\"     Gabapentin Swelling     Pt states that her body swelled up, not her airway, no troubles breathing    Merthiolate Glycerite [Thimerosal] Dermatitis     Redness, blisters    Tylenol With Codeine #3 [Acetaminophen-Codeine] Nausea And Vomiting      Past Surgical History:   Procedure Laterality Date    CARPAL TUNNEL RELEASE      bilaterally    COLONOSCOPY  11/28/2016    Ria Verdin MD    COLONOSCOPY  05/2017    at [address] [place] [address]. PYlori positive, adenoma, hyperplastic polyp    COLONOSCOPY  06/14/2018, 10/2019, 10/27/2021    DR. Boles -polyp x 1, diverticulosis    JOINT REPLACEMENT Left 07/17/2019    knee    LITHOTRIPSY      NERVE BLOCK Left 11/06/2020    GENICULAR NERVE BLOCK LEFT KNEE performed by Yony Jules MD at [address] [place] [address] N/A 09/04/2020    L3,L4 MEDIAL BRANCH BLOCK , L5 DORSAL RAMI INJECTION performed by Yony Jules MD at [address] [place] [address] Bilateral 10/02/2020    BILATERAL L3-4 MEDIAL BRANCH AND L5 DORSAL RAMI INJECTION performed by Yony Jules MD at [address] [place] [address] MANAGEMENT PROCEDURE Right 01/15/2021    RFA, L3-L4 MEDIAL BRANCH AND L-5 DORSEL RAMI INJECTION RIGHT performed by Everton Thomas MD at [address] Left 2019    LEFT KNEE TOTAL ARTHROPLASTY- [address] performed by Naseem West DO at [address] [facility] GASTROINTESTINAL ENDOSCOPY  2016    Malik Norman MD      Social History     Socioeconomic History    Marital status:      Spouse name: Not on file    Number of children: Not on file    Years of education: Not on file    Highest education level: Not on file   Occupational History    Not on file   Tobacco Use    Smoking status: Former Smoker     Packs/day: 0.25     Years: 4.00     Pack years: 1.00     Types: Cigarettes     Quit date: 1981     Years since quittin.3    Smokeless tobacco: Never Used   Vaping Use    Vaping Use: Not on file   Substance and Sexual Activity    Alcohol use: Yes     Comment: rare, states she hasn't had a drink in months    Drug use: No    Sexual activity: Not on file   Other Topics Concern    Not on file   Social History Narrative    Not on file     Social Determinants of Health     Financial Resource Strain: Low Risk     Difficulty of Paying Living Expenses: Not hard at all   Food Insecurity: No Food Insecurity    Worried About Running Out of Food in the Last Year: Never true    Jaja of Food in the Last Year: Never true   Transportation Needs: No Transportation Needs    Lack of Transportation (Medical): No    Lack of Transportation (Non-Medical):  No   Physical Activity:     Days of Exercise per Week: Not on file    Minutes of Exercise per Session: Not on file   Stress:     Feeling of Stress : Not on file   Social Connections:     Frequency of Communication with Friends and Family: Not on file    Frequency of Social Gatherings with Friends and Family: Not on file    Attends [employer]ism Services: Not on file   [employer]s or Organizations: Not on file    Attends Club or Organization Meetings: Not on file    Marital Status: Not on file   Intimate Partner Violence:     Fear of Current or Ex-Partner: Not on file    Emotionally Abused: Not on file    Physically Abused: Not on file    Sexually Abused: Not on file   Housing Stability:     Unable to Pay for Housing in the Last Year: Not on file    Number of [place] in the Last Year: Not on file    Unstable Housing in the Last Year: Not on file     Family History   Problem Relation Age of Onset    High Blood Pressure Mother     Diabetes Father         PHYSICAL EXAM:    The patient was examined today 5/4/2022 with findings as follows:    CONSTITUTIONAL:    General Appearance: well-appearing, nontoxic, alert, no acute distress     Communication: understanding at normal conversational tones, normal voicing, speech intelligible, hearing with hearing aids    HEAD/FACE:    Head: atraumatic, normocephalic, no lesions    Facial Inspection: no lesions, healthy skin    Facial Strength: motor strength normal, symmetric strength, symmetric movement, motor strength    Sinuses: no sinus tenderness    Salivary Glands: no enlargements of parotid glands, no tenderness of parotid glands, no masses of parotid glands, clear salivary flow on palpation from Stensen's ducts, no duct stones of Stensen's duct, no enlargement of submandibular glands, no tenderness of submandibular glands, no masses of submandibular glands, clear salivary flow from [place]'s ducts, no stones of Frida's ducts    Temporomandibular Joint: no crepitus with motion, no tenderness on palpation, no trismus, motion symmetric    EYES:    Pupils: PERRLA, extra-ocular movements intact, no nystagmus, sclera white, no redness of eyes, no watering of eyes    EARS:    Bilateral External Ears: no pits, no tags    Right External Ear: normally formed, no lesions, no mastoid tenderness    Left External Ear: normally formed, no lesions, no mastoid tenderness    Right External Auditory Canal: normal, healthy skin, no obstructing cerumen, no discharge    Left External Auditory Canal: normal, healthy skin, no obstructing cerumen, no discharge    Right Tympanic Membrane: normal landmarks, translucent, mobile to pneumatic otoscopy, no perforation    Left Tympanic Membrane: normal landmarks, translucent, mobile to pneumatic otoscopy, no perforation    Hearing: intact to spoken voice    NOSE:    Nasal Skin: no lesions, no lacerations, no scars    Nasal Dorsum: symmetric with no visible or palpable deformities    Nasal Tip: normal symmetric nasal tip, normal nasal valves    Nasal Mucosa: normal, pink and moist    Septum: not markedly deformed, midline, mild anterior crusts and excoriation    Turbinates: normal size and conformation    Nasopharynx: normal    ORAL CAVITY/MOUTH:    Lips, teeth, gums: normal lips, normal gums, dentition intact, no dental pain on palpation    Oral Mucosa: normal, moist, no lesions    Palate: normal hard palate, normal soft palate, symmetric palatal elevation    Floor of Mouth: normal floor of mouth    Tongue: normal tongue, no lesions, no edema, no masses, normal mucosa, mobile    Tonsils: normal tonsils, symmetric, no lesions    Posterior pharynx: normal    NECK:    Neck: no masses, trachea midline, normal range of motion, no cysts or pits, no tenderness to palpation    Thyroid: normal thyroid, no enlargement, no tenderness, no nodules    LYMPH NODES:    Cervical: no palpable lymph node enlargement    SKIN:    General Appearance: no lesions, warm and dry, normal turgor, no bruising    NEUROLOGICAL SYSTEM:    Orientation: oriented to time, oriented to place, oriented to person    PSYCHIATRIC:    Mood and affect: normal mood, normal affect          Assessment and Plan:    She has some mild nasal septal dryness and crusting. Topical Mupirocin is advised to promote healing.   I see no nasal polyps, sinus infection, septal perforation or other sinonasal disease and reassurance is offered. The patient and/or caregiver is advised on the use of saline nasal rinses for moisturization, decongestion, and cleansing of the nasal and sinus cavities and an informational sheet on the preparation and use of saline is provided. The patient and/or caregiver is able to state an understanding of these recommendations and is agreeable to the treatment plan. Diagnosis Orders   1. Nasal vestibulitis  mupirocin (BACTROBAN NASAL) 2 % nasal ointment   2. Nasal dryness        Return in about 3 months (around 8/4/2022). The patient and/or caregiver is to notify the office if no improvement or worsening of symptoms is noted prior to the scheduled follow-up for sooner evaluation. The patient and/or caregiver is able to state an understanding of these recommendations and is agreeable to the treatment plan. --Eddie Wong MD on 5/4/2022 at 3:58 PM    An electronic signature was used to authenticate this note.

## 2022-06-06 ENCOUNTER — OFFICE VISIT (OUTPATIENT)
Dept: FAMILY MEDICINE CLINIC | Age: 77
End: 2022-06-06
Payer: MEDICARE

## 2022-06-06 VITALS
WEIGHT: 256 LBS | SYSTOLIC BLOOD PRESSURE: 102 MMHG | BODY MASS INDEX: 45.35 KG/M2 | HEART RATE: 43 BPM | OXYGEN SATURATION: 96 % | DIASTOLIC BLOOD PRESSURE: 70 MMHG

## 2022-06-06 DIAGNOSIS — Z12.31 BREAST CANCER SCREENING BY MAMMOGRAM: ICD-10-CM

## 2022-06-06 DIAGNOSIS — E78.2 MIXED HYPERLIPIDEMIA: Primary | ICD-10-CM

## 2022-06-06 DIAGNOSIS — M51.36 DDD (DEGENERATIVE DISC DISEASE), LUMBAR: ICD-10-CM

## 2022-06-06 DIAGNOSIS — E66.01 MORBID OBESITY WITH BMI OF 45.0-49.9, ADULT (HCC): ICD-10-CM

## 2022-06-06 DIAGNOSIS — M48.07 FORAMINAL STENOSIS OF LUMBOSACRAL REGION: ICD-10-CM

## 2022-06-06 DIAGNOSIS — R73.01 IFG (IMPAIRED FASTING GLUCOSE): ICD-10-CM

## 2022-06-06 DIAGNOSIS — I48.20 CHRONIC ATRIAL FIBRILLATION (HCC): ICD-10-CM

## 2022-06-06 DIAGNOSIS — F33.0 MAJOR DEPRESSIVE DISORDER, RECURRENT, MILD (HCC): ICD-10-CM

## 2022-06-06 DIAGNOSIS — N18.31 STAGE 3A CHRONIC KIDNEY DISEASE (HCC): ICD-10-CM

## 2022-06-06 DIAGNOSIS — M85.851 OSTEOPENIA OF NECKS OF BOTH FEMURS: ICD-10-CM

## 2022-06-06 DIAGNOSIS — I34.0 MODERATE MITRAL REGURGITATION: ICD-10-CM

## 2022-06-06 DIAGNOSIS — I27.20 PULMONARY HTN (HCC): ICD-10-CM

## 2022-06-06 DIAGNOSIS — D50.0 IRON DEFICIENCY ANEMIA DUE TO CHRONIC BLOOD LOSS: ICD-10-CM

## 2022-06-06 DIAGNOSIS — R93.7 ABNORMAL MRI, LUMBAR SPINE: ICD-10-CM

## 2022-06-06 DIAGNOSIS — I34.0 MITRAL VALVE INSUFFICIENCY, UNSPECIFIED ETIOLOGY: ICD-10-CM

## 2022-06-06 DIAGNOSIS — M85.852 OSTEOPENIA OF NECKS OF BOTH FEMURS: ICD-10-CM

## 2022-06-06 DIAGNOSIS — I51.7 BIATRIAL ENLARGEMENT: ICD-10-CM

## 2022-06-06 DIAGNOSIS — K14.4 SMOOTH TONGUE: ICD-10-CM

## 2022-06-06 DIAGNOSIS — I50.22 CHRONIC SYSTOLIC (CONGESTIVE) HEART FAILURE (HCC): ICD-10-CM

## 2022-06-06 DIAGNOSIS — M16.0 BILATERAL PRIMARY OSTEOARTHRITIS OF HIP: ICD-10-CM

## 2022-06-06 PROBLEM — N18.30 CHRONIC RENAL DISEASE, STAGE III (HCC): Status: ACTIVE | Noted: 2022-06-06

## 2022-06-06 PROCEDURE — 83036 HEMOGLOBIN GLYCOSYLATED A1C: CPT | Performed by: NURSE PRACTITIONER

## 2022-06-06 PROCEDURE — 1090F PRES/ABSN URINE INCON ASSESS: CPT | Performed by: NURSE PRACTITIONER

## 2022-06-06 PROCEDURE — 1123F ACP DISCUSS/DSCN MKR DOCD: CPT | Performed by: NURSE PRACTITIONER

## 2022-06-06 PROCEDURE — 1036F TOBACCO NON-USER: CPT | Performed by: NURSE PRACTITIONER

## 2022-06-06 PROCEDURE — G8427 DOCREV CUR MEDS BY ELIG CLIN: HCPCS | Performed by: NURSE PRACTITIONER

## 2022-06-06 PROCEDURE — 99214 OFFICE O/P EST MOD 30 MIN: CPT | Performed by: NURSE PRACTITIONER

## 2022-06-06 PROCEDURE — G8417 CALC BMI ABV UP PARAM F/U: HCPCS | Performed by: NURSE PRACTITIONER

## 2022-06-06 PROCEDURE — G8399 PT W/DXA RESULTS DOCUMENT: HCPCS | Performed by: NURSE PRACTITIONER

## 2022-06-06 ASSESSMENT — PATIENT HEALTH QUESTIONNAIRE - PHQ9
1. LITTLE INTEREST OR PLEASURE IN DOING THINGS: 0
SUM OF ALL RESPONSES TO PHQ QUESTIONS 1-9: 0
SUM OF ALL RESPONSES TO PHQ QUESTIONS 1-9: 0
SUM OF ALL RESPONSES TO PHQ9 QUESTIONS 1 & 2: 0
SUM OF ALL RESPONSES TO PHQ QUESTIONS 1-9: 0
SUM OF ALL RESPONSES TO PHQ QUESTIONS 1-9: 0
2. FEELING DOWN, DEPRESSED OR HOPELESS: 0

## 2022-06-06 NOTE — PROGRESS NOTES
[facility]  [facility] PRIMARY CARE [address] [address]  Dept: [phone]     Subjective:  Twila Ames is a 76 y.o. female presenting today for routine follow up of hypertension, hyperlipidemia and impaired fasting glucose and recent labs. She denies chest pain, shortness of breath or palpitations. She denies headaches, vision changes and lightheadedness. She reports myalgias. She denies numbness and tingling in the hands and feet. She has been compliant with diet and exercise. She has been compliant with her medications. She is tolerating medications. Low back pain with radiculopathy     Dr. Margherita Leyden for medical marijuana- gummies. Pt has had change in dose and is \"giddy\" today on exam. She states \"my back hurts\" nothing seems to be helping it. Known chronic back pain aggravated with BMI 45.35. Pt on coumadin for chronic atrial fibrillation following with [facility] coumadin clinic. Rate controlled with cardizem   Refuses sleep study  Hx pulmonary hypertension. Last echo 9/10/2020 with EF at 45% with moderate mitral regurgitation  Fluids watched closely with spironolactone and lasix both taken. Wt Readings from Last 3 Encounters:   06/13/22 256 lb (116.1 kg)   06/06/22 256 lb (116.1 kg)   05/04/22 252 lb 9.6 oz (114.6 kg)         Hx L knee replacement. Difficulty standing and walking limited due to chronic back and hip pains. Allopurinol without any gout flares. MRI lumbar 5/3/2021  Impression       1. Severe neural foraminal stenosis on the right at L2/L3, L3/L4, and L4/L5. Moderate neural foraminal stenosis on the right at L1/L2 and L5/S1.       2. Severe neural foraminal stenosis on the left at L4/L5 and L5/S1. Moderate    neural foraminal stenosis on the left at L1/L2, L2/L3, and L3/L4.       3. No significant lumbar central canal stenosis. Slight effacement of the    ventral thecal sac at L1/L2, L2/L3, and L3/L4.       4.  No acute osseous abnormality.       5. There are 2 small nonspecific subcentimeter lesions within the posterior    aspect of L5 demonstrating decreased T1, T2, and STIR signal, compatible with    bone islands. Sclerotic metastasis cannot be excluded. Short-term followup    noncontrast enhanced MRI within 3-6 months is recommended. If there is any    history of or concern for malignancy, further evaluation with a bone scan would    be recommended. Repeat MRI with chronic low back pain and abnormal MRI lumbar spine with risk sclerotic metastasis. · Hypertension: well controlled   · Medications: continue current medication doses     Atenolol, spironolactone, furosemide, diltiazem      · Hyperlipidemia: fairly well controlled   · Medications: pt not on medication   · Recommended low cholesterol diet     · Impaired fasting glucose:  stable  · Recommend low carb diet    · Nutrition Status: Morbid Obesity (BMI >40 kg/m2   · Recommend inactive with chronic back c/o  , diet portion control monitored. CURRENT ALLERGIES: Clarithromycin, Gabapentin, Merthiolate glycerite [thimerosal], and Tylenol with codeine #3 [acetaminophen-codeine]    SOCIAL HISTORY:   Social History     Tobacco Use    Smoking status: Former Smoker     Packs/day: 0.25     Years: 4.00     Pack years: 1.00     Types: Cigarettes     Quit date: 1981     Years since quittin.4    Smokeless tobacco: Never Used   Vaping Use    Vaping Use: Not on file   Substance Use Topics    Alcohol use: Yes     Comment: rare, states she hasn't had a drink in months    Drug use: No       HPI       Medication List          Accurate as of 2022 11:59 PM. If you have any questions, ask your nurse or doctor.             CONTINUE taking these medications    allopurinol 300 MG tablet  Commonly known as: ZYLOPRIM  TAKE 1/2 TABLET EVERY DAY     aspirin 81 MG EC tablet     atenolol 50 MG tablet  Commonly known as: TENORMIN  TAKE 1 TABLET EVERY DAY Bactroban Nasal 2 % nasal ointment  Generic drug: mupirocin  Apply pea sized amount to nasal septum 3 times daily. dilTIAZem 60 MG tablet  Commonly known as: CARDIZEM  Take 1 tab Three times a day     furosemide 40 MG tablet  Commonly known as: LASIX  TAKE 1 TABLET DAILY, TAKE EXTRA TABLET IF NEEDED FOR EDEMA     medical marijuana     spironolactone 25 MG tablet  Commonly known as: ALDACTONE  TAKE 1 TABLET TWICE DAILY     Tylenol PM Extra Strength  MG tablet  Generic drug: diphenhydrAMINE-APAP (sleep)     vitamin D 50 MCG (2000 UT) Caps capsule     warfarin 10 MG tablet  Commonly known as: COUMADIN  Take as directed by the anticoagulation clinic. If you are unsure how to take this medication, talk to your nurse or doctor. Original instructions: TAKE 1/2 TABLET daily ALL DAYS OF THE WEEK OR AS DIRECTED               Review of Systems   Constitutional: Negative for activity change, appetite change, chills and fever. HENT: Positive for hearing loss. Negative for congestion, rhinorrhea, sore throat and trouble swallowing. Eyes: Negative. Wears glasses   Respiratory: Negative for cough and chest tightness. Cardiovascular: Positive for leg swelling. Negative for chest pain and palpitations. Gastrointestinal: Negative for abdominal distention, constipation and diarrhea. Endocrine: Negative for cold intolerance and heat intolerance. Genitourinary: Negative for difficulty urinating. Musculoskeletal: Positive for arthralgias, back pain and myalgias. Skin: Negative for rash. Neurological: Negative for dizziness and headaches. Hematological: Negative for adenopathy. Psychiatric/Behavioral: Positive for decreased concentration and sleep disturbance. Negative for dysphoric mood, self-injury and suicidal ideas. The patient is nervous/anxious.         Objective:  /70 (Site: Right Upper Arm, Position: Sitting)   Pulse (!) 43   Wt 256 lb (116.1 kg)   LMP  (LMP Unknown)   SpO2 96% BMI 45.35 kg/m²   Physical Exam  Vitals and nursing note reviewed. Constitutional:       General: She is not in acute distress. Appearance: Normal appearance. She is well-developed. She is obese. Comments: Female in wheelchair with BMI 45.3   HENT:      Head: Normocephalic and atraumatic. Right Ear: Tympanic membrane and external ear normal.      Left Ear: Tympanic membrane and external ear normal.      Nose: No congestion. Mouth/Throat:      Mouth: Mucous membranes are moist.   Eyes:      General: No scleral icterus. Pupils: Pupils are equal, round, and reactive to light. Neck:      Vascular: No carotid bruit (neg francisco). Cardiovascular:      Rate and Rhythm: Normal rate and regular rhythm. Heart sounds: Normal heart sounds. No murmur (holosystolic blowing left sternal border 2/6) heard. Pulmonary:      Effort: Pulmonary effort is normal. No respiratory distress. Breath sounds: Normal breath sounds. No wheezing. Abdominal:      General: Bowel sounds are normal. There is distension. Palpations: Abdomen is soft. Tenderness: There is no abdominal tenderness. Musculoskeletal:         General: Normal range of motion. Cervical back: Normal range of motion and neck supple. Right lower leg: Edema (generalized non pitting) present. Left lower leg: Edema present. Comments: TPT lumbar paraspinal and left SI joint    Lymphadenopathy:      Cervical: No cervical adenopathy. Skin:     General: Skin is warm and dry. Neurological:      Mental Status: She is alert and oriented to person, place, and time. Psychiatric:         Behavior: Behavior normal.         Thought Content:  Thought content normal.         Judgment: Judgment normal.           Diagnostic Data:  Lab Results   Component Value Date    GLUCOSE 111 (H) 09/09/2021    LABA1C 6.4 (H) 01/11/2013     Lab Results   Component Value Date    BUN 29 (H) 09/09/2021    CREATININE 0.98 (H) 09/09/2021  09/09/2021    K 3.9 09/09/2021    CALCIUM 9.3 09/09/2021     09/09/2021    CO2 24 09/09/2021    LABGLOM 55 (L) 09/09/2021     Lab Results   Component Value Date    CHOL 162 09/09/2021    HDL 53 09/09/2021    LDLCHOLESTEROL 84 09/09/2021    TRIG 125 09/09/2021    ALT 17 09/09/2021    AST 16 09/09/2021     Lab Results   Component Value Date    TSH 1.34 09/09/2021       1. Mixed hyperlipidemia  The 10-year ASCVD risk score (Ney Willett, et al., 2013) is: 16.3%    Values used to calculate the score:      Age: 76 years      Sex: Female      Is Non- : No      Diabetic: No      Tobacco smoker: No      Systolic Blood Pressure: 350 mmHg      Is BP treated: No      HDL Cholesterol: 53 mg/dL      Total Cholesterol: 162 mg/dL      2. Pulmonary HTN ([facility] [address]      3. Chronic atrial fibrillation (HCC)  Rate ,rhythm controlled, anticoagulated    4. Mitral valve insufficiency, unspecified etiology      5. Iron deficiency anemia due to chronic blood loss  Lab Results   Component Value Date    WBC 10.7 09/09/2021    HGB 10.7 (L) 09/09/2021    HCT 32.8 (L) 09/09/2021    MCV 83.4 09/09/2021     09/09/2021     hgb low colonoscopy annually, need to replace iron   Start ferrous gluconate daily to bid dosing check iron stores may need iv infusion  Check b 12 and folate  Start colace bid to prevent constipation  Labs due ordered pt aware to complete next week pleas.e       6. Chronic systolic (congestive) heart failure    7. Stage 3a chronic kidney disease ([facility] [address]  Lab Results   Component Value Date    CREATININE 0.98 (H) 09/09/2021       8. Abnormal MRI, lumbar spine  - MRI LUMBAR SPINE WO CONTRAST; Future    9. Foraminal stenosis of lumbosacral region  No saddle paresthesia   No change in bowel habits  No numbness or tingling in francisco legs     10. DDD (degenerative disc disease), lumbar  Chronic     11. Breast cancer screening by mammogram  Please schedule  12.  IFG (impaired fasting glucose)  Lab Results   Component Value Date    LABA1C 6.4 (H) 01/11/2013     Lab Results   Component Value Date     01/11/2013       - POCT glycosylated hemoglobin (Hb A1C)    13. Osteopenia of necks of both femurs  Calcium and vit D3    14. Morbid obesity with BMI of 45.0-49.9, adult (HCC)  Reduce calories increase activity     15. Bilateral primary osteoarthritis of hip  Check for other diseases as cause of pain   - Rheumatoid Factor; Future  - ELINA Screen With Reflex; Future  - Cyclic Citrul Peptide Antibody, IgG; Future    16. Moderate mitral regurgitation  Repeat echo planned in August 17. Biatrial enlargement  Refuses sleep study  ·   · Health Maintenance:  · Discussed health maintenance issues: patient is up to date for health maintenance items  · Discussed immunization schedule: recommend annual flu vaccine and patient is up to date for vaccinations    · Follow Up  · Labs: rheumatoid and ELINA screen labs recommended  · MRI lumbar due to risk abnormality   · Continues to follow with pain management   · Due for colonoscopy in 12/2022 DR. Tiffani Bay , has anemia needs to start iron supplement and colace  · Consider adding elavil for back pain, sleep and mood  · Echo plan in August and has follow up with cardiologist.   ·   · Follow up appt to be scheduled in 3 months to f/u labs    Electronically signed by TYLOR Mehta CNP on 6/16/2022 at 3:17 PM

## 2022-06-06 NOTE — PATIENT INSTRUCTIONS
You may be receiving a survey from [employer] regarding your visit today. You may get this in the mail, through your MyChart or in your email. Please complete the survey to enable us to provide the highest quality of care to you and your family. If you cannot score us as very good ( 5 Stars) on any question, please feel free to call the office to discuss how we could have made your experience exceptional.     Thank You! Vernell Ceja, APRN-CNP  [address]  [place],  [place]

## 2022-06-06 NOTE — Clinical Note
Please help pt schedule echo for July appt with coumadin clinic time with raghu Reeves please.      thanks

## 2022-06-08 ENCOUNTER — HOSPITAL ENCOUNTER (OUTPATIENT)
Dept: PHARMACY | Age: 77
Setting detail: THERAPIES SERIES
Discharge: HOME | End: 2022-06-08
Payer: MEDICARE

## 2022-06-08 DIAGNOSIS — I48.91 ATRIAL FIBRILLATION, UNSPECIFIED TYPE (HCC): Primary | ICD-10-CM

## 2022-06-08 LAB — INR BLD: 2.9

## 2022-06-08 PROCEDURE — 85610 PROTHROMBIN TIME: CPT

## 2022-06-08 PROCEDURE — 99211 OFF/OP EST MAY X REQ PHY/QHP: CPT

## 2022-06-09 RX ORDER — ALLOPURINOL 300 MG/1
TABLET ORAL
Qty: 45 TABLET | Refills: 0 | Status: SHIPPED | OUTPATIENT
Start: 2022-06-09

## 2022-06-09 NOTE — PROGRESS NOTES
[facility] [address] [place] [address]/[place]  Medication Management  ANTICOAGULATION    Referring Provider: Dr Herndon     GOAL INR: 2.0-3.0     TODAY'S INR: 2.9     WARFARIN Dosage: 5 mg daily    INR (no units)   Date Value   2022 2.9   2022 2   2022 1.8   2022 2   11/10/2021 1.7   10/13/2021 2.2   2021 2.6       Medication changes:  None (did state REYES Yao was supposed to contact [address] [place] [address] to discuss med for arthritis/pain but no answer yet)    Notes:    Fingerstick INR drawn per clinic protocol. Patient states no visible blood in urine and no black tarry stool. Denies any missed doses of warfarin. No change in other maintenance medications or in diet. Has stopped marijuana because she does not like the 'high' feeling and the drowsiness. She did mention that Maria D Cristina was supposed to contact the clinic to discuss a new medication for her pain. Will recheck INR in 6 weeks. Patient acknowledges working in consult agreement with pharmacist as referred by his/her physician.                   For Pharmacy Admin Tracking Only     Intervention Detail: Adherence Monitorin   Total # of Interventions Recommended: 1   Total # of Interventions Accepted: 1   Time Spent (min): Sergio Armas PharmD 2022 12:25 PM

## 2022-06-13 ENCOUNTER — HOSPITAL ENCOUNTER (OUTPATIENT)
Dept: MRI IMAGING | Age: 77
Discharge: HOME | End: 2022-06-15
Payer: MEDICARE

## 2022-06-13 DIAGNOSIS — R93.7 ABNORMAL MRI, LUMBAR SPINE: ICD-10-CM

## 2022-06-13 PROCEDURE — 72148 MRI LUMBAR SPINE W/O DYE: CPT

## 2022-06-16 RX ORDER — DOXYCYCLINE HYCLATE 50 MG/1
324 CAPSULE, GELATIN COATED ORAL DAILY
Qty: 30 TABLET | Refills: 2 | Status: SHIPPED | OUTPATIENT
Start: 2022-06-16

## 2022-06-16 RX ORDER — AMITRIPTYLINE HYDROCHLORIDE 10 MG/1
10 TABLET, FILM COATED ORAL NIGHTLY
Qty: 30 TABLET | Refills: 1 | Status: SHIPPED | OUTPATIENT
Start: 2022-06-16 | End: 2022-08-08

## 2022-06-16 RX ORDER — DOCUSATE SODIUM 100 MG/1
100 CAPSULE, LIQUID FILLED ORAL 2 TIMES DAILY PRN
Qty: 60 CAPSULE | Refills: 0 | Status: SHIPPED | OUTPATIENT
Start: 2022-06-16 | End: 2022-07-16

## 2022-06-16 ASSESSMENT — ENCOUNTER SYMPTOMS
BACK PAIN: 1
CONSTIPATION: 0
RHINORRHEA: 0
EYES NEGATIVE: 1
ABDOMINAL DISTENTION: 0
TROUBLE SWALLOWING: 0
CHEST TIGHTNESS: 0
SORE THROAT: 0
COUGH: 0
DIARRHEA: 0

## 2022-06-21 ENCOUNTER — HOSPITAL ENCOUNTER (OUTPATIENT)
Age: 77
Discharge: HOME | End: 2022-06-21
Payer: MEDICARE

## 2022-06-21 ENCOUNTER — HOSPITAL ENCOUNTER (OUTPATIENT)
Dept: PHARMACY | Age: 77
Setting detail: THERAPIES SERIES
Discharge: HOME | End: 2022-06-21
Payer: MEDICARE

## 2022-06-21 DIAGNOSIS — M16.0 BILATERAL PRIMARY OSTEOARTHRITIS OF HIP: ICD-10-CM

## 2022-06-21 DIAGNOSIS — I48.91 ATRIAL FIBRILLATION, UNSPECIFIED TYPE (HCC): Primary | ICD-10-CM

## 2022-06-21 DIAGNOSIS — K14.4 SMOOTH TONGUE: ICD-10-CM

## 2022-06-21 DIAGNOSIS — D50.0 IRON DEFICIENCY ANEMIA DUE TO CHRONIC BLOOD LOSS: ICD-10-CM

## 2022-06-21 LAB
ABSOLUTE EOS #: 0.2 K/UL (ref 0–0.4)
ABSOLUTE LYMPH #: 1.2 K/UL (ref 1–4.8)
ABSOLUTE MONO #: 0.9 K/UL (ref 0–1)
BASOPHILS # BLD: 0 % (ref 0–2)
BASOPHILS ABSOLUTE: 0 K/UL (ref 0–0.2)
DIFFERENTIAL TYPE: YES
EOSINOPHILS RELATIVE PERCENT: 2 % (ref 0–5)
FERRITIN: 88 NG/ML (ref 13–150)
FOLATE: 10.9 NG/ML
HCT VFR BLD CALC: 45.3 % (ref 36–46)
HEMOGLOBIN: 14.9 G/DL (ref 12–16)
INR BLD: 3.4
IRON SATURATION: 16 % (ref 20–55)
IRON: 64 UG/DL (ref 37–145)
LYMPHOCYTES # BLD: 12 % (ref 15–40)
MCH RBC QN AUTO: 28.5 PG (ref 26–34)
MCHC RBC AUTO-ENTMCNC: 32.8 G/DL (ref 31–37)
MCV RBC AUTO: 86.8 FL (ref 80–100)
MONOCYTES # BLD: 9 % (ref 4–8)
PDW BLD-RTO: 17.5 % (ref 12.1–15.2)
PLATELET # BLD: 208 K/UL (ref 140–450)
RBC # BLD: 5.23 M/UL (ref 4–5.2)
RHEUMATOID FACTOR: <10 IU/ML
SEG NEUTROPHILS: 77 % (ref 47–75)
SEGMENTED NEUTROPHILS ABSOLUTE COUNT: 8 K/UL (ref 2.5–7)
TOTAL IRON BINDING CAPACITY: 392 UG/DL (ref 250–450)
UNSATURATED IRON BINDING CAPACITY: 328 UG/DL (ref 112–347)
VITAMIN B-12: 461 PG/ML (ref 232–1245)
WBC # BLD: 10.3 K/UL (ref 3.5–11)

## 2022-06-21 PROCEDURE — 82746 ASSAY OF FOLIC ACID SERUM: CPT

## 2022-06-21 PROCEDURE — 36415 COLL VENOUS BLD VENIPUNCTURE: CPT

## 2022-06-21 PROCEDURE — 86200 CCP ANTIBODY: CPT

## 2022-06-21 PROCEDURE — 85610 PROTHROMBIN TIME: CPT

## 2022-06-21 PROCEDURE — 86225 DNA ANTIBODY NATIVE: CPT

## 2022-06-21 PROCEDURE — 83540 ASSAY OF IRON: CPT

## 2022-06-21 PROCEDURE — 83550 IRON BINDING TEST: CPT

## 2022-06-21 PROCEDURE — 86038 ANTINUCLEAR ANTIBODIES: CPT

## 2022-06-21 PROCEDURE — 82607 VITAMIN B-12: CPT

## 2022-06-21 PROCEDURE — 86431 RHEUMATOID FACTOR QUANT: CPT

## 2022-06-21 PROCEDURE — 85025 COMPLETE CBC W/AUTO DIFF WBC: CPT

## 2022-06-21 PROCEDURE — 82728 ASSAY OF FERRITIN: CPT

## 2022-06-21 PROCEDURE — 99211 OFF/OP EST MAY X REQ PHY/QHP: CPT

## 2022-06-21 RX ORDER — WARFARIN SODIUM 10 MG/1
TABLET ORAL
Qty: 90 TABLET | Refills: 3 | Status: SHIPPED
Start: 2022-06-21

## 2022-06-21 RX ORDER — WARFARIN SODIUM 2.5 MG/1
TABLET ORAL
Qty: 30 TABLET | Refills: 1 | OUTPATIENT
Start: 2022-06-21

## 2022-06-21 NOTE — PROGRESS NOTES
[address]/[place]  Medication Management  ANTICOAGULATION    Referring Provider: Dr Herndon     GOAL INR: 2.0-3.0     TODAY'S INR: 3.4     WARFARIN Dosage: HOLD x 1 dose, then 2.5 mg  and 5 mg all other days of the week     INR (no units)   Date Value   2022 3.4   2022 2.9   2022 2   2022 1.8   2022 2   11/10/2021 1.7   10/13/2021 2.2       Medication changes:  Started Elavil 10 mg nightly, Ferrous gluconate 324 mg once daily, and Colace 100 mg BID PRN constipation on 2022    Notes:    Fingerstick INR drawn per clinic protocol. Patient states no visible blood in urine and no black tarry stool. Denies any missed doses of warfarin. Eliza Santana saw Edgardo Gamboa CNP on 2022 and says she started Elavil 10 mg nightly on 2022 for \"back pain and sleep\". Since this medication can potentially increase INR, this is likely contributing to her slightly supra-therapeutic INR today. She is still taking medical marijuana gummies \"but only at night\" because she says they make her feel \"loopy\" during the day. She thinks she will \"finish what she has\" but not take anymore since she thinks they have little effect on her pain. She also started Ferrous gluconate 324 mg once daily for iron deficiency anemia and Colace 100 mg BID PRN constipation. No change in other maintenance medications or in diet. Since she only has the 10 mg warfarin tablets at home, I have called in a new Rx for the 2.5 mg warfarin tablets to her local pharmacy. We will HOLD her warfarin dose this evening and then decrease weekly warfarin regimen (7%) as noted on her dosing calendar. She will take 2.5 mg warfarin on  and 5 mg all other days of the week and we will recheck INR in 2 weeks. Patient acknowledges working in consult agreement with pharmacist as referred by his/her physician.                   For Pharmacy Admin Tracking Only     Intervention Detail: Adherence Monitorin and Dose Adjustment: 1, reason: Therapy Optimization   Total # of Interventions Recommended: 2   Total # of Interventions Accepted: 2   Time Spent (min): [address] Ella Elkins [facility], PharmD

## 2022-06-23 LAB
ANTI DNA DOUBLE STRANDED: <0.5 IU/ML
ANTI-NUCLEAR ANTIBODY (ANA): NEGATIVE
CCP IGG ANTIBODIES: 0.9 U/ML (ref 0–7)
ENA ANTIBODIES SCREEN: 0.1 U/ML

## 2022-07-05 ENCOUNTER — HOSPITAL ENCOUNTER (OUTPATIENT)
Dept: PHARMACY | Age: 77
Setting detail: THERAPIES SERIES
Discharge: HOME | End: 2022-07-05
Payer: MEDICARE

## 2022-07-05 DIAGNOSIS — I48.91 ATRIAL FIBRILLATION, UNSPECIFIED TYPE (HCC): Primary | ICD-10-CM

## 2022-07-05 LAB — INR BLD: 2.5

## 2022-07-05 PROCEDURE — 85610 PROTHROMBIN TIME: CPT

## 2022-07-05 PROCEDURE — 99211 OFF/OP EST MAY X REQ PHY/QHP: CPT

## 2022-07-05 NOTE — PROGRESS NOTES
[facility] [address] [place] [address]/[place]  Medication Management  ANTICOAGULATION    Referring Provider: Dr Tyrese Neville     GOAL INR: 2.0-3.0     TODAY'S INR: 2.5     WARFARIN Dosage: 2.5 mg  and 5 mg all other days of the week     INR (no units)   Date Value   2022 2.5   2022 3.4   2022 2.9   2022 2   2022 1.8   2022 2   11/10/2021 1.7       Medication changes:  No longer using marijuana edibles  No longer taking Tylenol PM  No longer taking Docusate    Notes:    Fingerstick INR drawn per clinic protocol. Patient states no visible blood in urine and no black tarry stool. Denies any missed doses of warfarin. Pt is no longer using marijuana edibles, Tylenol PM or Docusate. No change in other maintenance medications or in diet. Pt did eat a can of spinach over 2 days last week. Encouraged eating vit K containing foods in moderation and more consistently. No dosage change required. Will recheck INR in 2 weeks. Patient acknowledges working in consult agreement with pharmacist as referred by his/her physician.                   For Pharmacy Admin Tracking Only     Intervention Detail: Adherence Monitorin   Total # of Interventions Recommended: 1   Total # of Interventions Accepted: 1   Time Spent (min): 20    Caryn Langston CapD 2022 2:48 PM

## 2022-07-11 RX ORDER — DILTIAZEM HYDROCHLORIDE 60 MG/1
TABLET, FILM COATED ORAL
Qty: 270 TABLET | Refills: 3 | Status: SHIPPED | OUTPATIENT
Start: 2022-07-11

## 2022-07-20 ENCOUNTER — HOSPITAL ENCOUNTER (OUTPATIENT)
Dept: PHARMACY | Age: 77
Setting detail: THERAPIES SERIES
Discharge: HOME | End: 2022-07-20
Payer: MEDICARE

## 2022-07-20 DIAGNOSIS — I48.91 ATRIAL FIBRILLATION, UNSPECIFIED TYPE (HCC): Primary | ICD-10-CM

## 2022-07-20 LAB — INR BLD: 2.3

## 2022-07-20 PROCEDURE — 85610 PROTHROMBIN TIME: CPT

## 2022-07-20 PROCEDURE — 99211 OFF/OP EST MAY X REQ PHY/QHP: CPT

## 2022-07-20 NOTE — PROGRESS NOTES
[address]/[place]  Medication Management  ANTICOAGULATION    Referring Provider: Dr Romeo Reasons INR: 2.0-3.0     TODAY'S INR: 2.3     WARFARIN Dosage: 2.5 mg  and 5 mg all other days of the week     INR (no units)   Date Value   2022 2.3   2022 2.5   2022 3.4   2022 2.9   2022 2   2022 1.8   2022 2       Medication changes:  None    Notes:    Fingerstick INR drawn per clinic protocol. Patient states no visible blood in urine and no black tarry stool. Denies any missed or extra doses of warfarin. No change in other maintenance medications or in diet. Leeroy Mcdonough is still taking APAP QAM, no longer using medical Marijuana, and is trying to eat greens more consistently. Will recheck INR in 3 weeks. Patient acknowledges working in consult agreement with pharmacist as referred by his/her physician.                   For Pharmacy Admin Tracking Only    Intervention Detail: Adherence Monitorin  Total # of Interventions Recommended: 1  Total # of Interventions Accepted: 1  Time Spent (min): 20    Korina Bloom PharmD 2022 11:11 AM

## 2022-08-08 ENCOUNTER — OFFICE VISIT (OUTPATIENT)
Dept: ENT CLINIC | Age: 77
End: 2022-08-08
Payer: MEDICARE

## 2022-08-08 VITALS
RESPIRATION RATE: 18 BRPM | WEIGHT: 260 LBS | BODY MASS INDEX: 46.06 KG/M2 | HEART RATE: 97 BPM | OXYGEN SATURATION: 95 %

## 2022-08-08 DIAGNOSIS — J34.89 NASAL VESTIBULITIS: ICD-10-CM

## 2022-08-08 DIAGNOSIS — H61.23 BILATERAL IMPACTED CERUMEN: Primary | ICD-10-CM

## 2022-08-08 DIAGNOSIS — J34.89 NASAL DRYNESS: ICD-10-CM

## 2022-08-08 PROCEDURE — 1090F PRES/ABSN URINE INCON ASSESS: CPT | Performed by: OTOLARYNGOLOGY

## 2022-08-08 PROCEDURE — G8427 DOCREV CUR MEDS BY ELIG CLIN: HCPCS | Performed by: OTOLARYNGOLOGY

## 2022-08-08 PROCEDURE — 1036F TOBACCO NON-USER: CPT | Performed by: OTOLARYNGOLOGY

## 2022-08-08 PROCEDURE — 1123F ACP DISCUSS/DSCN MKR DOCD: CPT | Performed by: OTOLARYNGOLOGY

## 2022-08-08 PROCEDURE — 99213 OFFICE O/P EST LOW 20 MIN: CPT | Performed by: OTOLARYNGOLOGY

## 2022-08-08 PROCEDURE — G8417 CALC BMI ABV UP PARAM F/U: HCPCS | Performed by: OTOLARYNGOLOGY

## 2022-08-08 PROCEDURE — G8399 PT W/DXA RESULTS DOCUMENT: HCPCS | Performed by: OTOLARYNGOLOGY

## 2022-08-08 RX ORDER — AMITRIPTYLINE HYDROCHLORIDE 10 MG/1
10 TABLET, FILM COATED ORAL NIGHTLY
Qty: 30 TABLET | Refills: 5 | Status: SHIPPED | OUTPATIENT
Start: 2022-08-08 | End: 2022-08-22 | Stop reason: SDUPTHER

## 2022-08-08 ASSESSMENT — ENCOUNTER SYMPTOMS
GASTROINTESTINAL NEGATIVE: 1
RESPIRATORY NEGATIVE: 1
ALLERGIC/IMMUNOLOGIC NEGATIVE: 1
EYES NEGATIVE: 1

## 2022-08-08 NOTE — PATIENT INSTRUCTIONS
NASAL SALINE (SALTWATER) RINSES     Your doctor has recommended the use of saline (salt water) nasal rinses. Nasal rinses have been used for centuries for the treatment of nasal and sinus infection, bothersome secretions, allergy, and nasal dryness. It is safe and effective for use in both children and adults. Daily rinsing of the nasal passages with saline promotes nasal and sinus health by washing away dried mucus, infected secretions, dust, pollen, and mold spores, by moisturizing the nasal lining, promoting normal mucus flow, and naturally decongesting inflamed tissues. Rinses also help with clearing dried blood, mucus, and infection after sinus surgery and promote rapid healing of tissue in this setting. In the beginning it is normal to have some difficulty with performing nasal rinses, but with practice, most people find that it becomes part of their normal routine just as much as brushing teeth or showering, and often, most wouldnt think of leaving home without it. RINSE INGREDIENTS:  No special tools are required for nasal saline rinses, and everything that you need can be picked up at your local pharmacy and grocery. Commercial saline rinses and syringe systems such as NeilMed SINUS RINSE, Ayr Saline Nasal Rinse, or Neti Pot are available, but these may contain preservatives or other irritants, and it is often less expensive and more convenient to make it at home.   You will need the following:  Distilled water (tap water contains irritating minerals and bacteria, but may be used if boiled)  Kosher, pickling, or non-iodized table salt (regular table salt contains iodine, an irritant)  Baking soda (not baking powder)  Corn syrup (optional, for additional moisturizing effect)  White vinegar (for antiseptic effect, if recommended by your doctor)  An airtight container for mixing saline rinse  Rubber bulb syringe (like those used to clear infant noses and ears)  Household bleach (for cleaning container and bulb syringe)    PREPARATION:  To prepare the rinses, measure out 1 quart (1 liter) of distilled or boiled tap water into the mixing container. Then add 2-3 heaping teaspoons of salt and 1 teaspoon of baking soda, mix to dissolve, and place in refrigerator for 1-2 days of storage. You may add 3-4 tablespoons of corn syrup if you experience nasal dryness. If your doctor recommends it, add 1 teaspoon of white vinegar to this mixture as well. STORAGE & CLEANING:  It is advised that you make up fresh rinse every day or two, and that you keep the unused rinse in the refrigerator in an airtight container to prevent bacterial growth. Set out enough rinse for your next use well in advance to let it come to room temperature before use. Wash the container and bulb syringe at least once a week in a quart of water with 2 tablespoons of bleach, rinse and dry to prevent bacterial growth    USING SALINE RINSES:  To perform nasal saline rinses, plan on using at least 1 pint (2 cups) twice daily. Lean over a sink or other basin (some people prefer to do this in the shower as it can be messy, especially when you first start) to catch the rinse as it drains from your nose and mouth. Fill the bulb syringe with the rinse solution and place it into the nostril on one side. Gently squeeze the bulb to flush the nasal passage until the rinse drains clear. Repeat on the other side. You should plan on using the rinses twice a day, which should take about 10 minutes to perform. OTHER MEDICATIONS:  If your doctor has prescribed other nasal spray medications, such as a nasal steroid, it is best to apply these after the nasal rinse so that you do not wash the medication away. It is also safe to continue with your other antihistamine or decongestant medications that your doctor may have prescribed in addition to the saline rinses.   If you have an allergy or adverse reaction to any of the ingredients do not use it in the preparation of the saline rinses. WHEN TO CALL US:  Stop the rinses and notify your doctor if you experience severe pain in the nose or ears, bleeding, or any other problems with the use of the nasal saline rinses. It is normal, especially in the beginning, to experience drainage of saline and nasal secretions into the throat. This is best avoided by holding your breath and leaning forward when using the rinses. Experienced users often will have the rinse exit the opposite nostril without drainage into the throat at all, and this can be achieved by most people with practice. Please call us if you have any additional questions or concerns. NOTICE:  THIS DOCUMENT IS INTENDED SOLELY FOR PATIENT EDUCATIONAL PURPOSES AND IS PROVIDED AS A COURTESY TO PATIENTS OF DR. Severiano Stable, MD AND Will Wolff PA-C. IT IS NOT INTENDED AS A SUBSTITUTE FOR PROFESSIONAL MEDICAL CARE OR ADVICE. THE PATIENT SHOULD SEEK ADVICE FROM THE PHYSICIAN IF THERE ARE ANY QUESTIONS ABOUT THE CONTENTS OR DIRECTIONS PROVIDED IN THIS DOCUMENT.

## 2022-08-08 NOTE — PROGRESS NOTES
[facility]  [address] [facility] [address]  Dept: [phone]  MD Luis Quinones 76 y.o. female     Patient presents with a chief complaint of Sinus Problem (Patient states that her sores in her nose are healing/ improved but not totally gone. )       Pulse 97   Resp 18   Wt 260 lb (117.9 kg)   LMP  (LMP Unknown)   SpO2 95%   BMI 46.06 kg/m²       History of Presenting Illness: The patient/caregiver reports a history of complaint with the following features: Onset: started a year or so ago  Timing: ongoing, but improved crusting and breathing  Duration: year  Quality: nasal crusts and obstruction  Location: inside of both nostrils  Severity: pain none  Risk factors: no trauma to area  Alleviating factors: relief with prior medication   Aggravating factors: nothing makes it worse  Associated factors: no bleeding     She also asks if I can clean her ears while she is here today. Review of systems covering 10 systems is reviewed as staff entry in other note and pertinent positives and negatives noted.     Past Medical History:   Diagnosis Date    Atrial fibrillation (HCC)     CHF (congestive heart failure) ([facility] [address]     Diverticulosis 07/27/2021    per Colonoscopy per Dr. Arnoldo Horner    Gout     Hearing loss 2015    bilateral hearing aids     History of colon polyps 2016    Hypertension     Kidney stones     Mitral valve insufficiency     Obesity, morbid, BMI 40.0-49.9 (HCC)     Osteoarthritis     Primary osteoarthritis of both knees     Dr. Jorge Faustin    Pulmonary HTN [facility])        Current Outpatient Medications:     mupirocin (BACTROBAN) 2 % ointment, apply PEA SIZED AMOUNT to NASAL SEPTUM three times a day, Disp: , Rfl:     dilTIAZem (CARDIZEM) 60 MG tablet, TAKE 1 TABLET THREE TIMES DAILY, Disp: 270 tablet, Rfl: 3    warfarin (COUMADIN) 10 MG tablet, TAKE 1/2 TABLET daily (except SATURDAYS) OR AS DIRECTED. Managed by [facility], Disp: 90 tablet, Rfl: 3    warfarin (COUMADIN) 2.5 MG tablet, Take 1 tablet daily or as directed. [facility], Disp: 30 tablet, Rfl: 1    amitriptyline (ELAVIL) 10 MG tablet, Take 1 tablet by mouth nightly, Disp: 30 tablet, Rfl: 1    ferrous gluconate (FERGON) 324 (38 Fe) MG tablet, Take 1 tablet by mouth daily On empty stomach for iron deficiency anemia, Disp: 30 tablet, Rfl: 2    allopurinol (ZYLOPRIM) 300 MG tablet, TAKE 1/2 TABLET EVERY DAY, Disp: 45 tablet, Rfl: 0    mupirocin (BACTROBAN NASAL) 2 % nasal ointment, Apply pea sized amount to nasal septum 3 times daily. , Disp: 1 g, Rfl: 3    atenolol (TENORMIN) 50 MG tablet, TAKE 1 TABLET EVERY DAY, Disp: 90 tablet, Rfl: 3    spironolactone (ALDACTONE) 25 MG tablet, TAKE 1 TABLET TWICE DAILY, Disp: 180 tablet, Rfl: 3    diphenhydrAMINE-APAP, sleep, (TYLENOL PM EXTRA STRENGTH)  MG tablet, Take 2 tablets by mouth nightly, Disp: , Rfl:     furosemide (LASIX) 40 MG tablet, TAKE 1 TABLET DAILY, TAKE EXTRA TABLET IF NEEDED FOR EDEMA, Disp: 180 tablet, Rfl: 3    aspirin 81 MG EC tablet, Take 81 mg by mouth daily, Disp: , Rfl:     Cholecalciferol (VITAMIN D) 2000 UNITS CAPS capsule, Take 2,000 Units by mouth 2 times daily. , Disp: , Rfl:    Allergies   Allergen Reactions    Clarithromycin Rash     Patient says she will \"never take this again\"     Gabapentin Swelling     Pt states that her body swelled up, not her airway, no troubles breathing    Merthiolate Glycerite [Thimerosal] Dermatitis     Redness, blisters    Tylenol With Codeine #3 [Acetaminophen-Codeine] Nausea And Vomiting      Past Surgical History:   Procedure Laterality Date    CARPAL TUNNEL RELEASE      bilaterally    COLONOSCOPY  11/28/2016    Aman Reyna MD    COLONOSCOPY  05/2017    at [address] [place] [address]. PYlori positive, adenoma, hyperplastic polyp    COLONOSCOPY  06/14/2018, 10/2019, 10/27/2021    DR. Boles -polyp x 1, diverticulosis    JOINT REPLACEMENT Left 2019    knee    LITHOTRIPSY      NERVE BLOCK Left 2020    GENICULAR NERVE BLOCK LEFT KNEE performed by Austen Richmond MD at [address] [place] [address] N/A 2020    L3,L4 MEDIAL BRANCH BLOCK , L5 DORSAL RAMI INJECTION performed by Austen Richmond MD at [address] [place] [address] Bilateral 10/02/2020    BILATERAL L3-4 MEDIAL BRANCH AND L5 DORSAL RAMI INJECTION performed by Austen Richmond MD at [address] [place] [address] Right 01/15/2021    RFA, L3-L4 MEDIAL BRANCH AND L-5 DORSEL RAMI INJECTION RIGHT performed by Austen Richmond MD at [address] [place] [address] [address] Left 2019    LEFT KNEE TOTAL ARTHROPLASTY- DEPUY performed by Rhonda Nunez DO at [address] [place] [address] ENDOSCOPY  2016    Lyndsay Dobson MD      Social History     Socioeconomic History    Marital status:       Spouse name: Not on file    Number of children: Not on file    Years of education: Not on file    Highest education level: Not on file   Occupational History    Not on file   Tobacco Use    Smoking status: Former     Packs/day: 0.25     Years: 4.00     Pack years: 1.00     Types: Cigarettes     Quit date: 1981     Years since quittin.6    Smokeless tobacco: Never   Vaping Use    Vaping Use: Not on file   Substance and Sexual Activity    Alcohol use: Yes     Comment: rare, states she hasn't had a drink in months    Drug use: No    Sexual activity: Not on file   Other Topics Concern    Not on file   Social History Narrative    Not on file     Social Determinants of Health     Financial Resource Strain: Not on file   Food Insecurity: Not on file   Transportation Needs: Not on file   Physical Activity: Not on file   Stress: Not on file   Social Connections: Not on file   Intimate Partner Violence: Not on file   Housing Stability: Not on file     Family History   Problem Relation Age of Onset    High Blood Pressure Mother     Diabetes Father         PHYSICAL EXAM:    The patient was examined today 8/8/2022 with findings as follows:    CONSTITUTIONAL:    General Appearance: well-appearing, nontoxic, alert, no acute distress     Communication: understanding at normal conversational tones, normal voicing, speech intelligible, hearing with hearing aids    HEAD/FACE:    Head: atraumatic, normocephalic, no lesions    Facial Inspection: no lesions, healthy skin    Facial Strength: motor strength normal, symmetric strength, symmetric movement, motor strength    Sinuses: no sinus tenderness    Salivary Glands: no enlargements of parotid glands, no tenderness of parotid glands, no masses of parotid glands, clear salivary flow on palpation from Stensen's ducts, no duct stones of Stensen's duct, no enlargement of submandibular glands, no tenderness of submandibular glands, no masses of submandibular glands, clear salivary flow from Windber's ducts, no stones of Frida's ducts    Temporomandibular Joint: no crepitus with motion, no tenderness on palpation, no trismus, motion symmetric    EYES:    Pupils: PERRLA, extra-ocular movements intact, no nystagmus, sclera white, no redness of eyes, no watering of eyes    EARS:    Bilateral External Ears: no pits, no tags    Right External Ear: normally formed, no lesions, no mastoid tenderness    Left External Ear: normally formed, no lesions, no mastoid tenderness    Right External Auditory Canal: normal, healthy skin, obstructing cerumen, removed,  no discharge    Left External Auditory Canal: normal, healthy skin, obstructing cerumen, removed, no discharge    Right Tympanic Membrane: normal landmarks, translucent, mobile to pneumatic otoscopy, no perforation    Left Tympanic Membrane: normal landmarks, translucent, mobile to pneumatic otoscopy, no perforation    Hearing: intact to spoken voice    NOSE:    Nasal Skin: no lesions, no lacerations, no scars    Nasal Dorsum: symmetric with no visible or palpable deformities    Nasal Tip: normal symmetric nasal tip, normal nasal valves    Nasal Mucosa: normal, pink and moist    Septum: not markedly deformed, midline, healed crusting    Turbinates: normal size and conformation    Nasopharynx: normal    ORAL CAVITY/MOUTH:    Lips, teeth, gums: normal lips, normal gums, dentition intact, no dental pain on palpation    Oral Mucosa: normal, moist, no lesions    Palate: normal hard palate, normal soft palate, symmetric palatal elevation    Floor of Mouth: normal floor of mouth    Tongue: normal tongue, no lesions, no edema, no masses, normal mucosa, mobile    Tonsils: normal tonsils, symmetric, no lesions    Posterior pharynx: normal    NECK:    Neck: no masses, trachea midline, normal range of motion, no cysts or pits, no tenderness to palpation    Thyroid: normal thyroid, no enlargement, no tenderness, no nodules    LYMPH NODES:    Cervical: no palpable lymph node enlargement    SKIN:    General Appearance: no lesions, warm and dry, normal turgor, no bruising    NEUROLOGICAL SYSTEM:    Orientation: oriented to time, oriented to place, oriented to person    PSYCHIATRIC:    Mood and affect: normal mood, normal affect          Assessment and Plan:    She has had resolution of her mild nasal septal dryness and crusting. Ongoing use of topical Mupirocin is advised to promote healing for any recurrence. The patient and/or caregiver is advised on the use of saline nasal rinses for moisturization, decongestion, and cleansing of the nasal and sinus cavities and an informational sheet on the preparation and use of saline is provided. The patient and/or caregiver is able to state an understanding of these recommendations and is agreeable to the treatment plan. Her obstructing cerumen is removed as well today.     The patient and/or caregiver is advised on the use of saline nasal rinses for moisturization, decongestion, and cleansing of the nasal and sinus cavities and an informational sheet on the preparation and use of saline is provided. The patient and/or caregiver is able to state an understanding of these recommendations and is agreeable to the treatment plan. Diagnosis Orders   1. Bilateral impacted cerumen        2. Nasal vestibulitis        3. Nasal dryness             Return if symptoms worsen or fail to improve. The patient and/or caregiver is to notify the office if no improvement or worsening of symptoms is noted prior to the scheduled follow-up for sooner evaluation. The patient and/or caregiver is able to state an understanding of these recommendations and is agreeable to the treatment plan. --Loyda Doe MD on 8/8/2022 at 11:11 AM    An electronic signature was used to authenticate this note.

## 2022-08-08 NOTE — TELEPHONE ENCOUNTER
----- Message from Pedrowaleska Dumont sent at 8/8/2022  8:37 AM EDT -----  Subject: Refill Request    QUESTIONS  Name of Medication? amitriptyline (ELAVIL) 10 MG tablet  Patient-reported dosage and instructions? Take 1 tablet by mouth nightly  How many days do you have left? 5  Preferred Pharmacy? [id number]  Pharmacy phone number (if available)? [phone]  Additional Information for Provider? Helps her sleep, not consistent and   is not helping her back, pt requested a higher dosage. ---------------------------------------------------------------------------  --------------  Scooby Pablo Zinwave  What is the best way for the office to contact you? Do not leave any   message, patient will call back for answer, OK to respond with electronic   message via [employer] portal (only for patients who have registered [employer]   account)  Preferred Call Back Phone Number? [phone]  ---------------------------------------------------------------------------  --------------  SCRIPT ANSWERS  Relationship to Patient?  Self

## 2022-08-08 NOTE — TELEPHONE ENCOUNTER
Last OV: 6/6/2022 chronic  Last RX:      Next scheduled apt: 8/22/2022 back pain             Surescript requesting a refill

## 2022-08-10 ENCOUNTER — HOSPITAL ENCOUNTER (OUTPATIENT)
Dept: PHARMACY | Age: 77
Setting detail: THERAPIES SERIES
Discharge: HOME | End: 2022-08-10
Payer: MEDICARE

## 2022-08-10 DIAGNOSIS — I48.91 ATRIAL FIBRILLATION, UNSPECIFIED TYPE (HCC): Primary | ICD-10-CM

## 2022-08-10 LAB — INR BLD: 2

## 2022-08-10 PROCEDURE — 85610 PROTHROMBIN TIME: CPT

## 2022-08-10 PROCEDURE — 99211 OFF/OP EST MAY X REQ PHY/QHP: CPT

## 2022-08-10 NOTE — PROGRESS NOTES
[address]/[place]  Medication Management  ANTICOAGULATION    Referring Provider: Dr Romeo Reasons INR: 2.0-3.0     TODAY'S INR: 2.0     WARFARIN Dosage: 2.5 mg  and 5 mg all other days of the week     INR (no units)   Date Value   08/10/2022 2   2022 2.3   2022 2.5   2022 3.4   2022 2.9   2022 2   2022 1.8       Medication changes:  Pt has requested increased dose of amitriptyline from 10 mg QHS to 20 mg QHS to help with sleep     Notes:    Fingerstick INR drawn per clinic protocol. Patient states no visible blood in urine and no black tarry stool. Denies any missed or extra doses of warfarin. No change in other maintenance medications but does have request in to MD to request increased dose of amitriptyline from 10 mg QHS to 20 mg QHS to help with sleep as she has not been sleeping much at all. Failed Tylenol PM. No change in diet. Continue current dose. Will recheck INR in 4 weeks. Patient acknowledges working in consult agreement with pharmacist as referred by his/her physician.                   For Pharmacy Admin Tracking Only    Intervention Detail: Adherence Monitorin  Total # of Interventions Recommended: 1  Total # of Interventions Accepted: 1  Time Spent (min): 20    Korina Bloom PharmD 8/10/2022 12:10 PM

## 2022-08-16 ENCOUNTER — HOSPITAL ENCOUNTER (OUTPATIENT)
Dept: GENERAL RADIOLOGY | Age: 77
Discharge: HOME | End: 2022-08-18
Payer: MEDICARE

## 2022-08-16 ENCOUNTER — HOSPITAL ENCOUNTER (OUTPATIENT)
Age: 77
Discharge: HOME | End: 2022-08-16
Payer: MEDICARE

## 2022-08-16 ENCOUNTER — HOSPITAL ENCOUNTER (OUTPATIENT)
Age: 77
End: 2022-08-16
Payer: MEDICARE

## 2022-08-16 ENCOUNTER — HOSPITAL ENCOUNTER (OUTPATIENT)
Age: 77
Discharge: HOME | End: 2022-08-18
Payer: MEDICARE

## 2022-08-16 ENCOUNTER — HOSPITAL ENCOUNTER (OUTPATIENT)
Dept: NON INVASIVE DIAGNOSTICS | Age: 77
Discharge: HOME | End: 2022-08-16
Payer: MEDICARE

## 2022-08-16 DIAGNOSIS — I27.20 PULMONARY HTN (HCC): ICD-10-CM

## 2022-08-16 DIAGNOSIS — E55.9 VITAMIN D DEFICIENCY: ICD-10-CM

## 2022-08-16 DIAGNOSIS — I34.0 MITRAL VALVE INSUFFICIENCY, UNSPECIFIED ETIOLOGY: ICD-10-CM

## 2022-08-16 DIAGNOSIS — E78.5 HYPERLIPIDEMIA, UNSPECIFIED HYPERLIPIDEMIA TYPE: ICD-10-CM

## 2022-08-16 DIAGNOSIS — I50.9 CONGESTIVE HEART FAILURE, UNSPECIFIED HF CHRONICITY, UNSPECIFIED HEART FAILURE TYPE (HCC): ICD-10-CM

## 2022-08-16 DIAGNOSIS — I48.20 CHRONIC ATRIAL FIBRILLATION (HCC): ICD-10-CM

## 2022-08-16 LAB
ABSOLUTE EOS #: 0.2 K/UL (ref 0–0.4)
ABSOLUTE LYMPH #: 1 K/UL (ref 1–4.8)
ABSOLUTE MONO #: 0.7 K/UL (ref 0–1)
ALBUMIN SERPL-MCNC: 4.3 G/DL (ref 3.5–5.2)
ALP BLD-CCNC: 114 U/L (ref 35–104)
ALT SERPL-CCNC: 31 U/L (ref 5–33)
ANION GAP SERPL CALCULATED.3IONS-SCNC: 11 MMOL/L (ref 9–17)
AST SERPL-CCNC: 23 U/L
BASOPHILS # BLD: 0 % (ref 0–2)
BASOPHILS ABSOLUTE: 0 K/UL (ref 0–0.2)
BILIRUB SERPL-MCNC: 0.61 MG/DL (ref 0.3–1.2)
BUN BLDV-MCNC: 23 MG/DL (ref 8–23)
BUN/CREAT BLD: 24 (ref 9–20)
CALCIUM SERPL-MCNC: 9.6 MG/DL (ref 8.6–10.4)
CHLORIDE BLD-SCNC: 101 MMOL/L (ref 98–107)
CHOLESTEROL/HDL RATIO: 4
CHOLESTEROL: 205 MG/DL
CO2: 26 MMOL/L (ref 20–31)
CREAT SERPL-MCNC: 0.97 MG/DL (ref 0.5–0.9)
DIFFERENTIAL TYPE: YES
EOSINOPHILS RELATIVE PERCENT: 2 % (ref 0–5)
GFR AFRICAN AMERICAN: >60 ML/MIN
GFR NON-AFRICAN AMERICAN: 55 ML/MIN
GFR SERPL CREATININE-BSD FRML MDRD: ABNORMAL ML/MIN/{1.73_M2}
GLUCOSE BLD-MCNC: 120 MG/DL (ref 70–99)
HCT VFR BLD CALC: 45 % (ref 36–46)
HDLC SERPL-MCNC: 51 MG/DL
HEMOGLOBIN: 14.9 G/DL (ref 12–16)
LDL CHOLESTEROL: 114 MG/DL (ref 0–130)
LV EF: 45 %
LVEF MODALITY: NORMAL
LYMPHOCYTES # BLD: 12 % (ref 15–40)
MAGNESIUM: 2.1 MG/DL (ref 1.6–2.6)
MCH RBC QN AUTO: 29.6 PG (ref 26–34)
MCHC RBC AUTO-ENTMCNC: 33.1 G/DL (ref 31–37)
MCV RBC AUTO: 89.3 FL (ref 80–100)
MONOCYTES # BLD: 9 % (ref 4–8)
PATIENT FASTING?: YES
PDW BLD-RTO: 16.3 % (ref 12.1–15.2)
PLATELET # BLD: 215 K/UL (ref 140–450)
POTASSIUM SERPL-SCNC: 4.3 MMOL/L (ref 3.7–5.3)
RBC # BLD: 5.04 M/UL (ref 4–5.2)
SEG NEUTROPHILS: 77 % (ref 47–75)
SEGMENTED NEUTROPHILS ABSOLUTE COUNT: 6.2 K/UL (ref 2.5–7)
SODIUM BLD-SCNC: 138 MMOL/L (ref 135–144)
TOTAL PROTEIN: 7.6 G/DL (ref 6.4–8.3)
TRIGL SERPL-MCNC: 198 MG/DL
TSH SERPL DL<=0.05 MIU/L-ACNC: 1.31 UIU/ML (ref 0.3–5)
VITAMIN D 25-HYDROXY: 51.9 NG/ML
WBC # BLD: 8.2 K/UL (ref 3.5–11)

## 2022-08-16 PROCEDURE — 93005 ELECTROCARDIOGRAM TRACING: CPT

## 2022-08-16 PROCEDURE — 83735 ASSAY OF MAGNESIUM: CPT

## 2022-08-16 PROCEDURE — 80053 COMPREHEN METABOLIC PANEL: CPT

## 2022-08-16 PROCEDURE — 80061 LIPID PANEL: CPT

## 2022-08-16 PROCEDURE — 85025 COMPLETE CBC W/AUTO DIFF WBC: CPT

## 2022-08-16 PROCEDURE — 84443 ASSAY THYROID STIM HORMONE: CPT

## 2022-08-16 PROCEDURE — 93306 TTE W/DOPPLER COMPLETE: CPT

## 2022-08-16 PROCEDURE — 36415 COLL VENOUS BLD VENIPUNCTURE: CPT

## 2022-08-16 PROCEDURE — 82306 VITAMIN D 25 HYDROXY: CPT

## 2022-08-16 PROCEDURE — 71046 X-RAY EXAM CHEST 2 VIEWS: CPT

## 2022-08-18 LAB
EKG ATRIAL RATE: 79 BPM
EKG Q-T INTERVAL: 408 MS
EKG QRS DURATION: 92 MS
EKG QTC CALCULATION (BAZETT): 449 MS
EKG R AXIS: -51 DEGREES
EKG T AXIS: 32 DEGREES
EKG VENTRICULAR RATE: 73 BPM

## 2022-08-18 PROCEDURE — 93010 ELECTROCARDIOGRAM REPORT: CPT | Performed by: INTERNAL MEDICINE

## 2022-08-22 ENCOUNTER — OFFICE VISIT (OUTPATIENT)
Dept: FAMILY MEDICINE CLINIC | Age: 77
End: 2022-08-22
Payer: MEDICARE

## 2022-08-22 VITALS
SYSTOLIC BLOOD PRESSURE: 118 MMHG | BODY MASS INDEX: 45.17 KG/M2 | DIASTOLIC BLOOD PRESSURE: 70 MMHG | HEART RATE: 82 BPM | OXYGEN SATURATION: 95 % | WEIGHT: 255 LBS

## 2022-08-22 DIAGNOSIS — M54.42 CHRONIC BILATERAL LOW BACK PAIN WITH BILATERAL SCIATICA: Primary | ICD-10-CM

## 2022-08-22 DIAGNOSIS — R93.7 ABNORMAL MRI, LUMBAR SPINE: ICD-10-CM

## 2022-08-22 DIAGNOSIS — M54.41 CHRONIC BILATERAL LOW BACK PAIN WITH BILATERAL SCIATICA: Primary | ICD-10-CM

## 2022-08-22 DIAGNOSIS — M48.07 FORAMINAL STENOSIS OF LUMBOSACRAL REGION: ICD-10-CM

## 2022-08-22 DIAGNOSIS — G89.29 CHRONIC BILATERAL LOW BACK PAIN WITH BILATERAL SCIATICA: Primary | ICD-10-CM

## 2022-08-22 DIAGNOSIS — E78.2 MIXED HYPERLIPIDEMIA: ICD-10-CM

## 2022-08-22 DIAGNOSIS — F33.1 MAJOR DEPRESSIVE DISORDER, RECURRENT, MODERATE (HCC): ICD-10-CM

## 2022-08-22 PROCEDURE — G8417 CALC BMI ABV UP PARAM F/U: HCPCS | Performed by: NURSE PRACTITIONER

## 2022-08-22 PROCEDURE — G8399 PT W/DXA RESULTS DOCUMENT: HCPCS | Performed by: NURSE PRACTITIONER

## 2022-08-22 PROCEDURE — 99214 OFFICE O/P EST MOD 30 MIN: CPT | Performed by: NURSE PRACTITIONER

## 2022-08-22 PROCEDURE — 1123F ACP DISCUSS/DSCN MKR DOCD: CPT | Performed by: NURSE PRACTITIONER

## 2022-08-22 PROCEDURE — 1036F TOBACCO NON-USER: CPT | Performed by: NURSE PRACTITIONER

## 2022-08-22 PROCEDURE — G8427 DOCREV CUR MEDS BY ELIG CLIN: HCPCS | Performed by: NURSE PRACTITIONER

## 2022-08-22 PROCEDURE — 1090F PRES/ABSN URINE INCON ASSESS: CPT | Performed by: NURSE PRACTITIONER

## 2022-08-22 RX ORDER — FUROSEMIDE 40 MG/1
TABLET ORAL
Qty: 180 TABLET | Refills: 3 | Status: SHIPPED | OUTPATIENT
Start: 2022-08-22

## 2022-08-22 RX ORDER — TRIAMCINOLONE ACETONIDE 1 MG/G
CREAM TOPICAL
Qty: 45 G | Refills: 0 | Status: SHIPPED | OUTPATIENT
Start: 2022-08-22

## 2022-08-22 RX ORDER — AMITRIPTYLINE HYDROCHLORIDE 25 MG/1
25 TABLET, FILM COATED ORAL NIGHTLY
Qty: 30 TABLET | Refills: 1 | Status: SHIPPED | OUTPATIENT
Start: 2022-08-22 | End: 2022-09-19 | Stop reason: SDUPTHER

## 2022-08-22 SDOH — ECONOMIC STABILITY: FOOD INSECURITY: WITHIN THE PAST 12 MONTHS, YOU WORRIED THAT YOUR FOOD WOULD RUN OUT BEFORE YOU GOT MONEY TO BUY MORE.: NEVER TRUE

## 2022-08-22 SDOH — ECONOMIC STABILITY: FOOD INSECURITY: WITHIN THE PAST 12 MONTHS, THE FOOD YOU BOUGHT JUST DIDN'T LAST AND YOU DIDN'T HAVE MONEY TO GET MORE.: NEVER TRUE

## 2022-08-22 ASSESSMENT — SOCIAL DETERMINANTS OF HEALTH (SDOH): HOW HARD IS IT FOR YOU TO PAY FOR THE VERY BASICS LIKE FOOD, HOUSING, MEDICAL CARE, AND HEATING?: NOT HARD AT ALL

## 2022-08-22 ASSESSMENT — ENCOUNTER SYMPTOMS: BACK PAIN: 1

## 2022-08-22 NOTE — PROGRESS NOTES
[facility]  [facility]  [address]  [place] [address]  Dept: [phone]  Dept Fax: [phone]    Last encounter 6/6/2022    Rash (Pt noted red rough area on her Rt wrist over the past 2 months. Pt states she also noted red areas on her chest and upper left arm. Pt states area is dry and itches. Pt hasn't tried any OTC TX to areas. Pt states she did try athletes foot spray on her Rt wrist. ) and Back Pain (Pt presents for chronic back pain. Pt states she in not taking marijuana gummies any longer. Pt states they don't work. She lays awake until 3AM. Pt requesting referral to neuro surgeon.)       HPI:   Abhilash Beck is a 76 y.o. female who presentstoday for her medical conditions/complaints as noted below. Abhilash Beck is c/o of Rash (Pt noted red rough area on her Rt wrist over the past 2 months. Pt states she also noted red areas on her chest and upper left arm. Pt states area is dry and itches. Pt hasn't tried any OTC TX to areas. Pt states she did try athletes foot spray on her Rt wrist. ) and Back Pain (Pt presents for chronic back pain. Pt states she in not taking marijuana gummies any longer. Pt states they don't work. She lays awake until 3AM. Pt requesting referral to neuro surgeon.)      Back Pain  The current episode started more than 1 year ago. The problem occurs intermittently. The problem has been waxing and waning since onset. The quality of the pain is described as aching and shooting. The pain radiates to the right knee and left knee. The pain is at a severity of 6/10. The pain is moderate. The pain is The same all the time. The symptoms are aggravated by lying down. Pertinent negatives include no bladder incontinence, bowel incontinence, chest pain, fever, numbness, perianal numbness, tingling or weakness. Risk factors include obesity and poor posture. She has tried ice and home exercises for the symptoms. The treatment provided mild relief. Established patient  Allergy: gabapentin- caused swelling    Pt with limited mobility at home, here today in wheelchair came by hospital w/c [facility]. Uses walker all the time. Can't get up stairs  Can't get into shower but step into shower 6\" step , has shower chair. Small step up from sidewalk into home. Does not go to garage. Able to do her laundry. Do dishes. MRI LUMBAR  without 6/13/22       FINDINGS: Severe discogenic narrowing/disease at L1-L2 and L2-L3 with lesser    discogenic disease at L3-L4 through L5-S1. Stable small bone islands within the    L5 vertebral body. Distal cord appears unremarkable. No acute fracture or    neoplastic change. Similar appearance of mild spinal canal stenosis at L1-L2 and mild thecal sac    effacement at L2-L3, slight thecal sac effacement at L4-L5 caused by disc    bulging/disc-osteophyte complex combined with facet arthropathy. Bilateral    lateral recess narrowing at L1-L2 and L2-L3 appears similar. Similar appearance of multilevel moderate to severe foraminal stenosis at every    lumbar level due to disc-osteophyte complex and facet arthropathy. Visualized upper sacrum is intact. No paraspinal lymphadenopathy. Impression       Similar appearance of lumbar spine degeneration relative to 5/3/2021 as    discussed above. Hx seeing Dr. Angel Paul pain management in 2018    Major depression  On elavil started on 10 mg daily. Helping some to sleep doing okay. Would like to increase to 25 mg to help with lumbar radiculopathy. Pt with limited activity at home . Willing to do physical therapy--pt homebound . Dexa scan 6/30/2021     FINDINGS: L1 through L4 bone mineral density falsely elevated due to multilevel    degenerative changes lumbar spine. Bone mineral density L1 through L4 1.428    g/sq cm with a T-score of 2.1. This is not significantly changed from 2019 and    is within normal limits.        The bone mineral L5 DORSAL RAMI INJECTION performed by Soila Dominguez MD at [address] Right 01/15/2021    RFA, L3-L4 MEDIAL BRANCH AND L-5 DORSEL RAMI INJECTION RIGHT performed by Soila Dominguez MD at [address] Left 2019    LEFT KNEE TOTAL ARTHROPLASTY- [address] performed by Any Alexandra DO at [address] ENDOSCOPY  2016    Aman Reyna MD       Family History   Problem Relation Age of Onset    High Blood Pressure Mother     Diabetes Father        Social History     Tobacco Use    Smoking status: Former     Packs/day: 0.25     Years: 4.00     Pack years: 1.00     Types: Cigarettes     Quit date: 1981     Years since quittin.6    Smokeless tobacco: Never   Substance Use Topics    Alcohol use: Yes     Comment: rare, states she hasn't had a drink in months      Current Outpatient Medications   Medication Sig Dispense Refill    furosemide (LASIX) 40 MG tablet TAKE 1 TABLET DAILY, TAKE EXTRA TABLET IF NEEDED FOR EDEMA 180 tablet 3    amitriptyline (ELAVIL) 25 MG tablet Take 1 tablet by mouth nightly For low back pain, insomnia 30 tablet 1    triamcinolone (KENALOG) 0.1 % cream Apply to the affected area right arm and chest dry red rash spots topically 3 x a week. 45 g 0    dilTIAZem (CARDIZEM) 60 MG tablet TAKE 1 TABLET THREE TIMES DAILY 270 tablet 3    warfarin (COUMADIN) 10 MG tablet TAKE 1/2 TABLET daily (except ) OR AS DIRECTED. Managed by [facility] 90 tablet 3    warfarin (COUMADIN) 2.5 MG tablet Take 1 tablet daily or as directed.  Managed by [facility] 30 tablet 1    ferrous gluconate (FERGON) 324 (38 Fe) MG tablet Take 1 tablet by mouth daily On empty stomach for iron deficiency anemia 30 tablet 2    allopurinol (ZYLOPRIM) 300 MG tablet TAKE 1/2 TABLET EVERY DAY 45 tablet 0    mupirocin (BACTROBAN NASAL) 2 % nasal ointment Apply pea sized amount to nasal septum 3 times daily. 1 g 3    atenolol (TENORMIN) 50 MG tablet TAKE 1 TABLET EVERY DAY 90 tablet 3    spironolactone (ALDACTONE) 25 MG tablet TAKE 1 TABLET TWICE DAILY 180 tablet 3    aspirin 81 MG EC tablet Take 81 mg by mouth daily      Cholecalciferol (VITAMIN D) 2000 UNITS CAPS capsule Take 2,000 Units by mouth 2 times daily. diphenhydrAMINE-APAP, sleep, (TYLENOL PM EXTRA STRENGTH)  MG tablet Take 2 tablets by mouth nightly (Patient not taking: No sig reported)       No current facility-administered medications for this visit. Allergies   Allergen Reactions    Clarithromycin Rash     Patient says she will \"never take this again\"     Gabapentin Swelling     Pt states that her body swelled up, not her airway, no troubles breathing    Merthiolate Glycerite [Thimerosal] Dermatitis     Redness, blisters    Tylenol With Codeine #3 [Acetaminophen-Codeine] Nausea And Vomiting       Health Maintenance   Topic Date Due    Hepatitis C screen  Never done    Shingles vaccine (2 of 3) 09/01/2014    COVID-19 Vaccine (4 - Booster for Moderna series) 05/15/2022    Flu vaccine (1) 09/01/2022    Annual Wellness Visit (AWV)  11/16/2022    Depression Monitoring  06/06/2023    DTaP/Tdap/Td vaccine (2 - Td or Tdap) 12/29/2028    DEXA (modify frequency per FRAX score)  Completed    Pneumococcal 65+ years Vaccine  Completed    Hepatitis A vaccine  Aged Out    Hepatitis B vaccine  Aged Out    Hib vaccine  Aged Out    Meningococcal (ACWY) vaccine  Aged Out       Subjective:      Review of Systems   Constitutional:  Negative for activity change, chills and fever. HENT:  Negative for congestion, rhinorrhea and sore throat. Eyes: Negative. Respiratory:  Negative for cough and chest tightness. Cardiovascular:  Negative for chest pain and leg swelling. Gastrointestinal:  Negative for abdominal distention and bowel incontinence. Endocrine: Negative for cold intolerance and heat intolerance. Lab Results   Component Value Date/Time     08/16/2022 09:27 AM    K 4.3 08/16/2022 09:27 AM     08/16/2022 09:27 AM    CO2 26 08/16/2022 09:27 AM    BUN 23 08/16/2022 09:27 AM    CREATININE 0.97 08/16/2022 09:27 AM    GLUCOSE 120 08/16/2022 09:27 AM    GLUCOSE 102 04/27/2012 09:19 AM    PROT 7.6 08/16/2022 09:27 AM    LABALBU 4.3 08/16/2022 09:27 AM    LABALBU 4.4 04/10/2012 08:02 AM    BILITOT 0.61 08/16/2022 09:27 AM    ALKPHOS 114 08/16/2022 09:27 AM    AST 23 08/16/2022 09:27 AM    ALT 31 08/16/2022 09:27 AM     Lab Results   Component Value Date/Time    WBC 8.2 08/16/2022 09:27 AM    RBC 5.04 08/16/2022 09:27 AM    RBC 4.72 04/10/2012 08:02 AM    HGB 14.9 08/16/2022 09:27 AM    HCT 45.0 08/16/2022 09:27 AM    MCV 89.3 08/16/2022 09:27 AM    MCH 29.6 08/16/2022 09:27 AM    MCHC 33.1 08/16/2022 09:27 AM    RDW 16.3 08/16/2022 09:27 AM     08/16/2022 09:27 AM     04/10/2012 08:02 AM    MPV NOT REPORTED 09/09/2021 09:15 AM     Lab Results   Component Value Date/Time    TSH 1.31 08/16/2022 09:27 AM     Lab Results   Component Value Date/Time    CHOL 205 08/16/2022 09:27 AM    HDL 51 08/16/2022 09:27 AM    LABA1C 6.4 01/11/2013 08:07 AM          Assessment & Plan       1. Chronic bilateral low back pain with bilateral sciatica  Increase dose elavil. - External Referral To Physical Therapy    2. Foraminal stenosis of lumbosacral region    - External Referral To Physical Therapy    3. Abnormal MRI, lumbar spine    - External Referral To Physical Therapy    4. Major depressive disorder, recurrent, moderate  Continue elavil    5.  Mixed hyperlipidemia  On statin                Completed Refills   Requested Prescriptions     Signed Prescriptions Disp Refills    furosemide (LASIX) 40 MG tablet 180 tablet 3     Sig: TAKE 1 TABLET DAILY, TAKE EXTRA TABLET IF NEEDED FOR EDEMA    amitriptyline (ELAVIL) 25 MG tablet 30 tablet 1     Sig: Take 1 tablet by mouth nightly For low back pain, insomnia triamcinolone (KENALOG) 0.1 % cream 45 g 0     Sig: Apply to the affected area right arm and chest dry red rash spots topically 3 x a week. No follow-ups on file. Orders Placed This Encounter   Medications    furosemide (LASIX) 40 MG tablet     Sig: TAKE 1 TABLET DAILY, TAKE EXTRA TABLET IF NEEDED FOR EDEMA     Dispense:  180 tablet     Refill:  3    amitriptyline (ELAVIL) 25 MG tablet     Sig: Take 1 tablet by mouth nightly For low back pain, insomnia     Dispense:  30 tablet     Refill:  1     Dose increase will take 10 mg 2 tabs daily until out then fill new dose    triamcinolone (KENALOG) 0.1 % cream     Sig: Apply to the affected area right arm and chest dry red rash spots topically 3 x a week. Dispense:  45 g     Refill:  0     Orders Placed This Encounter   Procedures    External Referral To Physical Therapy     Referral Priority:   Routine     Referral Type:   Eval and Treat     Referral Reason:   Specialty Services Required     Requested Specialty:   Physical Therapist     Number of Visits Requested:   1                  On this date 8/22/2022 I have spent 32 minutes reviewing previous notes, test results and face to face with the patient discussing the diagnosis and importance of compliance with the treatment plan as well as documenting on the day of the visit.      Electronically signed by TYLOR Reid CNP on 8/24/2022 at 12:42 AM

## 2022-08-22 NOTE — PATIENT INSTRUCTIONS
Can use steroid cream once a day to dry spots on right arm and chest area , use 3 days a week for one month . Not on face   Wash hand after using steroid. Also can try regular lotion daily or Bio-Oil over the counter once a day which has vitaminE in it and helps moisturize skin well. For low back pain will increase elavil nightly medication from 10 mg daily to 10 mg TWO tabs (20 mg dose) nightly until out then  new script for 25 mg daily dose at bedtime please. This also helps with insomnia, but is in the drug class called TCA (tricyclic antidepressant) keep this meds up never want to take more than indicated and also keep out of the reach of children. Community health nurse will be contacting her to do a home assessment and see if any additional community services are available through the [facility]. Physical therapy can come to home for evaluation.

## 2022-08-24 ASSESSMENT — ENCOUNTER SYMPTOMS
COUGH: 0
RHINORRHEA: 0
BOWEL INCONTINENCE: 0
SORE THROAT: 0
ABDOMINAL DISTENTION: 0
CHEST TIGHTNESS: 0
EYES NEGATIVE: 1

## 2022-09-08 ENCOUNTER — HOSPITAL ENCOUNTER (OUTPATIENT)
Dept: PHARMACY | Age: 77
Setting detail: THERAPIES SERIES
Discharge: HOME | End: 2022-09-08
Payer: MEDICARE

## 2022-09-08 DIAGNOSIS — I48.91 ATRIAL FIBRILLATION, UNSPECIFIED TYPE (HCC): Primary | ICD-10-CM

## 2022-09-08 LAB — INR BLD: 2.1

## 2022-09-08 PROCEDURE — 99211 OFF/OP EST MAY X REQ PHY/QHP: CPT

## 2022-09-08 PROCEDURE — 85610 PROTHROMBIN TIME: CPT

## 2022-09-19 ENCOUNTER — TELEPHONE (OUTPATIENT)
Dept: FAMILY MEDICINE CLINIC | Age: 77
End: 2022-09-19

## 2022-09-19 RX ORDER — AMITRIPTYLINE HYDROCHLORIDE 25 MG/1
25 TABLET, FILM COATED ORAL NIGHTLY
Qty: 30 TABLET | Refills: 2 | Status: SHIPPED | OUTPATIENT
Start: 2022-09-19 | End: 2022-10-12 | Stop reason: SDUPTHER

## 2022-09-19 NOTE — TELEPHONE ENCOUNTER
Patient is asking for a refill on Elavil 25 mg. Patient is also asking about in home therapy. Patient states she thought [facility] was ordering for a nurse to come out to her home but she has never heard anything. Please advise. Patient last seen 8/22/22. Next appt 11/21/22.     [address] Maintenance   Topic Date Due    Hepatitis C screen  Never done    Shingles vaccine (2 of 3) 09/01/2014    COVID-19 Vaccine (4 - Booster for Moderna series) 05/15/2022    Flu vaccine (1) 09/01/2022    Annual Wellness Visit (AWV)  11/16/2022    Depression Monitoring  06/06/2023    DTaP/Tdap/Td vaccine (2 - Td or Tdap) 12/29/2028    DEXA (modify frequency per FRAX score)  Completed    Pneumococcal 65+ years Vaccine  Completed    Hepatitis A vaccine  Aged Out    Hepatitis B vaccine  Aged Out    Hib vaccine  Aged Out    Meningococcal (ACWY) vaccine  Aged Out             (applicable per patient's age: Cancer Screenings, Depression Screening, Fall Risk Screening, Immunizations)    Hemoglobin A1C (%)   Date Value   01/11/2013 6.4 (H)     LDL Cholesterol (mg/dL)   Date Value   08/16/2022 114     AST (U/L)   Date Value   08/16/2022 23     ALT (U/L)   Date Value   08/16/2022 31     BUN (mg/dL)   Date Value   08/16/2022 23      (goal A1C is < 7)   (goal LDL is <100) need 30-50% reduction from baseline     BP Readings from Last 3 Encounters:   08/22/22 118/70   06/06/22 102/70   05/04/22 122/84    (goal /80)      All Future Testing planned in [facility]:  Lab Frequency Next Occurrence       Next Visit Date:  Future Appointments   Date Time Provider Sj Stephen   10/19/2022  9:40 AM [address] [facility]   10/19/2022 10:40 AM ALFONSO MEDICATION [facility] MED [facility] Alfonso   11/1/2022 11:10 AM [facility]LE VAN UNIT [facility]BILE  Yovany Ards   11/1/2022 12:00 PM MD Lottie Ortega [facility]   11/21/2022  1:00 PM [facility]YLOR Garcia - CNP [facility]            Patient Active Problem List:     Kidney stones     Localized osteoarthritis of left knee     Atrial fibrillation (HCC)     Pulmonary HTN (HCC)     Knee pain, chronic     Iron deficiency anemia due to chronic blood loss     Mitral valve insufficiency     Backache     CHF (congestive heart failure) (HCC)     CKD (chronic kidney disease)     Echocardiogram abnormal     Gout     HL (hearing loss)     Hyperlipidemia     Morbid obesity with BMI of 45.0-49.9, adult ([facility])     DJD (degenerative joint disease)     Chronic renal disease, stage III ([facility]) [[id number]]     Chronic systolic (congestive) heart failure

## 2022-09-21 ENCOUNTER — TELEPHONE (OUTPATIENT)
Dept: PRIMARY CARE CLINIC | Age: 77
End: 2022-09-21

## 2022-09-21 DIAGNOSIS — N18.31 STAGE 3A CHRONIC KIDNEY DISEASE (HCC): ICD-10-CM

## 2022-09-21 DIAGNOSIS — M54.16 LUMBAR RADICULOPATHY: Primary | ICD-10-CM

## 2022-09-21 DIAGNOSIS — R53.81 PHYSICAL DECONDITIONING: ICD-10-CM

## 2022-09-21 DIAGNOSIS — E66.01 MORBID OBESITY WITH BMI OF 45.0-49.9, ADULT (HCC): ICD-10-CM

## 2022-09-21 DIAGNOSIS — I50.23 ACUTE ON CHRONIC SYSTOLIC CONGESTIVE HEART FAILURE (HCC): ICD-10-CM

## 2022-09-21 DIAGNOSIS — M48.061 FORAMINAL STENOSIS OF LUMBAR REGION: ICD-10-CM

## 2022-09-21 DIAGNOSIS — M54.42 CHRONIC BILATERAL LOW BACK PAIN WITH BILATERAL SCIATICA: ICD-10-CM

## 2022-09-21 DIAGNOSIS — I48.0 PAROXYSMAL ATRIAL FIBRILLATION (HCC): ICD-10-CM

## 2022-09-21 DIAGNOSIS — M54.41 CHRONIC BILATERAL LOW BACK PAIN WITH BILATERAL SCIATICA: ICD-10-CM

## 2022-09-21 DIAGNOSIS — G89.29 CHRONIC BILATERAL LOW BACK PAIN WITH BILATERAL SCIATICA: ICD-10-CM

## 2022-09-21 NOTE — TELEPHONE ENCOUNTER
Spoke with patient and she is open to [employer]. Insurance is accepted and [place] area is serviced. Patient would like a referral for physical and occupational therapy - patient states to need help doing everyday tasks such as getting out of bed and getting in the shower.

## 2022-09-22 DIAGNOSIS — E66.01 MORBID OBESITY WITH BMI OF 45.0-49.9, ADULT (HCC): Primary | ICD-10-CM

## 2022-09-22 DIAGNOSIS — R53.81 PHYSICAL DECONDITIONING: ICD-10-CM

## 2022-09-26 ENCOUNTER — TELEPHONE (OUTPATIENT)
Dept: FAMILY MEDICINE CLINIC | Age: 77
End: 2022-09-26

## 2022-09-26 NOTE — TELEPHONE ENCOUNTER
Note reviewed and awareness that patient has refused community services offered with [facility]    We have [facility] and [facility] patient's area and the referral in order was faxed so she should be able to get this home service soon

## 2022-10-12 RX ORDER — AMITRIPTYLINE HYDROCHLORIDE 25 MG/1
25 TABLET, FILM COATED ORAL NIGHTLY
Qty: 90 TABLET | Refills: 1 | Status: SHIPPED | OUTPATIENT
Start: 2022-10-12

## 2022-10-19 ENCOUNTER — HOSPITAL ENCOUNTER (OUTPATIENT)
Dept: PHARMACY | Age: 77
Setting detail: THERAPIES SERIES
Discharge: HOME | End: 2022-10-19
Payer: MEDICARE

## 2022-10-19 VITALS — DIASTOLIC BLOOD PRESSURE: 78 MMHG | SYSTOLIC BLOOD PRESSURE: 111 MMHG | HEART RATE: 73 BPM

## 2022-10-19 DIAGNOSIS — I48.91 ATRIAL FIBRILLATION, UNSPECIFIED TYPE (HCC): Primary | ICD-10-CM

## 2022-10-19 LAB — INR BLD: 2.2

## 2022-10-19 PROCEDURE — 85610 PROTHROMBIN TIME: CPT

## 2022-10-19 PROCEDURE — 99211 OFF/OP EST MAY X REQ PHY/QHP: CPT

## 2022-10-19 RX ORDER — ACETAMINOPHEN 500 MG
500 TABLET ORAL EVERY 6 HOURS PRN
COMMUNITY

## 2022-10-19 NOTE — PROGRESS NOTES
[address]/[place]  Medication Management  ANTICOAGULATION    Referring Provider: Dr Lorraine Quintana INR: 2.0-3.0     TODAY'S INR: 2.2     WARFARIN Dosage: Continue 2.5 mg  and 5 mg all other days of the week     INR (no units)   Date Value   10/19/2022 2.2   2022 2.1   08/10/2022 2   2022 2.3   2022 2.5   2022 3.4   2022 2.9       Medication changes:  Taking APAP 1500 mg QAM  No longer taking Tylenol PM    Notes:    Fingerstick INR drawn per clinic protocol. Patient states no visible blood in urine and no black tarry stool. Denies any missed  or extra doses of warfarin. Taking APAP 1500 mg QAM and is no longer taking Tylenol PM. Suggested more administrations of lower dose APAP vs 3 tabs at once. No change in other maintenance medications or in diet. Started PT and is walking better but still not able to walk distances. Continue current dosing and will recheck INR in 4 weeks. Was offering 6 weeks but pt has appt on  with PCP and although clinic is closed on , agreed to see her this time since she is needing the van to get to appts. Patient acknowledges working in consult agreement with pharmacist as referred by his/her physician.                   For Pharmacy Admin Tracking Only    Intervention Detail: Adherence Monitorin  Total # of Interventions Recommended: 1  Total # of Interventions Accepted: 1  Time Spent (min): 30    Caryn DowneyD 10/19/2022 10:48 AM

## 2022-11-01 ENCOUNTER — OFFICE VISIT (OUTPATIENT)
Dept: CARDIOLOGY CLINIC | Age: 77
End: 2022-11-01
Payer: MEDICARE

## 2022-11-01 VITALS — DIASTOLIC BLOOD PRESSURE: 73 MMHG | SYSTOLIC BLOOD PRESSURE: 115 MMHG

## 2022-11-01 DIAGNOSIS — I34.0 MITRAL VALVE INSUFFICIENCY, UNSPECIFIED ETIOLOGY: ICD-10-CM

## 2022-11-01 DIAGNOSIS — I48.20 CHRONIC ATRIAL FIBRILLATION (HCC): Primary | ICD-10-CM

## 2022-11-01 DIAGNOSIS — E78.5 HYPERLIPIDEMIA, UNSPECIFIED HYPERLIPIDEMIA TYPE: ICD-10-CM

## 2022-11-01 PROCEDURE — G8484 FLU IMMUNIZE NO ADMIN: HCPCS | Performed by: INTERNAL MEDICINE

## 2022-11-01 PROCEDURE — 1090F PRES/ABSN URINE INCON ASSESS: CPT | Performed by: INTERNAL MEDICINE

## 2022-11-01 PROCEDURE — 99214 OFFICE O/P EST MOD 30 MIN: CPT | Performed by: INTERNAL MEDICINE

## 2022-11-01 PROCEDURE — G8399 PT W/DXA RESULTS DOCUMENT: HCPCS | Performed by: INTERNAL MEDICINE

## 2022-11-01 PROCEDURE — 1123F ACP DISCUSS/DSCN MKR DOCD: CPT | Performed by: INTERNAL MEDICINE

## 2022-11-01 PROCEDURE — G8428 CUR MEDS NOT DOCUMENT: HCPCS | Performed by: INTERNAL MEDICINE

## 2022-11-01 PROCEDURE — 1036F TOBACCO NON-USER: CPT | Performed by: INTERNAL MEDICINE

## 2022-11-01 PROCEDURE — G8417 CALC BMI ABV UP PARAM F/U: HCPCS | Performed by: INTERNAL MEDICINE

## 2022-11-01 NOTE — LETTER
Yaa Armenta MD  [facility]  [address], [place] [address]  [phone]      2022      JUSTIN Isaacs, [facility] [address]      RE:   Alonso Bonner  :  1945      Dear Nakia Ruiz:    CHIEF COMPLAINT:  1. Chronic atrial fibrillation. 2.  On Coumadin. 3.  Severe mitral regurgitation. HISTORY OF PRESENT ILLNESS:  I had the pleasure of seeing Mrs. Britt Roberson in our office on 2022. She is a pleasant 77-year-old female, very inactive because of severe arthritis and chronic back pain, essentially wheelchair-bound. She does have chronic atrial fibrillation, has been on Coumadin and rate control for many years. We did not attempt cardioversion but instead did anticoagulation and rate control as she is totally asymptomatic. She has a long history of mitral regurgitation, in the moderate to moderate-to-severe category with an EF in the 45% to 50% range. She has never had a myocardial infarction or cardiac catheterization. Does have a long history of shortness of breath. She has had no chest pain or chest discomfort. She has been on Elavil for sleep, does have some dizziness. Denies any unusual shortness of breath. No edema. She is getting home physical therapy. Her knees are very bad. She can do no activity. She has had no hospitalizations or procedures since I last saw her. CARDIAC RISK FACTORS:  Hypertension:  Positive. Hyperlipidemia:  Positive. Peripheral Vascular Disease:  Negative. Smoking:  Negative. Diabetes:  Negative. Other Family Members:  Negative.     MEDICATIONS AT HOME:  She is currently on Zyloprim 300 mg half a tablet daily, Elavil 25 mg nightly for back pain and insomnia, aspirin 81 mg daily, atenolol 50 mg daily, vitamin D 2000 units b.i.d., Cardizem 60 mg t.i.d., iron 5 grains daily, Lasix 40 mg daily with an extra tablet if needed for edema, spironolactone 25 mg b.i.d., Coumadin as directed. PAST MEDICAL AND SURGICAL HISTORY:  1. Tubal ligation, many years ago. 2.  Tonsillectomy, years ago. 3.  Hypertension, well controlled. 4.  Probable sleep apnea with long snoring history although she has never agreed for a sleep study. 5.  Atrial fibrillation, on Coumadin and rate control. 6.  Pulmonary hypertension. 7.  Carpal tunnel release, 7 years ago. 8.  Left knee replacement, Dr. Claude Idler in 07/2019.  9.  Colonoscopy in 2017 and on 07/27/2021. FAMILY HISTORY:  No significant heart disease in the family. SOCIAL HISTORY:  She is 76years old,  for eight and a half years, three children. Granddaughter 28, works for [employer], got  and moved into the house that Mrs. Carlton Napoles was raised in. She does not smoke or drink alcohol. REVIEW OF SYSTEMS:  Cardiac as above. Other systems reviewed including constitutional, eyes, ears, nose and throat, cardiovascular, respiratory, GI, , musculoskeletal, integumentary, neurologic, endocrine, hematologic and allergic/immunologic, are negative except for what is described above. No weight loss or weight gain. No change in bowel habits. No blood in stools. No fevers, sweats or chills. PHYSICAL EXAMINATION:  VITAL SIGNS:  Her blood pressure was 115/73 with a heart rate of 70 and irregularly irregular. Respirations were 18. O2 saturation 94%. GENERAL:  She is a very pleasant 64-year-old female. Denied pain. She was oriented to person, place and time. Answered questions appropriately. SKIN:  No unusual skin changes. HEENT:  The pupils are equally round and reactive to light and accommodation. Extraocular movements were intact. Mucous membranes were dry. NECK:  No JVD. Good carotid pulses. No carotid bruits. No lymphadenopathy or thyromegaly. CARDIOVASCULAR EXAM:  S1 and S2 were normal.  No S3 or S4. Soft systolic blowing type murmur. No diastolic murmur.   PMI was normal.  No lift, thrust, or pericardial friction rub. LUNGS:  Clear to auscultation and percussion. ABDOMEN:  Soft and nontender. Good bowel sounds. EXTREMITIES:  Good femoral pulses. Good pedal pulses. No pedal edema. Skin was warm and dry. No calf tenderness. Nail beds pink. Good cap refill. PULSES:  Bilateral symmetrical radial, brachial and carotid pulses. No carotid bruits. Good femoral and pedal pulses. NEUROLOGIC EXAM:  Within normal limits. PSYCHIATRIC EXAM:  Within normal limits. LABORATORY DATA:  Her sodium was 138, potassium 4.3, BUN 23, creatinine 0.97, GFR 55, magnesium 2.1, glucose 120, calcium was 9.6. Cholesterol 205, HDL 51, , triglycerides 198. Her ALT was 31, AST was 23. TSH 1.31. White count 8.2, hemoglobin 14.9 with a platelet count of [phone]. EKG showed atrial fibrillation with left axis deviation and possible old anterior myocardial infarction with no change from previous EKGs. Chest x-ray was unremarkable on 08/16/2022. IMPRESSION:  1. Chronic atrial fibrillation, with rate control on Coumadin. 2.  Moderate to moderate-to-severe mitral regurgitation, very pressure dependent. 3.  Chronic shortness of breath due to COPD and deconditioning. 4.  Mild LV dysfunction, EF in the 45% to 50% range. 5.  Hypertension, well controlled. 6.  Hyperlipidemia, well controlled. 7.  Severe arthritis in both knees with left knee replacement in 07/2019, with her essentially wheelchair-bound. 8.  Chronic back pain. PLAN:  1. No change in medications. 2.  See in 1 year. DISCUSSION:   Son is doing relatively well. She denies any chest pain or chest discomfort or any unusual shortness of breath. She is extremely inactive because of severe arthritis. However, within the limits of her activity, she denies any chest pain or chest discomfort. I will plan on seeing her in 1 year unless a problem would develop.     Thank you very much for allowing me the privilege of seeing Mrs. Blake Macdonald. If you have any questions on my thoughts, please do not hesitate to contact me.     Sincerely,        Martha Swann    D: 11/07/2022 10:01:58     T: 11/07/2022 10:06:05     [email or web address]  Job#: [id number]   Doc#: [id number]

## 2022-11-01 NOTE — PROGRESS NOTES
Ov Dr. Randee Muñoz for one year follow up   No chest pain or heaviness  C/o dizziness since on elavil   No new sob  No edema  Was getting home PT   But knees bad and hurt now         No changes  Follow up in one year

## 2022-11-08 NOTE — PROGRESS NOTES
Brant Walton MD  [facility]  [address] [place], [place] [address]  [phone]      2022      Romana Quick, CNP [place] [address] [address], [place] [address]      RE:   Candelaria Seek  :  1945      Dear Divya Payne:    CHIEF COMPLAINT:  1. Chronic atrial fibrillation. 2.  On Coumadin. 3.  Severe mitral regurgitation. HISTORY OF PRESENT ILLNESS:  I had the pleasure of seeing Mrs. Carlton Napoles in our office on 2022. She is a pleasant 27-year-old female, very inactive because of severe arthritis and chronic back pain, essentially wheelchair-bound. She does have chronic atrial fibrillation, has been on Coumadin and rate control for many years. We did not attempt cardioversion but instead did anticoagulation and rate control as she is totally asymptomatic. She has a long history of mitral regurgitation, in the moderate to moderate-to-severe category with an EF in the 45% to 50% range. She has never had a myocardial infarction or cardiac catheterization. Does have a long history of shortness of breath. She has had no chest pain or chest discomfort. She has been on Elavil for sleep, does have some dizziness. Denies any unusual shortness of breath. No edema. She is getting home physical therapy. Her knees are very bad. She can do no activity. She has had no hospitalizations or procedures since I last saw her. CARDIAC RISK FACTORS:  Hypertension:  Positive. Hyperlipidemia:  Positive. Peripheral Vascular Disease:  Negative. Smoking:  Negative. Diabetes:  Negative. Other Family Members:  Negative.     MEDICATIONS AT HOME:  She is currently on Zyloprim 300 mg half a tablet daily, Elavil 25 mg nightly for back pain and insomnia, aspirin 81 mg daily, atenolol 50 mg daily, vitamin D 2000 units b.i.d., Cardizem 60 mg t.i.d., iron 5 grains daily, Lasix 40 mg daily with an extra tablet if needed for edema, spironolactone 25 mg b.i.d., Coumadin as directed. PAST MEDICAL AND SURGICAL HISTORY:  1. Tubal ligation, many years ago. 2.  Tonsillectomy, years ago. 3.  Hypertension, well controlled. 4.  Probable sleep apnea with long snoring history although she has never agreed for a sleep study. 5.  Atrial fibrillation, on Coumadin and rate control. 6.  Pulmonary hypertension. 7.  Carpal tunnel release, 7 years ago. 8.  Left knee replacement, Dr. Bogdan Burden in 07/2019.  9.  Colonoscopy in 2017 and on 07/27/2021. FAMILY HISTORY:  No significant heart disease in the family. SOCIAL HISTORY:  She is 76years old,  for eight and a half years, three children. Granddaughter 28, works for [employer], got  and moved into the house that Mrs. Selina Guy was raised in. She does not smoke or drink alcohol. REVIEW OF SYSTEMS:  Cardiac as above. Other systems reviewed including constitutional, eyes, ears, nose and throat, cardiovascular, respiratory, GI, , musculoskeletal, integumentary, neurologic, endocrine, hematologic and allergic/immunologic, are negative except for what is described above. No weight loss or weight gain. No change in bowel habits. No blood in stools. No fevers, sweats or chills. PHYSICAL EXAMINATION:  VITAL SIGNS:  Her blood pressure was 115/73 with a heart rate of 70 and irregularly irregular. Respirations were 18. O2 saturation 94%. GENERAL:  She is a very pleasant 66-year-old female. Denied pain. She was oriented to person, place and time. Answered questions appropriately. SKIN:  No unusual skin changes. HEENT:  The pupils are equally round and reactive to light and accommodation. Extraocular movements were intact. Mucous membranes were dry. NECK:  No JVD. Good carotid pulses. No carotid bruits. No lymphadenopathy or thyromegaly. CARDIOVASCULAR EXAM:  S1 and S2 were normal.  No S3 or S4. Soft systolic blowing type murmur. No diastolic murmur.   PMI was normal.  No lift, thrust, or pericardial friction rub. LUNGS:  Clear to auscultation and percussion. ABDOMEN:  Soft and nontender. Good bowel sounds. EXTREMITIES:  Good femoral pulses. Good pedal pulses. No pedal edema. Skin was warm and dry. No calf tenderness. Nail beds pink. Good cap refill. PULSES:  Bilateral symmetrical radial, brachial and carotid pulses. No carotid bruits. Good femoral and pedal pulses. NEUROLOGIC EXAM:  Within normal limits. PSYCHIATRIC EXAM:  Within normal limits. LABORATORY DATA:  Her sodium was 138, potassium 4.3, BUN 23, creatinine 0.97, GFR 55, magnesium 2.1, glucose 120, calcium was 9.6. Cholesterol 205, HDL 51, , triglycerides 198. Her ALT was 31, AST was 23. TSH 1.31. White count 8.2, hemoglobin 14.9 with a platelet count of 452,709. EKG showed atrial fibrillation with left axis deviation and possible old anterior myocardial infarction with no change from previous EKGs. Chest x-ray was unremarkable on 08/16/2022. IMPRESSION:  1. Chronic atrial fibrillation, with rate control on Coumadin. 2.  Moderate to moderate-to-severe mitral regurgitation, very pressure dependent. 3.  Chronic shortness of breath due to COPD and deconditioning. 4.  Mild LV dysfunction, EF in the 45% to 50% range. 5.  Hypertension, well controlled. 6.  Hyperlipidemia, well controlled. 7.  Severe arthritis in both knees with left knee replacement in 07/2019, with her essentially wheelchair-bound. 8.  Chronic back pain. PLAN:  1. No change in medications. 2.  See in 1 year. DISCUSSION:  Earl Douglas is doing relatively well. She denies any chest pain or chest discomfort or any unusual shortness of breath. She is extremely inactive because of severe arthritis. However, within the limits of her activity, she denies any chest pain or chest discomfort. I will plan on seeing her in 1 year unless a problem would develop.     Thank you very much for allowing me the privilege of seeing Mrs. Lito Cabrera. If you have any questions on my thoughts, please do not hesitate to contact me.     Sincerely,        Arden Bazan    D: 11/07/2022 10:01:58     T: 11/07/2022 10:06:05     [email or web address]  Job#: [id number]   Doc#: [id number]

## 2022-11-10 ENCOUNTER — APPOINTMENT (OUTPATIENT)
Dept: GENERAL RADIOLOGY | Age: 77
End: 2022-11-10
Payer: MEDICARE

## 2022-11-10 ENCOUNTER — HOSPITAL ENCOUNTER (EMERGENCY)
Age: 77
Discharge: HOME | End: 2022-11-11
Attending: FAMILY MEDICINE
Payer: MEDICARE

## 2022-11-10 DIAGNOSIS — Z79.01 LONG TERM CURRENT USE OF ANTICOAGULANTS WITH INR GOAL OF 2.0-3.0: ICD-10-CM

## 2022-11-10 DIAGNOSIS — I48.91 ATRIAL FIBRILLATION WITH RVR (HCC): ICD-10-CM

## 2022-11-10 DIAGNOSIS — Z20.822 LAB TEST NEGATIVE FOR COVID-19 VIRUS: ICD-10-CM

## 2022-11-10 DIAGNOSIS — R04.0 EPISTAXIS: Primary | ICD-10-CM

## 2022-11-10 LAB
ABSOLUTE EOS #: 0.2 K/UL (ref 0–0.4)
ABSOLUTE LYMPH #: 1.1 K/UL (ref 1–4.8)
ABSOLUTE MONO #: 1.4 K/UL (ref 0–1)
ANION GAP SERPL CALCULATED.3IONS-SCNC: 8 MMOL/L (ref 9–17)
BASOPHILS # BLD: 0 % (ref 0–2)
BASOPHILS ABSOLUTE: 0 K/UL (ref 0–0.2)
BUN BLDV-MCNC: 33 MG/DL (ref 8–23)
BUN/CREAT BLD: 30 (ref 9–20)
CALCIUM SERPL-MCNC: 9.1 MG/DL (ref 8.6–10.4)
CHLORIDE BLD-SCNC: 98 MMOL/L (ref 98–107)
CO2: 32 MMOL/L (ref 20–31)
CREAT SERPL-MCNC: 1.1 MG/DL (ref 0.5–0.9)
DIFFERENTIAL TYPE: YES
EOSINOPHILS RELATIVE PERCENT: 1 % (ref 0–5)
GFR SERPL CREATININE-BSD FRML MDRD: 51 ML/MIN/1.73M2
GLUCOSE BLD-MCNC: 178 MG/DL (ref 70–99)
HCT VFR BLD CALC: 40.7 % (ref 36–46)
HEMOGLOBIN: 13.4 G/DL (ref 12–16)
INR BLD: 1.8
LYMPHOCYTES # BLD: 8 % (ref 15–40)
MCH RBC QN AUTO: 29.8 PG (ref 26–34)
MCHC RBC AUTO-ENTMCNC: 32.8 G/DL (ref 31–37)
MCV RBC AUTO: 90.7 FL (ref 80–100)
MONOCYTES # BLD: 9 % (ref 4–8)
PDW BLD-RTO: 15.1 % (ref 12.1–15.2)
PLATELET # BLD: 236 K/UL (ref 140–450)
POTASSIUM SERPL-SCNC: 5.2 MMOL/L (ref 3.7–5.3)
PRO-BNP: 968 PG/ML
PROTHROMBIN TIME: 21 SEC (ref 11.5–14.2)
RBC # BLD: 4.49 M/UL (ref 4–5.2)
SARS-COV-2, RAPID: NOT DETECTED
SEG NEUTROPHILS: 82 % (ref 47–75)
SEGMENTED NEUTROPHILS ABSOLUTE COUNT: 12.2 K/UL (ref 2.5–7)
SODIUM BLD-SCNC: 138 MMOL/L (ref 135–144)
SPECIMEN DESCRIPTION: NORMAL
WBC # BLD: 14.9 K/UL (ref 3.5–11)

## 2022-11-10 PROCEDURE — 83880 ASSAY OF NATRIURETIC PEPTIDE: CPT

## 2022-11-10 PROCEDURE — 80048 BASIC METABOLIC PNL TOTAL CA: CPT

## 2022-11-10 PROCEDURE — 85610 PROTHROMBIN TIME: CPT

## 2022-11-10 PROCEDURE — 85025 COMPLETE CBC W/AUTO DIFF WBC: CPT

## 2022-11-10 PROCEDURE — 96375 TX/PRO/DX INJ NEW DRUG ADDON: CPT

## 2022-11-10 PROCEDURE — 71045 X-RAY EXAM CHEST 1 VIEW: CPT

## 2022-11-10 PROCEDURE — 87635 SARS-COV-2 COVID-19 AMP PRB: CPT

## 2022-11-10 PROCEDURE — 99285 EMERGENCY DEPT VISIT HI MDM: CPT

## 2022-11-10 PROCEDURE — 30901 CONTROL OF NOSEBLEED: CPT

## 2022-11-10 PROCEDURE — 36415 COLL VENOUS BLD VENIPUNCTURE: CPT

## 2022-11-10 PROCEDURE — 96374 THER/PROPH/DIAG INJ IV PUSH: CPT

## 2022-11-10 PROCEDURE — 6370000000 HC RX 637 (ALT 250 FOR IP): Performed by: FAMILY MEDICINE

## 2022-11-10 RX ORDER — OXYMETAZOLINE HYDROCHLORIDE 0.05 G/100ML
2 SPRAY NASAL ONCE
Status: COMPLETED | OUTPATIENT
Start: 2022-11-10 | End: 2022-11-10

## 2022-11-10 RX ORDER — DILTIAZEM HYDROCHLORIDE 5 MG/ML
10 INJECTION INTRAVENOUS ONCE
Status: COMPLETED | OUTPATIENT
Start: 2022-11-11 | End: 2022-11-11

## 2022-11-10 RX ADMIN — OXYMETAZOLINE HYDROCHLORIDE 2 SPRAY: 0.05 SPRAY NASAL at 22:49

## 2022-11-10 ASSESSMENT — PAIN - FUNCTIONAL ASSESSMENT: PAIN_FUNCTIONAL_ASSESSMENT: NONE - DENIES PAIN

## 2022-11-11 VITALS
WEIGHT: 263 LBS | HEIGHT: 63 IN | HEART RATE: 116 BPM | OXYGEN SATURATION: 96 % | BODY MASS INDEX: 46.6 KG/M2 | TEMPERATURE: 99.5 F | RESPIRATION RATE: 19 BRPM | DIASTOLIC BLOOD PRESSURE: 88 MMHG | SYSTOLIC BLOOD PRESSURE: 136 MMHG

## 2022-11-11 PROCEDURE — 93005 ELECTROCARDIOGRAM TRACING: CPT | Performed by: FAMILY MEDICINE

## 2022-11-11 PROCEDURE — 2500000003 HC RX 250 WO HCPCS: Performed by: FAMILY MEDICINE

## 2022-11-11 PROCEDURE — 6370000000 HC RX 637 (ALT 250 FOR IP): Performed by: FAMILY MEDICINE

## 2022-11-11 RX ORDER — ATENOLOL 50 MG/1
50 TABLET ORAL ONCE
Status: COMPLETED | OUTPATIENT
Start: 2022-11-11 | End: 2022-11-11

## 2022-11-11 RX ORDER — METOPROLOL TARTRATE 5 MG/5ML
5 INJECTION INTRAVENOUS ONCE
Status: COMPLETED | OUTPATIENT
Start: 2022-11-11 | End: 2022-11-11

## 2022-11-11 RX ADMIN — ATENOLOL 50 MG: 50 TABLET ORAL at 01:30

## 2022-11-11 RX ADMIN — METOPROLOL TARTRATE 5 MG: 5 INJECTION INTRAVENOUS at 00:39

## 2022-11-11 RX ADMIN — DILTIAZEM HYDROCHLORIDE 30 MG: 30 TABLET, FILM COATED ORAL at 01:30

## 2022-11-11 RX ADMIN — DILTIAZEM HYDROCHLORIDE 10 MG: 5 INJECTION INTRAVENOUS at 00:14

## 2022-11-11 NOTE — ED NOTES
Talked to daughter, Farzad Vaughn, at this time and gave her update on patient.       Courtney Cole RN  11/10/22 2694

## 2022-11-11 NOTE — ED PROVIDER NOTES
[address] [facility]  eMERGENCY dEPARTMENT eNCOUnter          [address] [facility]       Chief Complaint   Patient presents with    Epistaxis     Nose bleed that has been going on for a few hours and has not stopped. Nurses Notes reviewed and I agree except as noted in the HPI. HISTORY OF PRESENT ILLNESS    María Laird is a 76 y.o. female who presents to the emergency room via EMS from home, patient planing of epistaxis, onset over the past 2 hours, denies any trauma preceding the event, states been unable to stop it at home. Patient is noted to be on Coumadin secondary to her known atrial fibrillation. REVIEW OF SYSTEMS     Review of Systems   All other systems reviewed and are negative. PAST MEDICAL HISTORY    has a past medical history of Atrial fibrillation ([address] CHF (congestive heart failure) ([address] Diverticulosis, Gout, Hearing loss, History of colon polyps, Hypertension, Kidney stones, Mitral valve insufficiency, Obesity, morbid, BMI 40.0-49.9 ([address] Osteoarthritis, Primary osteoarthritis of both knees, and Pulmonary HTN ([address] SURGICAL HISTORY      has a past surgical history that includes Tubal ligation; Carpal tunnel release; Lithotripsy; Upper gastrointestinal endoscopy (11/28/2016); Colonoscopy (11/28/2016); Colonoscopy (05/2017); Colonoscopy (06/14/2018, 10/2019, 10/27/2021); joint replacement (Left, 07/17/2019); Total knee arthroplasty (Left, 07/17/2019); Pain management procedure (N/A, 09/04/2020); Pain management procedure (Bilateral, 10/02/2020); Nerve Block (Left, 11/06/2020); and Pain management procedure (Right, 01/15/2021).     CURRENT MEDICATIONS       Discharge Medication List as of 11/11/2022  2:59 AM        CONTINUE these medications which have NOT CHANGED    Details   acetaminophen (TYLENOL) 500 MG tablet Take 500 mg by mouth every 6 hours as needed for Pain Taking 3 tabs QAMHistorical Med      amitriptyline (ELAVIL) 25 MG tablet Take 1 tablet by mouth nightly For low back pain, insomnia, Disp-90 tablet, R-1Normal      furosemide (LASIX) 40 MG tablet TAKE 1 TABLET DAILY, TAKE EXTRA TABLET IF NEEDED FOR EDEMA, Disp-180 tablet, R-3Normal      triamcinolone (KENALOG) 0.1 % cream Apply to the affected area right arm and chest dry red rash spots topically 3 x a week., Disp-45 g, R-0, Normal      dilTIAZem (CARDIZEM) 60 MG tablet TAKE 1 TABLET THREE TIMES DAILY, Disp-270 tablet, R-3Normal      !! warfarin (COUMADIN) 10 MG tablet TAKE 1/2 TABLET daily (except SATURDAYS) OR AS DIRECTED. Managed by [address], Disp-90 tablet, R-3Adjust Sig      !! warfarin (COUMADIN) 2.5 MG tablet Take 1 tablet daily or as directed. Managed by [address], Disp-30 tablet, R-1Called in new Rx to physician refill line at [facility], 6/21/2022 at 1609Phone In      ferrous gluconate (FERGON) 324 (38 Fe) MG tablet Take 1 tablet by mouth daily On empty stomach for iron deficiency anemia, Disp-30 tablet, R-2Normal      allopurinol (ZYLOPRIM) 300 MG tablet TAKE 1/2 TABLET EVERY DAY, Disp-45 tablet, R-0Normal      mupirocin (BACTROBAN NASAL) 2 % nasal ointment Apply pea sized amount to nasal septum 3 times daily. , Disp-1 g, R-3, Normal      atenolol (TENORMIN) 50 MG tablet TAKE 1 TABLET EVERY DAY, Disp-90 tablet, R-3Normal      spironolactone (ALDACTONE) 25 MG tablet TAKE 1 TABLET TWICE DAILY, Disp-180 tablet, R-3Normal      diphenhydrAMINE-APAP, sleep, (TYLENOL PM EXTRA STRENGTH)  MG tablet Take 2 tablets by mouth nightlyHistorical Med      aspirin 81 MG EC tablet Take 81 mg by mouth dailyHistorical Med      Cholecalciferol (VITAMIN D) 2000 UNITS CAPS capsule Take 2,000 Units by mouth 2 times daily. !! - Potential duplicate medications found. Please discuss with provider.           ALLERGIES     is allergic to clarithromycin, gabapentin, merthiolate glycerite [thimerosal], and tylenol with codeine #3 [acetaminophen-codeine]. FAMILY HISTORY     She indicated that her mother is . She indicated that her father is . She indicated that her brother is . She indicated that her maternal grandmother is . She indicated that her maternal grandfather is . She indicated that her paternal grandmother is . She indicated that her paternal grandfather is . She indicated that her daughter is alive. She indicated that both of her sons are alive. family history includes Diabetes in her father; High Blood Pressure in her mother. SOCIAL HISTORY      reports that she quit smoking about 41 years ago. Her smoking use included cigarettes. She has a 1.00 pack-year smoking history. She has never used smokeless tobacco. She reports current alcohol use. She reports that she does not use drugs. PHYSICAL EXAM     INITIAL VITALS:  height is 5' 3\" (1.6 m) and weight is 263 lb (119.3 kg). Her oral temperature is 99.5 °F (37.5 °C). Her blood pressure is 136/88 and her pulse is 116 (abnormal). Her respiration is 19 and oxygen saturation is 96%. Physical Exam   Constitutional: Patient is oriented to person, place, and time. Patient appears well-developed and well-nourished. Patient is active and cooperative. HENT:   Head: Normocephalic and atraumatic. Head is without contusion. Right Ear: Hearing and external ear normal. No drainage. Left Ear: Hearing and external ear normal. No drainage. Nose: Nose normal. No nasal deformity. Noted primary in the left nares though small lesions noted in the right on otoscopic exam,  Mouth/Throat: Mucous membranes are not dry. Noted blood clots posterior pharynx and blood throughout patient's oral cavity, although no active bleed in the oral cavity. Eyes: EOMI. Conjunctivae, sclera, and lids are normal. Right eye exhibits no discharge. Left eye exhibits no discharge.    Neck: Full passive range of motion without pain and phonation normal.   Cardiovascular:  Normal rate, irregular rhythm and intact distal pulses. No lower extremity pitting edema. Pulses: Right radial pulse  2+   Pulmonary/Chest: Effort normal. No tachypnea and no bradypnea. No wheezes, rhonchi, or rales. Abdominal: BMI 46.5, soft. Patient without distension or tenderness  Musculoskeletal:   Negative acute trauma or deformity,  apparent full range of motion and normal strength all extremities appropriate to age. Neurological: Patient is alert and oriented to person, place, and time. patient displays no tremor. Patient displays no seizure activity. Skin: Skin is warm and dry. Patient is not diaphoretic. Psychiatric: Patient has a normal mood and affect. Patient speech is normal and behavior is normal. Cognition and memory are normal.     DIFFERENTIAL DIAGNOSIS:   Epistaxis anterior versus posterior    DIAGNOSTIC RESULTS     EKG @ 0003 hrs -SVT vs sinus tachycardia, rate 158, left axis, normal intervals      RADIOLOGY: non-plain film images(s) such as CT, Ultrasound and MRI are read by the radiologist.  XR CHEST PORTABLE   Final Result      1. Stable enlarged cardiac silhouette with clear lungs. 2. Suspect enlargement of the right main pulmonary artery which can be seen in    those with pulmonary hypertension.              LABS:   Labs Reviewed   BASIC METABOLIC PANEL - Abnormal; Notable for the following components:       Result Value    Glucose 178 (*)     BUN 33 (*)     Creatinine 1.10 (*)     Est, Glom Filt Rate 51 (*)     Bun/Cre Ratio 30 (*)     CO2 32 (*)     Anion Gap 8 (*)     All other components within normal limits   CBC WITH AUTO DIFFERENTIAL - Abnormal; Notable for the following components:    WBC 14.9 (*)     Seg Neutrophils 82 (*)     Lymphocytes 8 (*)     Monocytes 9 (*)     Segs Absolute 12.20 (*)     Absolute Mono # 1.40 (*)     All other components within normal limits   PROTIME-INR - Abnormal; Notable for the following components:    Protime 21.0 (*) All other components within normal limits   BRAIN NATRIURETIC PEPTIDE - Abnormal; Notable for the following components:    Pro- (*)     All other components within normal limits   COVID-19, RAPID       EMERGENCY DEPARTMENT COURSE:   Vitals:    Vitals:    11/10/22 2332 11/11/22 0025 11/11/22 0110 11/11/22 0130   BP:   120/68 136/88   Pulse:  (!) 156 (!) 108 (!) 116   Resp:  (!) 31 19    Temp:       TempSrc:       SpO2:  96%     Weight: 263 lb (119.3 kg)      Height: 5' 3\" (1.6 m)        Patient presents via EMS and brought to ER room #3 and transferred to [facility], initial primary and secondary survey performed, including evaluation of the nose using operating otoscope, appears to be an anterior bleed primarily from the left though there are small irritations in the right septum, patient does appear to have copious blood in the oral cavity in the form of likely postnasal drainage from the bleed, and numerous clots that she indicates in the back of her throat. Given the patient is on Coumadin to her atrial fibrillation, will get blood work, will place nasal clamp with Afrin, and reevaluate. I do advise patient to try using a carbonated beverage such as 7-Up/bright to help rinse some of the blood coating and clots out of her mouth, that she can spit it out, versus using a towel and continuously trying to wipe it off her tongue which is not doing a very good job. Patient sitting in bed semi-Fowlers, placed on pulse ox, acknowledges    EMS and the patient pulse ox was 99 2% and placed on O2 via nasal cannula, I am concerned with the dry oxygen actually potentially worsening the bleed in her nose, will keep patient on pulse ox and watch closely, will get a chest x-ray to look for possible cause behind patient's decreased oxygen level that we have gotten better levels with good waveforms.     Initial labs reviewed, WBC 14.9 with 82% PMNs no bands, H&H 13.4/40.7, , INR 1.8, SCR 1.10, BUN 33, normal electrolytes    Difficulty keeping the nasal clamp on patient's nose, other than slipping off her patient moving it, patient continuously using towel to pull blood off her tongue or coughing up small clots, there is concern regarding patient's pulse ox, and even once again now with good waveform patient appears to be somewhat hypoxic, I did add pBNP to patient's labs, initial review of chest x-ray did not show any gross pneumonia or effusion however does appear to have some pulmonary edema, awaiting formal radiology read. Consideration for placing a Rhino Rocket in the left nares at this time. Radiology report reviewed, showing likely right main pulmonary enlargement consistent with pulmonary hypertension, enlarged cardiac silhouette, no gross consolidation effusion or pneumothorax    pBNP 968, rapid COVID-negative    Patient gone to the bathroom, being taken via wheelchair, the bathroom, continues to have nasal bleeding from the left nares, at this time we have tried pressure as well as Afrin plus pressure, and evaluated a operating otoscope concern for a potential posterior bleed, will place Rhino Rocket at this time    With patient sitting upright in wheelchair, did place a 7.5 cm Rhino Rocket in the left nares, well-lubricated with surgical lubricant during placement, inflated with 6 cc air as patient was unable to tolerate in addition, we will continue to watch for any bleeding around the site.     Patient heart rate noted to increase again, will get EKG, consideration for giving her home dose diltiazem and atenolol    EKG as above, in the interim patient appeared to go back more into a atrial fib with RVR, however given the patient not had her evening medications, go ahead and continue with him as below    Patient's heart rate did slow down a little with the diltiazem and appears to be more in a A. fib rhythm 100-1 20 with occasional P waves noted, blood pressure maintained well, will give her normal dose atenolol 50 mg p.o., and will give diltiazem 30 mg p.o. And continue observe, I feel we get patient's rate under control and no bleeding around the [facility] we can discharge home with outpatient follow-up    Further evaluation patient's low pulse oximetry, with readjustment, patient with good waveform showing pulse ox in the 90s, however very difficult to keep the pulse ox in the right position whether be on the finger or using an ear clip, I feel is more likely we are getting false readings versus true hypoxia. After patient been on the medication for a while, heart rate appears to be going back and forth between sinus rhythm and atrial fibrillation, with rates ranging from 90-1 15. As patient remains hemodynamically stable otherwise, I feel we can discharge her home, I would strongly advised patient to continue with her regular medications beginning first thing in the morning, Rhino Rocket remain in place for 3 days and were removed afterwards, no change in her Coumadin dosing, follow-up with primary care, will give referral to ENT if needed. I will get a message to her cardiologist regarding her dysrhythmia in the ER though I felt is likely to be due to patient having not taken her evening medication, though patient larger may need some adjustment to her medications, explained that we best to go with her cardiologist as her did not need to be multiple making different changes. Patient had gone to the bathroom again, it was noted that her stool appeared very dark, however expect dark in stool given patient likely having posterior bleed and/or an anterior bleed that she may have been essentially snorting back and and swallowing, having  Patient coming in initial presentation and coughing up large amounts of clots and pulling out large amounts from her mouth and wiping her tongue.   Given patient is hemodynamically stable with good hemoglobin/hematocrit, feel this is unlikely watch closely but does not initially to be transferred for potential GI bleed or other cause. Patient's daughter was contacted to come pick her up, she states she would have to go get her mother's car as that was the only vehicle patient to get into, did take some time we did continue to observe patient in the interim, heart rate may remained sinus rhythm and occasionally A. fib, rate observe 90-1 15. FINAL IMPRESSION      1. Epistaxis    2. Lab test negative for COVID-19 virus    3. Atrial fibrillation with RVR ([address]    4. Long term current use of anticoagulants with INR goal of 2.0-3.0          DISPOSITION/PLAN   Discharge    PATIENT REFERRED TO:  TYLOR Chaudhry CNP  [facility]    Call       Toña Mendez MD  [address] [place] [address] [phone]    Call       [facility] ED  [address] [facility] [address] [phone]    As needed, If symptoms worsen    DISCHARGE MEDICATIONS:  Discharge Medication List as of 11/11/2022  2:59 AM              Summation      Patient Course: Discharge    ED Medications administered this visit:    Medications   oxymetazoline (AFRIN) 0.05 % nasal spray 2 spray (2 sprays Each Nostril Given 11/10/22 2249)   dilTIAZem injection 10 mg (10 mg IntraVENous Given 11/11/22 0014)   metoprolol (LOPRESSOR) injection 5 mg (5 mg IntraVENous Given 11/11/22 0039)   atenolol (TENORMIN) tablet 50 mg (50 mg Oral Given 11/11/22 0130)   dilTIAZem (CARDIZEM) tablet 30 mg (30 mg Oral Given 11/11/22 0130)       New Prescriptions from this visit:    Discharge Medication List as of 11/11/2022  2:59 AM          Follow-up:  TYLOR Chaudhry  [facility]  [phone]    Call       Toña Mendez MD  [place] [address]  [phone]    Call       [facility] ED  [address] [facility] [address] [phone]    As needed, If symptoms worsen      Final Impression:   1. Epistaxis    2. Lab test negative for COVID-19 virus    3. Atrial fibrillation with RVR ([address]    4.  Long term current use of anticoagulants with INR goal of 2.0-3.0               (Please note that portions of this note were completed with a voice recognition program.  Efforts were made to edit the dictations but occasionally words are mis-transcribed.)    MD Dany Lema MD  11/11/22 [address] [facility], MD  11/11/22 3340

## 2022-11-13 ENCOUNTER — HOSPITAL ENCOUNTER (EMERGENCY)
Age: 77
Discharge: HOME | End: 2022-11-13
Attending: EMERGENCY MEDICINE
Payer: MEDICARE

## 2022-11-13 VITALS — RESPIRATION RATE: 20 BRPM

## 2022-11-13 DIAGNOSIS — Z48.00 ENCOUNTER FOR REMOVAL OF NASAL PACKING: Primary | ICD-10-CM

## 2022-11-13 DIAGNOSIS — R04.0 EPISTAXIS: ICD-10-CM

## 2022-11-13 LAB
EKG ATRIAL RATE: 163 BPM
EKG Q-T INTERVAL: 280 MS
EKG QRS DURATION: 82 MS
EKG QTC CALCULATION (BAZETT): 454 MS
EKG R AXIS: -30 DEGREES
EKG T AXIS: 67 DEGREES
EKG VENTRICULAR RATE: 158 BPM

## 2022-11-13 PROCEDURE — 93010 ELECTROCARDIOGRAM REPORT: CPT | Performed by: INTERNAL MEDICINE

## 2022-11-13 PROCEDURE — 6370000000 HC RX 637 (ALT 250 FOR IP): Performed by: EMERGENCY MEDICINE

## 2022-11-13 PROCEDURE — 99283 EMERGENCY DEPT VISIT LOW MDM: CPT

## 2022-11-13 PROCEDURE — 94664 DEMO&/EVAL PT USE INHALER: CPT

## 2022-11-13 RX ORDER — HYDROCODONE BITARTRATE AND ACETAMINOPHEN 5; 325 MG/1; MG/1
1 TABLET ORAL EVERY 6 HOURS PRN
Qty: 12 TABLET | Refills: 0 | Status: SHIPPED | OUTPATIENT
Start: 2022-11-13 | End: 2022-11-16

## 2022-11-13 RX ORDER — CEPHALEXIN 500 MG/1
500 CAPSULE ORAL 3 TIMES DAILY
Qty: 21 CAPSULE | Refills: 0 | Status: SHIPPED | OUTPATIENT
Start: 2022-11-13 | End: 2022-11-20

## 2022-11-13 RX ORDER — HYDROCODONE BITARTRATE AND ACETAMINOPHEN 5; 325 MG/1; MG/1
1 TABLET ORAL ONCE
Status: COMPLETED | OUTPATIENT
Start: 2022-11-13 | End: 2022-11-13

## 2022-11-13 RX ORDER — IPRATROPIUM BROMIDE AND ALBUTEROL SULFATE 2.5; .5 MG/3ML; MG/3ML
1 SOLUTION RESPIRATORY (INHALATION) ONCE
Status: COMPLETED | OUTPATIENT
Start: 2022-11-13 | End: 2022-11-13

## 2022-11-13 RX ADMIN — IPRATROPIUM BROMIDE AND ALBUTEROL SULFATE 1 AMPULE: 2.5; .5 SOLUTION RESPIRATORY (INHALATION) at 15:19

## 2022-11-13 RX ADMIN — HYDROCODONE BITARTRATE AND ACETAMINOPHEN 1 TABLET: 5; 325 TABLET ORAL at 15:15

## 2022-11-13 ASSESSMENT — PAIN - FUNCTIONAL ASSESSMENT: PAIN_FUNCTIONAL_ASSESSMENT: ACTIVITIES ARE NOT PREVENTED

## 2022-11-13 ASSESSMENT — PAIN DESCRIPTION - LOCATION: LOCATION: NOSE

## 2022-11-13 ASSESSMENT — PAIN DESCRIPTION - ORIENTATION: ORIENTATION: LEFT

## 2022-11-13 ASSESSMENT — LIFESTYLE VARIABLES: HOW OFTEN DO YOU HAVE A DRINK CONTAINING ALCOHOL: NEVER

## 2022-11-13 ASSESSMENT — PAIN DESCRIPTION - DESCRIPTORS: DESCRIPTORS: ACHING

## 2022-11-13 ASSESSMENT — PAIN SCALES - GENERAL: PAINLEVEL_OUTOF10: 6

## 2022-11-13 NOTE — ED PROVIDER NOTES
[address] [address] COMPLAINT    Chief Complaint   Patient presents with    Nose Problem     Rhino rocket placed Friday night and needs it removed. ADRIANA Odonnell is a 76 y.o. female who presents to the ER for removal of left sided Rhino Rocket that was placed 3 days ago for epistaxis in this emergency room. She is on Coumadin. Her INR at that time was 1.8. She has no other complaints at this time. PAST MEDICAL HISTORY    Past Medical History:   Diagnosis Date    Atrial fibrillation ([address]     CHF (congestive heart failure) ([address]     Diverticulosis 07/27/2021    per Colonoscopy per Dr. Marivel Rowell    Gout     Hearing loss 2015    bilateral hearing aids     History of colon polyps 2016    Hypertension     Kidney stones     Mitral valve insufficiency     Obesity, morbid, BMI 40.0-49.9 ([address]     Osteoarthritis     Primary osteoarthritis of both knees     Dr. Christiana Guerra    Pulmonary HTN [facility])        SURGICAL HISTORY    Past Surgical History:   Procedure Laterality Date    CARPAL TUNNEL RELEASE      bilaterally    COLONOSCOPY  11/28/2016    Bry Arreola MD    COLONOSCOPY  05/2017    at [facility]. PYlori positive, adenoma, hyperplastic polyp    COLONOSCOPY  06/14/2018, 10/2019, 10/27/2021    DR. Boles -polyp x 1, diverticulosis    JOINT REPLACEMENT Left 07/17/2019    knee    LITHOTRIPSY      NERVE BLOCK Left 11/06/2020    GENICULAR NERVE BLOCK LEFT KNEE performed by Gauri Bacon MD at [facility] N/A 09/04/2020    L3,L4 MEDIAL BRANCH BLOCK , L5 DORSAL RAMI INJECTION performed by Gauri Bacon MD at [facility] Bilateral 10/02/2020    BILATERAL L3-4 MEDIAL BRANCH AND L5 DORSAL RAMI INJECTION performed by Gauri Bacon MD at [facility] Right 01/15/2021    RFA, L3-L4 MEDIAL BRANCH AND L-5 DORSEL RAMI INJECTION RIGHT performed by Gauri Bacon MD at [facility] ARTHROPLASTY Left 07/17/2019    LEFT KNEE TOTAL ARTHROPLASTY- DEPUY performed by Zuleima Caceres DO at [address] ENDOSCOPY  11/28/2016    Tatyana Jones MD       CURRENT MEDICATIONS    Current Outpatient Rx   Medication Sig Dispense Refill    cephALEXin (KEFLEX) 500 MG capsule Take 1 capsule by mouth 3 times daily for 7 days 21 capsule 0    HYDROcodone-acetaminophen (NORCO) 5-325 MG per tablet Take 1 tablet by mouth every 6 hours as needed for Pain for up to 3 days. Intended supply: 3 days. Take lowest dose possible to manage pain 12 tablet 0    acetaminophen (TYLENOL) 500 MG tablet Take 500 mg by mouth every 6 hours as needed for Pain Taking 3 tabs QAM      amitriptyline (ELAVIL) 25 MG tablet Take 1 tablet by mouth nightly For low back pain, insomnia 90 tablet 1    furosemide (LASIX) 40 MG tablet TAKE 1 TABLET DAILY, TAKE EXTRA TABLET IF NEEDED FOR EDEMA 180 tablet 3    triamcinolone (KENALOG) 0.1 % cream Apply to the affected area right arm and chest dry red rash spots topically 3 x a week. 45 g 0    dilTIAZem (CARDIZEM) 60 MG tablet TAKE 1 TABLET THREE TIMES DAILY 270 tablet 3    warfarin (COUMADIN) 10 MG tablet TAKE 1/2 TABLET daily (except SATURDAYS) OR AS DIRECTED. Managed by [facility] 90 tablet 3    warfarin (COUMADIN) 2.5 MG tablet Take 1 tablet daily or as directed. Managed by [facility] 30 tablet 1    ferrous gluconate (FERGON) 324 (38 Fe) MG tablet Take 1 tablet by mouth daily On empty stomach for iron deficiency anemia 30 tablet 2    allopurinol (ZYLOPRIM) 300 MG tablet TAKE 1/2 TABLET EVERY DAY 45 tablet 0    mupirocin (BACTROBAN NASAL) 2 % nasal ointment Apply pea sized amount to nasal septum 3 times daily.  1 g 3    atenolol (TENORMIN) 50 MG tablet TAKE 1 TABLET EVERY DAY 90 tablet 3    spironolactone (ALDACTONE) 25 MG tablet TAKE 1 TABLET TWICE DAILY 180 tablet 3    diphenhydrAMINE-APAP, sleep, (TYLENOL PM EXTRA STRENGTH)  MG tablet Take 2 tablets by mouth nightly (Patient not taking: No sig reported)      aspirin 81 MG EC tablet Take 81 mg by mouth daily      Cholecalciferol (VITAMIN D) 2000 UNITS CAPS capsule Take 2,000 Units by mouth 2 times daily. ALLERGIES    Allergies   Allergen Reactions    Clarithromycin Rash     Patient says she will \"never take this again\"     Gabapentin Swelling     Pt states that her body swelled up, not her airway, no troubles breathing    Merthiolate Glycerite [Thimerosal] Dermatitis     Redness, blisters    Tylenol With Codeine #3 [Acetaminophen-Codeine] Nausea And Vomiting       FAMILY HISTORY    Family History   Problem Relation Age of Onset    High Blood Pressure Mother     Diabetes Father        SOCIAL HISTORY    Social History     Socioeconomic History    Marital status:       Spouse name: None    Number of children: None    Years of education: None    Highest education level: None   Tobacco Use    Smoking status: Former     Packs/day: 0.25     Years: 4.00     Pack years: 1.00     Types: Cigarettes     Quit date: 1981     Years since quittin.8    Smokeless tobacco: Never   Substance and Sexual Activity    Alcohol use: Yes     Comment: rare, states she hasn't had a drink in months    Drug use: No     Social Determinants of Health     Financial Resource Strain: Low Risk     Difficulty of Paying Living Expenses: Not hard at all   Food Insecurity: No Food Insecurity    Worried About Running Out of Food in the Last Year: Never true    Ran Out of Food in the Last Year: Never true           Review of Systems:  Constitutional:  Denies fever, chills, weight loss or weakness   Eyes:  Denies photophobia or discharge   HENT:  Denies sore throat or ear pain   Respiratory:  Denies cough or shortness of breath   Cardiovascular:  Denies chest pain, palpitations or swelling   GI:  Denies abdominal pain, nausea, vomiting, or diarrhea   Musculoskeletal:  Denies back pain   Skin:  Denies rash   Neurologic:  Denies headache, focal weakness or sensory changes   Endocrine:  Denies polyuria or polydypsia   Lymphatic:  Denies swollen glands   Psychiatric:  Denies depression, suicidal ideation or homicidal ideation   All systems negative except as marked. PHYSICAL EXAM    VITAL SIGNS: LMP  (LMP Unknown)    Constitutional:  Well developed, Well nourished, No acute distress, Non-toxic appearance. HENT: Rhino Rocket left naris. Eyes:  PERRL, EOMI, Conjunctiva normal, No discharge. Respiratory: No respiratory distress noted. There is diffuse expiratory wheezing noted throughout all lung fields. Cardiovascular:  Normal heart rate, Normal rhythm, No murmurs, No rubs, No gallops. GI:  Bowel sounds normal, Soft, No tenderness, No masses, No pulsatile masses. Psychiatric:  Affect normal, Judgment normal, Mood normal.       MDM: Left-sided Rhino Rocket removed without difficulty. Patient observed in the emergency room. Patient continued to bleed down the posterior oropharynx concerning for continued posterior bleed. 7.5 cm Rhino Rocket replaced in the left naris. Norco given in the emergency room for pain. She was discharged home to return in 5 days for removal as she is currently taking anticoagulation and I recommended that the packing stay in longer than the last time when she was here. I prescribed Norco as needed for pain and Keflex for antibiotic prophylaxis.           Summation      Patient Course:     ED Medications administered this visit:    Medications   ipratropium-albuterol (DUONEB) nebulizer solution 1 ampule (has no administration in time range)   HYDROcodone-acetaminophen (NORCO) 5-325 MG per tablet 1 tablet (has no administration in time range)       New Prescriptions from this visit:    New Prescriptions    CEPHALEXIN (KEFLEX) 500 MG CAPSULE    Take 1 capsule by mouth 3 times daily for 7 days    HYDROCODONE-ACETAMINOPHEN (NORCO) 5-325 MG PER TABLET Take 1 tablet by mouth every 6 hours as needed for Pain for up to 3 days. Intended supply: 3 days. Take lowest dose possible to manage pain       Follow-up:  TYLOR Dover CNP [address] [place] [address]  [phone]    Schedule an appointment as soon as possible for a visit         Final Impression:   1.  Encounter for removal of nasal packing Controlled   2. Epistaxis               (Please note that portions of this note were completed with a voice recognition program.  Efforts were made to edit the dictations but occasionally words are mis-transcribed.)        Cherrie Mcfadden DO  11/13/22 1547

## 2022-11-14 ENCOUNTER — CARE COORDINATION (OUTPATIENT)
Dept: CARE COORDINATION | Age: 77
End: 2022-11-14

## 2022-11-16 ENCOUNTER — TELEPHONE (OUTPATIENT)
Dept: PRIMARY CARE CLINIC | Age: 77
End: 2022-11-16

## 2022-11-16 NOTE — TELEPHONE ENCOUNTER
----- Message from Rosalba Schwartz sent at 11/15/2022 11:00 AM EST -----  Subject: Message to Provider    QUESTIONS  Information for Provider? pt was in ED on 11/13 to have bloody nose   patched. Pt needs follow up to have patch removed and already has appt   11/21 and wants to know if she can have patch removed on 11/21 since she   has to use hospital van for transportation that day already  ---------------------------------------------------------------------------  --------------  [address] [facility] [employer]  [phone]; Do not leave any message, patient will call back for answer  ---------------------------------------------------------------------------  --------------  SCRIPT ANSWERS  Relationship to Patient? Self  (Patient requests to see provider urgently. )? No  Do you have any questions for your primary care provider that need to be   answered prior to your appointment?  Yes

## 2022-11-17 ENCOUNTER — TELEPHONE (OUTPATIENT)
Dept: PRIMARY CARE CLINIC | Age: 77
End: 2022-11-17

## 2022-11-17 NOTE — TELEPHONE ENCOUNTER
Pt calls this am concerned was in ER twice for packing to the nose , patient calls this a.m. at 845 with complaints of nose trickled in the back of her throat for about 10 to 15 minutes prior to 7 AM this morning which has stopped at this point she continues to be on Coumadin has had her nose packed twice in the ER remains on antibiotic orally Keflex and it is encouraged for her to seek further treatment in the nearest ER if the bleeding reoccurs    I did advise for her to follow-up with ear nose and throat for removal of the packing and reexamination I talked call Dr. Miguel Zepeda office today and he is in surgery all day at [facility] patient's option if she chooses to go to [address] to the ER if she is not actively bleeding to have that rechecked today is appropriate ; she is aware that if she has active bleeding she is advised to go to the closest ER or call 911  Dr. Holly Downs office advised that is best option due to office hours tomorrow are double booked most of the day. She will work on getting her ride in order to go get that checked today if possible her last INR was 1.8 with a pro time of 21.0 that was done on November 10 the 22 she is on oral antibiotic Keflex and has a appointment with the Coumadin clinic on 11/21/2022 to get that rechecked    She complains of discomfort in her left nare with the Rhino Rocket intact she states it is uncomfortable but denies any active bleeding at this time    She is on Coumadin for atrial fibrillation Dr. Marcos Dahl cardiologist notified and coumadin consideration with risk of active bleed and difficulty with control of nasal bleed. Dr. Marcos Dahl agreeable pt can temporarily stop coumadin due to situation.  And risks    Erin SNIDER CNP

## 2022-11-17 NOTE — TELEPHONE ENCOUNTER
----- Message from Alee Nunnannette sent at 11/17/2022  8:48 AM EST -----  Subject: Message to Provider    QUESTIONS  Information for Provider? Elijah Cuevas see's kellen howell and is   supposed to be referred to a ENT and she was not sure if the office was   setting the appointment up and if so she was needing the latest   appointment of the day. she was hoping the office would give her a call   back to let her know if they are setting the appointment up for her.   ---------------------------------------------------------------------------  --------------  [address] [employer]  [phone]; Do not leave any message, patient will call back for answer  ---------------------------------------------------------------------------  --------------  SCRIPT ANSWERS  Relationship to Patient?  Self

## 2022-11-17 NOTE — TELEPHONE ENCOUNTER
Pt unable to see ENT today , Dr. Ayoub Spotted in surgery. Pt has additional nasal drainage down throat today x 10 minutes. Remains on coumadin, so I spoke with DR. Liane Basurto who advised to stop coumadin. Pt aware if nasal bleed emergency needs to call 911 (which she has went to hospital twice by this route in recent past.     Attempted to return call to pt. And no answer. Has follow up Monday with coumadin clinic, and packing to be removed on Monday. Pt states it is uncomfortable. She is taking keflex antibiotic. Rosa SNIDER CNP       5 pm Dr. Ayoub Spotted ENT called, pt was seen in ER at [facility] today ER physician nasal packing removed but was still on coumadin so bled and had to be repacked. Dr. Ayoub Spotted agreeable to see her in office on Monday for evaluation and pt was instructed by him to stop coumadin today.      [facility] does not have ENT that covers ER call noted  Rosa SNIDER CNP

## 2022-11-18 ENCOUNTER — HOSPITAL ENCOUNTER (EMERGENCY)
Age: 77
Discharge: ANOTHER HOSPITAL | End: 2022-11-18
Attending: FAMILY MEDICINE
Payer: MEDICARE

## 2022-11-18 VITALS
WEIGHT: 255 LBS | HEART RATE: 78 BPM | RESPIRATION RATE: 18 BRPM | TEMPERATURE: 98.1 F | SYSTOLIC BLOOD PRESSURE: 108 MMHG | OXYGEN SATURATION: 94 % | BODY MASS INDEX: 45.18 KG/M2 | DIASTOLIC BLOOD PRESSURE: 61 MMHG | HEIGHT: 63 IN

## 2022-11-18 DIAGNOSIS — Z20.822 LAB TEST NEGATIVE FOR COVID-19 VIRUS: ICD-10-CM

## 2022-11-18 DIAGNOSIS — R04.0 POSTERIOR EPISTAXIS: Primary | ICD-10-CM

## 2022-11-18 DIAGNOSIS — Z79.01 LONG TERM (CURRENT) USE OF ANTICOAGULANTS: ICD-10-CM

## 2022-11-18 DIAGNOSIS — Z86.79 HISTORY OF ATRIAL FIBRILLATION: ICD-10-CM

## 2022-11-18 LAB
ABSOLUTE BANDS #: 0.31 K/UL (ref 0–1)
ABSOLUTE EOS #: 0.15 K/UL (ref 0–0.4)
ABSOLUTE LYMPH #: 1.85 K/UL (ref 1–4.8)
ABSOLUTE MONO #: 0.92 K/UL (ref 0–1)
ANION GAP SERPL CALCULATED.3IONS-SCNC: 8 MMOL/L (ref 9–17)
BANDS: 2 % (ref 0–10)
BASOPHILS # BLD: ABNORMAL % (ref 0–2)
BASOPHILS ABSOLUTE: ABNORMAL K/UL (ref 0–0.2)
BUN BLDV-MCNC: 15 MG/DL (ref 8–23)
BUN/CREAT BLD: 19 (ref 9–20)
CALCIUM SERPL-MCNC: 8.6 MG/DL (ref 8.6–10.4)
CHLORIDE BLD-SCNC: 102 MMOL/L (ref 98–107)
CO2: 27 MMOL/L (ref 20–31)
CREAT SERPL-MCNC: 0.77 MG/DL (ref 0.5–0.9)
EOSINOPHILS RELATIVE PERCENT: 1 % (ref 0–5)
GFR SERPL CREATININE-BSD FRML MDRD: >60 ML/MIN/1.73M2
GLUCOSE BLD-MCNC: 123 MG/DL (ref 70–99)
HCT VFR BLD CALC: 30.2 % (ref 36–46)
HEMOGLOBIN: 10.1 G/DL (ref 12–16)
INR BLD: 1.8
LYMPHOCYTES # BLD: 12 % (ref 15–40)
MCH RBC QN AUTO: 30.3 PG (ref 26–34)
MCHC RBC AUTO-ENTMCNC: 33.5 G/DL (ref 31–37)
MCV RBC AUTO: 90.5 FL (ref 80–100)
METAMYELOCYTES ABSOLUTE COUNT: 0.15 K/UL
METAMYELOCYTES: 1 %
MONOCYTES # BLD: 6 % (ref 4–8)
MORPHOLOGY: ABNORMAL
PDW BLD-RTO: 15.6 % (ref 12.1–15.2)
PLATELET # BLD: 348 K/UL (ref 140–450)
POTASSIUM SERPL-SCNC: 4.2 MMOL/L (ref 3.7–5.3)
PROTHROMBIN TIME: 21.1 SEC (ref 11.5–14.2)
RBC # BLD: 3.34 M/UL (ref 4–5.2)
SARS-COV-2, RAPID: NOT DETECTED
SEG NEUTROPHILS: 78 % (ref 47–75)
SEGMENTED NEUTROPHILS ABSOLUTE COUNT: 12.02 K/UL (ref 2.5–7)
SODIUM BLD-SCNC: 137 MMOL/L (ref 135–144)
SPECIMEN DESCRIPTION: NORMAL
WBC # BLD: 15.4 K/UL (ref 3.5–11)

## 2022-11-18 PROCEDURE — 30903 CONTROL OF NOSEBLEED: CPT

## 2022-11-18 PROCEDURE — 36415 COLL VENOUS BLD VENIPUNCTURE: CPT

## 2022-11-18 PROCEDURE — 99285 EMERGENCY DEPT VISIT HI MDM: CPT

## 2022-11-18 PROCEDURE — 85025 COMPLETE CBC W/AUTO DIFF WBC: CPT

## 2022-11-18 PROCEDURE — 85610 PROTHROMBIN TIME: CPT

## 2022-11-18 PROCEDURE — 87635 SARS-COV-2 COVID-19 AMP PRB: CPT

## 2022-11-18 PROCEDURE — 80048 BASIC METABOLIC PNL TOTAL CA: CPT

## 2022-11-18 PROCEDURE — C9803 HOPD COVID-19 SPEC COLLECT: HCPCS

## 2022-11-18 ASSESSMENT — PAIN - FUNCTIONAL ASSESSMENT
PAIN_FUNCTIONAL_ASSESSMENT: NONE - DENIES PAIN
PAIN_FUNCTIONAL_ASSESSMENT: NONE - DENIES PAIN

## 2022-11-18 NOTE — ED NOTES
Report given to Pramod Vang and Stephanie Roberson with [facility] for transport.       Eduard Fitzgerald RN  11/18/22 8867

## 2022-11-18 NOTE — ED PROVIDER NOTES
[facility]  eMERGENCY dEPARTMENT eNCOUnter          [address]       Chief Complaint   Patient presents with    Epistaxis     Pt has been having a nose bleed for the last week       Nurses Notes reviewed and I agree except as noted in the HPI. HISTORY OF PRESENT ILLNESS    Israel Lozada is a 76 y.o. female who presents to the emergency room via EMS from home, painless recurrent bleed from the left nares, patient has a Rhino Rocket intact and the bleeding is coming around the site though it is slow down. Patient is unsure if she still been taking her Coumadin for her A. fib, denies any trauma. Patient denies any trauma or falls. Per EMS, patient been in the emergency room multiple times for same, and they were told that she has an appointment in 3 days with ENT. Patient was unable to confirm this information. REVIEW OF SYSTEMS     Review of Systems   HENT:  Positive for nosebleeds. All other systems reviewed and are negative. PAST MEDICAL HISTORY    has a past medical history of Atrial fibrillation ([address] CHF (congestive heart failure) ([address] Diverticulosis, Gout, Hearing loss, History of colon polyps, Hypertension, Kidney stones, Mitral valve insufficiency, Obesity, morbid, BMI 40.0-49.9 ([address] Osteoarthritis, Primary osteoarthritis of both knees, and Pulmonary HTN ([address] SURGICAL HISTORY      has a past surgical history that includes Tubal ligation; Carpal tunnel release; Lithotripsy; Upper gastrointestinal endoscopy (11/28/2016); Colonoscopy (11/28/2016); Colonoscopy (05/2017); Colonoscopy (06/14/2018, 10/2019, 10/27/2021); joint replacement (Left, 07/17/2019); Total knee arthroplasty (Left, 07/17/2019); Pain management procedure (N/A, 09/04/2020); Pain management procedure (Bilateral, 10/02/2020); Nerve Block (Left, 11/06/2020); and Pain management procedure (Right, 01/15/2021).     CURRENT MEDICATIONS       Discharge Medication List as of 11/18/2022 12:21 PM        CONTINUE these medications which have NOT CHANGED    Details   cephALEXin (KEFLEX) 500 MG capsule Take 1 capsule by mouth 3 times daily for 7 days, Disp-21 capsule, R-0Normal      acetaminophen (TYLENOL) 500 MG tablet Take 500 mg by mouth every 6 hours as needed for Pain Taking 3 tabs QAMHistorical Med      amitriptyline (ELAVIL) 25 MG tablet Take 1 tablet by mouth nightly For low back pain, insomnia, Disp-90 tablet, R-1Normal      furosemide (LASIX) 40 MG tablet TAKE 1 TABLET DAILY, TAKE EXTRA TABLET IF NEEDED FOR EDEMA, Disp-180 tablet, R-3Normal      triamcinolone (KENALOG) 0.1 % cream Apply to the affected area right arm and chest dry red rash spots topically 3 x a week., Disp-45 g, R-0, Normal      dilTIAZem (CARDIZEM) 60 MG tablet TAKE 1 TABLET THREE TIMES DAILY, Disp-270 tablet, R-3Normal      !! warfarin (COUMADIN) 10 MG tablet TAKE 1/2 TABLET daily (except SATURDAYS) OR AS DIRECTED. Managed by [address], Disp-90 tablet, R-3Adjust Sig      !! warfarin (COUMADIN) 2.5 MG tablet Take 1 tablet daily or as directed. Managed by [address], Disp-30 tablet, R-1Called in new Rx to physician refill line at [facility], 6/21/2022 at 1609Phone In      ferrous gluconate (FERGON) 324 (38 Fe) MG tablet Take 1 tablet by mouth daily On empty stomach for iron deficiency anemia, Disp-30 tablet, R-2Normal      allopurinol (ZYLOPRIM) 300 MG tablet TAKE 1/2 TABLET EVERY DAY, Disp-45 tablet, R-0Normal      mupirocin (BACTROBAN NASAL) 2 % nasal ointment Apply pea sized amount to nasal septum 3 times daily. , Disp-1 g, R-3, Normal      atenolol (TENORMIN) 50 MG tablet TAKE 1 TABLET EVERY DAY, Disp-90 tablet, R-3Normal      spironolactone (ALDACTONE) 25 MG tablet TAKE 1 TABLET TWICE DAILY, Disp-180 tablet, R-3Normal      diphenhydrAMINE-APAP, sleep, (TYLENOL PM EXTRA STRENGTH)  MG tablet Take 2 tablets by mouth nightlyHistorical Med      aspirin 81 MG EC tablet Take 81 mg by mouth dailyHistorical Med      Cholecalciferol (VITAMIN D) 2000 UNITS CAPS capsule Take 2,000 Units by mouth 2 times daily. !! - Potential duplicate medications found. Please discuss with provider. ALLERGIES     is allergic to clarithromycin, gabapentin, merthiolate glycerite [thimerosal], and tylenol with codeine #3 [acetaminophen-codeine]. FAMILY HISTORY     She indicated that her mother is . She indicated that her father is . She indicated that her brother is . She indicated that her maternal grandmother is . She indicated that her maternal grandfather is . She indicated that her paternal grandmother is . She indicated that her paternal grandfather is . She indicated that her daughter is alive. She indicated that both of her sons are alive. family history includes Diabetes in her father; High Blood Pressure in her mother. SOCIAL HISTORY      reports that she quit smoking about 41 years ago. Her smoking use included cigarettes. She has a 1.00 pack-year smoking history. She has never used smokeless tobacco. She reports current alcohol use. She reports that she does not use drugs. PHYSICAL EXAM     INITIAL VITALS:  height is 5' 3\" (1.6 m) and weight is 255 lb (115.7 kg). Her temporal temperature is 98.1 °F (36.7 °C). Her blood pressure is 108/61 and her pulse is 78. Her respiration is 18 and oxygen saturation is 94%. Physical Exam   Constitutional: Patient is oriented to person, place, and time. Patient appears well-developed and well-nourished. Patient is active and cooperative. HENT:   Head: Normocephalic and atraumatic. Head is without contusion. Right Ear: Hearing and external ear normal. No drainage. Left Ear: Hearing and external ear normal. No drainage. Nose: Nose normal. No nasal deformity.   Noted Rhino Rocket in the left nares that appears engorged with some fresh blood, there is no active bleeding around the site at this point, no active bleeding from the right nares. Mouth/Throat: Mucous membranes are not dry. Eyes: EOMI. Conjunctivae, sclera, and lids are normal. Right eye exhibits no discharge. Left eye exhibits no discharge. Neck: Full passive range of motion without pain and phonation normal.   Cardiovascular:  Normal rate, regular rhythm and intact distal pulses. Pulses: Right radial pulse  2+   Pulmonary/Chest: Effort normal. No tachypnea and no bradypnea. Abdominal: BMI 45.1, soft. Patient without distension   Musculoskeletal:   Negative acute trauma or deformity,  apparent full range of motion and normal strength all extremities appropriate to age. Neurological: Patient is alert and oriented to person, place, and time. patient displays no tremor. Patient displays no seizure activity. Skin: Skin is warm and dry. Patient is not diaphoretic. Psychiatric: Patient has a normal mood and anxious affect.  Patient speech is normal and behavior is normal. Cognition and memory are normal.     DIFFERENTIAL DIAGNOSIS:    epistaxis posterior    DIAGNOSTIC RESULTS           RADIOLOGY: non-plain film images(s) such as CT, Ultrasound and MRI are read by the radiologist.  No orders to display       LABS:   Labs Reviewed   BASIC METABOLIC PANEL - Abnormal; Notable for the following components:       Result Value    Glucose 123 (*)     Anion Gap 8 (*)     All other components within normal limits   CBC WITH AUTO DIFFERENTIAL - Abnormal; Notable for the following components:    WBC 15.4 (*)     RBC 3.34 (*)     Hemoglobin 10.1 (*)     Hematocrit 30.2 (*)     RDW 15.6 (*)     Seg Neutrophils 78 (*)     Lymphocytes 12 (*)     Metamyelocytes 1 (*)     Segs Absolute 12.02 (*)     Metamyelocytes Absolute 0.15 (*)     All other components within normal limits   PROTIME-INR - Abnormal; Notable for the following components:    Protime 21.1 (*)     All other components within normal limits   COVID-19, RAPID EMERGENCY DEPARTMENT COURSE:   Vitals:    Vitals:    11/18/22 0907 11/18/22 0910 11/18/22 1132   BP:  115/62 108/61   Pulse: 84  78   Resp: 20  18   Temp: 98 °F (36.7 °C)  98.1 °F (36.7 °C)   TempSrc: Oral  Temporal   SpO2: 92% 93% 94%   Weight: 255 lb (115.7 kg)     Height: 5' 3\" (1.6 m)       Patient history and physical exam taken at bedside, discussed symptoms and exam findings, noting this is patient's third ER visit in 8 days for left nosebleed, suspected posterior, with at least 3 Rhino Rocket's placed, and patient is still bleeding around it, I feel patient needs to be seen by ENT more emergently, advised that removing the packing at this point will only cause her even more bleeding and would have to replace it for which patient emphatically states that she does not want this, will get basic blood work to check patient's hemoglobin and INR, will begin working on getting her transferred to a facility that has ENT immediately available. Lab work-up reviewed    Patient daughter arrived emergency room, she was updated on her mother's care, that we are working on trying to get ENT at any 1 of several facilities, acknowledges    ~1003 hrs -Case was discussed with [facility] transfer center, regarding patient presentation and current work-up, she will check with [facility] and [facility] for ENT coverage    Advised that Dr. Olga Sanchez, ENT at [facility], has accepted patient, request patient be sent to the ER where he can take her to the OR for definitive evaluation treatment of posterior nasal bleed. Patient updated on acceptance to [facility], we are working on transport, patient and daughter at bedside acknowledge    [facility] agreed to take patient down to [facility] ER, as it is time sensitive to get her there when they have coverage, EMS arrived and report was given, patient packaged for transport      FINAL IMPRESSION      1. Posterior epistaxis    2.  Long term (current) use of anticoagulants    3. History of atrial fibrillation    4. Lab test negative for COVID-19 virus          DISPOSITION/PLAN   trn    PATIENT REFERRED TO:  No follow-up provider specified. DISCHARGE MEDICATIONS:  Discharge Medication List as of 11/18/2022 12:21 PM              Summation      Patient Course:  trn    ED Medications administered this visit:  Medications - No data to display    New Prescriptions from this visit:    Discharge Medication List as of 11/18/2022 12:21 PM          Follow-up:  No follow-up provider specified. Final Impression:   1. Posterior epistaxis    2. Long term (current) use of anticoagulants    3. History of atrial fibrillation    4.  Lab test negative for COVID-19 virus               (Please note that portions of this note were completed with a voice recognition program.  Efforts were made to edit the dictations but occasionally words are mis-transcribed.)    MD Yakelin Laurent MD  11/19/22 7481

## 2022-11-23 ENCOUNTER — TELEPHONE (OUTPATIENT)
Dept: FAMILY MEDICINE CLINIC | Age: 77
End: 2022-11-23

## 2022-11-23 ENCOUNTER — TELEPHONE (OUTPATIENT)
Dept: CARDIOLOGY CLINIC | Age: 77
End: 2022-11-23

## 2022-11-23 NOTE — TELEPHONE ENCOUNTER
Patient is scheduled for hospital follow up on 12/05/22. Patient states she was discharged 11/21/22.   DX Nosebleeds    Health Maintenance   Topic Date Due    Hepatitis C screen  Never done    Shingles vaccine (2 of 3) 09/01/2014    COVID-19 Vaccine (4 - Booster for Moderna series) 03/12/2022    Flu vaccine (1) 08/01/2022    Annual Wellness Visit (AWV)  11/16/2022    Depression Monitoring  06/06/2023    DTaP/Tdap/Td vaccine (2 - Td or Tdap) 12/29/2028    DEXA (modify frequency per FRAX score)  Completed    Pneumococcal 65+ years Vaccine  Completed    Hepatitis A vaccine  Aged Out    Hib vaccine  Aged Out    Meningococcal (ACWY) vaccine  Aged Out             (applicable per patient's age: Cancer Screenings, Depression Screening, Fall Risk Screening, Immunizations)    Hemoglobin A1C (%)   Date Value   01/11/2013 6.4 (H)     LDL Cholesterol (mg/dL)   Date Value   08/16/2022 114     AST (U/L)   Date Value   08/16/2022 23     ALT (U/L)   Date Value   08/16/2022 31     BUN (mg/dL)   Date Value   11/18/2022 15      (goal A1C is < 7)   (goal LDL is <100) need 30-50% reduction from baseline     BP Readings from Last 3 Encounters:   11/18/22 108/61   11/11/22 136/88   11/01/22 115/73    (goal /80)      All Future Testing planned in [facility]:  Lab Frequency Next Occurrence   CBC with Auto Differential Once 11/01/2023   Comprehensive Metabolic Panel Once 07/30/5911   Lipid Panel Once 11/01/2023   TSH with Reflex Once 11/01/2023   Magnesium Once 11/01/2023   EKG 12 Lead Once 11/01/2023   XR CHEST (2 VW) Once 11/01/2023   ECHO Complete 2D W Doppler W Color Once 11/01/2022       Next Visit Date:  Future Appointments   Date Time Provider Sj Stephen   12/5/2022  1:20 PM TYLOR Moore - CNP [facility]   10/31/2023  1:30 PM MD Abril Gates [facility]            Patient Active Problem List:     Kidney stones     Localized osteoarthritis of left knee     Atrial fibrillation ([address]     Pulmonary HTN

## 2022-11-23 NOTE — TELEPHONE ENCOUNTER
Pt called states she was just released from hospital   Nosebleeds/surgery - off coumadin - ? When to restart   (Discharges states stay off awhile to let nose heal)  Advised pt  is out of the office and will need to call/f/u with pcp   In the meantime- pt verbalized understanding and will contact pcp. Advised ER if needed.  Pt verbalized understanding

## 2022-11-23 NOTE — TELEPHONE ENCOUNTER
Spoke with Dr. Javier Araujo via phone   Advised to continue to hold coumadin 2 more weeks  Pt notified

## 2022-11-23 NOTE — TELEPHONE ENCOUNTER
Care Transitions Initial Follow Up Call    Outreach made within 2 business days of discharge: Yes    Patient: Joss Boykin Patient : 1945   MRN: [id number]  Reason for Admission: Nose bleed  Discharge Date: 22      Spoke with: Patient    Discharge department/facility: [facility]    TCM Interactive Patient Contact:  Was patient able to fill all prescriptions: Pt didn't receive any new scripts  Was patient instructed to bring all medications to the follow-up visit: Yes  Is patient taking all medications as directed in the discharge summary?  Yes  Does patient understand their discharge instructions: Yes  Does patient have questions or concerns that need addressed prior to 7-14 day follow up office visit: no    Scheduled appointment with PCP within 7-14 days    Follow Up  Future Appointments   Date Time Provider Sj Stephen   2022 12:20 PM [id number] [place] [address]   2022  1:20 PM TYLOR Bailey - CNP [facility]   10/31/2023  1:30 PM MD Doreen Crawford [facility]       Thai Patel LPN

## 2022-12-05 ENCOUNTER — HOSPITAL ENCOUNTER (OUTPATIENT)
Dept: NON INVASIVE DIAGNOSTICS | Age: 77
Discharge: HOME | End: 2022-12-05
Payer: MEDICARE

## 2022-12-05 ENCOUNTER — OFFICE VISIT (OUTPATIENT)
Dept: FAMILY MEDICINE CLINIC | Age: 77
End: 2022-12-05
Payer: MEDICARE

## 2022-12-05 VITALS
DIASTOLIC BLOOD PRESSURE: 70 MMHG | HEIGHT: 63 IN | SYSTOLIC BLOOD PRESSURE: 124 MMHG | HEART RATE: 81 BPM | WEIGHT: 254 LBS | OXYGEN SATURATION: 93 % | BODY MASS INDEX: 45 KG/M2

## 2022-12-05 DIAGNOSIS — I48.20 CHRONIC ATRIAL FIBRILLATION (HCC): ICD-10-CM

## 2022-12-05 DIAGNOSIS — Z23 NEED FOR INFLUENZA VACCINATION: ICD-10-CM

## 2022-12-05 DIAGNOSIS — Z86.010 HISTORY OF COLON POLYPS: ICD-10-CM

## 2022-12-05 DIAGNOSIS — I50.22 CHRONIC SYSTOLIC (CONGESTIVE) HEART FAILURE (HCC): ICD-10-CM

## 2022-12-05 DIAGNOSIS — Z12.11 COLON CANCER SCREENING: ICD-10-CM

## 2022-12-05 DIAGNOSIS — Z09 HOSPITAL DISCHARGE FOLLOW-UP: ICD-10-CM

## 2022-12-05 DIAGNOSIS — R04.0 EPISTAXIS, RECURRENT: ICD-10-CM

## 2022-12-05 DIAGNOSIS — R04.0 EPISTAXIS, RECURRENT: Primary | ICD-10-CM

## 2022-12-05 PROCEDURE — 1090F PRES/ABSN URINE INCON ASSESS: CPT | Performed by: NURSE PRACTITIONER

## 2022-12-05 PROCEDURE — G8399 PT W/DXA RESULTS DOCUMENT: HCPCS | Performed by: NURSE PRACTITIONER

## 2022-12-05 PROCEDURE — G8427 DOCREV CUR MEDS BY ELIG CLIN: HCPCS | Performed by: NURSE PRACTITIONER

## 2022-12-05 PROCEDURE — G8417 CALC BMI ABV UP PARAM F/U: HCPCS | Performed by: NURSE PRACTITIONER

## 2022-12-05 PROCEDURE — 99214 OFFICE O/P EST MOD 30 MIN: CPT | Performed by: NURSE PRACTITIONER

## 2022-12-05 PROCEDURE — 93270 REMOTE 30 DAY ECG REV/REPORT: CPT

## 2022-12-05 PROCEDURE — 1111F DSCHRG MED/CURRENT MED MERGE: CPT | Performed by: NURSE PRACTITIONER

## 2022-12-05 PROCEDURE — 1123F ACP DISCUSS/DSCN MKR DOCD: CPT | Performed by: NURSE PRACTITIONER

## 2022-12-05 PROCEDURE — G8484 FLU IMMUNIZE NO ADMIN: HCPCS | Performed by: NURSE PRACTITIONER

## 2022-12-05 PROCEDURE — 1036F TOBACCO NON-USER: CPT | Performed by: NURSE PRACTITIONER

## 2022-12-05 NOTE — PROGRESS NOTES
The patient was educated on the use of an event monitor. The patient's comprehension was low. The patient was not able to verbalize recall, however was going to have granddaughter and son help as they would be there today. The patient was instructed on how and when to return the monitor. Instructed patient on how to call [employer] and how to call us if further instruction needed.

## 2022-12-05 NOTE — PROGRESS NOTES
Post-Discharge Transitional Care  Follow Up      Christin Menendez   YOB: 1945    Date of Office Visit:  12/5/2022  Date of Hospital Admission: 11/18/22  Date of Hospital Discharge: 11/18/22  Risk of hospital readmission (high >=14%. Medium >=10%) :No data recorded    Care management risk score Rising risk (score 2-5) and Complex Care (Scores >=6): No Risk Score On File     Non face to face  following discharge, date last encounter closed (first attempt may have been earlier): 11/23/2022    Call initiated 2 business days of discharge: Yes    ASSESSMENT/PLAN:   Epistaxis, recurrent  -     Cardiac event monitor; Future  Hospital discharge follow-up  -     MT DISCHARGE MEDS RECONCILED W/ CURRENT OUTPATIENT MED LIST  Chronic atrial fibrillation ([facility]  -     Cardiac event monitor; Future  Chronic systolic (congestive) heart failure  Need for influenza vaccination  -     Influenza, FLUAD, (age 72 y+), IM, Preservative Free, 0.5 mL      Medical Decision Making: moderate complexity  Return in 3 months (on 3/5/2023), or 6 months. On this date 12/5/2022 I have spent 35 minutes reviewing previous notes, test results and face to face with the patient discussing the diagnosis and importance of compliance with the treatment plan as well as documenting on the day of the visit. Subjective:   HPI:  Follow up of Hospital problems/diagnosis(es):     Inpatient course: Discharge summary reviewed- see chart. Interval history/Current status:       Dr. Aundrea Rose Right knee OA- lubricant injection x 1 in past 2 years. Up and moving at least moderate pain. Don't stand on it that long. Walker used at home. L knee replacement in past.     Colonoscopy Dr. Gene Herrera due may be ideal time while off coumadin. [facility]- has remained off coumadin since hospitalization, pt had cardiac event monitor placed today. To check atrial fibrillation burden .  Pt blood count down  Lab Results   Component Value Date    HGB 10.1 (L) 11/18/2022             Patient Active Problem List   Diagnosis    Kidney stones    Localized osteoarthritis of left knee    Atrial fibrillation (HCC)    Pulmonary HTN (HCC)    Knee pain, chronic    Iron deficiency anemia due to chronic blood loss    Mitral valve insufficiency    Backache    CHF (congestive heart failure) (HCC)    CKD (chronic kidney disease)    Echocardiogram abnormal    Gout    HL (hearing loss)    Hyperlipidemia    Morbid obesity with BMI of 45.0-49.9, adult ([facility])    DJD (degenerative joint disease)    Chronic renal disease, stage III ([facility]) [590553]    Chronic systolic (congestive) heart failure    Lumbar radiculopathy    Foraminal stenosis of lumbar region    Chronic bilateral low back pain with bilateral sciatica       Medications listed as ordered at the time of discharge from hospital     Medication List            Accurate as of December 5, 2022 11:59 PM. If you have any questions, ask your nurse or doctor. CONTINUE taking these medications      acetaminophen 500 MG tablet  Commonly known as: TYLENOL     allopurinol 300 MG tablet  Commonly known as: ZYLOPRIM  TAKE 1/2 TABLET EVERY DAY     amitriptyline 25 MG tablet  Commonly known as: ELAVIL  Take 1 tablet by mouth nightly For low back pain, insomnia     aspirin 81 MG EC tablet     atenolol 50 MG tablet  Commonly known as: TENORMIN  TAKE 1 TABLET EVERY DAY     Bactroban Nasal 2 % nasal ointment  Generic drug: mupirocin  Apply pea sized amount to nasal septum 3 times daily.      dilTIAZem 60 MG tablet  Commonly known as: CARDIZEM  TAKE 1 TABLET THREE TIMES DAILY     ferrous gluconate 324 (38 Fe) MG tablet  Commonly known as: FERGON  Take 1 tablet by mouth daily On empty stomach for iron deficiency anemia     furosemide 40 MG tablet  Commonly known as: LASIX  TAKE 1 TABLET DAILY, TAKE EXTRA TABLET IF NEEDED FOR EDEMA     spironolactone 25 MG tablet  Commonly known as: ALDACTONE  TAKE 1 TABLET TWICE DAILY     triamcinolone 0.1 % cream  Commonly known as: KENALOG  Apply to the affected area right arm and chest dry red rash spots topically 3 x a week. Tylenol PM Extra Strength  MG tablet  Generic drug: diphenhydrAMINE-APAP (sleep)     vitamin D 50 MCG (2000 UT) Caps capsule            STOP taking these medications      warfarin 10 MG tablet  Commonly known as: COUMADIN  Stopped by: TYLOR Hercules CNP     warfarin 2.5 MG tablet  Commonly known as: Coumadin  Stopped by: TYLOR Hercules CNP                Medications marked \"taking\" at this time  Outpatient Medications Marked as Taking for the 12/5/22 encounter (Office Visit) with TYLOR Hercules CNP   Medication Sig Dispense Refill    acetaminophen (TYLENOL) 500 MG tablet Take 500 mg by mouth every 6 hours as needed for Pain Taking 3 tabs QAM      amitriptyline (ELAVIL) 25 MG tablet Take 1 tablet by mouth nightly For low back pain, insomnia 90 tablet 1    furosemide (LASIX) 40 MG tablet TAKE 1 TABLET DAILY, TAKE EXTRA TABLET IF NEEDED FOR EDEMA 180 tablet 3    dilTIAZem (CARDIZEM) 60 MG tablet TAKE 1 TABLET THREE TIMES DAILY 270 tablet 3    allopurinol (ZYLOPRIM) 300 MG tablet TAKE 1/2 TABLET EVERY DAY 45 tablet 0    mupirocin (BACTROBAN NASAL) 2 % nasal ointment Apply pea sized amount to nasal septum 3 times daily. 1 g 3    atenolol (TENORMIN) 50 MG tablet TAKE 1 TABLET EVERY DAY 90 tablet 3    spironolactone (ALDACTONE) 25 MG tablet TAKE 1 TABLET TWICE DAILY 180 tablet 3    aspirin 81 MG EC tablet Take 81 mg by mouth daily      Cholecalciferol (VITAMIN D) 2000 UNITS CAPS capsule Take 2,000 Units by mouth 2 times daily. Medications patient taking as of now reconciled against medications ordered at time of hospital discharge: Yes    A comprehensive review of systems was negative except for what was noted in the HPI.     Objective:    /70 (Site: Left Upper Arm, Position: Sitting)   Pulse 81   Ht 5' 3\" (1.6 m)   Wt 254 lb (115.2 kg)   LMP  (LMP Unknown)   SpO2 93%   BMI 44.99 kg/m²   General Appearance: alert and oriented to person, place and time, well developed and well- nourished, in no acute distress  Skin: warm and dry, no rash or erythema  Head: normocephalic and atraumatic  Eyes: pupils equal, round, and reactive to light, extraocular eye movements intact, conjunctivae normal  ENT: tympanic membrane, external ear and ear canal normal bilaterally, nose without deformity, nostrils without bleeding  Neck: supple and non-tender without mass, no thyromegaly or thyroid nodules, no cervical lymphadenopathy  Pulmonary/Chest: clear to auscultation bilaterally- no wheezes, rales or rhonchi, normal air movement, no respiratory distress  Cardiovascular: normal rate, regular rhythm, normal S1 and S2, no murmurs, rubs, clicks, or gallops, distal pulses intact, no carotid bruits  Abdomen: soft, non-tender, non-distended, normal bowel sounds,  Extremities: no cyanosis, clubbing. Some generalized edema francisco non pitting  Musculoskeletal: normal range of motion, no joint swelling, deformity or tenderness  Neurologic: gait, coordination and speech normal      An electronic signature was used to authenticate this note.   --TYLOR Nascimento - CNP

## 2022-12-05 NOTE — PATIENT INSTRUCTIONS
Phone: [phone]  Fax: [phone] [facility] Office Hours:  Monday: [facility] office location 8-5 ([phone]) Offering additional late hours the first Monday of the month until 7 pm.   Tuesday: 8-5 Wednesday: 8-5 Thursday:  Additional hours offered 2 Thursdays a month. Please call to inquire those dates. Fridays: 7:30-4:30   SURVEY:    You may be receiving a survey from [employer] regarding your visit today. Please complete the survey to enable us to provide the highest quality of care to you and your family. If you cannot score us a very good on any question, please call the office to discuss how we could have made your experience a very good one. Thank you.

## 2022-12-12 PROCEDURE — 90694 VACC AIIV4 NO PRSRV 0.5ML IM: CPT | Performed by: NURSE PRACTITIONER

## 2022-12-12 PROCEDURE — G0008 ADMIN INFLUENZA VIRUS VAC: HCPCS | Performed by: NURSE PRACTITIONER

## 2022-12-14 RX ORDER — DILTIAZEM HYDROCHLORIDE 240 MG/1
240 CAPSULE, COATED, EXTENDED RELEASE ORAL DAILY
Qty: 30 CAPSULE | Refills: 11 | Status: SHIPPED | OUTPATIENT
Start: 2022-12-14

## 2022-12-14 NOTE — PROGRESS NOTES
Pt called after DR Marcos Dahl reviewed strip from event monitor   Will stop diltiazem 60 mg and start diltiazem 240 mg daily   Pt states she was on a high so before and it made her on edge   But that was 11 years ago she will try this dose   Informed to call if has any problems with it.

## 2023-01-09 NOTE — PROCEDURES
[place] [address]                                 EVENT MONITOR    PATIENT NAME: Justin Morales                  :        1945  MED REC NO:   [id number]                              ROOM:  ACCOUNT NO:   [de-identified]                           ADMIT DATE: 2022  PROVIDER:     Yeison Velasco. Yesi Maldonado MD    NAME OF TEST:  A 30-day event recorder. INDICATION:  Palpitations. The patient wore an event recorder from 2022 to 2023. We  received multiple transmissions. Underlying rhythm was atrial fibrillation. She had occasional rapid  ventricular response up to 167 to 178 beats per minute. However, she also had bradycardia with heart rates in the 35 to 40  range. She had some lightheadedness. She had a 3-second pause on  2022 associated with lightheadedness. She had a heart rate of 29  on 2022 at 05:00 a.m. The patient has sick sinus syndrome with rapid ventricular response with  heart rates in the 160 to 170 range. However, she also has bradycardia  with heart rates in the 30s with up to 3-second pauses. She has had  some lightheadedness and dizziness. I would consider a VVI permanent pacemaker to be able to better control  her rapid ventricular response.         Dixon Erazo MD    D: 2023 20:04:41       T: 2023 20:07:27     [email or web address]  Job#: [id number]     Doc#: [id number]    CC:  Guillermina Avina

## 2023-01-23 ENCOUNTER — TELEPHONE (OUTPATIENT)
Dept: FAMILY MEDICINE CLINIC | Age: 78
End: 2023-01-23

## 2023-01-23 DIAGNOSIS — R04.0 NOSEBLEED: Primary | ICD-10-CM

## 2023-01-23 RX ORDER — ALLOPURINOL 300 MG/1
TABLET ORAL
Qty: 45 TABLET | Refills: 0 | Status: SHIPPED | OUTPATIENT
Start: 2023-01-23

## 2023-01-23 NOTE — TELEPHONE ENCOUNTER
Bactroban nasal 2% ointment.      - [facility] Maintenance   Topic Date Due    Hepatitis C screen  Never done    Shingles vaccine (2 of 3) 09/01/2014    COVID-19 Vaccine (4 - Booster for Moderna series) 03/12/2022    Annual Wellness Visit (AWV)  11/16/2022    Depression Monitoring  06/06/2023    DTaP/Tdap/Td vaccine (2 - Td or Tdap) 12/29/2028    DEXA (modify frequency per FRAX score)  Completed    Flu vaccine  Completed    Pneumococcal 65+ years Vaccine  Completed    Hepatitis A vaccine  Aged Out    Hib vaccine  Aged Out    Meningococcal (ACWY) vaccine  Aged Out             (applicable per patient's age: Cancer Screenings, Depression Screening, Fall Risk Screening, Immunizations)    Hemoglobin A1C (%)   Date Value   01/11/2013 6.4 (H)     LDL Cholesterol (mg/dL)   Date Value   08/16/2022 114     AST (U/L)   Date Value   08/16/2022 23     ALT (U/L)   Date Value   08/16/2022 31     BUN (mg/dL)   Date Value   11/18/2022 15      (goal A1C is < 7)   (goal LDL is <100) need 30-50% reduction from baseline     BP Readings from Last 3 Encounters:   12/05/22 124/70   11/18/22 108/61   11/11/22 136/88    (goal /80)      All Future Testing planned in [facility]:  Lab Frequency Next Occurrence   CBC with Auto Differential Once 11/01/2023   Comprehensive Metabolic Panel Once 22/73/4212   Lipid Panel Once 11/01/2023   TSH with Reflex Once 11/01/2023   Magnesium Once 11/01/2023   EKG 12 Lead Once 11/01/2023   XR CHEST (2 VW) Once 11/01/2023   ECHO Complete 2D W Doppler W Color Once 11/01/2022       Next Visit Date:  Future Appointments   Date Time Provider Sj Stephen   2/1/2023  9:30 AM [address]   2/1/2023 10:00 AM MD Rimma Steele [facility]   10/31/2023  1:30 PM MD Rimma Steele [facility]            Patient Active Problem List:     Kidney stones     Localized osteoarthritis of left knee     Atrial fibrillation ([address]     Pulmonary HTN (HCC)     Knee pain, chronic     Iron deficiency anemia due to chronic blood loss     Mitral valve insufficiency     Backache     CHF (congestive heart failure) (HCC)     CKD (chronic kidney disease)     Echocardiogram abnormal     Gout     HL (hearing loss)     Hyperlipidemia     Morbid obesity with BMI of 45.0-49.9, adult (HCC)     DJD (degenerative joint disease)     Chronic renal disease, stage III ([facility]) [484711]     Chronic systolic (congestive) heart failure     Lumbar radiculopathy     Foraminal stenosis of lumbar region     Chronic bilateral low back pain with bilateral sciatica

## 2023-01-24 ENCOUNTER — TELEPHONE (OUTPATIENT)
Dept: PHARMACY | Age: 78
End: 2023-01-24

## 2023-01-24 NOTE — TELEPHONE ENCOUNTER
Jaylon Wise had been taken off her warfarin prior to Thanksgiving for severe nosebleed/ bleeding complications. She is still off warfarin and has an upcoming appointment to discuss possible pacemaker on 2/1/23 with Dr. Tomas Kuo. I checked with Dr. Tomas Kuo to see if her warfarin should be restarted at this time OR if he wants her to remain off warfarin until after pacemaker procedure is completed and then see about restarting warfarin afterwards. According to Caitlyn Spencer LPN, in his office, Dr. Tomas Kuo would like to restart her warfarin at this time. I contacted Jaylon Wise and asked her to restart 5 mg warfarin daily for the next 8 days and then we will check her INR in the clinic on 2/1/2023 at 10:40 am after her appointment with Dr. Tomas Kuo at 10 am the same day. Aixa Elena understanding and will start her warfarin tonight. I also asked her monitor for any s/s of bleeding or unusual bruising and to contact our clinic if she has any concerns over the next 8 days.

## 2023-02-01 ENCOUNTER — HOSPITAL ENCOUNTER (OUTPATIENT)
Dept: PHARMACY | Age: 78
Setting detail: THERAPIES SERIES
Discharge: HOME | End: 2023-02-01
Payer: MEDICARE

## 2023-02-01 ENCOUNTER — OFFICE VISIT (OUTPATIENT)
Dept: CARDIOLOGY CLINIC | Age: 78
End: 2023-02-01

## 2023-02-01 DIAGNOSIS — I49.5 SSS (SICK SINUS SYNDROME) (HCC): Primary | ICD-10-CM

## 2023-02-01 DIAGNOSIS — I48.91 ATRIAL FIBRILLATION, UNSPECIFIED TYPE (HCC): Primary | ICD-10-CM

## 2023-02-01 DIAGNOSIS — I50.22 CHRONIC SYSTOLIC (CONGESTIVE) HEART FAILURE (HCC): ICD-10-CM

## 2023-02-01 LAB — INR BLD: 1.7

## 2023-02-01 PROCEDURE — 99211 OFF/OP EST MAY X REQ PHY/QHP: CPT

## 2023-02-01 PROCEDURE — 85610 PROTHROMBIN TIME: CPT

## 2023-02-01 RX ORDER — WARFARIN SODIUM 10 MG/1
5 TABLET ORAL DAILY
COMMUNITY

## 2023-02-01 NOTE — PROGRESS NOTES
Ov Dr. Poppy Bess for follow up  Here to discuss Pacer-  \"Not sure I want event want it \"  No dizziness or lightheadedness  No falls  No chest pain  No sob   Restarted coumadin last wed     Can STOP ASA now     Will proceed with pacer on March 8th    Will need to HOLD Coumadin 5 days prior   And bridge- will need to HOLD lovenox day prior and day of procedure.

## 2023-02-01 NOTE — LETTER
Barak Pacheco MD  [address] [facility]  [address] [place]  [phone]      2023      Yonny Denty, JUSTIN [address]      RE:   Diana Laurent  :  1945      Dear Oralia Boyd:    REASON FOR CONSULT:  Sick sinus syndrome. HISTORY OF PRESENT ILLNESS:  I met with Chencho Brenner and her daughter, Naveen Wang, in our office on 2023. I trust you received my full H and P from 2022. She does have chronic atrial fibrillation and is on Coumadin. She also has severe mitral regurgitation. She has never had a myocardial infarction or cardiac catheterization. She was in the hospital for epistaxis and her Coumadin was held. Her EKG was somewhat slow and, therefore, placed an event recorder from 2022 to 2023. We received multiple transmissions. She had heart rates in the 30 to 35 range. She also had some lightheadedness. She had a 3-second pause on 2022, associated with lightheadedness. She had a heart rate of 29 on 2022. I met with her and her daughter to discuss pacemaker. She did have a fair amount of heart rates in the 130s to 140s range. We obviously cannot slow the tachycardia without making her bradycardia worse. Therefore, I have recommended a pacemaker, which would be a VVI pacemaker because of chronic atrial fibrillation. She has wished to proceed with a pacemaker. We will do so on . She will hold her Coumadin five days prior to the procedure and bridge with Lovenox. She is not having any chest pain or chest discomfort, and we will do no other testing. Thank you very much for allowing me the privilege of seeing Mrs. Brian Schuler. If you have any questions on my thoughts, please do not hesitate to contact me.     Sincerely,        Ibrahima Camacho MD    D: 2023 14:31:22     T: 2023 14:33:44     [email or web address]  Job#: [id number]   Doc#: [id number]

## 2023-02-01 NOTE — PROGRESS NOTES
[address]/[place]  Medication Management  ANTICOAGULATION    Referring Provider: Dr Magdalene Patel INR: 2.0-3.0    TODAY'S INR: 1.7    WARFARIN Dosage: 7.5 mg x 1, then resume 5 mg daily    INR (no units)   Date Value   2023 1.7   2022 1.8   11/10/2022 1.8   10/19/2022 2.2   2022 2.1   08/10/2022 2   2022 2.3       Medication changes:  -Warfarin restarted 23  -Diltiazem dosage changed  -Pacemaker placement scheduled for 3/8 (will hold warfarin x 5 days prior and will bridge with Lovenox)  -ASA stopped today    Notes:    Fingerstick INR drawn per clinic protocol. Patient states no visible blood in urine, no black tarry stool and no nosebleeds. Denies any missed or extra doses of warfarin. Warfarin was restarted by Dr Liane Basurto on 23. She stated that her Diltiazem dosage had changed a couple weeks ago. Had an appt with Dr Liane Basurto prior to Clinic visit and a Pacemaker placement was scheduled for 3/8/23. Her ASA was stopped today and he ordered her warfarin to be held x 5 days prior and will need to bridge with Lovenox. No change in other maintenance medications or in diet. INR is subtherapeutic today so will give 7.5 mg today and will continue 5 mg QD. Will recheck INR in 2 weeks. Patient acknowledges working in consult agreement with pharmacist as referred by his/her physician.                   For Pharmacy Admin Tracking Only    Intervention Detail: Adherence Monitorin and Dose Adjustment: 1, reason: Therapy Optimization  Total # of Interventions Recommended: 2  Total # of Interventions Accepted: 2  Time Spent (min): 30    Sara Sutton PharmD 2023 2:39 PM

## 2023-02-01 NOTE — PATIENT INSTRUCTIONS
Can STOP ASA now     Will proceed with pacer on March 8th    Will need to HOLD Coumadin 5 days prior   And bridge- will need to HOLD lovenox day prior and day of procedure.

## 2023-02-01 NOTE — PATIENT INSTRUCTIONS
Continue current dose of warfarin as instructed on dosing calendar provided. Continue to monitor urine and stool for signs and symptoms of bleeding. Please notify the clinic of any medication changes. Kindly notify the clinic if you are unable to make to your next appointment. Please remember to bring all medications (both prescription and OTC) to your next visit.

## 2023-02-01 NOTE — PROGRESS NOTES
[facility]   Preadmission Testing    Name: Kaye Fournier  : 1945  Patient Phone: [phone] (home)     Procedure: Pacemaker Insert  Date of Procedure: 3/8/2023  Surgeon: Nieves Fonseca MD    Ht:  5' 3\" (160 cm)  Wt: 255 lb (115.7 kg)  Wt method: Stated    Allergies: Allergies   Allergen Reactions    Clarithromycin Rash     Patient says she will \"never take this again\"     Gabapentin Swelling     Pt states that her body swelled up, not her airway, no troubles breathing    Merthiolate Glycerite [Thimerosal] Dermatitis     Redness, blisters    Tylenol With Codeine #3 [Acetaminophen-Codeine] Nausea And Vomiting                There were no vitals filed for this visit. No LMP recorded (lmp unknown). Patient is postmenopausal.    Do you take blood thinners? [x] Yes    [] No         Instructed to stop blood thinners prior to procedure? [x] Yes   Working with coumadin clinic to bridge [] No      [] N/A   Do you have sleep apnea? [] Yes    [x] No     Do you have acid reflux ? [] Yes    [x] No     Do you have  hiatal hernia? [] Yes    [x] No    Do you ever experience motion sickness? [] Yes    [x] No     Have you had a respiratory infection or sore throat in last 4 weeks before surgery? [] Yes    [x] No     Do you have poorly controlled asthma or COPD? Difficulty with intubation in past? [] Yes    [x] No      [] Yes    [x] No       Do you have a history of angina in the last month or symptomatic arrhythmia? [] Yes    [x] No     Do you have significant central nervous system disease? [] Yes    [x] No     Have you had an EKG, labs, or chest xray in last 12 months? If yes provide copies to anesthesia   [x] Yes    [] No       [x] Lab    [x] EKG    [] CXR     Have you had a stress test?     [x] Yes    [] No    When/where:Dr Melvina Zuñiga    Was it normal?    [x] Yes    [] No     Do you or your family have a history of Malignant Hyperthermia? [] Yes    [x] No           Do you smoke? [] Yes    [x] No      Please refrain from smoking on the day of surgery. Patient instructed on: [x] NPO Status   [x] Meds to Take  [x] Ride Home  [x]No Jewelry/Contact Lenses/Nail [place]  [x] Prep/Lax/Clear Liquids    [] Chlorhexidene     DOS Patient Needs [] HCG   [] Blood Sugar  [] PT/INR    [] T&S       COVID Vaccinated? [] Yes    [] No                     Patient instructed on the pre-operative, intra-operative, and post-operative process? Yes  Medication instructions reviewed with patient?   Yes

## 2023-02-06 ENCOUNTER — TELEPHONE (OUTPATIENT)
Dept: FAMILY MEDICINE CLINIC | Age: 78
End: 2023-02-06

## 2023-02-06 NOTE — TELEPHONE ENCOUNTER
----- Message from Marisabel Penny sent at 2/6/2023 10:36 AM EST -----  Subject: Appointment Request    Reason for Call: Established Patient Appointment needed: Routine Medicare   AWV    QUESTIONS    Reason for appointment request? No appointments available during search     Additional Information for Provider? Patient needs for wellness visit. None available.  Please call radha [phone] [place] to schedule   as phone is not working.   ---------------------------------------------------------------------------  --------------  [address] [facility] [employer]  [phone]; OK to leave message on voicemail  ---------------------------------------------------------------------------  --------------  SCRIPT ANSWERS  COVID Screen: Rosiland Ormond

## 2023-02-13 NOTE — PROGRESS NOTES
[facility]  [address] [place] [address]  [phone]      2023      Jony Geller CNP  [place]  [place], [place] [address]      RE:   Vicci Schilder  :  1945      Dear Rimma Dc:    REASON FOR CONSULT:  Sick sinus syndrome. HISTORY OF PRESENT ILLNESS:  I met with [facility] and her daughter, Sabine Verduzco, in our office on 2023. I trust you received my full H and P from 2022. She does have chronic atrial fibrillation and is on Coumadin. She also has severe mitral regurgitation. She has never had a myocardial infarction or cardiac catheterization. She was in the hospital for epistaxis and her Coumadin was held. Her EKG was somewhat slow and, therefore, placed an event recorder from 2022 to 2023. We received multiple transmissions. She had heart rates in the 30 to 35 range. She also had some lightheadedness. She had a 3-second pause on 2022, associated with lightheadedness. She had a heart rate of 29 on 2022. I met with her and her daughter to discuss pacemaker. She did have a fair amount of heart rates in the 130s to 140s range. We obviously cannot slow the tachycardia without making her bradycardia worse. Therefore, I have recommended a pacemaker, which would be a VVI pacemaker because of chronic atrial fibrillation. She has wished to proceed with a pacemaker. We will do so on . She will hold her Coumadin five days prior to the procedure and bridge with Lovenox. She is not having any chest pain or chest discomfort, and we will do no other testing. Thank you very much for allowing me the privilege of seeing Mrs. Cj Perez. If you have any questions on my thoughts, please do not hesitate to contact me.     Sincerely,        Liliana Reese MD    D: 2023 14:31:22     T: 2023 14:33:44     [email or web address]  Job#: [id number]   Doc#: [id number]

## 2023-02-15 ENCOUNTER — HOSPITAL ENCOUNTER (OUTPATIENT)
Dept: PHARMACY | Age: 78
Setting detail: THERAPIES SERIES
Discharge: HOME | End: 2023-02-15
Payer: MEDICARE

## 2023-02-15 VITALS — DIASTOLIC BLOOD PRESSURE: 51 MMHG | SYSTOLIC BLOOD PRESSURE: 103 MMHG | HEART RATE: 72 BPM

## 2023-02-15 DIAGNOSIS — I48.91 ATRIAL FIBRILLATION, UNSPECIFIED TYPE (HCC): Primary | ICD-10-CM

## 2023-02-15 LAB — INR BLD: 2.2

## 2023-02-15 PROCEDURE — 85610 PROTHROMBIN TIME: CPT

## 2023-02-15 PROCEDURE — 99211 OFF/OP EST MAY X REQ PHY/QHP: CPT

## 2023-02-15 RX ORDER — ENOXAPARIN SODIUM 100 MG/ML
40 INJECTION SUBCUTANEOUS EVERY MORNING
Qty: 4 ML | Refills: 1 | OUTPATIENT
Start: 2023-03-03 | End: 2023-03-13

## 2023-02-15 RX ORDER — ENOXAPARIN SODIUM 100 MG/ML
INJECTION SUBCUTANEOUS EVERY MORNING
COMMUNITY
End: 2023-02-15 | Stop reason: SDUPTHER

## 2023-02-15 NOTE — PROGRESS NOTES
[facility] [address] [place], [place] [address]Farhat/Alfonso  Medication Management  ANTICOAGULATION    Referring Provider: Dr Terrence Don INR: 2.0-3.0     TODAY'S INR: 2.2     WARFARIN Dosage: Continue 5 mg daily until 3/3, then follow dosing calendar    INR (no units)   Date Value   02/15/2023 2.2   2023 1.7   2022 1.8   11/10/2022 1.8   10/19/2022 2.2   2022 2.1   08/10/2022 2       Medication changes:  Lovenox 40 mg QAM to start 3/3 (#10/1Refill called into [facility] 2/15/23 @ 13:58)    Notes:    Fingerstick INR drawn per clinic protocol. Patient states no visible blood in urine and no black tarry stool. No falls. Denies any missed or extra doses of warfarin. Complains of dry throat so encouraged her to try her humidifier. No change in maintenance medications or in diet except for the use of Lovenox 40 mg QAM to bridge prior to/after Pacemaker placement on 3/8/23. Will continue current 5 mg QD warfarin dosing for therapeutic INR until pt is to follow the bridging dosing calendar starting 3/3/23. Will recheck INR in 4 weeks. Patient acknowledges working in consult agreement with pharmacist as referred by his/her physician.                   For Pharmacy Admin Tracking Only    Intervention Detail: Adherence Monitorin and Dose Adjustment: 1, reason: Therapy Optimization  Total # of Interventions Recommended: 2  Total # of Interventions Accepted: 2  Time Spent (min): 45    Caryn TaborD 2/15/2023 2:03 PM

## 2023-02-27 ENCOUNTER — OFFICE VISIT (OUTPATIENT)
Dept: FAMILY MEDICINE CLINIC | Age: 78
End: 2023-02-27

## 2023-02-27 VITALS — WEIGHT: 260 LBS | HEART RATE: 76 BPM | HEIGHT: 63 IN | BODY MASS INDEX: 46.07 KG/M2 | OXYGEN SATURATION: 93 %

## 2023-02-27 DIAGNOSIS — M79.10 MYALGIA: ICD-10-CM

## 2023-02-27 DIAGNOSIS — Z00.00 MEDICARE ANNUAL WELLNESS VISIT, SUBSEQUENT: Primary | ICD-10-CM

## 2023-02-27 DIAGNOSIS — I48.20 CHRONIC ATRIAL FIBRILLATION (HCC): ICD-10-CM

## 2023-02-27 DIAGNOSIS — F33.1 MAJOR DEPRESSIVE DISORDER, RECURRENT, MODERATE (HCC): ICD-10-CM

## 2023-02-27 DIAGNOSIS — D50.9 IRON DEFICIENCY ANEMIA, UNSPECIFIED IRON DEFICIENCY ANEMIA TYPE: ICD-10-CM

## 2023-02-27 DIAGNOSIS — I50.22 CHRONIC SYSTOLIC (CONGESTIVE) HEART FAILURE (HCC): ICD-10-CM

## 2023-02-27 DIAGNOSIS — N18.31 STAGE 3A CHRONIC KIDNEY DISEASE (HCC): ICD-10-CM

## 2023-02-27 SDOH — ECONOMIC STABILITY: HOUSING INSECURITY
IN THE LAST 12 MONTHS, WAS THERE A TIME WHEN YOU DID NOT HAVE A STEADY PLACE TO SLEEP OR SLEPT IN A SHELTER (INCLUDING NOW)?: NO

## 2023-02-27 SDOH — ECONOMIC STABILITY: FOOD INSECURITY: WITHIN THE PAST 12 MONTHS, THE FOOD YOU BOUGHT JUST DIDN'T LAST AND YOU DIDN'T HAVE MONEY TO GET MORE.: NEVER TRUE

## 2023-02-27 SDOH — ECONOMIC STABILITY: INCOME INSECURITY: HOW HARD IS IT FOR YOU TO PAY FOR THE VERY BASICS LIKE FOOD, HOUSING, MEDICAL CARE, AND HEATING?: NOT HARD AT ALL

## 2023-02-27 SDOH — ECONOMIC STABILITY: FOOD INSECURITY: WITHIN THE PAST 12 MONTHS, YOU WORRIED THAT YOUR FOOD WOULD RUN OUT BEFORE YOU GOT MONEY TO BUY MORE.: NEVER TRUE

## 2023-02-27 ASSESSMENT — PATIENT HEALTH QUESTIONNAIRE - PHQ9
SUM OF ALL RESPONSES TO PHQ9 QUESTIONS 1 & 2: 0
1. LITTLE INTEREST OR PLEASURE IN DOING THINGS: 0
9. THOUGHTS THAT YOU WOULD BE BETTER OFF DEAD, OR OF HURTING YOURSELF: 0
10. IF YOU CHECKED OFF ANY PROBLEMS, HOW DIFFICULT HAVE THESE PROBLEMS MADE IT FOR YOU TO DO YOUR WORK, TAKE CARE OF THINGS AT HOME, OR GET ALONG WITH OTHER PEOPLE: 0
SUM OF ALL RESPONSES TO PHQ QUESTIONS 1-9: 7
8. MOVING OR SPEAKING SO SLOWLY THAT OTHER PEOPLE COULD HAVE NOTICED. OR THE OPPOSITE, BEING SO FIGETY OR RESTLESS THAT YOU HAVE BEEN MOVING AROUND A LOT MORE THAN USUAL: 0
7. TROUBLE CONCENTRATING ON THINGS, SUCH AS READING THE NEWSPAPER OR WATCHING TELEVISION: 1
4. FEELING TIRED OR HAVING LITTLE ENERGY: 3
SUM OF ALL RESPONSES TO PHQ QUESTIONS 1-9: 7
5. POOR APPETITE OR OVEREATING: 0
2. FEELING DOWN, DEPRESSED OR HOPELESS: 0
6. FEELING BAD ABOUT YOURSELF - OR THAT YOU ARE A FAILURE OR HAVE LET YOURSELF OR YOUR FAMILY DOWN: 0
SUM OF ALL RESPONSES TO PHQ QUESTIONS 1-9: 7
SUM OF ALL RESPONSES TO PHQ QUESTIONS 1-9: 7
3. TROUBLE FALLING OR STAYING ASLEEP: 3

## 2023-02-27 ASSESSMENT — LIFESTYLE VARIABLES
HOW MANY STANDARD DRINKS CONTAINING ALCOHOL DO YOU HAVE ON A TYPICAL DAY: PATIENT DOES NOT DRINK
HOW OFTEN DO YOU HAVE A DRINK CONTAINING ALCOHOL: NEVER

## 2023-02-27 NOTE — PATIENT INSTRUCTIONS
Phone: [phone]  Fax: 969 [facility] Office Hours:  Monday: [place] office location 8-5 ([phone]) Offering additional late hours the first Monday of the month until 7 pm.   Tuesday: 8-5 Wednesday: 8-5 Thursday:  Additional hours offered 2 Thursdays a month. Please call to inquire those dates. Fridays: 7:30-4:30   SURVEY:    You may be receiving a survey from [employer] regarding your visit today. Please complete the survey to enable us to provide the highest quality of care to you and your family. If you cannot score us a very good on any question, please call the office to discuss how we could have made your experience a very good one. Thank you. Preventing Falls: Care Instructions  Overview     Getting around your home safely can be a challenge if you have injuries or health problems that make it easy for you to fall. Loose rugs and furniture in walkways are among the dangers for many older people who have problems walking or who have poor eyesight. People who have conditions such as arthritis, osteoporosis, or dementia also have to be careful not to fall. You can make your home safer with a few simple measures. Follow-up care is a key part of your treatment and safety. Be sure to make and go to all appointments, and call your doctor if you are having problems. It's also a good idea to know your test results and keep a list of the medicines you take. How can you care for yourself at home? Taking care of yourself  Exercise regularly to improve your strength, muscle tone, and balance. Walk if you can. Swimming may be a good choice if you cannot walk easily. Have your vision and hearing checked each year or any time you notice a change. If you have trouble seeing and hearing, you might not be able to avoid objects and could lose your balance. Know the side effects of the medicines you take.  Ask your doctor or pharmacist whether the medicines you take can affect your balance. Sleeping pills or sedatives can affect your balance. Limit the amount of alcohol you drink. Alcohol can impair your balance and other senses. Ask your doctor whether calluses or corns on your feet need to be removed. If you wear loose-fitting shoes because of calluses or corns, you can lose your balance and fall. Talk to your doctor if you have numbness in your feet. You may get dizzy if you do not drink enough water. To prevent dehydration, drink plenty of fluids. Choose water and other clear liquids. If you have kidney, heart, or liver disease and have to limit fluids, talk with your doctor before you increase the amount of fluids you drink. Preventing falls at home  Remove raised doorway thresholds, throw rugs, and clutter. Repair loose carpet or raised areas in the floor. Move furniture and electrical cords to keep them out of walking paths. Use nonskid floor wax, and wipe up spills right away, especially on ceramic tile floors. If you use a walker or cane, put rubber tips on it. If you use crutches, clean the bottoms of them regularly with an abrasive pad, such as steel wool. Keep your house well lit, especially stairways, porches, and outside walkways. Use night-lights in areas such as hallways and bathrooms. Add extra light switches or use remote switches (such as switches that go on or off when you clap your hands) to make it easier to turn lights on if you have to get up during the night. Install sturdy handrails on stairways. Move items in your cabinets so that the things you use a lot are on the lower shelves (about waist level). Keep a cordless phone and a flashlight with new batteries by your bed. If possible, put a phone in each of the main rooms of your house, or carry a cell phone in case you fall and cannot reach a phone. Or, you can wear a device around your neck or wrist. You push a button that sends a signal for help.   Wear low-heeled shoes that fit well and give your feet good support. Use footwear with nonskid soles. Check the heels and soles of your shoes for wear. Repair or replace worn heels or soles. Do not wear socks without shoes on smooth floors, such as wood. Walk on the grass when the sidewalks are slippery. If you live in an area that gets snow and ice in the winter, sprinkle salt on slippery steps and sidewalks. Or ask a family member or friend to do this for you. Preventing falls in the bath  Install grab bars and nonskid mats inside and outside your shower or tub and near the toilet and sinks. Use shower chairs and bath benches. Use a hand-held shower head that will allow you to sit while showering. Get into a tub or shower by putting the weaker leg in first. Get out of a tub or shower with your strong side first.  Repair loose toilet seats and consider installing a raised toilet seat to make getting on and off the toilet easier. Keep your bathroom door unlocked while you are in the shower. Where can you learn more? Go to http://www.arroyo.com/ and enter G117 to learn more about \"Preventing Falls: Care Instructions. \"  Current as of: May 4, 2022               Content Version: 13.5  © 9792-7053 Healthwise, Incorporated. Care instructions adapted under license by [facility] ([facility]). If you have questions about a medical condition or this instruction, always ask your healthcare professional. [place] [address] any warranty or liability for your use of this information. Learning About Mindfulness for Stress  What are mindfulness and stress? Stress is what you feel when you have to handle more than you are used to. A lot of things can cause stress. You may feel stress when you go on a job interview, take a test, or run a race. This kind of short-term stress is normal and even useful. It can help you if you need to work hard or react quickly. Stress also can last a long time.  Long-term stress is caused by stressful situations or events. Examples of long-term stress include long-term health problems, ongoing problems at work, and conflicts in your family. Long-term stress can harm your health. Mindfulness is a focus only on things happening in the present moment. It's a process of purposefully paying attention to and being aware of your surroundings, your emotions, your thoughts, and how your body feels. You are aware of these things, but you aren't judging these experiences as \"good\" or \"bad. \" Mindfulness can help you learn to calm your mind and body to help you cope with illness, pain, and stress. How does mindfulness help to relieve stress? Mindfulness can help quiet your mind and relax your body. Studies show that it can help some people sleep better, feel less anxious, and bring their blood pressure down. And it's been shown to help some people live and cope better with certain health problems like heart disease, depression, chronic pain, and cancer. How do you practice mindfulness? To be mindful is to pay attention, to be present, and to be accepting. When you're mindful, you do just one thing and you pay close attention to that one thing. For example, you may sit quietly and notice your emotions or how your food tastes and smells. When you're present, you focus on the things that are happening right now. You let go of your thoughts about the past and the future. When you dwell on the past or the future, you miss moments that can heal and strengthen you. You may miss moments like hearing a child laugh or seeing a friendly face when you think you're all alone. When you're accepting, you don't  the present moment. Instead you accept your thoughts and feelings as they come. You can practice anytime, anywhere, and in any way you choose. You can practice in many ways. Here are a few ideas:  While doing your chores, like washing the dishes, let your mind focus on what's in your hand. What does the dish feel like?  Is the water warm or cold? Go outside and take a few deep breaths. What is the air like? Is it warm or cold? When you can, take some time at the start of your day to sit alone and think. Take a slow walk by yourself. Count your steps while you breathe in and out. Try yoga breathing exercises, stretches, and poses to strengthen and relax your muscles. At work, if you can, try to stop for a few moments each hour. Note how your body feels. Let yourself regroup and let your mind settle before you return to what you were doing. If you struggle with anxiety or \"worry thoughts,\" imagine your mind as a blue garcía and your worry thoughts as clouds. Now imagine those worry thoughts floating across your mind's garcía. Just let them pass by as you watch. Follow-up care is a key part of your treatment and safety. Be sure to make and go to all appointments, and call your doctor if you are having problems. It's also a good idea to know your test results and keep a list of the medicines you take. Where can you learn more? Go to http://www.arroyo.com/ and enter M676 to learn more about \"Learning About Mindfulness for Stress. \"  Current as of: February 9, 2022               Content Version: 13.5  © 8594-5100 Healthwise, Incorporated. Care instructions adapted under license by [facility] ([facility]). If you have questions about a medical condition or this instruction, always ask your healthcare professional. [place] [address] any warranty or liability for your use of this information. Learning About Emotional Support  When do you need emotional support? You might find getting support from others helpful when you have a long-term health problem. Often people feel alone, confused, or scared when coping with an illness. But you aren't alone. Other people are going through the same thing you are and know how you feel. Talking with others about your feelings can help you feel better.   Your family and friends can give you support. So can your doctor, a support group, or a Scientologist. If you have a support network, you will not feel as alone. You will learn new ways to deal with your situation, and you may try harder to overcome it. Where you can get support  Family and friends: They can help you cope by giving you comfort and encouragement. Counseling: Professional counseling can help you cope with situations that interfere with your life and cause stress. Counseling can help you understand and deal with your illness. Your doctor: Find a doctor you trust and feel comfortable with. Be open and honest about your fears and concerns. Your doctor can help you get the right medical treatments, including counseling. Spiritual or Oriental orthodox groups: They can provide comfort and may be able to help you find counseling or other social support services. Social groups: They can help you meet new people and get involved in activities you enjoy. Community support groups: In a support group, you can talk to others who have dealt with the same problems or illness as you. You can encourage one another and learn ways to cope with tough emotions. How to find a support group  Ask your doctor, counselor, or other health professional for suggestions. Contact your local Scientologist, Baptist, Judaism, or other Oriental orthodox group. Ask your family and friends. Ask people who have the same health concerns. Go online. Forums and blogs let you read messages from others and leave your own messages. You can exchange stories, vent your frustrations, and ask and answer questions. Contact a city, state, or national group that provides support for your health concerns. Your library or community center may have a list of these groups. Or you can look for information online. Look for a support group that works for you. Ask yourself if you prefer structure and would like a , or if you would like a less formal group.  Do you prefer face-to-face meetings? Or do you feel more secure in online chat rooms or forums? Supportive relationships  A supportive relationship includes emotional support such as love, trust, and understanding, as well as advice and concrete help, such as help managing your time. Reach out to others  Family and friends can help you. Ask them to:  Listen to you and give you encouragement. This can keep you from feeling hopeless or alone. Help with small daily tasks or with bigger problems. A helping hand can keep you from feeling overwhelmed. Help you manage a health problem. For example, ask them to go to doctor visits with you. Your loved ones can offer support by being involved in your medical care. Respect your relationships  A good relationship is also a two-way street. You count on help from others, but they also count on you. Know your friends' limits. You don't have to see or call your friends every day. If you are going through a rough patch, ask friends if you can contact them outside of the usual boundaries. Don't always complain or talk about yourself. Know when it's time to stop talking and listen or just enjoy your friend's company. Know that good friends can be a bad influence. For example, if a friend encourages you to drink when you know it will harm you, you may want to end the friendship. Where can you learn more? Go to http://www.woods.com/ and enter G092 to learn more about \"Learning About Emotional Support. \"  Current as of: February 9, 2022               Content Version: 13.5  © 2006-2022 Healthwise, Incorporated. Care instructions adapted under license by [facility] [address] If you have questions about a medical condition or this instruction, always ask your healthcare professional. [place] [address] any warranty or liability for your use of this information.            Fatigue: Care Instructions  Your Care Instructions     Fatigue is a feeling of tiredness, exhaustion, or lack of energy. You may feel fatigue because of too much or not enough activity. It can also come from stress, lack of sleep, boredom, and poor diet. Many medical problems, such as viral infections, can cause fatigue. Emotional problems, especially depression, are often the cause of fatigue. Fatigue is most often a symptom of another problem. Treatment for fatigue depends on the cause. For example, if you have fatigue because you have a certain health problem, treating this problem also treats your fatigue. If depression or anxiety is the cause, treatment may help. Follow-up care is a key part of your treatment and safety. Be sure to make and go to all appointments, and call your doctor if you are having problems. It's also a good idea to know your test results and keep a list of the medicines you take. How can you care for yourself at home? Get regular exercise. But don't overdo it. Go back and forth between rest and exercise. Get plenty of rest.  Eat a healthy diet. Do not skip meals, especially breakfast.  Reduce your use of caffeine, tobacco, and alcohol. Caffeine is most often found in coffee, tea, cola drinks, and chocolate. Limit medicines that can cause fatigue. This includes tranquilizers and cold and allergy medicines. When should you call for help? Watch closely for changes in your health, and be sure to contact your doctor if:    You have new symptoms such as fever or a rash.     Your fatigue gets worse.     You have been feeling down, depressed, or hopeless. Or you may have lost interest in things that you usually enjoy.     You are not getting better as expected. Where can you learn more? Go to http://www.woods.com/ and enter O890 to learn more about \"Fatigue: Care Instructions. \"  Current as of: February 9, 2022               Content Version: 13.5  © 6374-1202 Healthwise, Incorporated. Care instructions adapted under license by [facility] ([facility]).  If you have questions about a medical condition or this instruction, always ask your healthcare professional. [address] any warranty or liability for your use of this information. Learning About Emotional Support  When do you need emotional support? You might find getting support from others helpful when you have a long-term health problem. Often people feel alone, confused, or scared when coping with an illness. But you aren't alone. Other people are going through the same thing you are and know how you feel. Talking with others about your feelings can help you feel better. Your family and friends can give you support. So can your doctor, a support group, or a Lutheran. If you have a support network, you will not feel as alone. You will learn new ways to deal with your situation, and you may try harder to overcome it. Where you can get support  Family and friends: They can help you cope by giving you comfort and encouragement. Counseling: Professional counseling can help you cope with situations that interfere with your life and cause stress. Counseling can help you understand and deal with your illness. Your doctor: Find a doctor you trust and feel comfortable with. Be open and honest about your fears and concerns. Your doctor can help you get the right medical treatments, including counseling. Spiritual or Orthodox groups: They can provide comfort and may be able to help you find counseling or other social support services. Social groups: They can help you meet new people and get involved in activities you enjoy. Community support groups: In a support group, you can talk to others who have dealt with the same problems or illness as you. You can encourage one another and learn ways to cope with tough emotions. How to find a support group  Ask your doctor, counselor, or other health professional for suggestions.   Contact your local Lutheran, Restorationism, Yazidism, or other Orthodox group. Ask your family and friends. Ask people who have the same health concerns. Go online. Forums and blogs let you read messages from others and leave your own messages. You can exchange stories, vent your frustrations, and ask and answer questions. Contact a city, state, or national group that provides support for your health concerns. Your library or [employer] [place] center may have a list of these groups. Or you can look for information online. Look for a support group that works for you. Ask yourself if you prefer structure and would like a , or if you would like a less formal group. Do you prefer face-to-face meetings? Or do you feel more secure in online chat rooms or forums? Supportive relationships  A supportive relationship includes emotional support such as love, trust, and understanding, as well as advice and concrete help, such as help managing your time. Reach out to others  Family and friends can help you. Ask them to:  Listen to you and give you encouragement. This can keep you from feeling hopeless or alone. Help with small daily tasks or with bigger problems. A helping hand can keep you from feeling overwhelmed. Help you manage a health problem. For example, ask them to go to doctor visits with you. Your loved ones can offer support by being involved in your medical care. Respect your relationships  A good relationship is also a two-way street. You count on help from others, but they also count on you. Know your friends' limits. You don't have to see or call your friends every day. If you are going through a rough patch, ask friends if you can contact them outside of the usual boundaries. Don't always complain or talk about yourself. Know when it's time to stop talking and listen or just enjoy your friend's company. Know that good friends can be a bad influence.  For example, if a friend encourages you to drink when you know it will harm you, you may want to end the friendship. Where can you learn more? Go to http://www.woods.com/ and enter G092 to learn more about \"Learning About Emotional Support. \"  Current as of: February 9, 2022               Content Version: 13.5  © 2159-7564 Healthwise, [place]. Care instructions adapted under license by [facility] ([facility]). If you have questions about a medical condition or this instruction, always ask your healthcare professional. [address] any warranty or liability for your use of this information. Learning About Stress  What is stress? Stress is what you feel when you have to handle more than you are used to. Stress is a fact of life for most people, and it affects everyone differently. What causes stress for you may not be stressful for someone else. A lot of things can cause stress. You may feel stress when you go on a job interview, take a test, or run a race. This kind of short-term stress is normal and even useful. It can help you if you need to work hard or react quickly. For example, stress can help you finish an important job on time. Stress also can last a long time. Long-term stress is caused by stressful situations or events. Examples of long-term stress include long-term health problems, ongoing problems at work, or conflicts in your family. Long-term stress can harm your health. How does stress affect your health? When you are stressed, your body responds as though you are in danger. It makes hormones that speed up your heart, make you breathe faster, and give you a burst of energy. This is called the fight-or-flight stress response. If the stress is over quickly, your body goes back to normal and no harm is done. But if stress happens too often or lasts too long, it can have bad effects. Long-term stress can make you more likely to get sick, and it can make symptoms of some diseases worse.  If you tense up when you are stressed, you may develop neck, shoulder, or low back pain. Stress is linked to high blood pressure and heart disease. Stress also harms your emotional health. It can make you skaggs, tense, or depressed. Your relationships may suffer, and you may not do well at work or school. What can you do to manage stress? How to relax your mind   Write. It may help to write about things that are bothering you. This helps you find out how much stress you feel and what is causing it. When you know this, you can find better ways to cope. Let your feelings out. Talk, laugh, cry, and express anger when you need to. Talking with friends, family, a counselor, or a member of the clergy about your feelings is a healthy way to relieve stress. Do something you enjoy. For example, listen to music or go to a movie. Practice your hobby or do volunteer work. Meditate. This can help you relax, because you are not worrying about what happened before or what may happen in the future. Do guided imagery. Imagine yourself in any setting that helps you feel calm. You can use audiotapes, books, or a teacher to guide you. How to relax your body   Do something active. Exercise or activity can help reduce stress. Walking is a great way to get started. Even everyday activities such as housecleaning or yard work can help. Do breathing exercises. For example:  From a standing position, bend forward from the waist with your knees slightly bent. Let your arms dangle close to the floor. Breathe in slowly and deeply as you return to a standing position. Roll up slowly and lift your head last.  Hold your breath for just a few seconds in the standing position. Breathe out slowly and bend forward from the waist.  Try yoga or alondra chi. These techniques combine exercise and meditation. You may need some training at first to learn them. What can you do to prevent stress? Manage your time. This helps you find time to do the things you want and need to do. Get enough sleep.  Your body recovers from the stresses of the day while you are sleeping. Get support. Your family, friends, and community can make a difference in how you experience stress. Where can you learn more? Go to http://www.arroyo.com/ and enter N032 to learn more about \"Learning About Stress. \"  Current as of: October 6, 2021               Content Version: 13.5  © 8151-7476 [employer]. Care instructions adapted under license by [facility] ([place] [facility]). If you have questions about a medical condition or this instruction, always ask your healthcare professional. [place] [address] any warranty or liability for your use of this information. Learning About Managing Anger  What causes anger? Many things can cause anger: Stress at work or at home. Social situations that make you angry. A response to everyday events. Anger signals your body to prepare for a fight. This reaction is often called \"fight or flight. \" When you get angry, adrenaline and other hormones are released into your blood. Then your blood pressure goes up, your heart beats faster, and you breathe faster. When you express anger in a healthy way, it can inspire change and make you productive. But if you don't have the skills to express anger in a healthy way, anger can build up. You may hurt others--and yourself--emotionally and even physically. Violent behavior often starts with verbal threats or fairly minor incidents. But over time, it can involve physical harm. It can include physical, verbal, or sexual abuse of an intimate partner (domestic violence), a child (child abuse), or an older adult (elder abuse). It can also make you sick. Anger and constant hostility keep your blood pressure high. They increase your chances of having another health problem, such as depression, a heart attack, or a stroke. Some people with post-traumatic stress disorder (PTSD) feel angry and on alert all the time.   It may feel like there are no other ways to react when you are angry. But when you learn to work with anger in appropriate and healthy ways, your anger no longer controls you. How can you manage your anger? The first step to managing anger is to be more aware of it. Note the thoughts, feelings, and emotions that you have when you get angry. Practice noticing these signs of anger when you are calm. If you are more aware of the signs of anger, you can take steps to manage it. Here are a few tips: Think before you act. Take time to stop and cool down when you feel yourself getting angry. Count to 10 while you take slow, steady breaths. Practice some other form of mental relaxation. Learn the feelings that lead to angry outbursts. Anger and hostility may be a symptom of unhappy feelings or depression about your job, your relationship, or other aspects of your personal life. Avoid situations that lead to angry outbursts. If standing in line bothers you, do errands at less busy times. Express anger in a healthy way. You might:  Go for a short walk or jog. Draw, paint, or listen to music to release the anger. Write in a daily journal.  Use \"I\" statements, not \"you\" statements, to discuss your anger. Say \"I don't feel valued when my needs are not being met\" instead of \"You make me mad when you are so inconsiderate. \"  Take care of yourself. Exercise regularly. Eat a variety of healthy foods. Don't skip meals. Try to get 8 hours of sleep each night. Limit your use of alcohol, and don't use drugs. Practice yoga, meditation, or alondra chi to relax. Explore other resources that may be available through your job or your community. Contact your human resources department at work. You might be able to get services through an employee assistance program.  Contact your local hospital, mental health facility, or health department. Ask what types of programs or support groups are available in your area. Do not keep guns in your home.  If you must have guns in your home, unload them and lock them up. Lock ammunition in a separate place. Keep guns away from children.  Where can you find help?  If anger or stress starts to harm your work or personal relationships, you might seek help. You can learn ways to manage your feelings and actions.  Talk to someone you trust, or find a counselor.  There are groups in your area that can connect you with people to talk to.  Behavioral Health Treatment Services . This service from the national Substance Abuse and Mental Health Services Administration can help you find local counselors. Search online at findtreatment.samhsa.gov or call 3-523-900Status4HELP (3456), or TDD 1-740.214.8248.  Parents Anonymous. Self-help groups that serve parents under stress, as well as children who have been abused, are available throughout the [place], [place], and [place]. To find a group in your area, search online or in your phone book under Parents Anonymous or call (285) 031-5035.  Where can you learn more?  Go to https://www.BetKlub.net/patientEd and enter Z357 to learn more about \"Learning About Managing Anger.\"  Current as of: February 9, 2022               Content Version: 13.5  © 5480-6645 [employer].   Care instructions adapted under license by [employer]. If you have questions about a medical condition or this instruction, always ask your healthcare professional. [employer] disclaims any warranty or liability for your use of this information.           Learning About Being Active as an Older Adult  Why is being active important as you get older?     Being active is one of the best things you can do for your health. And it's never too late to start. Being active--or getting active, if you aren't already--has definite benefits. It can:  Give you more energy,  Keep your mind sharp.  Improve balance to reduce your risk of falls.  Help you manage chronic illness with fewer medicines.  No matter how old you are,  how fit you are, or what health problems you have, there is a form of activity that will work for you. And the more physical activity you can do, the better your overall health will be.  What kinds of activity can help you stay healthy?  Being more active will make your daily activities easier. Physical activity includes planned exercise and things you do in daily life. There are four types of activity:  Aerobic.  Doing aerobic activity makes your heart and lungs strong.  Includes walking, dancing, and gardening.  Aim for at least 2½ hours spread throughout the week.  It improves your energy and can help you sleep better.  Muscle-strengthening.  This type of activity can help maintain muscle and strengthen bones.  Includes climbing stairs, using resistance bands, and lifting or carrying heavy loads.  Aim for at least twice a week.  It can help protect the knees and other joints.  Stretching.  Stretching gives you better range of motion in joints and muscles.  Includes upper arm stretches, calf stretches, and gentle yoga.  Aim for at least twice a week, preferably after your muscles are warmed up from other activities.  It can help you function better in daily life.  Balancing.  This helps you stay coordinated and have good posture.  Includes heel-to-toe walking, alondra chi, and certain types of yoga.  Aim for at least 3 days a week.  It can reduce your risk of falling.  Even if you have a hard time meeting the recommendations, it's better to be more active than less active. All activity done in each category counts toward your weekly total. You'd be surprised how daily things like carrying groceries, keeping up with grandchildren, and taking the stairs can add up.  What keeps you from being active?  If you've had a hard time being more active, you're not alone. Maybe you remember being able to do more. Or maybe you've never thought of yourself as being active. It's frustrating when you can't do the things you want.  Being more active can help. What's holding you back? Getting started. Have a goal, but break it into easy tasks. Small steps build into big accomplishments. Staying motivated. If you feel like skipping your activity, remember your goal. Maybe you want to move better and stay independent. Every activity gets you one step closer. Not feeling your best.  Start with 5 minutes of an activity you enjoy. Prove to yourself you can do it. As you get comfortable, increase your time. You may not be where you want to be. But you're in the process of getting there. Everyone starts somewhere. How can you find safe ways to stay active? Talk with your doctor about any physical challenges you're facing. Make a plan with your doctor if you have a health problem or aren't sure how to get started with activity. If you're already active, ask your doctor if there is anything you should change to stay safe as your body and health change. If you tend to feel dizzy after you take medicine, avoid activity at that time. Try being active before you take your medicine. This will reduce your risk of falls. If you plan to be active at home, make sure to clear your space before you get started. Remove things like TV cords, coffee tables, and throw rugs. It's safest to have plenty of space to move freely. The key to getting more active is to take it slow and steady. Try to improve only a little bit at a time. Pick just one area to improve on at first. And if an activity hurts, stop and talk to your doctor. Where can you learn more? Go to http://www.arroyo.com/ and enter P600 to learn more about \"Learning About Being Active as an Older Adult. \"  Current as of: October 10, 2022               Content Version: 13.5  © 8874-9007 Healthwise, [employer]. Care instructions adapted under license by [facility] ([facility]).  If you have questions about a medical condition or this instruction, always ask your healthcare professional. [facility] disclaims any warranty or liability for your use of this information. Learning About Dental Care for Older Adults  Dental care for older adults: Overview  Dental care for older people is much the same as for younger adults. But older adults do have concerns that younger adults do not. Older adults may have problems with gum disease and decay on the roots of their teeth. They may need missing teeth replaced or broken fillings fixed. Or they may have dentures that need to be cared for. Some older adults may have trouble holding a toothbrush. You can help remind the person you are caring for to brush and floss their teeth or to clean their dentures. In some cases, you may need to do the brushing and other dental care tasks. People who have trouble using their hands or who have dementia may need this extra help. How can you help with dental care? Normal dental care  To keep the teeth and gums healthy:  Brush the teeth with fluoride toothpaste twice a day--in the morning and at night--and floss at least once a day. Plaque can quickly build up on the teeth of older adults. Watch for the signs of gum disease. These signs include gums that bleed after brushing or after eating hard foods, such as apples. See a dentist regularly. Many experts recommend checkups every 6 months. Keep the dentist up to date on any new medications the person is taking. Encourage a balanced diet that includes whole grains, vegetables, and fruits, and that is low in saturated fat and sodium. Encourage the person you're caring for not to use tobacco products. They can affect dental and general health. Many older adults have a fixed income and feel that they can't afford dental care. But most [facility] and [facility] have programs in which dentists help older adults by lowering fees. Contact your area's public health offices or  for information about dental care in your area.   Using a toothbrush  Older adults with arthritis sometimes have trouble brushing their teeth because they can't easily hold the toothbrush. Their hands and fingers may be stiff, painful, or weak. If this is the case, you can: Offer an electric toothbrush. Enlarge the handle of a non-electric toothbrush by wrapping a sponge, an elastic bandage, or adhesive tape around it. Push the toothbrush handle through a ball made of rubber or soft foam.  Make the handle longer and thicker by taping Popsicle sticks or tongue depressors to it. You may also be able to buy special toothbrushes, toothpaste dispensers, and floss holders. Your doctor may recommend a soft-bristle toothbrush if the person you care for bleeds easily. Bleeding can happen because of a health problem or from certain medicines. A toothpaste for sensitive teeth may help if the person you care for has sensitive teeth. How do you brush and floss someone's teeth? If the person you are caring for has a hard time cleaning their teeth on their own, you may need to brush and floss their teeth for them. It may be easiest to have the person sit and face away from you, and to sit or stand behind them. That way you can steady their head against your arm as you reach around to floss and brush their teeth. Choose a place that has good lighting and is comfortable for both of you. Before you begin, gather your supplies. You will need gloves, floss, a toothbrush, and a container to hold water if you are not near a sink. Wash and dry your hands well and put on gloves. Start by flossing:  Gently work a piece of floss between each of the teeth toward the gums. A plastic flossing tool may make this easier, and they are available at most [facility]. Curve the floss around each tooth into a U-shape and gently slide it under the gum line. Move the floss firmly up and down several times to scrape off the plaque.   After you've finished flossing, throw away the used floss and begin brushing:  Wet the brush and apply toothpaste. Place the brush at a 45-degree angle where the teeth meet the gums. Press firmly, and move the brush in small circles over the surface of the teeth. Be careful not to brush too hard. Vigorous brushing can make the gums pull away from the teeth and can scratch the tooth enamel. Brush all surfaces of the teeth, on the tongue side and on the cheek side. Pay special attention to the front teeth and all surfaces of the back teeth. Brush chewing surfaces with short back-and-forth strokes. After you've finished, help the person rinse the remaining toothpaste from their mouth. Where can you learn more? Go to http://www.woods.com/ and enter F944 to learn more about \"Learning About Dental Care for Older Adults. \"  Current as of: June 16, 2022               Content Version: 13.5  © 2006-2022 [employer]. Care instructions adapted under license by [facility] ([facility]). If you have questions about a medical condition or this instruction, always ask your healthcare professional. [place] [address] any warranty or liability for your use of this information. Hearing Loss: Care Instructions  Overview     Hearing loss is a sudden or slow decrease in how well you hear. It can range from mild to severe. Permanent hearing loss can occur with aging. It also can happen when you are exposed long-term to loud noise. Examples include listening to loud music, riding motorcycles, or being around other loud machines. Hearing loss can affect your work and home life. It can make you feel lonely or depressed. You may feel that you have lost your independence. But hearing aids and other devices can help you hear better and feel connected to others. Follow-up care is a key part of your treatment and safety. Be sure to make and go to all appointments, and call your doctor if you are having problems.  It's also a good idea to know your test results and keep a list of the medicines you take. How can you care for yourself at home? Avoid loud noises whenever possible. This helps keep your hearing from getting worse. Always wear hearing protection around loud noises. Wear a hearing aid as directed. See a professional who can help you pick a hearing aid that fits you. Have hearing tests as your doctor suggests. They can show whether your hearing has changed. Your hearing aid may need to be adjusted. Use other devices as needed. These may include:  Telephone amplifiers and hearing aids that can connect to a television, stereo, radio, or microphone. Devices that use lights or vibrations. These alert you to the doorbell, a ringing telephone, or a baby monitor. Television closed-captioning. This shows the words at the bottom of the screen. Most new TVs can do this. TTY (text telephone). This lets you type messages back and forth on the telephone instead of talking or listening. These devices are also called TDD. When messages are typed on the keyboard, they are sent over the phone line to a receiving TTY. The message is shown on a monitor. Use text messaging, social media, and email if it is hard for you to communicate by telephone. Try to learn a listening technique called speechreading. It is not lipreading. You pay attention to people's gestures, expressions, posture, and tone of voice. These clues can help you understand what a person is saying. Face the person you are talking to, and have them face you. Make sure the lighting is good. You need to see the other person's face clearly. Think about counseling if you need help to adjust to your hearing loss. When should you call for help? Watch closely for changes in your health, and be sure to contact your doctor if:    You think your hearing is getting worse.     You have new symptoms, such as dizziness or nausea. Where can you learn more?   Go to http://www.arroyo.com/ and enter O260 to learn more about \"Hearing Loss: Care Instructions. \"  Current as of: May 4, 2022               Content Version: 13.5  © 2006-2022 AFAR. Care instructions adapted under license by [facility] [employer] [facility] ([facility]). If you have questions about a medical condition or this instruction, always ask your healthcare professional. [address] any warranty or liability for your use of this information. Advance Directives: Care Instructions  Overview  An advance directive is a legal way to state your wishes at the end of your life. It tells your family and your doctor what to do if you can't say what you want. There are two main types of advance directives. You can change them any time your wishes change. Living will. This form tells your family and your doctor your wishes about life support and other treatment. The form is also called a declaration. Medical power of . This form lets you name a person to make treatment decisions for you when you can't speak for yourself. This person is called a health care agent (health care proxy, health care surrogate). The form is also called a durable power of  for health care. If you do not have an advance directive, decisions about your medical care may be made by a family member, or by a doctor or a  who doesn't know you. It may help to think of an advance directive as a gift to the people who care for you. If you have one, they won't have to make tough decisions by themselves. For more information, including forms for your state, see the [address] [facility] website (www.[email or web address]/planning/advance-[email or web address]). Follow-up care is a key part of your treatment and safety. Be sure to make and go to all appointments, and call your doctor if you are having problems. It's also a good idea to know your test results and keep a list of the medicines you take. What should you include in an advance directive?   Many states have a unique advance directive form. (It may ask you to address specific issues.) Or you might use a universal form that's approved by many states. If your form doesn't tell you what to address, it may be hard to know what to include in your advance directive. Use the questions below to help you get started. Who do you want to make decisions about your medical care if you are not able to? What life-support measures do you want if you have a serious illness that gets worse over time or can't be cured? What are you most afraid of that might happen? (Maybe you're afraid of having pain, losing your independence, or being kept alive by machines.)  Where would you prefer to die? (Your home? A hospital? A nursing home?)  Do you want to donate your organs when you die? Do you want certain [employer] practices performed before you die? When should you call for help? Be sure to contact your doctor if you have any questions. Where can you learn more? Go to http://www.arroyo.com/ and enter R264 to learn more about \"Advance Directives: Care Instructions. \"  Current as of: June 16, 2022               Content Version: 13.5  © 6904-6978 Healthwise, [employer]. Care instructions adapted under license by [facility] ([facility]). If you have questions about a medical condition or this instruction, always ask your healthcare professional. [address] any warranty or liability for your use of this information. Starting a Weight Loss Plan: Care Instructions  Overview     If you're thinking about losing weight, it can be hard to know where to start. Your doctor can help you set up a weight loss plan that best meets your needs. You may want to take a class on nutrition or exercise, or you could join a weight loss support group.  If you have questions about how to make changes to your eating or exercise habits, ask your doctor about seeing a registered dietitian or an exercise specialist.  It can be a big challenge to lose weight. But you don't have to make huge changes at once. Make small changes, and stick with them. When those changes become habit, add a few more changes. If you don't think you're ready to make changes right now, try to pick a date in the future. Make an appointment to see your doctor to discuss whether the time is right for you to start a plan. Follow-up care is a key part of your treatment and safety. Be sure to make and go to all appointments, and call your doctor if you are having problems. It's also a good idea to know your test results and keep a list of the medicines you take. How can you care for yourself at home? Set realistic goals. Many people expect to lose much more weight than is likely. A weight loss of 5% to 10% of your body weight may be enough to improve your health. Get family and friends involved to provide support. Talk to them about why you are trying to lose weight, and ask them to help. They can help by participating in exercise and having meals with you, even if they may be eating something different. Find what works best for you. If you do not have time or do not like to cook, a program that offers meal replacement bars or shakes may be better for you. Or if you like to prepare meals, finding a plan that includes daily menus and recipes may be best.  Ask your doctor about other health professionals who can help you achieve your weight loss goals. A dietitian can help you make healthy changes in your diet. An exercise specialist or  can help you develop a safe and effective exercise program.  A counselor or psychiatrist can help you cope with issues such as depression, anxiety, or family problems that can make it hard to focus on weight loss. Consider joining a support group for people who are trying to lose weight. Your doctor can suggest groups in your area. Where can you learn more?   Go to http://www.woods.com/ and enter U357 to learn more about \"Starting a Weight Loss Plan: Care Instructions. \"  Current as of: August 25, 2022               Content Version: 13.5  © 2006-2022 [employer]. Care instructions adapted under license by [facility] ([facility]). If you have questions about a medical condition or this instruction, always ask your healthcare professional. [address] any warranty or liability for your use of this information. A Healthy Heart: Care Instructions  Your Care Instructions     Coronary artery disease, also called heart disease, occurs when a substance called plaque builds up in the vessels that supply oxygen-rich blood to your heart muscle. This can narrow the blood vessels and reduce blood flow. A heart attack happens when blood flow is completely blocked. A high-fat diet, smoking, and other factors increase the risk of heart disease. Your doctor has found that you have a chance of having heart disease. You can do lots of things to keep your heart healthy. It may not be easy, but you can change your diet, exercise more, and quit smoking. These steps really work to lower your chance of heart disease. Follow-up care is a key part of your treatment and safety. Be sure to make and go to all appointments, and call your doctor if you are having problems. It's also a good idea to know your test results and keep a list of the medicines you take. How can you care for yourself at home? Diet    Use less salt when you cook and eat. This helps lower your blood pressure. Taste food before salting. Add only a little salt when you think you need it. With time, your taste buds will adjust to less salt.     Eat fewer snack items, fast foods, canned soups, and other high-salt, high-fat, processed foods.     Read food labels and try to avoid saturated and trans fats.  They increase your risk of heart disease by raising cholesterol levels.     Limit the amount of solid fat-butter, margarine, and shortening-you eat. Use olive, peanut, or canola oil when you cook. Bake, broil, and steam foods instead of frying them.     Eat a variety of fruit and vegetables every day. Dark green, deep orange, red, or yellow fruits and vegetables are especially good for you. Examples include spinach, carrots, peaches, and berries.     Foods high in fiber can reduce your cholesterol and provide important vitamins and minerals. High-fiber foods include whole-grain cereals and breads, oatmeal, beans, brown rice, citrus fruits, and apples.     Eat lean proteins. Heart-healthy proteins include seafood, lean meats and poultry, eggs, beans, peas, nuts, seeds, and soy products.     Limit drinks and foods with added sugar. These include candy, desserts, and soda pop. Lifestyle changes    If your doctor recommends it, get more exercise. Walking is a good choice. Bit by bit, increase the amount you walk every day. Try for at least 30 minutes on most days of the week. You also may want to swim, bike, or do other activities.     Do not smoke. If you need help quitting, talk to your doctor about stop-smoking programs and medicines. These can increase your chances of quitting for good. Quitting smoking may be the most important step you can take to protect your heart. It is never too late to quit.     Limit alcohol to 2 drinks a day for men and 1 drink a day for women. Too much alcohol can cause health problems.     Manage other health problems such as diabetes, high blood pressure, and high cholesterol. If you think you may have a problem with alcohol or drug use, talk to your doctor. Medicines    Take your medicines exactly as prescribed. Call your doctor if you think you are having a problem with your medicine.     If your doctor recommends aspirin, take the amount directed each day. Make sure you take aspirin and not another kind of pain reliever, such as acetaminophen (Tylenol). When should you call for help?    Call 911 if you have symptoms of a heart attack. These may include:    Chest pain or pressure, or a strange feeling in the chest.     Sweating.     Shortness of breath.     Pain, pressure, or a strange feeling in the back, neck, jaw, or upper belly or in one or both shoulders or arms.     Lightheadedness or sudden weakness.     A fast or irregular heartbeat. After you call 911, the  may tell you to chew 1 adult-strength or 2 to 4 low-dose aspirin. Wait for an ambulance. Do not try to drive yourself. Watch closely for changes in your health, and be sure to contact your doctor if you have any problems. Where can you learn more? Go to http://www.arroyo.com/ and enter F075 to learn more about \"A Healthy Heart: Care Instructions. \"  Current as of: September 7, 2022               Content Version: 13.5  © 4258-9444 [employer]. Care instructions adapted under license by [facility] ([facility]). If you have questions about a medical condition or this instruction, always ask your healthcare professional. [place] [address] any warranty or liability for your use of this information. Personalized Preventive Plan for Edson Landau - 2/27/2023  Medicare offers a range of preventive health benefits. Some of the tests and screenings are paid in full while other may be subject to a deductible, co-insurance, and/or copay. Some of these benefits include a comprehensive review of your medical history including lifestyle, illnesses that may run in your family, and various assessments and screenings as appropriate. After reviewing your medical record and screening and assessments performed today your provider may have ordered immunizations, labs, imaging, and/or referrals for you. A list of these orders (if applicable) as well as your Preventive Care list are included within your After Visit Summary for your review.     Other Preventive Recommendations:    A preventive eye exam performed by an eye specialist is recommended every 1-2 years to screen for glaucoma; cataracts, macular degeneration, and other eye disorders. A preventive dental visit is recommended every 6 months. Try to get at least 150 minutes of exercise per week or 10,000 steps per day on a pedometer . Order or download the FREE \"Exercise & Physical Activity: Your Everyday Guide\" from [employer]. Call 6-869.490.2716 or search [employer] online. You need 0655-0635 mg of calcium and 5378-2895 IU of vitamin D per day. It is possible to meet your calcium requirement with diet alone, but a vitamin D supplement is usually necessary to meet this goal.  When exposed to the sun, use a sunscreen that protects against both UVA and UVB radiation with an SPF of 30 or greater. Reapply every 2 to 3 hours or after sweating, drying off with a towel, or swimming. Always wear a seat belt when traveling in a car. Always wear a helmet when riding a bicycle or motorcycle.

## 2023-02-27 NOTE — PROGRESS NOTES
Medicare Annual Wellness Visit    Deisi Freeman is here for Medicare AWV (Hyperlipidemia, Afib. Pt getting pacemaker next week with Dr Anson Licona)    Assessment & Plan   Medicare annual wellness visit, subsequent  Chronic atrial fibrillation ([address]  -     CBC with Auto Differential; Future  -     Iron and TIBC; Future  -     Magnesium; Future  Chronic systolic (congestive) heart failure (HCC)  -     CBC with Auto Differential; Future  -     Iron and TIBC; Future  -     Magnesium; Future  Iron deficiency anemia, unspecified iron deficiency anemia type  -     Iron and TIBC; Future  -     Ferritin; Future  Major depressive disorder, recurrent, moderate (HCC)  Stage 3a chronic kidney disease (HCC)  Myalgia  -     Ferritin; Future  -     Sedimentation Rate; Future  -     C-Reactive Protein; Future  -     Magnesium; Future    Recommendations for Preventive Services Due: see orders and patient instructions/AVS.  Recommended screening schedule for the next 5-10 years is provided to the patient in written form: see Patient Instructions/AVS.     Return in 3 months (on 5/27/2023) for Medicare Annual Wellness Visit in 1 year, HTN check. Subjective   Fibromyalgia type pain, leg pain from back and poor mobility   On elavil 25 mg nightly , due to continued c/o pain and rare use CBD gummy will trial cymbalta 20 mg daily to see if helps with fibromyalgia type symptoms. Patient's complete Health Risk Assessment and screening values have been reviewed and are found in Flowsheets. The following problems were reviewed today and where indicated follow up appointments were made and/or referrals ordered. Positive Risk Factor Screenings with Interventions:    Fall Risk:  Do you feel unsteady or are you worried about falling? : (!) yes  2 or more falls in past year?: no  Fall with injury in past year?: no     Interventions:    Patient comments: uses wheeled walker with seat.  Clear pathways at home     Depression:  PHQ-2 Score: 0  PHQ-9 Total Score: 7    Interpretation:   1-4 = minimal  5-9 = mild  10-14 = moderate  15-19 = moderately severe  20-27 = severe  Interventions:  Elavil at bedtime  Makes own meals , daughter grocery shop  Someone comes in and cleans house. Self-assessment of health: In general, how would you say your health is?: (!) Poor    Interventions:  No other needs     General HRA Questions:  Select all that apply: (!) New or Increased Pain, New or Increased Fatigue, Loneliness, Social Isolation, Stress, Anger    Pain Interventions:  Tried gummy, elavil 25 mg daily dose    Fatigue Interventions:  Offered therapy pt declined    Loneliness Interventions:  Pt reading a lot states daughter has her stocked up    Social Isolation Interventions:  Comes out for coumadin clinic and OV only    Stress Interventions:  Reading pt 's best     Anger Interventions:  None needed        Weight and Activity:  Physical Activity: Inactive    Days of Exercise per Week: 0 days    Minutes of Exercise per Session: 0 min     On average, how many days per week do you engage in moderate to strenuous exercise (like a brisk walk)?: 0 days  Have you lost any weight without trying in the past 3 months?: No  Body mass index: (!) 46.05    Inactivity Interventions:  Uses walker, active in home,   Obesity Interventions:  Increase activity heart healthy diet           Dentist Screen:  Have you seen the dentist within the past year?: (!) No    Intervention:  Not applicable - dentures      Safety:  Do you have working smoke detectors?: (!) No  Do you have any tripping hazards - loose or unsecured carpets or rugs?: (!) Yes  Interventions:  Patient declined any further interventions or treatment       CV Risk Counseling:  Patient was asked about her current diet and exercise habits, and personalized advice was provided regarding recommended lifestyle changes.  Patient's individual cardiovascular disease risk factors, including advanced age (> 54 for men, > 72 for women), dyslipidemia, hypertension, obesity (BMI >= 30), and sedentary lifestyle, were discussed, as well as the likely benefits of lifestyle changes. Based upon patient's motivation to change her behavior, the following plan was agreed upon to work toward lowering cardiovascular disease risk: low saturated fat, low cholesterol diet and DASH diet. Aspirin use for primary prevention of cardiovascular disease for men 45-79 and women 55-79: Indicated- continue daily aspirin. Educational materials for lifestyle changes were provided. Patient will follow-up in 6 month(s) with PCP. Provider spent 4 minutes counseling patient. Objective   Vitals:    02/27/23 1345   Pulse: 76   SpO2: 93%   Weight: 260 lb (117.9 kg)   Height: 5' 3\" (1.6 m)      Body mass index is 46.06 kg/m².       General Appearance: alert and oriented to person, place and time, well developed in wheelchair   Skin: warm and dry, no rash or erythema  Head: normocephalic and atraumatic  Eyes: pupils equal, round, and reactive to light, extraocular eye movements intact,  Neck: supple and non-tender without mass, no thyromegaly or thyroid nodules, no cervical lymphadenopathy  Pulmonary/Chest: clear to auscultation bilaterally- no wheezes, rales or rhonchi, normal air movement, no respiratory distress  Cardiovascular: normal rate, regular rhythm, normal S1 and S2, no murmurs, rubs, clicks, or gallops, distal pulses intact, no carotid bruits  Abdomen: soft, non-tender, non-distended, normal bowel sounds,  Extremities: no cyanosis, clubbing or edema  Musculoskeletal: normal range of motion, no joint swelling, deformity or tenderness  Neurologic: gait slow steady, coordination and speech normal       Allergies   Allergen Reactions    Clarithromycin Rash     Patient says she will \"never take this again\"     Gabapentin Swelling     Pt states that her body swelled up, not her airway, no troubles breathing    Merthiolate Glycerite [Thimerosal] Dermatitis     Redness, blisters    Tylenol With Codeine #3 [Acetaminophen-Codeine] Nausea And Vomiting     Prior to Visit Medications    Medication Sig Taking? Authorizing Provider   DULoxetine (CYMBALTA) 20 MG extended release capsule Take 1 capsule by mouth daily Yes TYLOR Yip CNP   enoxaparin (LOVENOX) 40 MG/0.4ML Inject 0.4 mLs into the skin every morning for 10 days Yes Анна Hemphill MD   warfarin (COUMADIN) 10 MG tablet Take 5 mg by mouth daily Yes Historical Provider, MD   Calcium Polycarbophil (FIBER-CAPS PO) Take by mouth Yes Historical Provider, MD   mupirocin (BACTROBAN NASAL) 2 % nasal ointment Apply to each nostril daily prn hx multiple nose bleeds Yes TYLOR Yip CNP   allopurinol (ZYLOPRIM) 300 MG tablet TAKE 1/2 [place] Yes Анна Hemphill MD   dilTIAZem (CARDIZEM CD) 240 MG extended release capsule Take 1 capsule by mouth daily Yes Анна Hemphill MD   acetaminophen (TYLENOL) 500 MG tablet Take 500 mg by mouth every 6 hours as needed for Pain Taking 3 tabs QAM Yes Historical Provider, MD   amitriptyline (ELAVIL) 25 MG tablet Take 1 tablet by mouth nightly For low back pain, insomnia Yes TYLOR Yip CNP   furosemide (LASIX) 40 MG tablet TAKE 1 TABLET DAILY, TAKE EXTRA TABLET IF NEEDED FOR EDEMA Yes TYLOR Yip CNP   atenolol (TENORMIN) 50 MG tablet TAKE 1 TABLET EVERY DAY Yes Анна Hemphill MD   spironolactone (ALDACTONE) 25 MG tablet TAKE 1 TABLET TWICE DAILY Yes Анна Hemphill MD   Cholecalciferol (VITAMIN D) 2000 UNITS CAPS capsule Take 2,000 Units by mouth 2 times daily.  Yes Historical Provider, MD [place] (Including outside providers/suppliers regularly involved in providing care):   Patient Care Team:  TYLOR Yip CNP as PCP - General (Certified Nurse Practitioner)  TYLOR Yip CNP as PCP - Empaneled Provider  Radha Combs MD as Surgeon (General Surgery)  Cathi Crigler, [facility] as Pharmacist (Pharmacist)  Daniel Barragan [facility] as Pharmacist (Pharmacist)     Reviewed and updated this visit:  Tobacco  Allergies  Meds  Problems  Med Hx  Surg Hx  Soc Hx  Fam Hx               Lissette Richmond, TYLOR - CNP

## 2023-02-28 DIAGNOSIS — I48.20 CHRONIC ATRIAL FIBRILLATION (HCC): Primary | ICD-10-CM

## 2023-02-28 RX ORDER — DULOXETIN HYDROCHLORIDE 20 MG/1
20 CAPSULE, DELAYED RELEASE ORAL DAILY
Qty: 30 CAPSULE | Refills: 0 | Status: SHIPPED | OUTPATIENT
Start: 2023-02-28

## 2023-03-01 ENCOUNTER — HOSPITAL ENCOUNTER (OUTPATIENT)
Age: 78
Discharge: HOME | End: 2023-03-01
Payer: MEDICARE

## 2023-03-01 DIAGNOSIS — D50.9 IRON DEFICIENCY ANEMIA, UNSPECIFIED IRON DEFICIENCY ANEMIA TYPE: ICD-10-CM

## 2023-03-01 DIAGNOSIS — I48.20 CHRONIC ATRIAL FIBRILLATION (HCC): ICD-10-CM

## 2023-03-01 DIAGNOSIS — M79.10 MYALGIA: ICD-10-CM

## 2023-03-01 DIAGNOSIS — I50.22 CHRONIC SYSTOLIC (CONGESTIVE) HEART FAILURE (HCC): ICD-10-CM

## 2023-03-01 LAB
ABSOLUTE EOS #: 0.2 K/UL (ref 0–0.4)
ABSOLUTE LYMPH #: 1.3 K/UL (ref 1–4.8)
ABSOLUTE MONO #: 0.7 K/UL (ref 0–1)
ALBUMIN SERPL-MCNC: 4.2 G/DL (ref 3.5–5.2)
ALP SERPL-CCNC: 117 U/L (ref 35–104)
ALT SERPL-CCNC: 33 U/L (ref 5–33)
ANION GAP SERPL CALCULATED.3IONS-SCNC: 10 MMOL/L (ref 9–17)
AST SERPL-CCNC: 32 U/L
BASOPHILS # BLD: 0 % (ref 0–2)
BASOPHILS ABSOLUTE: 0 K/UL (ref 0–0.2)
BILIRUB SERPL-MCNC: 0.3 MG/DL (ref 0.3–1.2)
BUN SERPL-MCNC: 19 MG/DL (ref 8–23)
BUN/CREAT BLD: 17 (ref 9–20)
CALCIUM SERPL-MCNC: 9.2 MG/DL (ref 8.6–10.4)
CHLORIDE SERPL-SCNC: 100 MMOL/L (ref 98–107)
CO2 SERPL-SCNC: 27 MMOL/L (ref 20–31)
CREAT SERPL-MCNC: 1.12 MG/DL (ref 0.5–0.9)
CRP SERPL HS-MCNC: 6.2 MG/L (ref 0–5)
DIFFERENTIAL TYPE: YES
EKG Q-T INTERVAL: 400 MS
EKG QRS DURATION: 90 MS
EKG QTC CALCULATION (BAZETT): 444 MS
EKG R AXIS: -49 DEGREES
EKG T AXIS: 49 DEGREES
EKG VENTRICULAR RATE: 74 BPM
EOSINOPHILS RELATIVE PERCENT: 2 % (ref 0–5)
ERYTHROCYTE [SEDIMENTATION RATE] IN BLOOD BY WESTERGREN METHOD: 51 MM/HR (ref 0–30)
FERRITIN SERPL-MCNC: 35 NG/ML (ref 13–150)
GFR SERPL CREATININE-BSD FRML MDRD: 50 ML/MIN/1.73M2
GLUCOSE SERPL-MCNC: 98 MG/DL (ref 70–99)
HCT VFR BLD AUTO: 40.3 % (ref 36–46)
HGB BLD-MCNC: 12.9 G/DL (ref 12–16)
IRON SATURATION: 11 % (ref 20–55)
IRON SERPL-MCNC: 45 UG/DL (ref 37–145)
LYMPHOCYTES # BLD: 16 % (ref 15–40)
MAGNESIUM SERPL-MCNC: 1.9 MG/DL (ref 1.6–2.6)
MCH RBC QN AUTO: 26.3 PG (ref 26–34)
MCHC RBC AUTO-ENTMCNC: 32.1 G/DL (ref 31–37)
MCV RBC AUTO: 82 FL (ref 80–100)
MONOCYTES # BLD: 9 % (ref 4–8)
PDW BLD-RTO: 17.1 % (ref 12.1–15.2)
PLATELET # BLD AUTO: 220 K/UL (ref 140–450)
POTASSIUM SERPL-SCNC: 4.1 MMOL/L (ref 3.7–5.3)
PROT SERPL-MCNC: 7.8 G/DL (ref 6.4–8.3)
RBC # BLD: 4.92 M/UL (ref 4–5.2)
SEG NEUTROPHILS: 73 % (ref 47–75)
SEGMENTED NEUTROPHILS ABSOLUTE COUNT: 5.9 K/UL (ref 2.5–7)
SODIUM SERPL-SCNC: 137 MMOL/L (ref 135–144)
TIBC SERPL-MCNC: 427 UG/DL (ref 250–450)
UNSATURATED IRON BINDING CAPACITY: 382 UG/DL (ref 112–347)
WBC # BLD AUTO: 8.1 K/UL (ref 3.5–11)

## 2023-03-01 PROCEDURE — 83540 ASSAY OF IRON: CPT

## 2023-03-01 PROCEDURE — 82728 ASSAY OF FERRITIN: CPT

## 2023-03-01 PROCEDURE — 80053 COMPREHEN METABOLIC PANEL: CPT

## 2023-03-01 PROCEDURE — 93010 ELECTROCARDIOGRAM REPORT: CPT | Performed by: INTERNAL MEDICINE

## 2023-03-01 PROCEDURE — 83550 IRON BINDING TEST: CPT

## 2023-03-01 PROCEDURE — 93005 ELECTROCARDIOGRAM TRACING: CPT

## 2023-03-01 PROCEDURE — 85652 RBC SED RATE AUTOMATED: CPT

## 2023-03-01 PROCEDURE — 85025 COMPLETE CBC W/AUTO DIFF WBC: CPT

## 2023-03-01 PROCEDURE — 36415 COLL VENOUS BLD VENIPUNCTURE: CPT

## 2023-03-01 PROCEDURE — 83735 ASSAY OF MAGNESIUM: CPT

## 2023-03-01 PROCEDURE — 86140 C-REACTIVE PROTEIN: CPT

## 2023-03-02 ENCOUNTER — ANESTHESIA EVENT (OUTPATIENT)
Dept: OPERATING ROOM | Age: 78
End: 2023-03-02
Payer: MEDICARE

## 2023-03-03 RX ORDER — PREDNISONE 20 MG/1
20 TABLET ORAL DAILY
Qty: 5 TABLET | Refills: 0 | Status: SHIPPED | OUTPATIENT
Start: 2023-03-03 | End: 2023-03-08

## 2023-03-03 RX ORDER — DILTIAZEM HYDROCHLORIDE 240 MG/1
240 CAPSULE, COATED, EXTENDED RELEASE ORAL DAILY
Qty: 90 CAPSULE | Refills: 3 | Status: SHIPPED | OUTPATIENT
Start: 2023-03-03

## 2023-03-06 ENCOUNTER — TELEPHONE (OUTPATIENT)
Dept: PHARMACY | Age: 78
End: 2023-03-06

## 2023-03-07 NOTE — ANESTHESIA PRE PROCEDURE
Department of Anesthesiology  Preprocedure Note       Name:  Kelly Mendieta   Age:  76 y.o.  :  1945                                          MRN:  [id number]         Date:  3/8/2023      Surgeon: Levar Cavanaugh):  Farshad Broderick MD    Procedure: Procedure(s):  PACEMAKER INSERTION PERMANENT    Medications prior to admission:   Prior to Admission medications    Medication Sig Start Date End Date Taking?  Authorizing Provider   dilTIAZem (CARDIZEM CD) 240 MG extended release capsule Take 1 capsule by mouth daily 3/3/23   Farshad Broderick MD   predniSONE (DELTASONE) 20 MG tablet Take 1 tablet by mouth daily for 5 days Take in am with food, for increased inflammatory markers and arthritis pain 3/3/23 3/8/23  TYLOR Brambila CNP   DULoxetine (CYMBALTA) 20 MG extended release capsule Take 1 capsule by mouth daily 23   TYLOR Brambila CNP   enoxaparin (LOVENOX) 40 MG/0.4ML Inject 0.4 mLs into the skin every morning for 10 days 3/3/23 3/13/23  Farshad Broderick MD   warfarin (COUMADIN) 10 MG tablet Take 5 mg by mouth daily    Historical Provider, MD   Calcium Polycarbophil (FIBER-CAPS PO) Take by mouth    Historical Provider, MD   mupirocin (BACTROBAN NASAL) 2 % nasal ointment Apply to each nostril daily prn hx multiple nose bleeds 23   TYLOR Brambila CNP   allopurinol (ZYLOPRIM) 300 MG tablet TAKE 1/2 TABLET EVERY DAY 23   Farshad Broderick MD   acetaminophen (TYLENOL) 500 MG tablet Take 500 mg by mouth every 6 hours as needed for Pain Taking 3 tabs QAM    Historical Provider, MD   amitriptyline (ELAVIL) 25 MG tablet Take 1 tablet by mouth nightly For low back pain, insomnia 10/12/22   TYLOR Brambila CNP   furosemide (LASIX) 40 MG tablet TAKE 1 TABLET DAILY, TAKE EXTRA TABLET IF NEEDED FOR EDEMA 22   TYLOR Brambila CNP   atenolol (TENORMIN) 50 MG tablet TAKE 1 TABLET EVERY DAY 22   Farshad Broderick MD   spironolactone (ALDACTONE) 25 MG tablet TAKE 1 TABLET TWICE DAILY 11/29/21   Lee Crowley MD   Cholecalciferol (VITAMIN D) 2000 UNITS CAPS capsule Take 2,000 Units by mouth 2 times daily. Historical Provider, MD       Current medications:    Current Facility-Administered Medications   Medication Dose Route Frequency Provider Last Rate Last Admin    0.9 % sodium chloride infusion   IntraVENous Continuous Lee Crowley  mL/hr at 03/08/23 0653 New Bag at 03/08/23 0653    ceFAZolin (ANCEF) 2000 mg in dextrose 3 % 50 mL IVPB (duplex)  2,000 mg IntraVENous On Call to [address] MD Ella        levalbuterol Wilber Narayan) nebulizer solution 1.25 mg  1.25 mg Nebulization Once Lee Crowley MD           Allergies:     Allergies   Allergen Reactions    Clarithromycin Rash     Patient says she will \"never take this again\"     Gabapentin Swelling     Pt states that her body swelled up, not her airway, no troubles breathing    Merthiolate Glycerite [Thimerosal] Dermatitis     Redness, blisters    Tylenol With Codeine #3 [Acetaminophen-Codeine] Nausea And Vomiting       Problem List:    Patient Active Problem List   Diagnosis Code    Kidney stones N20.0    Localized osteoarthritis of left knee M17.12    Atrial fibrillation ([facility]) I48.91    Pulmonary HTN ([facility]) I27.20    Knee pain, chronic M25.569, G89.29    Iron deficiency anemia due to chronic blood loss D50.0    Mitral valve insufficiency I34.0    Backache M54.9    CHF (congestive heart failure) ([facility]) I50.9    CKD (chronic kidney disease) N18.9    Echocardiogram abnormal R93.1    Gout M10.9    HL (hearing loss) H91.90    Hyperlipidemia E78.5    Morbid obesity with BMI of 45.0-49.9, adult ([facility]) E66.01, Z68.42    DJD (degenerative joint disease) M19.90    Chronic renal disease, stage III ([facility]) [153216] N18.30    Chronic systolic (congestive) heart failure I50.22    Lumbar radiculopathy M54.16    Foraminal stenosis of lumbar region M48.061    Chronic bilateral low back pain with bilateral sciatica M54.42, M54.41, G89.29       Past Medical History:        Diagnosis Date    Atrial fibrillation ([address]     CHF (congestive heart failure) ([address]     Diverticulosis 2021    per Colonoscopy per Dr. Haider Has Gout     Hearing loss     bilateral hearing aids     History of colon polyps 2016    Hypertension     Kidney stones     Mitral valve insufficiency     Obesity, morbid, BMI 40.0-49.9 ([address]     Osteoarthritis     Primary osteoarthritis of both knees     Dr. Kerrie Mcguire Pulmonary HTN [facility])        Past Surgical History:        Procedure Laterality Date    CARPAL TUNNEL RELEASE      bilaterally    COLONOSCOPY  2016    Douglas Ugarte MD    COLONOSCOPY  2017    at [facility]. PYlori positive, adenoma, hyperplastic polyp    COLONOSCOPY  2018, 10/2019, 10/27/2021    DR. Boles -polyp x 1, diverticulosis    JOINT REPLACEMENT Left 2019    knee    LITHOTRIPSY      NERVE BLOCK Left 2020    GENICULAR NERVE BLOCK LEFT KNEE performed by Hubert Stewart MD at [address] [place] [address] N/A 2020    L3,L4 MEDIAL BRANCH BLOCK , L5 DORSAL RAMI INJECTION performed by Hubert Stewart MD at [address] [place] [address] Bilateral 10/02/2020    BILATERAL L3-4 MEDIAL BRANCH AND L5 DORSAL RAMI INJECTION performed by Hubetr Stewart MD at [address] [place] [address] Right 01/15/2021    RFA, L3-L4 MEDIAL BRANCH AND L-5 DORSEL RAMI INJECTION RIGHT performed by Hubert Stewart MD at [address] [place] [address] Left 2019    LEFT KNEE TOTAL ARTHROPLASTY- DEPUY performed by Hipolito Billy DO at [address] [place] [address] UPPER GASTROINTESTINAL ENDOSCOPY  2016    Douglas Ugarte MD       Social History:    Social History     Tobacco Use    Smoking status: Former     Packs/day: 0.25     Years: 4.00     Pack years: 1.00     Types: Cigarettes     Quit date: 1981     Years since quittin.2    Smokeless tobacco: Never   Substance Use Topics    Alcohol use: Yes     Comment: rare, states she hasn't had a drink in months                                Counseling given: Not Answered      Vital Signs (Current):   Vitals:    02/01/23 1239 03/08/23 0636 03/08/23 0637 03/08/23 0658   BP:  98/73     Pulse:   60    Resp:   20    Temp:    36.7 °C (98 °F)   TempSrc:    Temporal   SpO2:   94%    Weight: 255 lb (115.7 kg) 255 lb 9.6 oz (115.9 kg)     Height: 5' 3\" (1.6 m) 5' 3\" (1.6 m)                                                BP Readings from Last 3 Encounters:   03/08/23 98/73   02/15/23 (!) 103/51   12/05/22 124/70       NPO Status: Time of last liquid consumption: 2300                        Time of last solid consumption: 1930                        Date of last liquid consumption: 03/07/23                        Date of last solid food consumption: 03/07/23    BMI:   Wt Readings from Last 3 Encounters:   03/08/23 255 lb 9.6 oz (115.9 kg)   02/27/23 260 lb (117.9 kg)   12/05/22 254 lb (115.2 kg)     Body mass index is 45.28 kg/m².     CBC:   Lab Results   Component Value Date/Time    WBC 8.1 03/01/2023 01:08 PM    RBC 4.92 03/01/2023 01:08 PM    RBC 4.72 04/10/2012 08:02 AM    HGB 12.9 03/01/2023 01:08 PM    HCT 40.3 03/01/2023 01:08 PM    MCV 82.0 03/01/2023 01:08 PM    RDW 17.1 03/01/2023 01:08 PM     03/01/2023 01:08 PM     04/10/2012 08:02 AM       CMP:   Lab Results   Component Value Date/Time     03/01/2023 01:12 PM    K 4.1 03/01/2023 01:12 PM     03/01/2023 01:12 PM    CO2 27 03/01/2023 01:12 PM    BUN 19 03/01/2023 01:12 PM    CREATININE 1.12 03/01/2023 01:12 PM    GFRAA >60 08/16/2022 09:27 AM    LABGLOM 50 03/01/2023 01:12 PM    GLUCOSE 98 03/01/2023 01:12 PM    GLUCOSE 102 04/27/2012 09:19 AM    PROT 7.8 03/01/2023 01:12 PM    CALCIUM 9.2 03/01/2023 01:12 PM    BILITOT 0.3 03/01/2023 01:12 PM    ALKPHOS 117 03/01/2023 01:12 PM    AST 32 03/01/2023 01:12 PM    ALT 33 03/01/2023 01:12 PM       POC Tests: No results for input(s): POCGLU, POCNA, POCK, POCCL, POCBUN, POCHEMO, POCHCT in the last 72 hours. Coags:   Lab Results   Component Value Date/Time    PROTIME 21.1 11/18/2022 09:28 AM    PROTIME 22.7 10/19/2011 09:04 AM    INR 2.2 02/15/2023 01:26 PM    APTT 29.6 06/17/2018 07:38 AM       HCG (If Applicable): No results found for: PREGTESTUR, PREGSERUM, HCG, HCGQUANT     ABGs: No results found for: PHART, PO2ART, XTA1IOF, CCK2JQP, BEART, B3QFCSDL     Type & Screen (If Applicable):  No results found for: LABABO, LABRH    Drug/Infectious Status (If Applicable):  No results found for: HIV, HEPCAB    COVID-19 Screening (If Applicable):   Lab Results   Component Value Date/Time    COVID19 Not Detected 11/18/2022 10:26 AM           Anesthesia Evaluation  Patient summary reviewed and Nursing notes reviewed  Airway: Mallampati: II  TM distance: >3 FB   Neck ROM: limited  Mouth opening: > = 3 FB   Dental:    (+) caps and lower dentures      Pulmonary:   (+) shortness of breath: chronic,  wheezes: scattered                           ROS comment: Former smoker with a history of pulmonary hypertension, wheezing this morning. Breathing treatment ordered. Cardiovascular:  Exercise tolerance: no interval change,   (+) hypertension:, valvular problems/murmurs (History of mitral valve insufficiency):, dysrhythmias: atrial fibrillation, CHF: systolic, DELGADO: after ambulating 1 flight of stairs, pulmonary hypertension:,       ECG reviewed  Rhythm: irregular  Rate: normal  Echocardiogram reviewed    Cleared by cardiology              Neuro/Psych:   (+) neuromuscular disease:,              ROS comment: Patient is hard of hearing GI/Hepatic/Renal:   (+) renal disease: CRI and kidney stones, morbid obesity          Endo/Other:    (+) : arthritis: OA., .                 Abdominal:   (+) obese,     Abdomen: soft. Vascular: negative vascular ROS.          Other Findings:           Anesthesia Plan      MAC ASA 4       Induction: intravenous. MIPS: Postoperative opioids intended. Anesthetic plan and risks discussed with patient. Plan discussed with attending.                     TYLOR Ferrer - IGLESIA   3/8/2023

## 2023-03-08 ENCOUNTER — APPOINTMENT (OUTPATIENT)
Dept: GENERAL RADIOLOGY | Age: 78
End: 2023-03-08
Attending: INTERNAL MEDICINE
Payer: MEDICARE

## 2023-03-08 ENCOUNTER — HOSPITAL ENCOUNTER (OUTPATIENT)
Age: 78
Discharge: HOME | End: 2023-03-09
Attending: INTERNAL MEDICINE | Admitting: INTERNAL MEDICINE
Payer: MEDICARE

## 2023-03-08 ENCOUNTER — ANESTHESIA (OUTPATIENT)
Dept: OPERATING ROOM | Age: 78
End: 2023-03-08
Payer: MEDICARE

## 2023-03-08 DIAGNOSIS — G89.18 POST-OP PAIN: Primary | ICD-10-CM

## 2023-03-08 PROCEDURE — C1892 INTRO/SHEATH,FIXED,PEEL-AWAY: HCPCS | Performed by: INTERNAL MEDICINE

## 2023-03-08 PROCEDURE — 71045 X-RAY EXAM CHEST 1 VIEW: CPT

## 2023-03-08 PROCEDURE — 3600000004 HC SURGERY LEVEL 4 BASE: Performed by: INTERNAL MEDICINE

## 2023-03-08 PROCEDURE — 6370000000 HC RX 637 (ALT 250 FOR IP): Performed by: INTERNAL MEDICINE

## 2023-03-08 PROCEDURE — 94761 N-INVAS EAR/PLS OXIMETRY MLT: CPT

## 2023-03-08 PROCEDURE — 6360000002 HC RX W HCPCS: Performed by: INTERNAL MEDICINE

## 2023-03-08 PROCEDURE — 2709999900 HC NON-CHARGEABLE SUPPLY: Performed by: INTERNAL MEDICINE

## 2023-03-08 PROCEDURE — 3700000000 HC ANESTHESIA ATTENDED CARE: Performed by: INTERNAL MEDICINE

## 2023-03-08 PROCEDURE — 94664 DEMO&/EVAL PT USE INHALER: CPT

## 2023-03-08 PROCEDURE — 3700000001 HC ADD 15 MINUTES (ANESTHESIA): Performed by: INTERNAL MEDICINE

## 2023-03-08 PROCEDURE — 2500000003 HC RX 250 WO HCPCS

## 2023-03-08 PROCEDURE — C1898 LEAD, PMKR, OTHER THAN TRANS: HCPCS | Performed by: INTERNAL MEDICINE

## 2023-03-08 PROCEDURE — 2500000003 HC RX 250 WO HCPCS: Performed by: NURSE ANESTHETIST, CERTIFIED REGISTERED

## 2023-03-08 PROCEDURE — 3600000014 HC SURGERY LEVEL 4 ADDTL 15MIN: Performed by: INTERNAL MEDICINE

## 2023-03-08 PROCEDURE — 2580000003 HC RX 258: Performed by: INTERNAL MEDICINE

## 2023-03-08 PROCEDURE — 7100000000 HC PACU RECOVERY - FIRST 15 MIN: Performed by: INTERNAL MEDICINE

## 2023-03-08 PROCEDURE — C1786 PMKR, SINGLE, RATE-RESP: HCPCS | Performed by: INTERNAL MEDICINE

## 2023-03-08 PROCEDURE — 7100000001 HC PACU RECOVERY - ADDTL 15 MIN: Performed by: INTERNAL MEDICINE

## 2023-03-08 PROCEDURE — 76000 FLUOROSCOPY <1 HR PHYS/QHP: CPT

## 2023-03-08 PROCEDURE — 33207 INSERT HEART PM VENTRICULAR: CPT | Performed by: INTERNAL MEDICINE

## 2023-03-08 PROCEDURE — 6360000002 HC RX W HCPCS: Performed by: NURSE ANESTHETIST, CERTIFIED REGISTERED

## 2023-03-08 DEVICE — PACEMAKER CRD UPLR/BPLR 50.8X42.6X7.4 MM 12.25 CC 22.5 GM: Type: IMPLANTABLE DEVICE | Status: FUNCTIONAL

## 2023-03-08 DEVICE — LEAD PACE 5.7FR L52CM VENT SIL PLAT INSUL BPLR STEROID: Type: IMPLANTABLE DEVICE | Status: FUNCTIONAL

## 2023-03-08 RX ORDER — VANCOMYCIN HYDROCHLORIDE 1 G/20ML
INJECTION, POWDER, LYOPHILIZED, FOR SOLUTION INTRAVENOUS PRN
Status: DISCONTINUED | OUTPATIENT
Start: 2023-03-08 | End: 2023-03-08 | Stop reason: ALTCHOICE

## 2023-03-08 RX ORDER — OXYCODONE HYDROCHLORIDE 5 MG/1
10 TABLET ORAL EVERY 4 HOURS PRN
Status: DISCONTINUED | OUTPATIENT
Start: 2023-03-08 | End: 2023-03-09 | Stop reason: HOSPADM

## 2023-03-08 RX ORDER — SODIUM CHLORIDE 0.9 % (FLUSH) 0.9 %
5-40 SYRINGE (ML) INJECTION EVERY 12 HOURS SCHEDULED
Status: DISCONTINUED | OUTPATIENT
Start: 2023-03-08 | End: 2023-03-09 | Stop reason: HOSPADM

## 2023-03-08 RX ORDER — ALLOPURINOL 100 MG/1
300 TABLET ORAL DAILY
Status: DISCONTINUED | OUTPATIENT
Start: 2023-03-08 | End: 2023-03-09 | Stop reason: HOSPADM

## 2023-03-08 RX ORDER — LEVALBUTEROL INHALATION SOLUTION 1.25 MG/3ML
1.25 SOLUTION RESPIRATORY (INHALATION) ONCE
Status: COMPLETED | OUTPATIENT
Start: 2023-03-08 | End: 2023-03-08

## 2023-03-08 RX ORDER — ATENOLOL 50 MG/1
50 TABLET ORAL DAILY
Status: DISCONTINUED | OUTPATIENT
Start: 2023-03-08 | End: 2023-03-09 | Stop reason: HOSPADM

## 2023-03-08 RX ORDER — SPIRONOLACTONE 25 MG/1
25 TABLET ORAL DAILY
Status: DISCONTINUED | OUTPATIENT
Start: 2023-03-08 | End: 2023-03-09 | Stop reason: HOSPADM

## 2023-03-08 RX ORDER — DULOXETIN HYDROCHLORIDE 20 MG/1
20 CAPSULE, DELAYED RELEASE ORAL DAILY
Status: DISCONTINUED | OUTPATIENT
Start: 2023-03-08 | End: 2023-03-09 | Stop reason: HOSPADM

## 2023-03-08 RX ORDER — PROPOFOL 10 MG/ML
INJECTION, EMULSION INTRAVENOUS PRN
Status: DISCONTINUED | OUTPATIENT
Start: 2023-03-08 | End: 2023-03-08 | Stop reason: SDUPTHER

## 2023-03-08 RX ORDER — ONDANSETRON 2 MG/ML
4 INJECTION INTRAMUSCULAR; INTRAVENOUS EVERY 6 HOURS PRN
Status: DISCONTINUED | OUTPATIENT
Start: 2023-03-08 | End: 2023-03-09 | Stop reason: HOSPADM

## 2023-03-08 RX ORDER — AMITRIPTYLINE HYDROCHLORIDE 25 MG/1
25 TABLET, FILM COATED ORAL NIGHTLY
Status: DISCONTINUED | OUTPATIENT
Start: 2023-03-08 | End: 2023-03-09 | Stop reason: HOSPADM

## 2023-03-08 RX ORDER — BUPIVACAINE HYDROCHLORIDE 5 MG/ML
INJECTION, SOLUTION EPIDURAL; INTRACAUDAL PRN
Status: DISCONTINUED | OUTPATIENT
Start: 2023-03-08 | End: 2023-03-08 | Stop reason: ALTCHOICE

## 2023-03-08 RX ORDER — OXYCODONE HYDROCHLORIDE 5 MG/1
5 TABLET ORAL EVERY 4 HOURS PRN
Status: DISCONTINUED | OUTPATIENT
Start: 2023-03-08 | End: 2023-03-09 | Stop reason: HOSPADM

## 2023-03-08 RX ORDER — ACETAMINOPHEN 325 MG/1
650 TABLET ORAL EVERY 4 HOURS PRN
Status: DISCONTINUED | OUTPATIENT
Start: 2023-03-08 | End: 2023-03-09 | Stop reason: HOSPADM

## 2023-03-08 RX ORDER — LIDOCAINE HYDROCHLORIDE 10 MG/ML
INJECTION, SOLUTION EPIDURAL; INFILTRATION; INTRACAUDAL; PERINEURAL PRN
Status: DISCONTINUED | OUTPATIENT
Start: 2023-03-08 | End: 2023-03-08 | Stop reason: SDUPTHER

## 2023-03-08 RX ORDER — HEPARIN SODIUM 1000 [USP'U]/ML
INJECTION, SOLUTION INTRAVENOUS; SUBCUTANEOUS PRN
Status: DISCONTINUED | OUTPATIENT
Start: 2023-03-08 | End: 2023-03-08 | Stop reason: ALTCHOICE

## 2023-03-08 RX ORDER — LEVALBUTEROL TARTRATE 45 UG/1
1 AEROSOL, METERED ORAL ONCE
Status: CANCELLED | OUTPATIENT
Start: 2023-03-08

## 2023-03-08 RX ORDER — SODIUM CHLORIDE 0.9 % (FLUSH) 0.9 %
5-40 SYRINGE (ML) INJECTION PRN
Status: DISCONTINUED | OUTPATIENT
Start: 2023-03-08 | End: 2023-03-09 | Stop reason: HOSPADM

## 2023-03-08 RX ORDER — SODIUM CHLORIDE 9 MG/ML
INJECTION, SOLUTION INTRAVENOUS CONTINUOUS
Status: DISCONTINUED | OUTPATIENT
Start: 2023-03-08 | End: 2023-03-08 | Stop reason: HOSPADM

## 2023-03-08 RX ORDER — CEFAZOLIN SODIUM 2 G/50ML
2000 SOLUTION INTRAVENOUS
Status: COMPLETED | OUTPATIENT
Start: 2023-03-08 | End: 2023-03-08

## 2023-03-08 RX ORDER — SODIUM CHLORIDE 9 MG/ML
INJECTION, SOLUTION INTRAVENOUS PRN
Status: DISCONTINUED | OUTPATIENT
Start: 2023-03-08 | End: 2023-03-09 | Stop reason: HOSPADM

## 2023-03-08 RX ORDER — PROPOFOL 10 MG/ML
INJECTION, EMULSION INTRAVENOUS CONTINUOUS PRN
Status: DISCONTINUED | OUTPATIENT
Start: 2023-03-08 | End: 2023-03-08 | Stop reason: SDUPTHER

## 2023-03-08 RX ORDER — DILTIAZEM HYDROCHLORIDE 120 MG/1
240 CAPSULE, COATED, EXTENDED RELEASE ORAL DAILY
Status: DISCONTINUED | OUTPATIENT
Start: 2023-03-09 | End: 2023-03-09 | Stop reason: HOSPADM

## 2023-03-08 RX ORDER — MIDAZOLAM HYDROCHLORIDE 2 MG/2ML
INJECTION, SOLUTION INTRAMUSCULAR; INTRAVENOUS PRN
Status: DISCONTINUED | OUTPATIENT
Start: 2023-03-08 | End: 2023-03-08 | Stop reason: SDUPTHER

## 2023-03-08 RX ORDER — FUROSEMIDE 40 MG/1
40 TABLET ORAL DAILY
Status: DISCONTINUED | OUTPATIENT
Start: 2023-03-08 | End: 2023-03-09 | Stop reason: HOSPADM

## 2023-03-08 RX ORDER — FENTANYL CITRATE 50 UG/ML
INJECTION, SOLUTION INTRAMUSCULAR; INTRAVENOUS PRN
Status: DISCONTINUED | OUTPATIENT
Start: 2023-03-08 | End: 2023-03-08 | Stop reason: SDUPTHER

## 2023-03-08 RX ADMIN — DULOXETINE HYDROCHLORIDE 20 MG: 20 CAPSULE, DELAYED RELEASE ORAL at 12:20

## 2023-03-08 RX ADMIN — PROPOFOL 20 MG: 10 INJECTION, EMULSION INTRAVENOUS at 09:11

## 2023-03-08 RX ADMIN — PROPOFOL 30 MG: 10 INJECTION, EMULSION INTRAVENOUS at 07:50

## 2023-03-08 RX ADMIN — FENTANYL CITRATE 25 MCG: 50 INJECTION, SOLUTION INTRAMUSCULAR; INTRAVENOUS at 09:55

## 2023-03-08 RX ADMIN — ATENOLOL 50 MG: 50 TABLET ORAL at 12:20

## 2023-03-08 RX ADMIN — PROPOFOL 20 MG: 10 INJECTION, EMULSION INTRAVENOUS at 07:59

## 2023-03-08 RX ADMIN — LIDOCAINE HYDROCHLORIDE 50 MG: 10 INJECTION, SOLUTION EPIDURAL; INFILTRATION; INTRACAUDAL; PERINEURAL at 07:50

## 2023-03-08 RX ADMIN — SODIUM CHLORIDE: 9 INJECTION, SOLUTION INTRAVENOUS at 06:53

## 2023-03-08 RX ADMIN — ONDANSETRON 4 MG: 2 INJECTION INTRAMUSCULAR; INTRAVENOUS at 16:40

## 2023-03-08 RX ADMIN — FENTANYL CITRATE 25 MCG: 50 INJECTION, SOLUTION INTRAMUSCULAR; INTRAVENOUS at 10:10

## 2023-03-08 RX ADMIN — OXYCODONE HYDROCHLORIDE 10 MG: 5 TABLET ORAL at 23:31

## 2023-03-08 RX ADMIN — AMITRIPTYLINE HYDROCHLORIDE 25 MG: 25 TABLET, FILM COATED ORAL at 20:15

## 2023-03-08 RX ADMIN — PROPOFOL 20 MG: 10 INJECTION, EMULSION INTRAVENOUS at 09:42

## 2023-03-08 RX ADMIN — MIDAZOLAM HYDROCHLORIDE 0.5 MG: 1 INJECTION, SOLUTION INTRAMUSCULAR; INTRAVENOUS at 10:10

## 2023-03-08 RX ADMIN — PROPOFOL 50 MCG/KG/MIN: 10 INJECTION, EMULSION INTRAVENOUS at 07:50

## 2023-03-08 RX ADMIN — FENTANYL CITRATE 25 MCG: 50 INJECTION, SOLUTION INTRAMUSCULAR; INTRAVENOUS at 07:40

## 2023-03-08 RX ADMIN — ALLOPURINOL 300 MG: 100 TABLET ORAL at 12:20

## 2023-03-08 RX ADMIN — PROPOFOL 20 MG: 10 INJECTION, EMULSION INTRAVENOUS at 09:26

## 2023-03-08 RX ADMIN — PROPOFOL 20 MG: 10 INJECTION, EMULSION INTRAVENOUS at 09:55

## 2023-03-08 RX ADMIN — MIDAZOLAM HYDROCHLORIDE 0.5 MG: 1 INJECTION, SOLUTION INTRAMUSCULAR; INTRAVENOUS at 09:05

## 2023-03-08 RX ADMIN — PROPOFOL 20 MG: 10 INJECTION, EMULSION INTRAVENOUS at 08:24

## 2023-03-08 RX ADMIN — FENTANYL CITRATE 25 MCG: 50 INJECTION, SOLUTION INTRAMUSCULAR; INTRAVENOUS at 09:17

## 2023-03-08 RX ADMIN — SODIUM CHLORIDE, PRESERVATIVE FREE 10 ML: 5 INJECTION INTRAVENOUS at 16:40

## 2023-03-08 RX ADMIN — PROPOFOL 20 MG: 10 INJECTION, EMULSION INTRAVENOUS at 08:05

## 2023-03-08 RX ADMIN — LEVALBUTEROL HYDROCHLORIDE 1.25 MG: 1.25 SOLUTION RESPIRATORY (INHALATION) at 07:21

## 2023-03-08 RX ADMIN — CEFAZOLIN SODIUM 2000 MG: 2 SOLUTION INTRAVENOUS at 07:39

## 2023-03-08 RX ADMIN — MIDAZOLAM HYDROCHLORIDE 0.5 MG: 1 INJECTION, SOLUTION INTRAMUSCULAR; INTRAVENOUS at 07:40

## 2023-03-08 RX ADMIN — MIDAZOLAM HYDROCHLORIDE 0.5 MG: 1 INJECTION, SOLUTION INTRAMUSCULAR; INTRAVENOUS at 09:55

## 2023-03-08 RX ADMIN — SODIUM CHLORIDE, PRESERVATIVE FREE 10 ML: 5 INJECTION INTRAVENOUS at 20:15

## 2023-03-08 RX ADMIN — PROPOFOL 20 MG: 10 INJECTION, EMULSION INTRAVENOUS at 08:21

## 2023-03-08 ASSESSMENT — ENCOUNTER SYMPTOMS
DYSPNEA ACTIVITY LEVEL: AFTER AMBULATING 1 FLIGHT OF STAIRS
SHORTNESS OF BREATH: 1

## 2023-03-08 ASSESSMENT — PAIN SCALES - GENERAL
PAINLEVEL_OUTOF10: 3
PAINLEVEL_OUTOF10: 2
PAINLEVEL_OUTOF10: 2
PAINLEVEL_OUTOF10: 8

## 2023-03-08 ASSESSMENT — PAIN DESCRIPTION - LOCATION: LOCATION: CHEST

## 2023-03-08 ASSESSMENT — PAIN - FUNCTIONAL ASSESSMENT: PAIN_FUNCTIONAL_ASSESSMENT: NONE - DENIES PAIN

## 2023-03-08 NOTE — PROGRESS NOTES
Pt denies needing a ride home with the [address] when discharged from hospital.  States her daughter will take her home. Writer explains her post op follow up appointment date/time and Ángela Robert use for transportation. She verbalizes appreciation of the scheduling. Writer explains all information will be on her discharge instructions.

## 2023-03-08 NOTE — PROGRESS NOTES
Portable xray obtained. Pt repositioned in bed with sling to right arm. Pt denies any needs besides requesting ice water.

## 2023-03-08 NOTE — PROGRESS NOTES
Met with Patient this p.m. to discuss discharge plans. Patient is a 76year old , white female, admitted with a diagnosis of SSS. Patient is alert and oriented, pleasant and cooperative with this assessment. States that her plan is to return home when deemed medically stable. No additional resources or services requested or indicated at this time. Patient resides in [facility], alone. She has a walker, shower chair, grab bars and two lift chairs that she uses at home for assistance. Patient is ordinarily independent with her ADL's. She reports that she has a cleaning lady and that her daughter gets her groceries for her. She cooks her own meals and can drive some but relies on the [employer] for her medical transportation needs. PCP is Brett Avina CNP. Patient reports no difficulty with regards to affording her prescription medications at this point. Discharge plan is home when stable. Patient remains a 'Full Code' status and does report that she has medical directives in place. Encouraged her to provide copy for her medical records as she is able.       Rejida. Andrew Gutierrez 67, Wilner Call  3/8/2023

## 2023-03-08 NOTE — CARE COORDINATION
Case Management Assessment  Initial Evaluation    Date/Time of Evaluation: 3/8/2023 1:26 PM  Assessment Completed by: Mitali Valdez RN    If patient is discharged prior to next notation, then this note serves as note for discharge by case management. Patient Name: Apolonia Alicea                   YOB: 1945  Diagnosis: SSS (sick sinus syndrome) [facility]) [I49.5]                   Date / Time: 3/8/2023  5:59 AM    Patient Admission Status: Outpatient in a bed   Readmission Risk (Low < 19, Mod (19-27), High > 27): No data recorded  Current PCP: TYLOR Brito CNP  PCP verified by CM? (P) Yes Radha Hung CNP)    Chart Reviewed: Yes      History Provided by: (P) Patient  Patient Orientation: (P) Alert and Oriented, Person, Place, Situation, Self    Patient Cognition: (P) Alert    Hospitalization in the last 30 days (Readmission):  No    If yes, Readmission Assessment in CM Navigator will be completed.     Advance Directives:      Code Status: Full Code   Patient's Primary Decision Maker is: (P) Named in [address] [place] [address] (asked patient for copy)    Primary Decision MakerStewart RogeSelect Medical [facility] Child - [phone]    Discharge Planning:    Patient lives with: (P) Alone Type of Home: (P) House  Primary Care Giver: (P) Self  Patient Support Systems include: (P) Children, Family Members   Current Financial resources: (P) Medicare  Current community resources: (P) Transportation (uses [employer])  Current services prior to admission: (P) Emergency Call  System            Current DME:              Type of Home Care services:  (P) None    ADLS  Prior functional level: (P) Independent in ADLs/IADLs  Current functional level: (P) Independent in ADLs/IADLs    PT AM-PAC:   /24  OT AM-PAC:   /24    Family can provide assistance at DC: (P) Yes  Would you like Case Management to discuss the discharge plan with any other family members/significant others, and if so, who? (P) No  Plans to Return to Present Housing: (P) Yes  Other Identified Issues/Barriers to RETURNING to current housing: none  Potential Assistance needed at discharge: (P) N/A            Potential DME:    Patient expects to discharge to: (P) [address] [place] [address] for transportation at discharge:      Financial    Payor: [address] [place] [address],[address] / Plan: MEDICARE PART A AND B / Product Type: *No Product type* /     Does insurance require precert for SNF: No    Potential assistance Purchasing Medications: (P) No  Meds-to-Beds request:        Asuncion Logan 52, [place] - [address] Marrufo Leiva [phone]  [address] [place] [id number]  Phone: [phone] Fax: [phone]  Tee Woodson. [address] [place] [address] [phone] - f [phone]  [facility] [address]  Phone: [phone] Fax: [phone]    [address] [facility],[address] Mail Delivery - [address] [phone] - f [phone]  [address] [place] [address]  Phone: [phone] Fax: [address] [phone] Niru Leiva [phone]  [address] [address]  Phone: [phone] Fax: [phone]    [facility] Rupesh Rodriguez [address] [place] [address] [phone] Niru Leiva [phone]  JONO Talbot [address]  [place] [address]  Phone: [phone] Fax: [phone]      Notes:    Factors facilitating achievement of predicted outcomes: Motivated, Cooperative, Pleasant, and Has needed Durable Medical Equipment at home    Barriers to discharge: none    Additional Case Management Notes: lives alone, states has a cleaning lady every 2 weeks and her daughter does her grocery shopping. Prepares own meals and does her own laundry. Uses the [facility] for her transportation to medical appts. Has a medical alert button. Plans to return home and denies needs at this time. PCP is Donna Alonzo.      The Plan for Transition of Care is related to the following treatment goals of SSS (sick sinus syndrome) ([facility] [H87.1]    IF APPLICABLE: The Patient and/or patient representative Sree Chan and her family were provided with a choice of provider and agrees with the discharge plan. Freedom of choice list with basic dialogue that supports the patient's individualized plan of care/goals and shares the quality data associated with the providers was provided to: (P) Patient   Patient Representative Name:       The Patient and/or Patient Representative Agree with the Discharge Plan?  (P) Yes    Pieter Earl RN  Case Management Department  Ph: [phone] Fax: [phone]

## 2023-03-08 NOTE — ANESTHESIA POSTPROCEDURE EVALUATION
Department of Anesthesiology  Postprocedure Note    Patient: Bryce Body  MRN: [id number]  [place]: 1945  Date of evaluation: 3/8/2023      Procedure Summary     Date: 03/08/23 Room / Location: [address]    Anesthesia Start: Thomas Wesley Anesthesia Stop: 7753    Procedure: PACEMAKER INSERTION PERMANENT Diagnosis:       SSS (sick sinus syndrome) ([facility])      ([facility]    Surgeons: Neva Caro MD Responsible Provider: TYLOR Arroyo CRNA    Anesthesia Type: MAC ASA Status: 4          Anesthesia Type: No value filed.     Sam Phase I: Sam Score: 10    Sam Phase II:        Anesthesia Post Evaluation    Patient location during evaluation: PACU  Patient participation: complete - patient participated  Level of consciousness: awake and lethargic  Pain score: 0  Airway patency: patent  Nausea & Vomiting: no nausea and no vomiting  Complications: no  Cardiovascular status: blood pressure returned to baseline and hemodynamically stable  Respiratory status: acceptable, room air and spontaneous ventilation (does have complaint of chest tightness with breathing, CXR obtained via standard post surgical orders)  Hydration status: euvolemic  Multimodal analgesia pain management approach

## 2023-03-08 NOTE — H&P
RE:   Mer Marsh  :  1945        Dear Zee Mabry:     CHIEF COMPLAINT:  1. Chronic atrial fibrillation. 2.  On Coumadin. 3.  Severe mitral regurgitation. HISTORY OF PRESENT ILLNESS:  I had the pleasure of seeing Mrs. Arturo Parmar in our office on 2022. She is a pleasant 75-year-old female, very inactive because of severe arthritis and chronic back pain, essentially wheelchair-bound. She does have chronic atrial fibrillation, has been on Coumadin and rate control for many years. We did not attempt cardioversion but instead did anticoagulation and rate control as she is totally asymptomatic. She has a long history of mitral regurgitation, in the moderate to moderate-to-severe category with an EF in the 45% to 50% range. She has never had a myocardial infarction or cardiac catheterization. Does have a long history of shortness of breath. She has had no chest pain or chest discomfort. She has been on Elavil for sleep, does have some dizziness. Denies any unusual shortness of breath. No edema. She is getting home physical therapy. Her knees are very bad. She can do no activity. She has had no hospitalizations or procedures since I last saw her. CARDIAC RISK FACTORS:  Hypertension:  Positive. Hyperlipidemia:  Positive. Peripheral Vascular Disease:  Negative. Smoking:  Negative. Diabetes:  Negative. Other Family Members:  Negative. MEDICATIONS AT HOME:  She is currently on Zyloprim 300 mg half a tablet daily, Elavil 25 mg nightly for back pain and insomnia, aspirin 81 mg daily, atenolol 50 mg daily, vitamin D 2000 units b.i.d., Cardizem 60 mg t.i.d., iron 5 grains daily, Lasix 40 mg daily with an extra tablet if needed for edema, spironolactone 25 mg b.i.d., Coumadin as directed. PAST MEDICAL AND SURGICAL HISTORY:  1. Tubal ligation, many years ago. 2.  Tonsillectomy, years ago. 3.  Hypertension, well controlled.   4.  Probable sleep apnea with long snoring history although she has never agreed for a sleep study. 5.  Atrial fibrillation, on Coumadin and rate control. 6.  Pulmonary hypertension. 7.  Carpal tunnel release, 7 years ago. 8.  Left knee replacement, Dr. Gracie Garza in 07/2019.  9.  Colonoscopy in 2017 and on 07/27/2021. FAMILY HISTORY:  No significant heart disease in the family. SOCIAL HISTORY:  She is 76years old,  for eight and a half years, three children. Granddaughter 28, works for [employer], got  and moved into the house that Mrs. Lucita Berry was raised in. She does not smoke or drink alcohol. REVIEW OF SYSTEMS:  Cardiac as above. Other systems reviewed including constitutional, eyes, ears, nose and throat, cardiovascular, respiratory, GI, , musculoskeletal, integumentary, neurologic, endocrine, hematologic and allergic/immunologic, are negative except for what is described above. No weight loss or weight gain. No change in bowel habits. No blood in stools. No fevers, sweats or chills. PHYSICAL EXAMINATION:  VITAL SIGNS:  Her blood pressure was 115/73 with a heart rate of 70 and irregularly irregular. Respirations were 18. O2 saturation 94%. GENERAL:  She is a very pleasant 42-year-old female. Denied pain. She was oriented to person, place and time. Answered questions appropriately. SKIN:  No unusual skin changes. HEENT:  The pupils are equally round and reactive to light and accommodation. Extraocular movements were intact. Mucous membranes were dry. NECK:  No JVD. Good carotid pulses. No carotid bruits. No lymphadenopathy or thyromegaly. CARDIOVASCULAR EXAM:  S1 and S2 were normal.  No S3 or S4. Soft systolic blowing type murmur. No diastolic murmur. PMI was normal.  No lift, thrust, or pericardial friction rub. LUNGS:  Clear to auscultation and percussion. ABDOMEN:  Soft and nontender. Good bowel sounds. EXTREMITIES:  Good femoral pulses. Good pedal pulses. No pedal edema.   Skin was warm and dry. No calf tenderness. Nail beds pink. Good cap refill. PULSES:  Bilateral symmetrical radial, brachial and carotid pulses. No carotid bruits. Good femoral and pedal pulses. NEUROLOGIC EXAM:  Within normal limits. PSYCHIATRIC EXAM:  Within normal limits. LABORATORY DATA:  Her sodium was 138, potassium 4.3, BUN 23, creatinine 0.97, GFR 55, magnesium 2.1, glucose 120, calcium was 9.6. Cholesterol 205, HDL 51, , triglycerides 198. Her ALT was 31, AST was 23. TSH 1.31. White count 8.2, hemoglobin 14.9 with a platelet count of 854,394. EKG showed atrial fibrillation with left axis deviation and possible old anterior myocardial infarction with no change from previous EKGs. Chest x-ray was unremarkable on 08/16/2022. IMPRESSION:  1. Chronic atrial fibrillation, with rate control on Coumadin. 2.  Moderate to moderate-to-severe mitral regurgitation, very pressure dependent. 3.  Chronic shortness of breath due to COPD and deconditioning. 4.  Mild LV dysfunction, EF in the 45% to 50% range. 5.  Hypertension, well controlled. 6.  Hyperlipidemia, well controlled. 7.  Severe arthritis in both knees with left knee replacement in 07/2019, with her essentially wheelchair-bound. 8.  Chronic back pain. ADDENDUM:       REASON FOR CONSULT:  Sick sinus syndrome. HISTORY OF PRESENT ILLNESS:  I met with Riddhi Brice and her daughter, Cedrick Woods, in our office on 02/01/2023. I trust you received my full H and P from 11/01/2022. She does have chronic atrial fibrillation and is on Coumadin. She also has severe mitral regurgitation. She has never had a myocardial infarction or cardiac catheterization. She was in the hospital for epistaxis and her Coumadin was held. Her EKG was somewhat slow and, therefore, placed an event recorder from 12/05/2022 to 01/03/2023. We received multiple transmissions. She had heart rates in the 30 to 35 range. She also had some lightheadedness. She had a 3-second pause on 12/19/2022, associated with lightheadedness. She had a heart rate of 29 on 12/28/2022. I met with her and her daughter to discuss pacemaker. She did have a fair amount of heart rates in the 130s to 140s range. We obviously cannot slow the tachycardia without making her bradycardia worse. Therefore, I have recommended a pacemaker, which would be a VVI pacemaker because of chronic atrial fibrillation. She has wished to proceed with a pacemaker. We will do so on 03/08. She will hold her Coumadin five days prior to the procedure and bridge with Lovenox. She is not having any chest pain or chest discomfort, and we will do no other testing. Thank you very much for allowing me the privilege of seeing Mrs. Valente Sparks. If you have any questions on my thoughts, please do not hesitate to contact me.      Sincerely,           Hortensia Aldana MD

## 2023-03-08 NOTE — BRIEF OP NOTE
Brief Postoperative Note      Patient: Luis Shelley  YOB: 1945  MRN: [id number]    Date of Procedure: 3/8/2023    Pre-Op Diagnosis: SSS (sick sinus syndrome) ([facility] [I49.5]    Post-Op Diagnosis: Same       Procedure(s):  PACEMAKER INSERTION PERMANENT    Surgeon(s):  Amanda Wagner MD    Assistant:  * No surgical staff found *    Anesthesia: Monitor Anesthesia Care    Estimated Blood Loss (mL): less than 50     Complications: None    Specimens:       Implants:  Implant Name Type Inv. Item Serial No.  Lot No. LRB No. Used Action   LEAD PACE 5.7FR L52CM VENT LEROY PLAT INSUL BPLR STEROID - [id number] Cardiac Lead LEAD PACE 5.7FR L52CM VENT LEROY PLAT INSUL BPLR STEROID [id number] [employer] CARDIAC RTHYM MGT-WD  N/A 1 Implanted   PACEMAKER CRD UPLR/BPLR 50.8X42.6X7.4 MM 12.25 CC 22.5 GM - [id number] Cardiac PPM/CRT-P PACEMAKER CRD UPLR/BPLR 50.8X42.6X7.4 MM 12.25 CC 22.5 GM [id number] [employer]TR[employer] CARDIAC RTHYM MGT-WD  N/A 1 Implanted             Full note dictated.     Electronically signed by Amanda Wagner MD on 3/8/2023 at 10:27 AM

## 2023-03-08 NOTE — PROGRESS NOTES
Physician ordered removal of all drapes and prep completion of right side due to no access possible on the left. All drapes, bovie, suction, and C-arm cover replaced at 0858.   Second incision at [facility]

## 2023-03-08 NOTE — PROCEDURES
[place] [address]                            CARDIAC CATHETERIZATION    PATIENT NAME: Conor Turner                  :        1945  MED REC NO:   [id number]                              ROOM:       90  ACCOUNT NO:   [de-identified]                           ADMIT DATE: 2023  PROVIDER:     Kira Rodriguez MD    DATE OF PROCEDURE:  2023    NAME OF PROCEDURE:  VVI single lead permanent pacemaker. INDICATION:  Sick sinus syndrome. PROCEDURE:  We prepped and draped in the usual manner for pacemaker in  the left subclavian region. After she was heavily sedated in the OR  room, I infiltrated with Marcaine. I then made an incision in the  subclavicle region. Using blunt dissection, I made a pocket and  irrigated pocket with vancomycin solution. We then irrigated again and  placed gauze within the pocket. I then attempted to cannulate this left subclavian vein. We tried  multiple sites. We were unable to cannulate the subclavian vein. Therefore, we injected contrast in her left hand IV. There was no  contrast going through the subclavian vein. 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 I then aborted attempting to cannulate the left subclavian vein and we  closed the pocket with 2-0 Vicryl and 4-0 Vicryl. Steri-Strips were  applied. We then reprepped the patient for a right subclavian site. I injected  with Marcaine at this time to cannulate the right subclavian vein before  making an incision. .  It was somewhat difficult as the subclavian vein  _____. However, we were able to advance a guidewire into the left  subclavian vein in the superior vena cava. We then made an incision around the guidewire and made a pocket using  blunt dissection.   We irrigated the pocket with Ancef solution and  packed with Ancef gauze.    I then placed a sheath over the wire and placed the pacemaker wire into  the right ventricle. We had to work to find good sensing and  thresholds, but we were eventually able to do so. We sutured the lead  in with one silk. We then irrigated the pocket again making sure there  were no bleeders. We then connected the lead to the VVI pacemaker. This was placed within the pocket and sutured with one silk. We closed  the pocket with 2-0 Vicryl and 4-0 Vicryl. Steri-Strips were applied. There were no complications. She left the OR in good condition. Generator is Medtronic Lima XT SR MRI-compatible pacemaker, model  number [id number], serial number [id number], implant date 03/08/2023. Ventricular lead is a model number [id number], serial number [id number]  Medtronic. Thresholds 0.4 milliseconds, 0.85 volts, impedance 988 with an R wave of  11. Final settings, amplitude 3.5, pulse width 0.4, sensitivity 0.9,  lower rate 60, upper rate 120. 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         Alma Torre MD    D: 03/08/2023 10:44:07       T: 03/08/2023 10:48:28     [email or web address]  Job#: [id number]     Doc#: [id number]    CC:  Anastasiia Viera

## 2023-03-09 ENCOUNTER — APPOINTMENT (OUTPATIENT)
Dept: GENERAL RADIOLOGY | Age: 78
End: 2023-03-09
Attending: INTERNAL MEDICINE
Payer: MEDICARE

## 2023-03-09 VITALS
WEIGHT: 255.6 LBS | DIASTOLIC BLOOD PRESSURE: 80 MMHG | TEMPERATURE: 97.4 F | HEART RATE: 82 BPM | HEIGHT: 63 IN | OXYGEN SATURATION: 93 % | SYSTOLIC BLOOD PRESSURE: 129 MMHG | BODY MASS INDEX: 45.29 KG/M2 | RESPIRATION RATE: 16 BRPM

## 2023-03-09 DIAGNOSIS — I49.5 SSS (SICK SINUS SYNDROME) (HCC): Primary | ICD-10-CM

## 2023-03-09 DIAGNOSIS — I48.20 CHRONIC ATRIAL FIBRILLATION (HCC): ICD-10-CM

## 2023-03-09 LAB
HCT VFR BLD AUTO: 36.6 % (ref 36–46)
HGB BLD-MCNC: 11.8 G/DL (ref 12–16)
MCH RBC QN AUTO: 26.4 PG (ref 26–34)
MCHC RBC AUTO-ENTMCNC: 32.2 G/DL (ref 31–37)
MCV RBC AUTO: 81.8 FL (ref 80–100)
PDW BLD-RTO: 17.6 % (ref 12.1–15.2)
PLATELET # BLD AUTO: 211 K/UL (ref 140–450)
RBC # BLD: 4.48 M/UL (ref 4–5.2)
WBC # BLD AUTO: 11.5 K/UL (ref 3.5–11)

## 2023-03-09 PROCEDURE — 94761 N-INVAS EAR/PLS OXIMETRY MLT: CPT

## 2023-03-09 PROCEDURE — 71046 X-RAY EXAM CHEST 2 VIEWS: CPT

## 2023-03-09 PROCEDURE — 36415 COLL VENOUS BLD VENIPUNCTURE: CPT

## 2023-03-09 PROCEDURE — 6360000002 HC RX W HCPCS: Performed by: INTERNAL MEDICINE

## 2023-03-09 PROCEDURE — 85027 COMPLETE CBC AUTOMATED: CPT

## 2023-03-09 PROCEDURE — 2580000003 HC RX 258: Performed by: INTERNAL MEDICINE

## 2023-03-09 PROCEDURE — 6370000000 HC RX 637 (ALT 250 FOR IP): Performed by: INTERNAL MEDICINE

## 2023-03-09 RX ORDER — OXYCODONE HYDROCHLORIDE 5 MG/1
5 TABLET ORAL EVERY 4 HOURS PRN
Qty: 15 TABLET | Refills: 0 | Status: SHIPPED | OUTPATIENT
Start: 2023-03-09 | End: 2023-03-12

## 2023-03-09 RX ORDER — DIGOXIN 125 MCG
125 TABLET ORAL DAILY
Qty: 30 TABLET | Refills: 11 | Status: SHIPPED | OUTPATIENT
Start: 2023-03-09

## 2023-03-09 RX ORDER — DIGOXIN 125 MCG
125 TABLET ORAL DAILY
Status: DISCONTINUED | OUTPATIENT
Start: 2023-03-09 | End: 2023-03-09 | Stop reason: HOSPADM

## 2023-03-09 RX ADMIN — OXYCODONE HYDROCHLORIDE 5 MG: 5 TABLET ORAL at 12:06

## 2023-03-09 RX ADMIN — FUROSEMIDE 40 MG: 40 TABLET ORAL at 08:13

## 2023-03-09 RX ADMIN — ATENOLOL 50 MG: 50 TABLET ORAL at 08:13

## 2023-03-09 RX ADMIN — SODIUM CHLORIDE, PRESERVATIVE FREE 10 ML: 5 INJECTION INTRAVENOUS at 08:14

## 2023-03-09 RX ADMIN — ONDANSETRON 4 MG: 2 INJECTION INTRAMUSCULAR; INTRAVENOUS at 05:28

## 2023-03-09 RX ADMIN — DILTIAZEM HYDROCHLORIDE 240 MG: 120 CAPSULE, COATED, EXTENDED RELEASE ORAL at 08:12

## 2023-03-09 RX ADMIN — DIGOXIN 125 MCG: 125 TABLET ORAL at 08:12

## 2023-03-09 RX ADMIN — SPIRONOLACTONE 25 MG: 25 TABLET, FILM COATED ORAL at 08:13

## 2023-03-09 RX ADMIN — DULOXETINE HYDROCHLORIDE 20 MG: 20 CAPSULE, DELAYED RELEASE ORAL at 08:13

## 2023-03-09 RX ADMIN — ACETAMINOPHEN 650 MG: 325 TABLET, FILM COATED ORAL at 08:13

## 2023-03-09 RX ADMIN — ALLOPURINOL 300 MG: 100 TABLET ORAL at 08:13

## 2023-03-09 ASSESSMENT — PAIN SCALES - GENERAL
PAINLEVEL_OUTOF10: 2
PAINLEVEL_OUTOF10: 5
PAINLEVEL_OUTOF10: 5
PAINLEVEL_OUTOF10: 3
PAINLEVEL_OUTOF10: 0
PAINLEVEL_OUTOF10: 0

## 2023-03-09 ASSESSMENT — PAIN DESCRIPTION - LOCATION
LOCATION: SHOULDER
LOCATION: SHOULDER

## 2023-03-09 ASSESSMENT — PAIN DESCRIPTION - DESCRIPTORS
DESCRIPTORS: SORE
DESCRIPTORS: ACHING

## 2023-03-09 ASSESSMENT — PAIN SCALES - WONG BAKER
WONGBAKER_NUMERICALRESPONSE: 0
WONGBAKER_NUMERICALRESPONSE: 0

## 2023-03-09 ASSESSMENT — PAIN - FUNCTIONAL ASSESSMENT
PAIN_FUNCTIONAL_ASSESSMENT: ACTIVITIES ARE NOT PREVENTED
PAIN_FUNCTIONAL_ASSESSMENT: ACTIVITIES ARE NOT PREVENTED

## 2023-03-09 ASSESSMENT — PAIN DESCRIPTION - ORIENTATION
ORIENTATION: RIGHT
ORIENTATION: RIGHT

## 2023-03-09 NOTE — PROGRESS NOTES
Cardiology    Doing well. Cxr looks good with good placement of lead. Will check ppm and if ok discharge today. Have added Digoxin for rate control. Will do digoxin level in 1 week.     Tej Negron MD

## 2023-03-09 NOTE — PLAN OF CARE
Problem: Discharge Planning  Goal: Discharge to home or other facility with appropriate resources  3/9/2023 0240 by Carmina Juarez RN  Outcome: Progressing  Flowsheets (Taken 3/8/2023 1115 by Richard Zuñiga RN)  Discharge to home or other facility with appropriate resources:   Identify barriers to discharge with patient and caregiver   Arrange for needed discharge resources and transportation as appropriate   Identify discharge learning needs (meds, wound care, etc)   Refer to discharge planning if patient needs post-hospital services based on physician order or complex needs related to functional status, cognitive ability or social support system  3/8/2023 1303 by Richard Zuñiga RN  Outcome: Progressing  Flowsheets (Taken 3/8/2023 1115)  Discharge to home or other facility with appropriate resources:   Identify barriers to discharge with patient and caregiver   Arrange for needed discharge resources and transportation as appropriate   Identify discharge learning needs (meds, wound care, etc)   Refer to discharge planning if patient needs post-hospital services based on physician order or complex needs related to functional status, cognitive ability or social support system     Problem: Chronic Conditions and Co-morbidities  Goal: Patient's chronic conditions and co-morbidity symptoms are monitored and maintained or improved  Outcome: Progressing  Flowsheets (Taken 3/8/2023 1115 by Richard Zuñiga RN)  Care Plan - Patient's Chronic Conditions and Co-Morbidity Symptoms are Monitored and Maintained or Improved: Monitor and assess patient's chronic conditions and comorbid symptoms for stability, deterioration, or improvement     Problem: ABCDS Injury Assessment  Goal: Absence of physical injury  Outcome: Progressing     Problem: Safety - Adult  Goal: Free from fall injury  Outcome: Progressing     Problem: Cardiovascular - Adult  Goal: Maintains optimal cardiac output and hemodynamic stability  Outcome: Progressing     Problem: Skin/Tissue Integrity - Adult  Goal: Skin integrity remains intact  Outcome: Progressing

## 2023-03-09 NOTE — PROGRESS NOTES
[address] [facility]                 Inpatient Discharge Medication Education Note                                 Patient admitted for Pacemaker placement    Medications reviewed with the patient include:    digoxin (LANOXIN) 125 MCG tablet    oxyCODONE (ROXICODONE) 5 MG immediate release tablet      Patient education provided when necessary to include potential medication related side effects with acknowledgement of understanding. Discussed Docusate in case she has any constipation from the oxycodone. Because her surgery was a bit more complicated than planned, relayed to patient that Dr Jose Clancy would like to wait until Saturday, 3/11/23, to restart the Lovenox and warfarin. Provided a dosing calendar to pt and also rescheduled her Coumadin Clinic appt from 3/14 to 3/15 after her 1 wk f/u with Dr Jose Clancy. Patient has no further questions. Relayed Coumadin info to AMMY Chávez, PharmD 3/9/2023 9:33 AM

## 2023-03-09 NOTE — PROGRESS NOTES
RN at bedside and goes over discharge paperwork. Special attention paid to follow up appt and new medications. Instructed to wear sling at night and not to lift arm above heart level. Verbalizes understanding. Discharged home with all belongings. Pt daughter in room to take pt home.

## 2023-03-15 ENCOUNTER — HOSPITAL ENCOUNTER (OUTPATIENT)
Dept: PHARMACY | Age: 78
Setting detail: THERAPIES SERIES
Discharge: HOME | End: 2023-03-15
Payer: MEDICARE

## 2023-03-15 ENCOUNTER — OFFICE VISIT (OUTPATIENT)
Dept: CARDIOLOGY CLINIC | Age: 78
End: 2023-03-15
Payer: MEDICARE

## 2023-03-15 DIAGNOSIS — I48.20 CHRONIC ATRIAL FIBRILLATION (HCC): ICD-10-CM

## 2023-03-15 DIAGNOSIS — E78.5 HYPERLIPIDEMIA, UNSPECIFIED HYPERLIPIDEMIA TYPE: ICD-10-CM

## 2023-03-15 DIAGNOSIS — I49.5 SSS (SICK SINUS SYNDROME) (HCC): Primary | ICD-10-CM

## 2023-03-15 DIAGNOSIS — I48.91 ATRIAL FIBRILLATION, UNSPECIFIED TYPE (HCC): Primary | ICD-10-CM

## 2023-03-15 LAB — INR BLD: 2.1

## 2023-03-15 PROCEDURE — 93793 ANTICOAG MGMT PT WARFARIN: CPT | Performed by: INTERNAL MEDICINE

## 2023-03-15 PROCEDURE — 99211 OFF/OP EST MAY X REQ PHY/QHP: CPT

## 2023-03-15 PROCEDURE — 85610 PROTHROMBIN TIME: CPT

## 2023-03-15 RX ORDER — SPIRONOLACTONE 25 MG/1
TABLET ORAL
Qty: 180 TABLET | Refills: 3 | Status: SHIPPED | OUTPATIENT
Start: 2023-03-15

## 2023-03-15 NOTE — PROGRESS NOTES
Pt here for 1 week incision check   Both dressing removed from rt and lt   Chest incisions healing well  Bruising noted both sides. Pacemaker checked.      Will see in 3 mth

## 2023-03-15 NOTE — PROGRESS NOTES
[address]/[place]  Medication Management  ANTICOAGULATION    Referring Provider: Dr Belem Shaffer INR: 2.0-3.0     TODAY'S INR: 2.1     WARFARIN Dosage: Continue 2.5 mg Sa, 5 mg all other days of the week    INR (no units)   Date Value   03/15/2023 2.1   02/15/2023 2.2   2023 1.7   2022 1.8   11/10/2022 1.8   10/19/2022 2.2   2022 2.1       Medication changes:  Started Cymbalta and digoxin  Lovenox stopped - therapeutic INR    Notes:    Fingerstick INR drawn per clinic protocol. Patient states no visible blood in urine and no black tarry stool. No falls. Denies any missed or extra doses of warfarin. Started on Cymbalta and digoxin. Also able to stop Lovenox bridging after therapeutic INR today. No change in other maintenance medications or in diet. Will continue previous dosing of 2.5 mg Sa, 5 mg AOD and will recheck INR in 4 weeks. Patient acknowledges working in consult agreement with pharmacist as referred by his/her physician.                For Pharmacy Admin Tracking Only    Intervention Detail: Adherence Monitorin  Total # of Interventions Recommended: 1  Total # of Interventions Accepted: 1  Time Spent (min): 30    Caryn VincentD 3/15/2023 3:23 PM

## 2023-03-16 RX ORDER — AMITRIPTYLINE HYDROCHLORIDE 25 MG/1
25 TABLET, FILM COATED ORAL NIGHTLY
Qty: 90 TABLET | Refills: 1 | Status: SHIPPED | OUTPATIENT
Start: 2023-03-16

## 2023-03-20 ENCOUNTER — TELEPHONE (OUTPATIENT)
Dept: FAMILY MEDICINE CLINIC | Age: 78
End: 2023-03-20

## 2023-03-20 RX ORDER — DULOXETIN HYDROCHLORIDE 30 MG/1
30 CAPSULE, DELAYED RELEASE ORAL DAILY
Qty: 90 CAPSULE | Refills: 0 | Status: SHIPPED | OUTPATIENT
Start: 2023-03-20

## 2023-03-21 NOTE — TELEPHONE ENCOUNTER
Inform pt cymbalta 30 mg (slight increase in dose) taken once a day sent to mail in pharmacy. Please stay on this medication glad it is helping.      Hiral QUINTANILLA CNP
Pt informed, voices understanding
Comprehensive Metabolic Panel Once 37/37/7400   Lipid Panel Once 06/15/2023   Magnesium Once 06/15/2023       Next Visit Date:  Future Appointments   Date Time Provider Sj Hailee   4/13/2023 12:45 PM [id number] [facility]   4/13/2023  1:20 PM ALFONSO MEDICATION MGMT [facility] MED MGMT Alfonso   5/22/2023  1:00 PM MWHZ MOBILE VAN UNIT MTHZ MOBILE  [facility]   5/22/2023  1:20 PM TYLOR Huffman CNP ALFONSO MED W   6/13/2023  1:00 PM MD Nathaniel Stafford [facility]   10/31/2023  1:30 PM MD Nathaniel Stafford [facility]            Patient Active Problem List:     Kidney stones     Localized osteoarthritis of left knee     Atrial fibrillation ([address]     Pulmonary HTN ([address]     Knee pain, chronic     Iron deficiency anemia due to chronic blood loss     Mitral valve insufficiency     Backache     CHF (congestive heart failure) ([facility])     CKD (chronic kidney disease)     Echocardiogram abnormal     Gout     HL (hearing loss)     Hyperlipidemia     Morbid obesity with BMI of 45.0-49.9, adult (HCC)     DJD (degenerative joint disease)     Chronic renal disease, stage III ([facility]) [[id number]]     Chronic systolic (congestive) heart failure     Lumbar radiculopathy     Foraminal stenosis of lumbar region     Chronic bilateral low back pain with bilateral sciatica     SSS (sick sinus syndrome) ([address]

## 2023-04-28 ENCOUNTER — HOSPITAL ENCOUNTER (OUTPATIENT)
Dept: PHARMACY | Age: 78
Setting detail: THERAPIES SERIES
Discharge: HOME | End: 2023-04-28
Payer: MEDICARE

## 2023-04-28 VITALS — HEART RATE: 71 BPM | DIASTOLIC BLOOD PRESSURE: 66 MMHG | SYSTOLIC BLOOD PRESSURE: 124 MMHG

## 2023-04-28 DIAGNOSIS — I48.91 ATRIAL FIBRILLATION, UNSPECIFIED TYPE (HCC): Primary | ICD-10-CM

## 2023-04-28 LAB — INR BLD: 2.6

## 2023-04-28 PROCEDURE — 99211 OFF/OP EST MAY X REQ PHY/QHP: CPT

## 2023-04-28 PROCEDURE — 85610 PROTHROMBIN TIME: CPT

## 2023-04-28 RX ORDER — ATENOLOL 50 MG/1
TABLET ORAL
Qty: 90 TABLET | Refills: 3 | Status: SHIPPED | OUTPATIENT
Start: 2023-04-28

## 2023-04-28 NOTE — PROGRESS NOTES
[address]  Medication Management  ANTICOAGULATION    Referring Provider: Dr Belem Shaffer INR: 2.0-3.0     TODAY'S INR: 2.6     WARFARIN Dosage: Continue 2.5 mg /, 5 mg all other days of the week    INR (no units)   Date Value   2023 2.6   2023 3.8   03/15/2023 2.1   02/15/2023 2.2   2023 1.7   2022 1.8   11/10/2022 1.8       Hemoglobin   Date Value Ref Range Status   2023 11.8 (L) 12.0 - 16.0 g/dL Final     Hematocrit   Date Value Ref Range Status   2023 36.6 36 - 46 % Final     ALT   Date Value Ref Range Status   2023 33 5 - 33 U/L Final     AST   Date Value Ref Range Status   2023 32 (H) <32 U/L Final       Medication changes:  None    Notes:    Fingerstick INR drawn per clinic protocol. Patient states no visible blood in urine, no black tarry stool, no falls. Denies any missed doses of warfarin. Admits to having several servings of roasted asparagus and a can of spinach this past week. No change in other maintenance medications or in diet. INR is therapeutic today so will continue her current dosing and will recheck INR in 4 weeks. Patient acknowledges working in consult agreement with pharmacist as referred by his/her physician.                   For Pharmacy Admin Tracking Only    Intervention Detail: Adherence Monitorin  Total # of Interventions Recommended: 1  Total # of Interventions Accepted: 1  Time Spent (min): 20    Caryn VincentD 2023 12:16 PM

## 2023-05-22 ENCOUNTER — HOSPITAL ENCOUNTER (OUTPATIENT)
Age: 78
Setting detail: SPECIMEN
Discharge: HOME | End: 2023-05-22
Payer: MEDICARE

## 2023-05-22 ENCOUNTER — OFFICE VISIT (OUTPATIENT)
Dept: FAMILY MEDICINE CLINIC | Age: 78
End: 2023-05-22

## 2023-05-22 VITALS
DIASTOLIC BLOOD PRESSURE: 70 MMHG | BODY MASS INDEX: 45.28 KG/M2 | HEIGHT: 63 IN | HEART RATE: 78 BPM | OXYGEN SATURATION: 95 % | SYSTOLIC BLOOD PRESSURE: 128 MMHG

## 2023-05-22 DIAGNOSIS — R11.0 NAUSEA: ICD-10-CM

## 2023-05-22 DIAGNOSIS — M51.36 DDD (DEGENERATIVE DISC DISEASE), LUMBAR: ICD-10-CM

## 2023-05-22 DIAGNOSIS — E66.01 MORBID OBESITY WITH BMI OF 45.0-49.9, ADULT (HCC): ICD-10-CM

## 2023-05-22 DIAGNOSIS — R30.0 DYSURIA: ICD-10-CM

## 2023-05-22 DIAGNOSIS — E78.2 MIXED HYPERLIPIDEMIA: ICD-10-CM

## 2023-05-22 DIAGNOSIS — R11.0 NAUSEA: Primary | ICD-10-CM

## 2023-05-22 DIAGNOSIS — N18.31 STAGE 3A CHRONIC KIDNEY DISEASE (HCC): ICD-10-CM

## 2023-05-22 DIAGNOSIS — I48.20 CHRONIC ATRIAL FIBRILLATION (HCC): ICD-10-CM

## 2023-05-22 LAB
BILIRUBIN, POC: NORMAL
BLOOD URINE, POC: NEGATIVE
CLARITY, POC: NORMAL
COLOR, POC: NORMAL
GLUCOSE URINE, POC: NEGATIVE
KETONES, POC: NEGATIVE
LEUKOCYTE EST, POC: NEGATIVE
NITRITE, POC: NEGATIVE
PH, POC: 5.5
PROTEIN, POC: 30
SPECIFIC GRAVITY, POC: 1.02
UROBILINOGEN, POC: 0.2

## 2023-05-22 PROCEDURE — 87086 URINE CULTURE/COLONY COUNT: CPT

## 2023-05-22 RX ORDER — PANTOPRAZOLE SODIUM 20 MG/1
20 TABLET, DELAYED RELEASE ORAL
Qty: 30 TABLET | Refills: 0 | Status: SHIPPED | OUTPATIENT
Start: 2023-05-22

## 2023-05-22 RX ORDER — DULOXETIN HYDROCHLORIDE 30 MG/1
30 CAPSULE, DELAYED RELEASE ORAL DAILY
Qty: 90 CAPSULE | Refills: 1 | Status: SHIPPED | OUTPATIENT
Start: 2023-05-22

## 2023-05-22 RX ORDER — ONDANSETRON 4 MG/1
4 TABLET, ORALLY DISINTEGRATING ORAL 3 TIMES DAILY PRN
Qty: 6 TABLET | Refills: 0 | Status: SHIPPED | OUTPATIENT
Start: 2023-05-22

## 2023-05-22 NOTE — PROGRESS NOTES
equal, round, and reactive to light. Neck:      Vascular: No carotid bruit (neg francisco). Cardiovascular:      Rate and Rhythm: Normal rate and regular rhythm. Heart sounds: Normal heart sounds. No murmur heard. Pulmonary:      Effort: Pulmonary effort is normal. No respiratory distress. Breath sounds: Normal breath sounds. No wheezing. Abdominal:      Palpations: Abdomen is soft. Tenderness: There is no abdominal tenderness. Musculoskeletal:         General: Normal range of motion. Cervical back: Normal range of motion and neck supple. Right lower leg: No edema. Left lower leg: No edema. Lymphadenopathy:      Cervical: No cervical adenopathy. Skin:     General: Skin is warm and dry. Findings: No rash. Neurological:      Mental Status: She is alert and oriented to person, place, and time. Psychiatric:         Behavior: Behavior normal.         Thought Content:  Thought content normal.         Judgment: Judgment normal.     /70 (Site: Right Lower Arm, Position: Sitting)   Pulse 78   Ht 5' 3\" (1.6 m)   LMP  (LMP Unknown)   SpO2 95%   BMI 45.28 kg/m²     Data:     Lab Results   Component Value Date/Time     03/01/2023 01:12 PM    K 4.1 03/01/2023 01:12 PM     03/01/2023 01:12 PM    CO2 27 03/01/2023 01:12 PM    BUN 19 03/01/2023 01:12 PM    CREATININE 1.12 03/01/2023 01:12 PM    GLUCOSE 98 03/01/2023 01:12 PM    GLUCOSE 102 04/27/2012 09:19 AM    PROT 7.8 03/01/2023 01:12 PM    LABALBU 4.2 03/01/2023 01:12 PM    LABALBU 4.4 04/10/2012 08:02 AM    BILITOT 0.3 03/01/2023 01:12 PM    ALKPHOS 117 03/01/2023 01:12 PM    AST 32 03/01/2023 01:12 PM    ALT 33 03/01/2023 01:12 PM     Lab Results   Component Value Date/Time    WBC 11.5 03/09/2023 04:38 AM    RBC 4.48 03/09/2023 04:38 AM    RBC 4.72 04/10/2012 08:02 AM    HGB 11.8 03/09/2023 04:38 AM    HCT 36.6 03/09/2023 04:38 AM    MCV 81.8 03/09/2023 04:38 AM    MCH 26.4 03/09/2023 04:38 AM    MCHC 32.2

## 2023-05-22 NOTE — PATIENT INSTRUCTIONS
Phone: [phone]  Fax: [phone] [facility] Office Hours:  Monday: Amarilys Thorne office location 8-5 ([phone]) Offering additional late hours the first Monday of the month until 7 pm.   Tuesday: 8-5 Wednesday: 8-5 Thursday:  Additional hours offered 2 Thursdays a month. Please call to inquire those dates. Fridays: 7:30-4:30   SURVEY:    You may be receiving a survey from [employer] regarding your visit today. Please complete the survey to enable us to provide the highest quality of care to you and your family. If you cannot score us a very good on any question, please call the office to discuss how we could have made your experience a very good one. Thank you.

## 2023-05-23 LAB
MICROORGANISM SPEC CULT: NORMAL
SPECIMEN DESCRIPTION: NORMAL

## 2023-05-26 ENCOUNTER — HOSPITAL ENCOUNTER (OUTPATIENT)
Dept: PHARMACY | Age: 78
Setting detail: THERAPIES SERIES
Discharge: HOME | End: 2023-05-26
Payer: MEDICARE

## 2023-05-26 LAB — INR BLD: 3.3

## 2023-05-26 PROCEDURE — 99211 OFF/OP EST MAY X REQ PHY/QHP: CPT

## 2023-05-26 PROCEDURE — 85610 PROTHROMBIN TIME: CPT

## 2023-05-26 ASSESSMENT — ENCOUNTER SYMPTOMS
ABDOMINAL PAIN: 0
SORE THROAT: 0
NAUSEA: 1
CHEST TIGHTNESS: 0
ABDOMINAL DISTENTION: 0
CONSTIPATION: 0
EYES NEGATIVE: 1
SHORTNESS OF BREATH: 0
WHEEZING: 0
BACK PAIN: 1
VOMITING: 0
COUGH: 0

## 2023-05-26 NOTE — PROGRESS NOTES
[address]  Medication Management  ANTICOAGULATION    Referring Provider: Dr Moreen Schwab INR: 2.0-3.0     TODAY'S INR: 3.3     WARFARIN Dosage: HOLD x 1, then resume 2.5 mg Tu/Sa, 5 mg all other days of the week    INR (no units)   Date Value   2023 3.3   2023 2.6   2023 3.8   03/15/2023 2.1   02/15/2023 2.2   2023 1.7   2022 1.8       Hemoglobin   Date Value Ref Range Status   2023 11.8 (L) 12.0 - 16.0 g/dL Final     Hematocrit   Date Value Ref Range Status   2023 36.6 36 - 46 % Final     ALT   Date Value Ref Range Status   2023 33 5 - 33 U/L Final     AST   Date Value Ref Range Status   2023 32 (H) <32 U/L Final       Medication changes:  Was started on ondansetron (which she is no longer taking) and Protonix 20 mg QAM     Notes:    Fingerstick INR drawn per clinic protocol. Patient states no visible blood in urine, no black tarry stool, no falls. Denies any missed or extra doses of warfarin. Was started on ondansetron (which she is no longer taking) and Protonix 20 mg QAM. No change in other maintenance medications or in diet. INR is slightly supratherapeutic today so will hold today's dose and then resume 2.5 mg Tu/Sa, 5 mg all other days and will recheck INR in 4 weeks. Patient acknowledges working in consult agreement with pharmacist as referred by his/her physician.                   For Pharmacy Admin Tracking Only    Intervention Detail: Adherence Monitorin and Dose Adjustment: 1, reason: Therapy Optimization  Total # of Interventions Recommended: 2  Total # of Interventions Accepted: 2  Time Spent (min): Jennifer Ceballos PharmD 2023 12:29 PM

## 2023-06-05 ENCOUNTER — TELEPHONE (OUTPATIENT)
Dept: FAMILY MEDICINE CLINIC | Age: 78
End: 2023-06-05

## 2023-06-05 RX ORDER — DULOXETIN HYDROCHLORIDE 30 MG/1
30 CAPSULE, DELAYED RELEASE ORAL DAILY
Qty: 90 CAPSULE | Refills: 1 | Status: SHIPPED | OUTPATIENT
Start: 2023-06-05

## 2023-06-05 NOTE — TELEPHONE ENCOUNTER
6/23/2023 10:20 AM HOLLIE MEDICATION [facility] Efrain Sandra   8/28/2023  1:00 PM TYLOR [place] - JUSTIN Hair MED [facility]   10/31/2023  1:30 PM MD Graeme Orellana [facility]            Patient Active Problem List:     Kidney stones     Localized osteoarthritis of left knee     Atrial fibrillation (HCC)     Pulmonary HTN (HCC)     Knee pain, chronic     Iron deficiency anemia due to chronic blood loss     Mitral valve insufficiency     Backache     CHF (congestive heart failure) ([facility])     CKD (chronic kidney disease)     Echocardiogram abnormal     Gout     HL (hearing loss)     Hyperlipidemia     Morbid obesity with BMI of 45.0-49.9, adult ([facility])     DJD (degenerative joint disease)     Chronic renal disease, stage III ([facility]) [826448]     Chronic systolic (congestive) heart failure     Lumbar radiculopathy     Foraminal stenosis of lumbar region     Chronic bilateral low back pain with bilateral sciatica     SSS (sick sinus syndrome) ([facility]

## 2023-06-16 DIAGNOSIS — R11.0 NAUSEA: ICD-10-CM

## 2023-06-19 RX ORDER — PANTOPRAZOLE SODIUM 20 MG/1
TABLET, DELAYED RELEASE ORAL
Qty: 30 TABLET | Refills: 0 | Status: SHIPPED | OUTPATIENT
Start: 2023-06-19

## 2023-07-17 DIAGNOSIS — R11.0 NAUSEA: ICD-10-CM

## 2023-07-17 RX ORDER — PANTOPRAZOLE SODIUM 20 MG/1
TABLET, DELAYED RELEASE ORAL
Qty: 30 TABLET | Refills: 1 | Status: SHIPPED | OUTPATIENT
Start: 2023-07-17 | End: 2023-07-21 | Stop reason: ALTCHOICE

## 2023-07-17 NOTE — TELEPHONE ENCOUNTER
Last OV: 5/22/2023  Last RX: 6/19/2023   Next scheduled apt: 8/28/2023      Surescripts requesting refill

## 2023-07-18 ENCOUNTER — TELEPHONE (OUTPATIENT)
Dept: CARDIOLOGY CLINIC | Age: 78
End: 2023-07-18

## 2023-07-18 RX ORDER — DILTIAZEM HYDROCHLORIDE 60 MG/1
60 TABLET, FILM COATED ORAL 3 TIMES DAILY
COMMUNITY

## 2023-07-18 NOTE — TELEPHONE ENCOUNTER
[facility] called to state that she started taking Diltiazem 60 mg TID instead of Diltiazem 240 mg once daily because she felt it was making her nauseated and queasy. She stated that she had some left from a prior procedure. She also wanted to move her appointment sooner than October, however, she isn't having any cardiac issues at this time, so I left appointment as is in October.     Thank you

## 2023-07-21 ENCOUNTER — HOSPITAL ENCOUNTER (OUTPATIENT)
Dept: PHARMACY | Age: 78
Setting detail: THERAPIES SERIES
Discharge: HOME | End: 2023-07-21
Payer: MEDICARE

## 2023-07-21 LAB — INR BLD: 1.9

## 2023-07-21 PROCEDURE — 99211 OFF/OP EST MAY X REQ PHY/QHP: CPT

## 2023-07-21 PROCEDURE — 85610 PROTHROMBIN TIME: CPT

## 2023-07-21 NOTE — PROGRESS NOTES
[facility]  Medication Management  ANTICOAGULATION    Referring Provider: Dr Mynor Cordova INR: 2.0-3.0     TODAY'S INR: 1.9     WARFARIN Dosage: Continue 2.5 mg Tu/Sa, 5 mg all other days of the week    INR (no units)   Date Value   2023 1.9   2023 3.3   2023 2.6   2023 3.8   03/15/2023 2.1   02/15/2023 2.2   2023 1.7       Hemoglobin   Date Value Ref Range Status   2023 11.8 (L) 12.0 - 16.0 g/dL Final     Hematocrit   Date Value Ref Range Status   2023 36.6 36 - 46 % Final     ALT   Date Value Ref Range Status   2023 33 5 - 33 U/L Final     AST   Date Value Ref Range Status   2023 32 (H) <32 U/L Final       Medication changes:  Has had 2 rounds of Prednisone since her last appt  Stopped pantoprazole as she didn't feel it was working  Diltiazem changed to 60 mg TID    Notes:    Fingerstick INR drawn per clinic protocol. Patient states no visible blood in urine, no black tarry stool, no falls. Denies any missed or extra doses of warfarin. States that she has had 2 rounds of Prednisone since her last appt. Also stopped pantoprazole as she didn't feel it was working and informs that her Diltiazem was changed to 60 mg TID. No other changes in medications or in diet. INR is slightly subtherapeutic but will continue current dosing of 2.5 mg Tu/Sa, 5 mg all other days of the week. Will recheck INR in 6 weeks. Patient acknowledges working in consult agreement with pharmacist as referred by his/her physician.      For Pharmacy Admin Tracking Only    Intervention Detail: Adherence Monitorin  Total # of Interventions Recommended: 1  Total # of Interventions Accepted: 1  Time Spent (min): 30    Bossman Rosas PharmD 2023 4:26 PM

## 2023-07-26 RX ORDER — ALLOPURINOL 300 MG/1
TABLET ORAL
Qty: 45 TABLET | Refills: 0 | Status: SHIPPED | OUTPATIENT
Start: 2023-07-26

## 2023-08-14 RX ORDER — WARFARIN SODIUM 10 MG/1
TABLET ORAL
Qty: 90 TABLET | Refills: 0 | Status: SHIPPED | OUTPATIENT
Start: 2023-08-14

## 2023-08-14 RX ORDER — DILTIAZEM HYDROCHLORIDE 60 MG/1
60 TABLET, FILM COATED ORAL 3 TIMES DAILY
Qty: 120 TABLET | Refills: 3 | Status: SHIPPED | OUTPATIENT
Start: 2023-08-14

## 2023-08-14 NOTE — TELEPHONE ENCOUNTER
Pending medication    Health Maintenance   Topic Date Due    Shingles vaccine (2 of 3) 09/01/2014    COVID-19 Vaccine (4 - Booster for Moderna series) 03/12/2022    Flu vaccine (1) 08/01/2023    Depression Monitoring  02/27/2024    Annual Wellness Visit (AWV)  02/28/2024    DTaP/Tdap/Td vaccine (2 - Td or Tdap) 12/29/2028    DEXA (modify frequency per FRAX score)  Completed    Pneumococcal 65+ years Vaccine  Completed    Hepatitis A vaccine  Aged Out    Hib vaccine  Aged Out    Meningococcal (ACWY) vaccine  Aged Out    Depression Screen  Discontinued             (applicable per patient's age: Cancer Screenings, Depression Screening, Fall Risk Screening, Immunizations)    Hemoglobin A1C (%)   Date Value   01/11/2013 6.4 (H)     LDL Cholesterol (mg/dL)   Date Value   08/16/2022 114     AST (U/L)   Date Value   03/01/2023 32 (H)     ALT (U/L)   Date Value   03/01/2023 33     BUN (mg/dL)   Date Value   03/01/2023 19      (goal A1C is < 7)   (goal LDL is <100) need 30-50% reduction from baseline     BP Readings from Last 3 Encounters:   05/22/23 128/70   04/28/23 124/66   04/13/23 127/61    (goal /80)      All Future Testing planned in [facility]:  Lab Frequency Next Occurrence   CBC with Auto Differential Once 11/01/2023   Comprehensive Metabolic Panel Once 28/49/0188   Lipid Panel Once 11/01/2023   TSH with Reflex Once 11/01/2023   Magnesium Once 11/01/2023   EKG 12 Lead Once 11/01/2023   XR CHEST (2 VW) Once 11/01/2023   ECHO Complete 2D W Doppler W Color Once 11/01/2022   Magnesium Once 02/27/2023   CBC with Auto Differential Once 64/37/9202   Basic Metabolic Panel Once 09/60/6611   Digoxin Level Once 03/16/2023   TSH with Reflex Once 06/15/2023   CBC with Auto Differential Once 06/15/2023   Comprehensive Metabolic Panel Once 74/59/8780   Lipid Panel Once 06/15/2023   Magnesium Once 06/15/2023       Next Visit Date:  Future Appointments   Date Time Provider [address]   8/28/2023  1:00 PM Karina Oneill,

## 2023-08-28 ENCOUNTER — OFFICE VISIT (OUTPATIENT)
Dept: FAMILY MEDICINE CLINIC | Age: 78
End: 2023-08-28

## 2023-08-28 ENCOUNTER — HOSPITAL ENCOUNTER (OUTPATIENT)
Age: 78
Discharge: HOME | End: 2023-08-30
Payer: MEDICARE

## 2023-08-28 ENCOUNTER — HOSPITAL ENCOUNTER (OUTPATIENT)
Dept: GENERAL RADIOLOGY | Age: 78
Discharge: HOME | End: 2023-08-30
Payer: MEDICARE

## 2023-08-28 VITALS
BODY MASS INDEX: 41.46 KG/M2 | WEIGHT: 234 LBS | HEIGHT: 63 IN | OXYGEN SATURATION: 96 % | HEART RATE: 87 BPM | DIASTOLIC BLOOD PRESSURE: 76 MMHG | SYSTOLIC BLOOD PRESSURE: 130 MMHG

## 2023-08-28 DIAGNOSIS — M19.011 PRIMARY OSTEOARTHRITIS OF RIGHT SHOULDER: ICD-10-CM

## 2023-08-28 DIAGNOSIS — M25.511 CHRONIC RIGHT SHOULDER PAIN: Primary | ICD-10-CM

## 2023-08-28 DIAGNOSIS — F33.1 MAJOR DEPRESSIVE DISORDER, RECURRENT, MODERATE (HCC): ICD-10-CM

## 2023-08-28 DIAGNOSIS — G89.29 CHRONIC RIGHT SHOULDER PAIN: Primary | ICD-10-CM

## 2023-08-28 DIAGNOSIS — M25.511 CHRONIC RIGHT SHOULDER PAIN: ICD-10-CM

## 2023-08-28 DIAGNOSIS — G89.29 CHRONIC BILATERAL LOW BACK PAIN WITH BILATERAL SCIATICA: ICD-10-CM

## 2023-08-28 DIAGNOSIS — M54.42 CHRONIC BILATERAL LOW BACK PAIN WITH BILATERAL SCIATICA: ICD-10-CM

## 2023-08-28 DIAGNOSIS — M54.41 CHRONIC BILATERAL LOW BACK PAIN WITH BILATERAL SCIATICA: ICD-10-CM

## 2023-08-28 DIAGNOSIS — L29.9 ITCHING: ICD-10-CM

## 2023-08-28 DIAGNOSIS — I51.7 BIATRIAL ENLARGEMENT: ICD-10-CM

## 2023-08-28 DIAGNOSIS — G89.29 CHRONIC RIGHT SHOULDER PAIN: ICD-10-CM

## 2023-08-28 PROCEDURE — 73030 X-RAY EXAM OF SHOULDER: CPT

## 2023-08-28 RX ORDER — FAMOTIDINE 20 MG/1
20 TABLET, FILM COATED ORAL DAILY
Qty: 30 TABLET | Refills: 0 | Status: SHIPPED | OUTPATIENT
Start: 2023-08-28

## 2023-08-28 NOTE — PROGRESS NOTES
membrane and external ear normal.      Left Ear: Tympanic membrane and external ear normal.      Nose: No congestion. Mouth/Throat:      Mouth: Mucous membranes are moist.      Pharynx: No oropharyngeal exudate or posterior oropharyngeal erythema. Eyes:      General: No scleral icterus. Pupils: Pupils are equal, round, and reactive to light. Neck:      Vascular: No carotid bruit (neg francisco). Cardiovascular:      Rate and Rhythm: Normal rate and regular rhythm. Heart sounds: Normal heart sounds. No murmur heard. Pulmonary:      Effort: Pulmonary effort is normal. No respiratory distress. Breath sounds: Normal breath sounds. No wheezing. Abdominal:      Palpations: Abdomen is soft. Tenderness: There is no abdominal tenderness. Musculoskeletal:         General: Tenderness (right shoulder with anterior jointline tenderness and also positive impingement signs, antoine positive, apley positive. crepitus with movement) present. Normal range of motion. Cervical back: Normal range of motion and neck supple. Right lower leg: No edema. Left lower leg: No edema. Lymphadenopathy:      Cervical: No cervical adenopathy. Skin:     General: Skin is warm and dry. Findings: No rash. Neurological:      Mental Status: She is alert and oriented to person, place, and time. Psychiatric:         Behavior: Behavior normal.         Thought Content:  Thought content normal.         Judgment: Judgment normal.         Diagnostic Data:  Lab Results   Component Value Date    GLUCOSE 319 (H) 08/31/2023    LABA1C 12.6 (H) 08/30/2023     Lab Results   Component Value Date    BUN 11 08/31/2023    CREATININE 0.8 08/31/2023     08/31/2023    K 3.4 (L) 08/31/2023    CALCIUM 8.8 08/31/2023     08/31/2023    CO2 25 08/31/2023    LABGLOM >60 08/31/2023     Lab Results   Component Value Date    CHOL 205 (H) 08/16/2022    HDL 51 08/16/2022    LDLCHOLESTEROL 114 08/16/2022    TRIG 198 (H)

## 2023-08-28 NOTE — PATIENT INSTRUCTIONS
Phone: [phone]  [facility]: [facility] Office Hours:  Monday: Heaven Blanco office location 8-5 ([phone]) Offering additional late hours the first Monday of the month until 7 pm.   Tuesday: 8-5 Wednesday: 8-5 Thursday:  Additional hours offered 2 Thursdays a month. Please call to inquire those dates. Fridays: 7:30-4:30   SURVEY:    You may be receiving a survey from [employer] regarding your visit today. Please complete the survey to enable us to provide the highest quality of care to you and your family. If you cannot score us a very good on any question, please call the office to discuss how we could have made your experience a very good one. Thank you.

## 2023-08-30 ENCOUNTER — HOSPITAL ENCOUNTER (OUTPATIENT)
Age: 78
Setting detail: OBSERVATION
Discharge: HOME | End: 2023-08-31
Attending: EMERGENCY MEDICINE | Admitting: INTERNAL MEDICINE
Payer: MEDICARE

## 2023-08-30 ENCOUNTER — APPOINTMENT (OUTPATIENT)
Dept: CT IMAGING | Age: 78
End: 2023-08-30
Payer: MEDICARE

## 2023-08-30 DIAGNOSIS — R73.9 HYPERGLYCEMIA: ICD-10-CM

## 2023-08-30 DIAGNOSIS — E86.0 DEHYDRATION: ICD-10-CM

## 2023-08-30 DIAGNOSIS — S09.90XA INJURY OF HEAD, INITIAL ENCOUNTER: ICD-10-CM
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
## 2023-08-30 LAB
ALBUMIN SERPL-MCNC: 3.8 G/DL (ref 3.5–5.2)
ALP SERPL-CCNC: 121 U/L (ref 35–104)
ALT SERPL-CCNC: 51 U/L (ref 5–33)
ANION GAP SERPL CALCULATED.3IONS-SCNC: 12 MMOL/L (ref 8–16)
AST SERPL-CCNC: 53 U/L
ATYPICAL LYMPHOCYTE ABSOLUTE COUNT: 0.07 K/UL (ref 0–1)
ATYPICAL LYMPHOCYTES: 1 %
BASOPHILS # BLD: ABNORMAL K/UL (ref 0–0.2)
BASOPHILS NFR BLD: ABNORMAL % (ref 0–2)
BILIRUB SERPL-MCNC: 0.4 MG/DL (ref 0.3–1.2)
BILIRUB UR QL STRIP: NEGATIVE
BUN SERPL-MCNC: 12 MG/DL (ref 8–23)
CALCIUM SERPL-MCNC: 9.2 MG/DL (ref 8.6–10.4)
CHLORIDE SERPL-SCNC: 99 MMOL/L (ref 98–107)
CLARITY UR: CLEAR
CO2 SERPL-SCNC: 22 MMOL/L (ref 20–31)
COLOR UR: YELLOW
COMMENT: ABNORMAL
CREAT SERPL-MCNC: 0.9 MG/DL (ref 0.5–0.9)
EOSINOPHIL # BLD: 0.14 K/UL (ref 0–0.4)
EOSINOPHILS RELATIVE PERCENT: 2 % (ref 0–5)
ERYTHROCYTE [DISTWIDTH] IN BLOOD BY AUTOMATED COUNT: 16.8 % (ref 12.1–15.2)
GFR SERPL CREATININE-BSD FRML MDRD: >60 ML/MIN/1.73M2
GLUCOSE BLD-MCNC: 282 MG/DL (ref 65–99)
GLUCOSE BLD-MCNC: 306 MG/DL (ref 65–99)
GLUCOSE BLD-MCNC: 388 MG/DL (ref 65–99)
GLUCOSE SERPL-MCNC: 380 MG/DL (ref 70–99)
GLUCOSE UR STRIP-MCNC: ABNORMAL MG/DL
HCT VFR BLD AUTO: 43 % (ref 36–46)
HGB BLD-MCNC: 14.3 G/DL (ref 12–16)
HGB UR QL STRIP.AUTO: NEGATIVE
IMM GRANULOCYTES # BLD AUTO: ABNORMAL K/UL (ref 0–0.3)
IMM GRANULOCYTES NFR BLD: ABNORMAL %
INR PPP: 1.8
KETONES UR STRIP-MCNC: NEGATIVE MG/DL
LEUKOCYTE ESTERASE UR QL STRIP: NEGATIVE
LYMPHOCYTES NFR BLD: 0.77 K/UL (ref 1–4.8)
LYMPHOCYTES RELATIVE PERCENT: 11 % (ref 15–40)
MCH RBC QN AUTO: 28.6 PG (ref 26–34)
MCHC RBC AUTO-ENTMCNC: 33.3 G/DL (ref 31–37)
MCV RBC AUTO: 85.9 FL (ref 80–100)
MONOCYTES NFR BLD: 0.63 K/UL (ref 0–1)
MONOCYTES NFR BLD: 9 % (ref 4–8)
MORPHOLOGY: ABNORMAL
MORPHOLOGY: ABNORMAL
NEUTROPHILS NFR BLD: 77 % (ref 47–75)
NEUTS SEG NFR BLD: 5.39 K/UL (ref 2.5–7)
NITRITE UR QL STRIP: NEGATIVE
PH UR STRIP: 5 [PH] (ref 5–8)
PLATELET # BLD AUTO: 219 K/UL (ref 140–450)
POTASSIUM SERPL-SCNC: 4.1 MMOL/L (ref 3.7–5.3)
PROT SERPL-MCNC: 7.6 G/DL (ref 6.4–8.3)
PROT UR STRIP-MCNC: NEGATIVE MG/DL
PROTHROMBIN TIME: 21.8 SEC (ref 11.5–14.2)
RBC # BLD AUTO: 5.01 M/UL (ref 4–5.2)
SODIUM SERPL-SCNC: 133 MMOL/L (ref 135–144)
SP GR UR STRIP: 1.01 (ref 1–1.03)
TROPONIN I SERPL HS-MCNC: 20 NG/L (ref 0–14)
UROBILINOGEN UR STRIP-ACNC: NORMAL EU/DL (ref 0–1)
WBC OTHER # BLD: 7 K/UL (ref 3.5–11)

## 2023-08-30 PROCEDURE — 80053 COMPREHEN METABOLIC PANEL: CPT

## 2023-08-30 PROCEDURE — 2580000003 HC RX 258: Performed by: INTERNAL MEDICINE

## 2023-08-30 PROCEDURE — 70450 CT HEAD/BRAIN W/O DYE: CPT

## 2023-08-30 PROCEDURE — 6360000002 HC RX W HCPCS: Performed by: INTERNAL MEDICINE

## 2023-08-30 PROCEDURE — 84484 ASSAY OF TROPONIN QUANT: CPT

## 2023-08-30 PROCEDURE — 96374 THER/PROPH/DIAG INJ IV PUSH: CPT

## 2023-08-30 PROCEDURE — 36415 COLL VENOUS BLD VENIPUNCTURE: CPT

## 2023-08-30 PROCEDURE — 96361 HYDRATE IV INFUSION ADD-ON: CPT

## 2023-08-30 PROCEDURE — 94761 N-INVAS EAR/PLS OXIMETRY MLT: CPT

## 2023-08-30 PROCEDURE — G0378 HOSPITAL OBSERVATION PER HR: HCPCS

## 2023-08-30 PROCEDURE — 93005 ELECTROCARDIOGRAM TRACING: CPT | Performed by: EMERGENCY MEDICINE

## 2023-08-30 PROCEDURE — 96376 TX/PRO/DX INJ SAME DRUG ADON: CPT

## 2023-08-30 PROCEDURE — 2580000003 HC RX 258: Performed by: EMERGENCY MEDICINE

## 2023-08-30 PROCEDURE — 82947 ASSAY GLUCOSE BLOOD QUANT: CPT

## 2023-08-30 PROCEDURE — 6370000000 HC RX 637 (ALT 250 FOR IP): Performed by: INTERNAL MEDICINE

## 2023-08-30 PROCEDURE — 6360000002 HC RX W HCPCS: Performed by: EMERGENCY MEDICINE

## 2023-08-30 PROCEDURE — 85610 PROTHROMBIN TIME: CPT

## 2023-08-30 PROCEDURE — 81003 URINALYSIS AUTO W/O SCOPE: CPT

## 2023-08-30 PROCEDURE — 83036 HEMOGLOBIN GLYCOSYLATED A1C: CPT

## 2023-08-30 PROCEDURE — 99285 EMERGENCY DEPT VISIT HI MDM: CPT

## 2023-08-30 PROCEDURE — 85025 COMPLETE CBC W/AUTO DIFF WBC: CPT

## 2023-08-30 RX ORDER — WARFARIN SODIUM 2.5 MG/1
2.5 TABLET ORAL
Status: DISCONTINUED | OUTPATIENT
Start: 2023-09-02 | End: 2023-08-31

## 2023-08-30 RX ORDER — ONDANSETRON 2 MG/ML
4 INJECTION INTRAMUSCULAR; INTRAVENOUS EVERY 6 HOURS PRN
Status: DISCONTINUED | OUTPATIENT
Start: 2023-08-30 | End: 2023-08-31 | Stop reason: HOSPADM

## 2023-08-30 RX ORDER — INSULIN LISPRO 100 [IU]/ML
0-4 INJECTION, SOLUTION INTRAVENOUS; SUBCUTANEOUS
Status: DISCONTINUED | OUTPATIENT
Start: 2023-08-30 | End: 2023-08-31 | Stop reason: HOSPADM

## 2023-08-30 RX ORDER — ONDANSETRON 2 MG/ML
4 INJECTION INTRAMUSCULAR; INTRAVENOUS ONCE
Status: COMPLETED | OUTPATIENT
Start: 2023-08-30 | End: 2023-08-30

## 2023-08-30 RX ORDER — GLUCAGON 1 MG/ML
1 KIT INJECTION PRN
Status: DISCONTINUED | OUTPATIENT
Start: 2023-08-30 | End: 2023-08-31 | Stop reason: HOSPADM

## 2023-08-30 RX ORDER — ALLOPURINOL 100 MG/1
100 TABLET ORAL DAILY
Status: DISCONTINUED | OUTPATIENT
Start: 2023-08-30 | End: 2023-08-31 | Stop reason: HOSPADM

## 2023-08-30 RX ORDER — POLYETHYLENE GLYCOL 3350 17 G/17G
17 POWDER, FOR SOLUTION ORAL DAILY PRN
Status: DISCONTINUED | OUTPATIENT
Start: 2023-08-30 | End: 2023-08-31 | Stop reason: HOSPADM

## 2023-08-30 RX ORDER — 0.9 % SODIUM CHLORIDE 0.9 %
1000 INTRAVENOUS SOLUTION INTRAVENOUS ONCE
Status: COMPLETED | OUTPATIENT
Start: 2023-08-30 | End: 2023-08-30

## 2023-08-30 RX ORDER — DIPHENHYDRAMINE HCL 25 MG
25 TABLET ORAL EVERY 6 HOURS PRN
Status: DISCONTINUED | OUTPATIENT
Start: 2023-08-30 | End: 2023-08-31 | Stop reason: HOSPADM

## 2023-08-30 RX ORDER — ONDANSETRON 4 MG/1
4 TABLET, ORALLY DISINTEGRATING ORAL EVERY 8 HOURS PRN
Status: DISCONTINUED | OUTPATIENT
Start: 2023-08-30 | End: 2023-08-31 | Stop reason: HOSPADM

## 2023-08-30 RX ORDER — FAMOTIDINE 20 MG/1
20 TABLET, FILM COATED ORAL DAILY
Status: DISCONTINUED | OUTPATIENT
Start: 2023-08-31 | End: 2023-08-31 | Stop reason: HOSPADM

## 2023-08-30 RX ORDER — WARFARIN SODIUM 5 MG/1
5 TABLET ORAL
Status: DISCONTINUED | OUTPATIENT
Start: 2023-08-30 | End: 2023-08-31

## 2023-08-30 RX ORDER — SODIUM CHLORIDE 9 MG/ML
INJECTION, SOLUTION INTRAVENOUS CONTINUOUS
Status: DISCONTINUED | OUTPATIENT
Start: 2023-08-30 | End: 2023-08-31 | Stop reason: HOSPADM

## 2023-08-30 RX ORDER — DILTIAZEM HYDROCHLORIDE 60 MG/1
60 TABLET, FILM COATED ORAL 3 TIMES DAILY
Status: DISCONTINUED | OUTPATIENT
Start: 2023-08-30 | End: 2023-08-31 | Stop reason: HOSPADM

## 2023-08-30 RX ORDER — VITAMIN B COMPLEX
2000 TABLET ORAL 2 TIMES DAILY
Status: DISCONTINUED | OUTPATIENT
Start: 2023-08-30 | End: 2023-08-31 | Stop reason: HOSPADM

## 2023-08-30 RX ORDER — INSULIN LISPRO 100 [IU]/ML
0-4 INJECTION, SOLUTION INTRAVENOUS; SUBCUTANEOUS NIGHTLY
Status: DISCONTINUED | OUTPATIENT
Start: 2023-08-30 | End: 2023-08-31 | Stop reason: HOSPADM

## 2023-08-30 RX ORDER — DULOXETIN HYDROCHLORIDE 30 MG/1
30 CAPSULE, DELAYED RELEASE ORAL DAILY
Status: DISCONTINUED | OUTPATIENT
Start: 2023-08-30 | End: 2023-08-31 | Stop reason: HOSPADM

## 2023-08-30 RX ORDER — SPIRONOLACTONE 25 MG/1
25 TABLET ORAL 2 TIMES DAILY
Status: DISCONTINUED | OUTPATIENT
Start: 2023-08-30 | End: 2023-08-31 | Stop reason: HOSPADM

## 2023-08-30 RX ORDER — SODIUM CHLORIDE 9 MG/ML
INJECTION, SOLUTION INTRAVENOUS PRN
Status: DISCONTINUED | OUTPATIENT
Start: 2023-08-30 | End: 2023-08-31 | Stop reason: HOSPADM

## 2023-08-30 RX ORDER — ATENOLOL 50 MG/1
50 TABLET ORAL DAILY
Status: DISCONTINUED | OUTPATIENT
Start: 2023-08-30 | End: 2023-08-31 | Stop reason: HOSPADM

## 2023-08-30 RX ORDER — SODIUM CHLORIDE 0.9 % (FLUSH) 0.9 %
5-40 SYRINGE (ML) INJECTION EVERY 12 HOURS SCHEDULED
Status: DISCONTINUED | OUTPATIENT
Start: 2023-08-30 | End: 2023-08-31 | Stop reason: HOSPADM

## 2023-08-30 RX ORDER — SODIUM CHLORIDE 0.9 % (FLUSH) 0.9 %
5-40 SYRINGE (ML) INJECTION PRN
Status: DISCONTINUED | OUTPATIENT
Start: 2023-08-30 | End: 2023-08-31 | Stop reason: HOSPADM

## 2023-08-30 RX ORDER — DEXTROSE MONOHYDRATE 100 MG/ML
INJECTION, SOLUTION INTRAVENOUS CONTINUOUS PRN
Status: DISCONTINUED | OUTPATIENT
Start: 2023-08-30 | End: 2023-08-31 | Stop reason: HOSPADM

## 2023-08-30 RX ORDER — ACETAMINOPHEN 325 MG/1
650 TABLET ORAL EVERY 6 HOURS PRN
Status: DISCONTINUED | OUTPATIENT
Start: 2023-08-30 | End: 2023-08-31 | Stop reason: HOSPADM

## 2023-08-30 RX ORDER — DIGOXIN 125 MCG
125 TABLET ORAL DAILY
Status: DISCONTINUED | OUTPATIENT
Start: 2023-08-30 | End: 2023-08-31 | Stop reason: HOSPADM

## 2023-08-30 RX ADMIN — SODIUM CHLORIDE: 9 INJECTION, SOLUTION INTRAVENOUS at 17:00

## 2023-08-30 RX ADMIN — ONDANSETRON 4 MG: 2 INJECTION INTRAMUSCULAR; INTRAVENOUS at 20:48

## 2023-08-30 RX ADMIN — INSULIN LISPRO 2 UNITS: 100 INJECTION, SOLUTION INTRAVENOUS; SUBCUTANEOUS at 16:48

## 2023-08-30 RX ADMIN — INSULIN LISPRO 4 UNITS: 100 INJECTION, SOLUTION INTRAVENOUS; SUBCUTANEOUS at 20:30

## 2023-08-30 RX ADMIN — DILTIAZEM HYDROCHLORIDE 60 MG: 60 TABLET, FILM COATED ORAL at 20:30

## 2023-08-30 RX ADMIN — SPIRONOLACTONE 25 MG: 25 TABLET, FILM COATED ORAL at 16:49

## 2023-08-30 RX ADMIN — SODIUM CHLORIDE 1000 ML: 9 INJECTION, SOLUTION INTRAVENOUS at 13:54

## 2023-08-30 RX ADMIN — ONDANSETRON 4 MG: 2 INJECTION INTRAMUSCULAR; INTRAVENOUS at 14:13

## 2023-08-30 RX ADMIN — ACETAMINOPHEN 650 MG: 325 TABLET, FILM COATED ORAL at 23:41

## 2023-08-30 RX ADMIN — WARFARIN SODIUM 5 MG: 5 TABLET ORAL at 17:02

## 2023-08-30 RX ADMIN — METFORMIN HYDROCHLORIDE 500 MG: 500 TABLET ORAL at 16:53

## 2023-08-30 RX ADMIN — SODIUM CHLORIDE, PRESERVATIVE FREE 10 ML: 5 INJECTION INTRAVENOUS at 20:49

## 2023-08-30 RX ADMIN — ACETAMINOPHEN 650 MG: 325 TABLET, FILM COATED ORAL at 16:49

## 2023-08-30 RX ADMIN — CHOLECALCIFEROL TAB 25 MCG (1000 UNIT) 2000 UNITS: 25 TAB at 20:30

## 2023-08-30 RX ADMIN — DIPHENHYDRAMINE HYDROCHLORIDE 25 MG: 25 TABLET ORAL at 21:57

## 2023-08-30 ASSESSMENT — PAIN SCALES - GENERAL
PAINLEVEL_OUTOF10: 3
PAINLEVEL_OUTOF10: 3
PAINLEVEL_OUTOF10: 5
PAINLEVEL_OUTOF10: 0

## 2023-08-30 ASSESSMENT — PAIN DESCRIPTION - ORIENTATION
ORIENTATION: RIGHT
ORIENTATION: RIGHT;LEFT

## 2023-08-30 ASSESSMENT — PAIN - FUNCTIONAL ASSESSMENT
PAIN_FUNCTIONAL_ASSESSMENT: NONE - DENIES PAIN
PAIN_FUNCTIONAL_ASSESSMENT: ACTIVITIES ARE NOT PREVENTED

## 2023-08-30 ASSESSMENT — PAIN DESCRIPTION - DESCRIPTORS
DESCRIPTORS: TIGHTNESS
DESCRIPTORS: ACHING

## 2023-08-30 ASSESSMENT — PAIN DESCRIPTION - LOCATION
LOCATION: NECK
LOCATION: ARM

## 2023-08-30 NOTE — ED PROVIDER NOTES
EMERGENCY DEPARTMENT ENCOUNTER      CHIEF COMPLAINT    Chief Complaint   Patient presents with    Fall     Pt here via EMS after fall at home. States she was sitting on a stool cooking her lunch and became dizzy and fell backwards hitting her head. Unsure of what she hit her head on. Pt is on Coumadin. States she has been getting dizzy frequently       HPI    Chema Garcia is a 78 y.o. female who presentsto ED brought to ED via EMS. Patient fell backwards and hit her head at home. Patient is on Coumadin patient has history of atrial fibrillation pacemaker. Patient denies chest pain denies shortness. Patient's fingerstick blood sugar was over 500 at home. PAST MEDICAL HISTORY    Past Medical History:   Diagnosis Date    Atrial fibrillation ([address])     CHF (congestive heart failure) ([address])     Diverticulosis 07/27/2021    per Colonoscopy per Dr. Melody Peoples    Gout     Hearing loss 2015    bilateral hearing aids     History of colon polyps 2016    Hypertension     Kidney stones     Mitral valve insufficiency     Obesity, morbid, BMI 40.0-49.9 ([address])     Osteoarthritis     Primary osteoarthritis of both knees     Dr. Martín Guerrero    Pulmonary HTN [facility])        SURGICAL HISTORY    Past Surgical History:   Procedure Laterality Date    CARPAL TUNNEL RELEASE      bilaterally    COLONOSCOPY  11/28/2016    Silvano Cooper MD    COLONOSCOPY  05/2017    at [address]. PYlori positive, adenoma, hyperplastic polyp    COLONOSCOPY  06/14/2018, 10/2019, 10/27/2021    DR. Boles -polyp x 1, diverticulosis    JOINT REPLACEMENT Left 07/17/2019    knee    LITHOTRIPSY      NERVE BLOCK Left 11/06/2020    GENICULAR NERVE BLOCK LEFT KNEE performed by Stefan Walter MD at [address] [place]  3/8/2023    PACEMAKER INSERTION PERMANENT performed by Luther Acevedo MD at [address] [facility] N/A 09/04/2020    L3,L4 MEDIAL BRANCH BLOCK , L5 DORSAL RAMI INJECTION performed by Stefan Walter MD at [address] [facility] PAIN MANAGEMENT PROCEDURE Bilateral 10/02/2020    BILATERAL L3-4 MEDIAL BRANCH AND L5 DORSAL RAMI INJECTION performed by Lorrie Moreno MD at [address] Right 01/15/2021    RFA, L3-L4 MEDIAL BRANCH AND L-5 DORSEL RAMI INJECTION RIGHT performed by Lorrie Moreno MD at [address] Left 07/17/2019    LEFT KNEE TOTAL ARTHROPLASTY- DEPUY performed by Caesar Mitchell DO at [address] [facility] ENDOSCOPY  11/28/2016    Lois Barahona MD       CURRENT MEDICATIONS    Current Outpatient Rx   Medication Sig Dispense Refill    famotidine (PEPCID) 20 MG tablet Take 1 tablet by mouth daily For itching 30 tablet 0    warfarin (COUMADIN) 10 MG tablet Take 1/2 tablet once daily on Sundays, Mondays, Wednesdays, Thursdays, and Fridays or as directed. Managed by [facility] 90 tablet 0    dilTIAZem (CARDIZEM) 60 MG tablet Take 1 tablet by mouth 3 times daily 120 tablet 3    allopurinol (ZYLOPRIM) 300 MG tablet TAKE 1/2 TABLET EVERY DAY 45 tablet 0    DULoxetine (CYMBALTA) 30 MG extended release capsule Take 1 capsule by mouth daily 90 capsule 1    ondansetron (ZOFRAN-ODT) 4 MG disintegrating tablet Take 1 tablet by mouth 3 times daily as needed for Nausea or Vomiting (Patient not taking: Reported on 5/26/2023) 6 tablet 0    atenolol (TENORMIN) 50 MG tablet TAKE 1 TABLET EVERY DAY 90 tablet 3    warfarin (COUMADIN) 2.5 MG tablet Take 1 tablet on Tuesdays and Saturdays or as directed.  Managed by [facility] 35 tablet 3    spironolactone (ALDACTONE) 25 MG tablet TAKE 1 TABLET TWICE DAILY 180 tablet 3    digoxin (LANOXIN) 125 MCG tablet Take 1 tablet by mouth daily 30 tablet 11    Calcium Polycarbophil (FIBER-CAPS PO) Take 1 caplet by mouth daily (Patient not taking: Reported on 8/30/2023)      mupirocin (BACTROBAN NASAL) 2 % nasal ointment Apply to each nostril daily prn hx multiple nose

## 2023-08-30 NOTE — CONSULTS
Unable to speak with pt for inpatient diabetes education. Pt resting with eyes closed, resp even and unlabored.  Will allow to rest.     [facility]  Diabetes clinic educator  8/30/2023  4:37 PM

## 2023-08-30 NOTE — PROGRESS NOTES
Pharmacy Note  Warfarin Consult    Deidre Calabrese is a 78 y.o. female for whom pharmacy has been consulted to manage warfarin therapy. Consulting Physician: Rizwana Griffith  Reason for Admission: dehydration    Warfarin dose prior to admission: 2.5 mg Tu/Sa; 5 mg AOD  Warfarin indication: A Fib  Target INR range: 2.0-3.0     Past Medical History:   Diagnosis Date    Atrial fibrillation (HCC)     CHF (congestive heart failure) ([facility])     Diverticulosis 07/27/2021    per Colonoscopy per Dr. Joycelyn Hodgkins    Gout     Hearing loss 2015    bilateral hearing aids     History of colon polyps 2016    Hypertension     Kidney stones     Mitral valve insufficiency     Obesity, morbid, BMI 40.0-49.9 (HCC)     Osteoarthritis     Primary osteoarthritis of both knees     Dr. Trent Mota    Pulmonary HTN [facility])                 Recent Labs     08/30/23  1215   INR 1.8     Recent Labs     08/30/23  1215   HGB 14.3   HCT 43.0          Current warfarin drug-drug interactions: allopurinol      Date             INR        Dose   8/30/2023            1.8       5 mg    Daily PT/INR while inpatient. PT/INR ordered to start 8/31/23. Thank you for the consult. Will continue to follow.     Chantal Bai, PharmD, BCPS  8/30/2023  4:16 PM

## 2023-08-30 NOTE — ED NOTES
Pt had difficulty standing up, required 2-3 assist. Pt states \"I just feel really weak. \" Dr. Lavonne Lagos aware.       Padilla Ponce, ALEX  08/30/23 [id number]

## 2023-08-30 NOTE — ED NOTES
Med rec completed with pt as historian and list provided by patient        Roger Morales, RN  08/30/23 7436

## 2023-08-30 NOTE — PLAN OF CARE
Problem: Discharge Planning  Goal: Discharge to home or other facility with appropriate resources  Outcome: Progressing     Problem: Safety - Adult  Goal: Free from fall injury  Outcome: Progressing     Problem: ABCDS Injury Assessment  Goal: Absence of physical injury  Outcome: Progressing     Problem: Skin/Tissue Integrity  Goal: Absence of new skin breakdown  Description: 1. Monitor for areas of redness and/or skin breakdown  2. Assess vascular access sites hourly  3. Every 4-6 hours minimum:  Change oxygen saturation probe site  4. Every 4-6 hours:  If on nasal continuous positive airway pressure, respiratory therapy assess nares and determine need for appliance change or resting period.   Outcome: Progressing     Problem: Musculoskeletal - Adult  Goal: Return mobility to safest level of function  Outcome: Progressing  Goal: Maintain proper alignment of affected body part  Outcome: Progressing  Goal: Return ADL status to a safe level of function  Outcome: Progressing     Problem: Metabolic/Fluid and Electrolytes - Adult  Goal: Electrolytes maintained within normal limits  Outcome: Progressing     Problem: Hematologic - Adult  Goal: Maintains hematologic stability  Outcome: Progressing

## 2023-08-31 VITALS
OXYGEN SATURATION: 94 % | HEIGHT: 63 IN | SYSTOLIC BLOOD PRESSURE: 114 MMHG | TEMPERATURE: 97.5 F | WEIGHT: 237.8 LBS | BODY MASS INDEX: 42.13 KG/M2 | HEART RATE: 75 BPM | DIASTOLIC BLOOD PRESSURE: 58 MMHG | RESPIRATION RATE: 16 BRPM

## 2023-08-31 LAB
ANION GAP SERPL CALCULATED.3IONS-SCNC: 11 MMOL/L (ref 9–17)
BUN SERPL-MCNC: 11 MG/DL (ref 8–23)
BUN/CREAT SERPL: 14 (ref 9–20)
CALCIUM SERPL-MCNC: 8.8 MG/DL (ref 8.6–10.4)
CHLORIDE SERPL-SCNC: 100 MMOL/L (ref 98–107)
CO2 SERPL-SCNC: 25 MMOL/L (ref 20–31)
CREAT SERPL-MCNC: 0.8 MG/DL (ref 0.5–0.9)
EKG Q-T INTERVAL: 366 MS
EKG QRS DURATION: 94 MS
EKG QTC CALCULATION (BAZETT): 389 MS
EKG R AXIS: -46 DEGREES
EKG T AXIS: -54 DEGREES
EKG VENTRICULAR RATE: 68 BPM
EST. AVERAGE GLUCOSE BLD GHB EST-MCNC: 315 MG/DL
GFR SERPL CREATININE-BSD FRML MDRD: >60 ML/MIN/1.73M2
GLUCOSE BLD-MCNC: 242 MG/DL (ref 65–99)
GLUCOSE BLD-MCNC: 286 MG/DL (ref 65–99)
GLUCOSE SERPL-MCNC: 319 MG/DL (ref 70–99)
HBA1C MFR BLD: 12.6 % (ref 4–6)
INR PPP: 1.9
MAGNESIUM SERPL-MCNC: 1.9 MG/DL (ref 1.6–2.6)
POTASSIUM SERPL-SCNC: 3.4 MMOL/L (ref 3.7–5.3)
PROTHROMBIN TIME: 22.2 SEC (ref 11.5–14.2)
SODIUM SERPL-SCNC: 136 MMOL/L (ref 135–144)

## 2023-08-31 PROCEDURE — 93010 ELECTROCARDIOGRAM REPORT: CPT | Performed by: INTERNAL MEDICINE

## 2023-08-31 PROCEDURE — 6360000002 HC RX W HCPCS: Performed by: INTERNAL MEDICINE

## 2023-08-31 PROCEDURE — G0378 HOSPITAL OBSERVATION PER HR: HCPCS

## 2023-08-31 PROCEDURE — 96361 HYDRATE IV INFUSION ADD-ON: CPT

## 2023-08-31 PROCEDURE — 83735 ASSAY OF MAGNESIUM: CPT

## 2023-08-31 PROCEDURE — 94761 N-INVAS EAR/PLS OXIMETRY MLT: CPT

## 2023-08-31 PROCEDURE — 97163 PT EVAL HIGH COMPLEX 45 MIN: CPT

## 2023-08-31 PROCEDURE — 36415 COLL VENOUS BLD VENIPUNCTURE: CPT

## 2023-08-31 PROCEDURE — 96376 TX/PRO/DX INJ SAME DRUG ADON: CPT

## 2023-08-31 PROCEDURE — 80048 BASIC METABOLIC PNL TOTAL CA: CPT

## 2023-08-31 PROCEDURE — 6370000000 HC RX 637 (ALT 250 FOR IP): Performed by: INTERNAL MEDICINE

## 2023-08-31 PROCEDURE — 82947 ASSAY GLUCOSE BLOOD QUANT: CPT

## 2023-08-31 PROCEDURE — 2580000003 HC RX 258: Performed by: INTERNAL MEDICINE

## 2023-08-31 PROCEDURE — 85610 PROTHROMBIN TIME: CPT

## 2023-08-31 RX ORDER — POTASSIUM CHLORIDE 750 MG/1
40 TABLET, FILM COATED, EXTENDED RELEASE ORAL ONCE
Status: COMPLETED | OUTPATIENT
Start: 2023-08-31 | End: 2023-08-31

## 2023-08-31 RX ORDER — INSULIN GLARGINE 100 [IU]/ML
15 INJECTION, SOLUTION SUBCUTANEOUS
Qty: 5 ADJUSTABLE DOSE PRE-FILLED PEN SYRINGE | Refills: 3 | Status: SHIPPED | OUTPATIENT
Start: 2023-08-31

## 2023-08-31 RX ORDER — INSULIN GLARGINE 100 [IU]/ML
10 INJECTION, SOLUTION SUBCUTANEOUS EVERY MORNING
Status: DISCONTINUED | OUTPATIENT
Start: 2023-08-31 | End: 2023-08-31 | Stop reason: HOSPADM

## 2023-08-31 RX ADMIN — FAMOTIDINE 20 MG: 20 TABLET, FILM COATED ORAL at 08:38

## 2023-08-31 RX ADMIN — CHOLECALCIFEROL TAB 25 MCG (1000 UNIT) 2000 UNITS: 25 TAB at 08:38

## 2023-08-31 RX ADMIN — SPIRONOLACTONE 25 MG: 25 TABLET, FILM COATED ORAL at 08:42

## 2023-08-31 RX ADMIN — SODIUM CHLORIDE: 9 INJECTION, SOLUTION INTRAVENOUS at 06:44

## 2023-08-31 RX ADMIN — METFORMIN HYDROCHLORIDE 500 MG: 500 TABLET ORAL at 08:39

## 2023-08-31 RX ADMIN — DILTIAZEM HYDROCHLORIDE 60 MG: 60 TABLET, FILM COATED ORAL at 14:55

## 2023-08-31 RX ADMIN — ACETAMINOPHEN 650 MG: 325 TABLET, FILM COATED ORAL at 05:57

## 2023-08-31 RX ADMIN — INSULIN GLARGINE 10 UNITS: 100 INJECTION, SOLUTION SUBCUTANEOUS at 08:37

## 2023-08-31 RX ADMIN — DIPHENHYDRAMINE HYDROCHLORIDE 25 MG: 25 TABLET ORAL at 04:46

## 2023-08-31 RX ADMIN — ATENOLOL 50 MG: 50 TABLET ORAL at 08:37

## 2023-08-31 RX ADMIN — ONDANSETRON 4 MG: 2 INJECTION INTRAMUSCULAR; INTRAVENOUS at 04:44

## 2023-08-31 RX ADMIN — INSULIN LISPRO 2 UNITS: 100 INJECTION, SOLUTION INTRAVENOUS; SUBCUTANEOUS at 08:36

## 2023-08-31 RX ADMIN — ALLOPURINOL 100 MG: 100 TABLET ORAL at 08:38

## 2023-08-31 RX ADMIN — POTASSIUM CHLORIDE 40 MEQ: 750 TABLET, FILM COATED, EXTENDED RELEASE ORAL at 08:39

## 2023-08-31 RX ADMIN — WARFARIN SODIUM 6 MG: 5 TABLET ORAL at 15:36

## 2023-08-31 RX ADMIN — INSULIN LISPRO 1 UNITS: 100 INJECTION, SOLUTION INTRAVENOUS; SUBCUTANEOUS at 12:37

## 2023-08-31 RX ADMIN — DILTIAZEM HYDROCHLORIDE 60 MG: 60 TABLET, FILM COATED ORAL at 08:42

## 2023-08-31 RX ADMIN — DIGOXIN 125 MCG: 125 TABLET ORAL at 08:42

## 2023-08-31 ASSESSMENT — PAIN SCALES - GENERAL
PAINLEVEL_OUTOF10: 0
PAINLEVEL_OUTOF10: 3
PAINLEVEL_OUTOF10: 0
PAINLEVEL_OUTOF10: 0

## 2023-08-31 ASSESSMENT — PAIN DESCRIPTION - ORIENTATION: ORIENTATION: RIGHT;LEFT

## 2023-08-31 ASSESSMENT — PAIN DESCRIPTION - LOCATION: LOCATION: NECK;ARM

## 2023-08-31 ASSESSMENT — PAIN - FUNCTIONAL ASSESSMENT: PAIN_FUNCTIONAL_ASSESSMENT: ACTIVITIES ARE NOT PREVENTED

## 2023-08-31 ASSESSMENT — PAIN DESCRIPTION - DESCRIPTORS: DESCRIPTORS: ACHING;TIGHTNESS

## 2023-08-31 NOTE — CONSULTS
[facility] Note-Warfarin Follow-up       Patient:  Wm Mg  MRN: [id number]    Warfarin consult follow-up:    INR (no units)   Date Value   08/31/2023 1.9   08/30/2023 1.8   07/21/2023 1.9   05/26/2023 3.3   04/28/2023 2.6   04/13/2023 3.8   03/15/2023 2.1       Hemoglobin (g/dL)   Date Value   08/30/2023 14.3   03/09/2023 11.8 (L)   03/01/2023 12.9     Hematocrit (%)   Date Value   08/30/2023 43.0   03/09/2023 36.6   03/01/2023 40.3     Platelet Count (k/uL)   Date Value   04/10/2012 180   10/19/2011 184     Platelets (k/uL)   Date Value   08/30/2023 219   03/09/2023 211   03/01/2023 220       Target INR Range: 2..0-3.0    Significant Drug-Drug Interactions:  New warfarin drug-drug interactions: continues allopurinol and famotidine (home meds)  Discontinued drug-drug interactions: no change      Notes:      patient to take warfarn 6mg po today. Pharmacy will continue to monitor closely               Daily PT/INR until stable within therapeutic range.      Thank you for the consult,  Elmira Bonilla, [address] [place] [address],   8/31/2023, 8:07 AM

## 2023-08-31 NOTE — PROGRESS NOTES
Pt requesting benadryl for itching. Telephone order received from [address] [facility].

## 2023-08-31 NOTE — H&P
Assessment and Plan     1. Hyperglycemia  -patient most likely with new onset diabetes. Hemoglobin A1c pending. Started on metformin 500 mg twice daily, sliding scale lispro. Will add Lantus 10 units daily, requested diabetic education to teach the patient insulin injections and blood sugar check at home. She is encouraged to follow low-carb diet, avoid sweets, soda products at home. 2.  Generalized weakness, s/p fall -consult PT and OT for evaluation. Discussed PT after discharge, however the patient declined stating did not benefit from it in the past  3. Dehydration -patient started on IV fluids  4. Hypokalemia -we will replace with oral potassium  5. Chronic atrial fibrillation, s/p pacemaker placement -heart rate controlled, on digoxin, Cardizem, atenolol anticoagulated with Coumadin, consulted pharmacy to help managing dosing  6. Hypertension -controlled, on atenolol, Cardizem  7. Moderate to severe MR  8. Chronic low back pain, polyarthralgia  9. Chronic shortness of breath due to deconditioning and history of COPD  10. Morbid obesity with BMI 42.8    Differential Diagnosis: Dehydration, new onset diabetes, electrolyte disorder, infectious process  Condition is improving / unchanged / worsening: Improving  Condition is a treatment goal: Yes  Chronic condition is / is not having mild / moderate, severe exacerbation, progression or side effects of treatment:      Shared decision making:      Code status and discussions: Full code    Medical Necessity:  Inpatient is appropriate for this patient     Estimated length of stay: .  1 day        Patient Active Problem List   Diagnosis Code    Kidney stones N20.0    Localized osteoarthritis of left knee M17.12    Atrial fibrillation (HCC) I48.91    Pulmonary HTN (HCC) I27.20    Knee pain, chronic M25.569, G89.29    Iron deficiency anemia due to chronic blood loss D50.0    Mitral valve insufficiency I34.0    Backache M54.9    CHF (congestive heart failure) ([facility]) I50.9    CKD (chronic kidney disease) N18.9    Echocardiogram abnormal R93.1    Gout M10.9    HL (hearing loss) H91.90    Hyperlipidemia E78.5    Morbid obesity with BMI of 45.0-49.9, adult ([facility]) E66.01, Z68.42    DJD (degenerative joint disease) M19.90    Chronic renal disease, stage III ([address] ) [658682] N18.30    Chronic systolic (congestive) heart failure I50.22    Lumbar radiculopathy M54.16    Foraminal stenosis of lumbar region M48.061    Chronic bilateral low back pain with bilateral sciatica M54.42, M54.41, G89.29    SSS (sick sinus syndrome) ([facility]) I49.5    Dehydration E86.0    Hyperglycemia R73.9       DVT prophylaxis:   [] Lovenox   [] SCDs   [] SQ Heparin   [] Encourage ambulation, low risk for DVT, no chemical or mechanical    prophylaxis necessary      [x] Already on Anticoagulation      Documentation of the Current Medications in the Medical Record    (x )  I have utilized all available immediate resources to obtain, update, or review the patient's current medications (including all prescriptions, over-the-counter products, herbals, cannabis / cannabidiol products, vitamin / mineral / dietary (nutritional) supplements). (Satisfies MIPS Performance)  If Yes, Stop Here  ( )  The patient is not eligible for medication reconciliation; the patient is in an emergent medical situation where delaying treatment would jeopardize the patient's health. (MIPS Performance exception / exclusion)  ( )  I did not confirm, update or review the patient's current list of medications today. (Does not satisfy MIPS Performance)          Advanced Care Plan    (x )  I confirmed that the patient's Advanced Care Plan is present, code status documented, or surrogate decision maker is listed in the patient's medical record.   ( )  The patient's advanced care plan is not present because:  (select)   ( ) I confirmed today that the patient does not wish or was not able to name a surrogate decision maker or provide an

## 2023-08-31 NOTE — CARE COORDINATION
Case Management Assessment  Initial Evaluation    Date/Time of Evaluation: 8/31/2023 10:47 AM  Assessment Completed by: Samuel Lamas RN    If patient is discharged prior to next notation, then this note serves as note for discharge by case management. Patient Name: Reece Kimball                   YOB: 1945  Diagnosis: Dehydration [E86.0]  Hyperglycemia [R73.9]  Injury of head, initial encounter [Z12.76JT]  Fall, initial encounter [W19. XXXA]                   Date / Time: 8/30/2023 12:02 PM    Patient Admission Status: Observation   Readmission Risk (Low < 19, Mod (19-27), High > 27): No data recorded  Current PCP: TYLOR Valdivia CNP  PCP verified by CM? (P) Yes Shelia Pyle)    Chart Reviewed: Yes      History Provided by: (P) Patient  Patient Orientation: (P) Alert and Oriented, Person, Place, Situation, Self    Patient Cognition: (P) Alert    Hospitalization in the last 30 days (Readmission):  No    If yes, Readmission Assessment in CM Navigator will be completed.     Advance Directives:      Code Status: Full Code   Patient's Primary Decision Maker is: (P) Named in Scanned ACP Document    [facility] [phone]    Discharge Planning:    Patient lives with: (P) Alone Type of Home: (P) House  Primary Care Giver: (P) Self  Patient Support Systems include: (P) Children, Family Members   Current Financial resources: (P) Medicare  Current community resources: (P) Transportation ([employer] for transportation needs.)  Current services prior to admission: (P) None            Current DME:              Type of Home Care services:  (P) Housekeeping (pt states she has a cleaning lady come in every 2 weeks)    ADLS  Prior functional level: (P) Independent in ADLs/IADLs  Current functional level: (P) Independent in ADLs/IADLs    PT AM-PAC:   /24  OT AM-PAC:   /24    Family can provide assistance at DC: (P) Yes  Would you like Case Management to discuss

## 2023-08-31 NOTE — PROGRESS NOTES
Occupational Therapy    RN reported that patient is discharging home this afternoon and that the OT evaluation does not need to be completed at this time.     Cm Stinson, OTR/L  8/31/23

## 2023-08-31 NOTE — THERAPY EVALUATION
[facility]  Physical Therapy  Evaluation  Date: 2023  Patient Name: Sotero Pacheco        MRN: [id number]    : 1945  (78 y.o.)  Gender: female   Referring Practitioner: Dr. Ewa Guadarrama  Diagnosis: Dehydration  Additional Pertinent Hx: Admitted for observation through ED following a fall at home requiring squad transportation. Upon admission, found to have dehydration and new onset of DM with BS > 500. Patient with muliple chronic orthpedic issues including left TKR, lumbar pain, B shoulder advanced DJD, left hip pain, right knee pain. Referred to PT to assess mobility and assist with discharge planning   Past Medical History:   Diagnosis Date    Atrial fibrillation ([address])     CHF (congestive heart failure) ([address])     Diverticulosis 2021    per Colonoscopy per Dr. Steven Rodney    Gout     Hearing loss 2015    bilateral hearing aids     History of colon polyps 2016    Hypertension     Kidney stones     Mitral valve insufficiency     Obesity, morbid, BMI 40.0-49.9 ([address])     Osteoarthritis     Primary osteoarthritis of both knees     Dr. Dean Chandler    Pulmonary HTN [facility])      Past Surgical History:   Procedure Laterality Date    CARPAL TUNNEL RELEASE      bilaterally    COLONOSCOPY  2016    Edward Canas MD    COLONOSCOPY  2017    at [address] [place] [address]. PYlori positive, adenoma, hyperplastic polyp    COLONOSCOPY  2018, 10/2019, 10/27/2021    DR. Boles -polyp x 1, diverticulosis    JOINT REPLACEMENT Left 2019    knee    LITHOTRIPSY      NERVE BLOCK Left 2020    GENICULAR NERVE BLOCK LEFT KNEE performed by Cassi Castorena MD at [address] [place] [address] N/A 3/8/2023    PACEMAKER INSERTION PERMANENT performed by Bree Escobedo MD at [address] N/A 2020    L3,L4 MEDIAL BRANCH BLOCK , L5 DORSAL RAMI INJECTION performed by Cassi Castorena MD at [address] Bilateral 10/02/2020    BILATERAL L3-4 MEDIAL NEISHA DEAN, PT,  8/31/2023

## 2023-08-31 NOTE — PROGRESS NOTES
Patient ambulates to bathroom and back to bed as stand by assist with personal front wheeled walker. Pt needs minimal assistance from nurse.

## 2023-08-31 NOTE — DISCHARGE SUMMARY
Hospitalist Discharge Summary    Patient:  Wm Mg  YOB: 1945    MRN: [id number]   Acct: [de-identified]    Primary Care Physician: TYLOR Lester CNP    Admit date:  8/30/2023    Discharge date:  8/31/2023       Discharge Diagnoses:     1. Onset diabetes mellitus type 2 with hemoglobin A1c 12.6%  2. S/p fall, generalized weakness  3. Hypokalemia  4. Dehydration  5. Chronic atrial fibrillation  6. Hypertension  7. Chronic low back pain, polyarthralgia  8. Moderate to severe MR  9. Chronic shortness of breath  10.   Morbid obesity with BMI 42.8      Discharge Medications:         Medication List        START taking these medications      Lantus SoloStar 100 UNIT/ML injection pen  Generic drug: insulin glargine  Inject 15 Units into the skin every morning (before breakfast)     metFORMIN 500 MG tablet  Commonly known as: GLUCOPHAGE  Take 1 tablet by mouth 2 times daily (with meals)            CONTINUE taking these medications      acetaminophen 500 MG tablet  Commonly known as: TYLENOL     allopurinol 300 MG tablet  Commonly known as: ZYLOPRIM  TAKE 1/2 TABLET EVERY DAY     atenolol 50 MG tablet  Commonly known as: TENORMIN  TAKE 1 TABLET EVERY DAY     Bactroban Nasal 2 % nasal ointment  Generic drug: mupirocin  Apply to each nostril daily prn hx multiple nose bleeds     digoxin 125 MCG tablet  Commonly known as: LANOXIN  Take 1 tablet by mouth daily     dilTIAZem 60 MG tablet  Commonly known as: CARDIZEM  Take 1 tablet by mouth 3 times daily     DULoxetine 30 MG extended release capsule  Commonly known as: Cymbalta  Take 1 capsule by mouth daily     famotidine 20 MG tablet  Commonly known as: PEPCID  Take 1 tablet by mouth daily For itching     furosemide 40 MG tablet  Commonly known as: LASIX  TAKE 1 TABLET DAILY, TAKE EXTRA TABLET IF NEEDED FOR EDEMA     spironolactone 25 MG tablet  Commonly known as: ALDACTONE  TAKE 1 TABLET TWICE DAILY     vitamin D 50 MCG (2000 UT) Caps twice daily. We discussed potential side effects from metformin. She is advised to be compliant with low-carb diet, monitoring glucose levels at home. We offered home health care with RN, however she declined it at this time. Will discharge home with Lantus 15 units every morning due to very high hemoglobin A1c of 12.6. Metformin 500 mg twice daily.   She is advised to follow-up with her family physician Kely Arriaga in about 1 week          Disposition: home    Condition: Stable    Time Spent: 25 minutes      Electronically signed by Serenity Salgado MD on 8/31/2023 at 12:13 PM  Discharging Hospitalist

## 2023-08-31 NOTE — PROGRESS NOTES
Met with Patient during quality flow rounding this a.m. to discuss discharge plans. Patient is an 78year old , white female, admitted with a diagnosis of Dehydration after a fall at home. Patient also noted to be new onset Diabetic. She is alert and oriented, polite and cooperative for this assessment. States that she wishes to return home following this hospitalization. Discussed potential needs at home and offered to coordinate home health for nursing education and medication management but Patient declines offer at this time. Patient lives alone in [facility]. She has a walker, lift chair, shower chair, grab bars and an emergency response button to use at home as needed. Patient currently has no outside resources or services at home. She is independent with all ADL's. Reports that she has not driven since November of last year and primarily utilizes the [employer] for her medical/transportation needs. Patient relies on her family for her other transportation needs at this point. PCP is Fiorella Mims CNP. Patient has medical insurance and denies need for financial assistance with the cost of her prescription medications. Discharge plan is home when stable. Patient is a 'Full Code' status and states that she is \"sure that I have\" Advanced Directives but they are not on file at this time. Offered several times to coordinate home services but Patient refused. [facility] to monitor and assist with discharge planning as appropriate and as Patient will allow.     [address] [place] [address]  8/31/2023

## 2023-08-31 NOTE — PLAN OF CARE
Problem: Discharge Planning  Goal: Discharge to home or other facility with appropriate resources  8/30/2023 2115 by Pasquale Gomez RN  Outcome: Progressing  Flowsheets (Taken 8/30/2023 2006 by Ashlie Johns RN)  Discharge to home or other facility with appropriate resources:   Identify barriers to discharge with patient and caregiver   Arrange for needed discharge resources and transportation as appropriate   Identify discharge learning needs (meds, wound care, etc)   Refer to discharge planning if patient needs post-hospital services based on physician order or complex needs related to functional status, cognitive ability or social support system  8/30/2023 1526 by Michael Issa RN  Outcome: Progressing  Flowsheets (Taken 8/30/2023 1445)  Discharge to home or other facility with appropriate resources: Identify barriers to discharge with patient and caregiver     Problem: Safety - Adult  Goal: Free from fall injury  8/30/2023 2115 by Pasquale Gomez RN  Outcome: Progressing  8/30/2023 1526 by Michael Issa RN  Outcome: Progressing     Problem: ABCDS Injury Assessment  Goal: Absence of physical injury  8/30/2023 2115 by Pasquale Gomez RN  Outcome: Progressing  8/30/2023 1526 by Michael Issa RN  Outcome: Progressing     Problem: Skin/Tissue Integrity  Goal: Absence of new skin breakdown  Description: 1. Monitor for areas of redness and/or skin breakdown  2. Assess vascular access sites hourly  3. Every 4-6 hours minimum:  Change oxygen saturation probe site  4. Every 4-6 hours:  If on nasal continuous positive airway pressure, respiratory therapy assess nares and determine need for appliance change or resting period.   8/30/2023 2115 by Pasquale Gomez RN  Outcome: Progressing  8/30/2023 1526 by Michael Issa RN  Outcome: Progressing     Problem: Musculoskeletal - Adult  Goal: Return mobility to safest level of function  8/30/2023 2115 by Pasquale Gomez RN  Outcome: Mikaela Caban (Taken 8/30/2023 2006 by Priya Spencer RN)  Return Mobility to Safest Level of Function:   Assess patient stability and activity tolerance for standing, transferring and ambulating with or without assistive devices   Assist with transfers and ambulation using safe patient handling equipment as needed  8/30/2023 1526 by Cathy Frazier RN  Outcome: Progressing  Flowsheets (Taken 8/30/2023 1445)  Return Mobility to Safest Level of Function: Assist with transfers and ambulation using safe patient handling equipment as needed  Goal: Maintain proper alignment of affected body part  8/30/2023 2115 by Justa Orta RN  Outcome: Progressing  Flowsheets (Taken 8/30/2023 2006 by Priya Spencer RN)  Maintain proper alignment of affected body part: Instruct and reinforce with patient and family use of appropriate assistive device and precautions (e.g. spinal or hip dislocation precautions)  8/30/2023 1526 by Cathy Frazier RN  Outcome: Progressing  Flowsheets (Taken 8/30/2023 1445)  Maintain proper alignment of affected body part:  Instruct and reinforce with patient and family use of appropriate assistive device and precautions (e.g. spinal or hip dislocation precautions)  Goal: Return ADL status to a safe level of function  8/30/2023 2115 by Justa Orta RN  Outcome: Progressing  8/30/2023 1526 by Cathy Frazier RN  Outcome: Progressing     Problem: Metabolic/Fluid and Electrolytes - Adult  Goal: Electrolytes maintained within normal limits  8/30/2023 2115 by Justa Orta RN  Outcome: Merline Fall (Taken 8/30/2023 2006 by Priya Spencer RN)  Electrolytes maintained within normal limits: Monitor labs and assess patient for signs and symptoms of electrolyte imbalances  8/30/2023 1526 by Cathy Frazier RN  Outcome: Progressing  Flowsheets (Taken 8/30/2023 1445)  Electrolytes maintained within normal limits: Monitor labs and assess patient for signs and symptoms of electrolyte imbalances

## 2023-08-31 NOTE — PLAN OF CARE
[place]  Inpatient Physical Therapy  Plan of Care  Date: 2023  Patient Name: Nathen Harris        : 1945  (8 y.o.)  Referring Practitioner: Dr. Clementine Carrasquillo  Admission Date: 2023  Referral Date : 23  Diagnosis: Dehydration  Treatment Diagnosis: Difficulty with amb following a fall at home  PT Orders Received and Evaluation Complete  Identified Problem Areas:  Therapy Prognosis: Good    Justification for Skilled Services:  [x] Reduce Falls   [x] Improve Ambulation  []  Complete Daily Tasks Safely   [] Improve Balance   [] Improve LE strength  [x]  Return to Prior Level of Function  [] Improve Functional Mobility   []  Family/Caregiver Education  [x] Patient Education: []Assistive Devices []Home Exercise Program and Progression    Plan  Frequency: 1-2 sessions    Duration: 1 day - expires 23  [] Modalities:  [] Therapeutic Exercise   [x] Gait Training  [] Therapeutic Activity    [] Home Safety Evaluation         [] Massage                        [] Neuromuscular Re-education [] Back Education             [] Patient Education [] Home Exercise Program  Discharge Recommendations: Home with assist PRN    Rehab Potential:  [x]  Good [] Fair   []  Poor    Goals  Short Term Goals  Time Frame for Short Term Goals: 1-2 visits  Short Term Goal 1: Assess functional mobility for safety to return home and provide discharge recommendations         Upon Swingbed Transfer this Plan of Care will be followed until revision is complete.     NEISHA DEAN, PT,    Date: 2023

## 2023-08-31 NOTE — CONSULTS
DIABETES EDUCATION    Labs:   Lab Results   Component Value Date    LABA1C 6.4 (H) 01/11/2013     Lab Results   Component Value Date    CREATININE 0.8 08/31/2023     Met with pt for 25 minutes for inpatient diabetes education. Discussed what diabetes is and how it is managed. States, \"That must be why I've been so tired lately. \" Given SAFE handouts and briefly discussed each. Reviewed hyper/hypoglycemia and treatment. Provided with glucometer and instructed on use. Denies questions. States she has a granddaughter who is a nurse and she will help her if needed. Provided with contact number to diabetes office and instructed to call with questions or concerns. Did Dietitian see patient? [] Yes  [x] No    Does patient have a monitor and strips? [x] Yes      [] No        Frequency of monitoring at home: Instructed to monitor fasting glucose and as needed. Can patient afford medications? [x]  Yes     [] No   Medication taking at home:    [] Oral medication   [] Insulin     [] Other     [] None    New to insulin? [] Yes       [] No   Instructed on insulin use? [] Yes      [] No       [x] N/A    \"SAFE\" HANDOUTS:   [x] Where do I Begin?  booklet   [x] What is Diabetes? [x] What is Insulin?               [x] Hypo- hyperglycemia    [x] Diabetes pills                     [x] How to Check your sugar  [x] Be safe with needles   [x] Sick Days with Diabetes    [x] When to Call the Doctor  [] Other                                  [] Type 2 DM and adding Insulin       [x] Carbohydrate Counting for People with Diabetes       Dima Fisher RN  [facility]  Diabetes clinic educator  8/31/2023  9:08 AM

## 2023-09-01 ENCOUNTER — TELEPHONE (OUTPATIENT)
Dept: FAMILY MEDICINE CLINIC | Age: 78
End: 2023-09-01

## 2023-09-01 ENCOUNTER — TELEPHONE (OUTPATIENT)
Dept: PRIMARY CARE CLINIC | Age: 78
End: 2023-09-01

## 2023-09-01 DIAGNOSIS — Z79.4 TYPE 2 DIABETES MELLITUS WITHOUT COMPLICATION, WITH LONG-TERM CURRENT USE OF INSULIN (HCC): Primary | ICD-10-CM

## 2023-09-01 DIAGNOSIS — E11.9 TYPE 2 DIABETES MELLITUS WITHOUT COMPLICATION, WITH LONG-TERM CURRENT USE OF INSULIN (HCC): Primary | ICD-10-CM

## 2023-09-01 RX ORDER — PEN NEEDLE, DIABETIC 30 GX5/16"
1 NEEDLE, DISPOSABLE MISCELLANEOUS DAILY
Qty: 100 EACH | Refills: 3 | Status: SHIPPED | OUTPATIENT
Start: 2023-09-01

## 2023-09-01 NOTE — TELEPHONE ENCOUNTER
Care Transitions Initial Follow Up Call    Outreach made within 2 business days of discharge: Yes    Patient: Anastasia Campbell Patient : 1945   MRN: [id number]  Reason for Admission: Fall  Discharge Date: 23       Spoke with: Patient    Discharge department/facility:     [facility] Interactive Patient Contact:  Was patient able to fill all prescriptions: Yes  Was patient instructed to bring all medications to the follow-up visit: Yes  Is patient taking all medications as directed in the discharge summary?  Yes  Does patient understand their discharge instructions: Yes  Does patient have questions or concerns that need addressed prior to 7-14 day follow up office visit: no    Scheduled appointment with PCP within 7-14 days    Follow Up  Future Appointments   Date Time Provider [address]   2023  1:20 PM TYLOR Parsons CNP pc [facility]   2023 11:20 AM [facility] [address] CHANI Hamilton   10/25/2023  8:30 [facility]  Alfonso BERRY   10/31/2023  1:00 PM [facility] MOBILE VAN UNIT [facility] MOBILE  Slim Castle   10/31/2023  1:30 PM MD Ciera Lea [facility]   10/31/2023  2:00 PM SCHEDULE, [facility]   3/4/2024  1:20 PM TYLOR Parsons CNP MED [facility]       Luis Angel Arboleda LPN

## 2023-09-01 NOTE — TELEPHONE ENCOUNTER
Christiano Siegel from [facility] called for a verbal to add nursing home care evaluation to the the plan since patient was recently in the hospital. Verbal given.

## 2023-09-01 NOTE — TELEPHONE ENCOUNTER
Received call requesting insulin pen needles to be ordered for patient diabetic supplies. The supplies can be sent to [facility] in [facility]. Please advise. Thank you.

## 2023-09-02 ASSESSMENT — ENCOUNTER SYMPTOMS
ABDOMINAL DISTENTION: 0
COUGH: 0
SORE THROAT: 0
SHORTNESS OF BREATH: 0
BACK PAIN: 1
WHEEZING: 0
CHEST TIGHTNESS: 0
EYES NEGATIVE: 1

## 2023-09-05 ENCOUNTER — OFFICE VISIT (OUTPATIENT)
Dept: PRIMARY CARE CLINIC | Age: 78
End: 2023-09-05

## 2023-09-05 VITALS
HEIGHT: 63 IN | BODY MASS INDEX: 41.99 KG/M2 | WEIGHT: 237 LBS | OXYGEN SATURATION: 97 % | SYSTOLIC BLOOD PRESSURE: 120 MMHG | DIASTOLIC BLOOD PRESSURE: 78 MMHG | HEART RATE: 70 BPM

## 2023-09-05 DIAGNOSIS — E11.9 NEW ONSET TYPE 2 DIABETES MELLITUS (HCC): Primary | ICD-10-CM

## 2023-09-05 DIAGNOSIS — E11.65 TYPE 2 DIABETES MELLITUS WITH HYPERGLYCEMIA, WITH LONG-TERM CURRENT USE OF INSULIN (HCC): ICD-10-CM

## 2023-09-05 DIAGNOSIS — E86.0 DEHYDRATION: ICD-10-CM

## 2023-09-05 DIAGNOSIS — G89.29 CHRONIC PAIN IN RIGHT SHOULDER: ICD-10-CM

## 2023-09-05 DIAGNOSIS — M19.011 PRIMARY OSTEOARTHRITIS OF RIGHT SHOULDER: ICD-10-CM

## 2023-09-05 DIAGNOSIS — E87.6 HYPOKALEMIA: ICD-10-CM

## 2023-09-05 DIAGNOSIS — Z09 HOSPITAL DISCHARGE FOLLOW-UP: ICD-10-CM

## 2023-09-05 DIAGNOSIS — I48.20 CHRONIC ATRIAL FIBRILLATION (HCC): ICD-10-CM

## 2023-09-05 DIAGNOSIS — M25.511 CHRONIC PAIN IN RIGHT SHOULDER: ICD-10-CM

## 2023-09-05 DIAGNOSIS — Z79.4 TYPE 2 DIABETES MELLITUS WITH HYPERGLYCEMIA, WITH LONG-TERM CURRENT USE OF INSULIN (HCC): ICD-10-CM

## 2023-09-05 NOTE — PROGRESS NOTES
Post-Discharge Transitional Care  Follow Up      Alexia Flood   YOB: 1945    Date of Office Visit:  9/5/2023  Date of Hospital Admission: 8/30/23  Date of Hospital Discharge: 8/31/23  Risk of hospital readmission (high >=14%. Medium >=10%) :No data recorded    Care management risk score Rising risk (score 2-5) and Complex Care (Scores >=6): No Risk Score On File     Non face to face  following discharge, date last encounter closed (first attempt may have been earlier): 09/01/2023    Admit date:  8/30/2023                        Discharge date:  8/31/2023         Discharge Diagnoses:      1. Onset diabetes mellitus type 2 with hemoglobin A1c 12.6%  2. S/p fall, generalized weakness  3. Hypokalemia  4. Dehydration  5. Chronic atrial fibrillation  6. Hypertension  7. Chronic low back pain, polyarthralgia  8. Moderate to severe MR  9. Chronic shortness of breath  10. Morbid obesity with BMI 42.8        Call initiated 2 business days of discharge: Yes    ASSESSMENT/PLAN:   New onset type 2 diabetes mellitus ([address])  Dehydration  Hypokalemia  Chronic atrial fibrillation (HCC)  Primary osteoarthritis of right shoulder  -     External Referral To Orthopedic Surgery  Chronic pain in right shoulder  -     External Referral To Orthopedic Surgery  Type 2 diabetes mellitus with hyperglycemia, with long-term current use of insulin (HCC)  -     External Referral To Orthopedic Surgery  -     Insulin Pen Needle 32G X 4 MM MISC; DAILY Starting Tue 9/5/2023, Disp-100 each, R-3, Normal  Hospital discharge follow-up  -     MA DISCHARGE MEDS RECONCILED W/ CURRENT OUTPATIENT MED LIST      Medical Decision Making: moderate complexity  Return in 3 months (on 12/5/2023).   28  On this date 9/5/2023 I have spent  minutes reviewing previous notes, test results and face to face with the patient discussing the diagnosis and importance of compliance with the treatment plan as well as documenting on the day of the

## 2023-09-05 NOTE — PATIENT INSTRUCTIONS
Phone: [phone]  Fax: [facility] Office Hours:  Monday: Miki Lara office location [phone] ([phone]) Offering additional late hours the first Monday of the month until 7 pm.   Tuesday: 8-5 Wednesday: 8-5 Thursday:  Additional hours offered 2 Thursdays a month. Please call to inquire those dates. Fridays: 7:30-4:30   SURVEY:    You may be receiving a survey from [employer] regarding your visit today. Please complete the survey to enable us to provide the highest quality of care to you and your family. If you cannot score us a very good on any question, please call the office to discuss how we could have made your experience a very good one. Thank you.

## 2023-09-14 ENCOUNTER — HOSPITAL ENCOUNTER (OUTPATIENT)
Dept: PHARMACY | Age: 78
Setting detail: THERAPIES SERIES
Discharge: HOME | End: 2023-09-14
Payer: MEDICARE

## 2023-09-14 VITALS — DIASTOLIC BLOOD PRESSURE: 60 MMHG | SYSTOLIC BLOOD PRESSURE: 119 MMHG | HEART RATE: 63 BPM

## 2023-09-14 DIAGNOSIS — I48.20 CHRONIC ATRIAL FIBRILLATION (HCC): Primary | ICD-10-CM

## 2023-09-14 LAB — INR BLD: 1.4

## 2023-09-14 PROCEDURE — 85610 PROTHROMBIN TIME: CPT

## 2023-09-14 PROCEDURE — 99211 OFF/OP EST MAY X REQ PHY/QHP: CPT

## 2023-09-14 NOTE — PROGRESS NOTES
[facility]  ANTICOAGULATION    Referring Provider: Dr Colleen Allen INR: 2.0 - 3.0     TODAY'S INR: 1.4     WARFARIN Dosage:  10 mg x 1 dose, then increase current dosage to 2.5 mg on Saturdays and 5 mg all other days of the week     INR (no units)   Date Value   09/14/2023 1.4   08/31/2023 1.9   08/30/2023 1.8   07/21/2023 1.9   05/26/2023 3.3   04/28/2023 2.6   04/13/2023 3.8       Hemoglobin   Date Value Ref Range Status   08/30/2023 14.3 12.0 - 16.0 g/dL Final     Hematocrit   Date Value Ref Range Status   08/30/2023 43.0 36 - 46 % Final     ALT   Date Value Ref Range Status   08/30/2023 51 (H) 5 - 33 U/L Final     AST   Date Value Ref Range Status   08/30/2023 53 (H) <32 U/L Final       Medication changes:  Started Metformin 500 mg BID and Lantus 15 units QAM following diagnosis of type 2 DM    Notes:    Fingerstick INR drawn per clinic protocol. Patient states no visible blood in urine and no black tarry stool. Denies any missed doses of warfarin. Sayda Rees had a fall at home and was brought to the ER by squad on 8/30/2023. Her BS was over 500 and she was admitted overnight for observation. She was given IV fluids and diagnosed with type 2 DM. She was started on Metformin 500 mg BID and is taking Lantus 15 units QAM. She would  like to start using Voltaren cream to her right knee, for arthritis pain relief, but is not using it \"yet\". She says she takes Tylenol \"every night\" to help her sleep and \"every once in a while, during the day\". No change in other maintenance medications. Sayda Rees says she has been trying to change her dietary habits and has been eating more \"meat and vegetables\" than she had been previously. She mentions that she has eaten \"broccoli and cauliflower, asparagus, and salads on occasion\", which could be contributing to her sub-therapeutic INR today.  We will have her take 10 mg warfarin tonight x 1 booster dose, and then increase weekly

## 2023-09-18 RX ORDER — GLUCOSAMINE HCL/CHONDROITIN SU 500-400 MG
CAPSULE ORAL
Qty: 300 STRIP | Refills: 1 | Status: SHIPPED | OUTPATIENT
Start: 2023-09-18

## 2023-09-18 NOTE — TELEPHONE ENCOUNTER
Patient is asking for refill on test strips. Patient last seen 8/28/23.      [facility] [place] Maintenance   Topic Date Due    Hepatitis B vaccine (1 of 3 - Risk 3-dose series) Never done    Shingles vaccine (2 of 3) 09/01/2014    COVID-19 Vaccine (4 - Moderna series) 03/12/2022    Flu vaccine (1) 08/01/2023    Depression Monitoring  02/27/2024    Annual Wellness Visit (AWV)  02/28/2024    DTaP/Tdap/Td vaccine (2 - Td or Tdap) 12/29/2028    DEXA (modify frequency per FRAX score)  Completed    Pneumococcal 65+ years Vaccine  Completed    Hepatitis A vaccine  Aged Out    Hib vaccine  Aged Out    Meningococcal (ACWY) vaccine  Aged Out    Depression Screen  Discontinued             (applicable per patient's age: Cancer Screenings, Depression Screening, Fall Risk Screening, Immunizations)    Hemoglobin A1C (%)   Date Value   08/30/2023 12.6 (H)   01/11/2013 6.4 (H)     LDL Cholesterol (mg/dL)   Date Value   08/16/2022 114     AST (U/L)   Date Value   08/30/2023 53 (H)     ALT (U/L)   Date Value   08/30/2023 51 (H)     BUN (mg/dL)   Date Value   08/31/2023 11      (goal A1C is < 7)   (goal LDL is <100) need 30-50% reduction from baseline     BP Readings from Last 3 Encounters:   09/14/23 119/60   09/05/23 120/78   08/31/23 (!) 114/58    (goal /80)      All Future Testing planned in [facility]:  Lab Frequency Next Occurrence   CBC with Auto Differential Once 11/01/2023   Comprehensive Metabolic Panel Once 51/24/3442   Lipid Panel Once 11/01/2023   TSH with Reflex Once 11/01/2023   Magnesium Once 11/01/2023   EKG 12 Lead Once 11/01/2023   XR CHEST (2 VW) Once 11/01/2023   ECHO Complete 2D W Doppler W Color Once 11/01/2022   Magnesium Once 02/27/2023   CBC with Auto Differential Once 08/29/6409   Basic Metabolic Panel Once 44/04/3497   Digoxin Level Once 03/16/2023   TSH with Reflex Once 06/15/2023   CBC with Auto Differential Once 06/15/2023   Comprehensive Metabolic Panel Once 61/34/6220   Lipid Panel Once

## 2023-09-20 DIAGNOSIS — G89.18 POST-OP PAIN: ICD-10-CM

## 2023-09-20 RX ORDER — OXYCODONE HYDROCHLORIDE 5 MG/1
TABLET ORAL
Qty: 15 TABLET | OUTPATIENT
Start: 2023-09-20

## 2023-09-27 RX ORDER — FAMOTIDINE 20 MG/1
TABLET, FILM COATED ORAL
Qty: 30 TABLET | Refills: 0 | Status: SHIPPED | OUTPATIENT
Start: 2023-09-27

## 2023-09-28 ENCOUNTER — HOSPITAL ENCOUNTER (OUTPATIENT)
Dept: PHARMACY | Age: 78
Setting detail: THERAPIES SERIES
Discharge: HOME | End: 2023-09-28
Payer: MEDICARE

## 2023-09-28 VITALS — SYSTOLIC BLOOD PRESSURE: 116 MMHG | DIASTOLIC BLOOD PRESSURE: 62 MMHG | HEART RATE: 68 BPM

## 2023-09-28 DIAGNOSIS — I48.20 CHRONIC ATRIAL FIBRILLATION (HCC): Primary | ICD-10-CM

## 2023-09-28 LAB — INR BLD: 1.8

## 2023-09-28 PROCEDURE — 85610 PROTHROMBIN TIME: CPT

## 2023-09-28 PROCEDURE — 99211 OFF/OP EST MAY X REQ PHY/QHP: CPT

## 2023-09-28 NOTE — PROGRESS NOTES
[facility]  Medication Management  ANTICOAGULATION    Referring Provider: Dr Crooks Heading INR: 2.0 - 3.0     TODAY'S INR: 1.8     WARFARIN Dosage:  7.5 mg x 1 dose, then continue current dosage of 2.5 mg on  and 5 mg all other days of the week     INR (no units)   Date Value   2023 1.8   2023 1.4   2023 1.9   2023 1.8   2023 1.9   2023 3.3   2023 2.6       Hemoglobin   Date Value Ref Range Status   2023 14.3 12.0 - 16.0 g/dL Final     Hematocrit   Date Value Ref Range Status   2023 43.0 36 - 46 % Final     ALT   Date Value Ref Range Status   2023 51 (H) 5 - 33 U/L Final     AST   Date Value Ref Range Status   2023 53 (H) <32 U/L Final       Medication changes:  None      Notes:    Fingerstick INR drawn per clinic protocol. Patient states no visible blood in urine and no black tarry stool. Denies any missed doses of warfarin. No change in other maintenance medications. Bethany Hernandez states that she did have a \"big chunk\" of lettuce on her alford sandwich last night for dinner. She says she has a salad \"about once a week\", but denies any other \"greens\" recently. Since her INR is slightly sub-therapeutic today, we will have her take 7.5 mg warfarin tonight x 1 booster dose, but then continue current weekly warfarin regimen as noted on her dosing calendar. We will recheck INR in 4 weeks. Patient acknowledges working in consult agreement with pharmacist as referred by his/her physician.                   For Pharmacy Admin Tracking Only    Intervention Detail: Adherence Monitorin and Dose Adjustment: 1, reason: Therapy Optimization  Total # of Interventions Recommended: 2  Total # of Interventions Accepted: 2  Time Spent (min): [facility], PharmD

## 2023-09-29 PROBLEM — E86.0 DEHYDRATION: Status: RESOLVED | Noted: 2023-08-30 | Resolved: 2023-09-29

## 2023-10-16 ENCOUNTER — TELEPHONE (OUTPATIENT)
Dept: FAMILY MEDICINE CLINIC | Age: 78
End: 2023-10-16

## 2023-10-16 NOTE — TELEPHONE ENCOUNTER
Patient is asking to speak with Kiana White or her Nurse about diabetes. Patient last seen 8/28/23.     Health Maintenance   Topic Date Due    Hepatitis B vaccine (1 of 3 - Risk 3-dose series) Never done    Shingles vaccine (2 of 3) 09/01/2014    COVID-19 Vaccine (4 - Moderna series) 03/12/2022    Flu vaccine (1) 08/01/2023    Depression Monitoring  02/27/2024    Annual Wellness Visit (AWV)  02/28/2024    DTaP/Tdap/Td vaccine (2 - Td or Tdap) 12/29/2028    DEXA (modify frequency per FRAX score)  Completed    Pneumococcal 65+ years Vaccine  Completed    Hepatitis A vaccine  Aged Out    Hib vaccine  Aged Out    Meningococcal (ACWY) vaccine  Aged Out    Depression Screen  Discontinued             (applicable per patient's age: Cancer Screenings, Depression Screening, Fall Risk Screening, Immunizations)    Hemoglobin A1C (%)   Date Value   08/30/2023 12.6 (H)   01/11/2013 6.4 (H)     LDL Cholesterol (mg/dL)   Date Value   08/16/2022 114     AST (U/L)   Date Value   08/30/2023 53 (H)     ALT (U/L)   Date Value   08/30/2023 51 (H)     BUN (mg/dL)   Date Value   08/31/2023 11      (goal A1C is < 7)   (goal LDL is <100) need 30-50% reduction from baseline     BP Readings from Last 3 Encounters:   09/28/23 116/62   09/14/23 119/60   09/05/23 120/78    (goal /80)      All Future Testing planned in [facility]:  Lab Frequency Next Occurrence   CBC with Auto Differential Once 11/01/2023   Comprehensive Metabolic Panel Once 82/52/5177   Lipid Panel Once 11/01/2023   TSH with Reflex Once 11/01/2023   Magnesium Once 11/01/2023   EKG 12 Lead Once 11/01/2023   XR CHEST (2 VW) Once 11/01/2023   ECHO Complete 2D W Doppler W Color Once 11/01/2022   Magnesium Once 02/27/2023   CBC with Auto Differential Once 00/15/9743   Basic Metabolic Panel Once 60/31/7340   Digoxin Level Once 03/16/2023   TSH with Reflex Once 06/15/2023   CBC with Auto Differential Once 06/15/2023   Comprehensive Metabolic Panel Once 15/39/8548   Lipid Panel Once

## 2023-10-16 NOTE — TELEPHONE ENCOUNTER
Pt is inquiring about starting Mounjaro, her granddaughter did a lot of research on this medication and she feels this one would be the best option for pt.  If PCP agreeable, please send to Claudia

## 2023-10-17 RX ORDER — TIRZEPATIDE 2.5 MG/.5ML
2.5 INJECTION, SOLUTION SUBCUTANEOUS WEEKLY
Qty: 2 ML | Refills: 0 | Status: SHIPPED | OUTPATIENT
Start: 2023-10-17 | End: 2023-11-08

## 2023-10-17 RX ORDER — TIRZEPATIDE 2.5 MG/.5ML
2.5 INJECTION, SOLUTION SUBCUTANEOUS WEEKLY
Qty: 2 ML | Refills: 0 | Status: SHIPPED | OUTPATIENT
Start: 2023-10-17 | End: 2023-10-17 | Stop reason: SDUPTHER

## 2023-10-17 NOTE — TELEPHONE ENCOUNTER
Beronica Dupree with information. She would like prescription sent to [facility] and she will check on the cost. She also stated that she would like to switch her prescriptions for her insulin, pens and metformin to [facility] as well. Please advise.

## 2023-10-17 NOTE — TELEPHONE ENCOUNTER
Yes I agree Andrena Se would be an ideal med but usually monthly cost for medicare patients are between $600- $1200 per month. Most cannot afford that. Happy to try sending it to pharmacy and pt can call and ask cost , if affordable then she can come to office to start. Please realize this med is branded and also in a drug category for both diabetes and obesity and insurance companies including medicare do not have good coverage for these  meds yet.      Thanks  Ronni SNIDER CNP

## 2023-10-18 DIAGNOSIS — Z79.4 TYPE 2 DIABETES MELLITUS WITH HYPERGLYCEMIA, WITH LONG-TERM CURRENT USE OF INSULIN (HCC): ICD-10-CM

## 2023-10-18 DIAGNOSIS — E11.65 TYPE 2 DIABETES MELLITUS WITH HYPERGLYCEMIA, WITH LONG-TERM CURRENT USE OF INSULIN (HCC): ICD-10-CM

## 2023-10-18 RX ORDER — INSULIN GLARGINE 100 [IU]/ML
15 INJECTION, SOLUTION SUBCUTANEOUS
Qty: 5 ADJUSTABLE DOSE PRE-FILLED PEN SYRINGE | Refills: 3 | Status: SHIPPED | OUTPATIENT
Start: 2023-10-18 | End: 2023-10-19

## 2023-10-18 NOTE — TELEPHONE ENCOUNTER
Cordell Moiz called in stating that she will be able to afford the [facility]. She needs to know how much to inject and what to reduce her other medication to. She does not want to go to Diabetic education for this. She stated that her granddaughter can help her. Please advise.

## 2023-10-18 NOTE — TELEPHONE ENCOUNTER
I currently need to know her sugar numbers to determine how much insulin to decrease depends on her current control.      Usually 20% reduction in insulin with starting GLP-1     Thanks  Dewey Kanner APRN CNP

## 2023-10-19 RX ORDER — INSULIN GLARGINE 100 [IU]/ML
10 INJECTION, SOLUTION SUBCUTANEOUS
Qty: 5 ADJUSTABLE DOSE PRE-FILLED PEN SYRINGE | Refills: 3
Start: 2023-10-19

## 2023-10-19 NOTE — PROGRESS NOTES
Inform patient 20% reduction in lantus so she normally is taking 15 units daily , I would like her to decrease to 10 units of lantus daily and she can start that today if she has not taken her dose yet. If she has start it with tomorrow's dose and stay at 10 units daily I notified coumadin clinic of this med start for mounjaro. Remember not to overeat, your appetite will be less. Good hydration still important. Does she have something to weight herself weekly ? Need to have close follow up has appts scheduled. Pt notified.    Dewey Kanner APRN CNP

## 2023-10-25 ENCOUNTER — HOSPITAL ENCOUNTER (OUTPATIENT)
Dept: PHARMACY | Age: 78
Setting detail: THERAPIES SERIES
Discharge: HOME | End: 2023-10-25
Payer: MEDICARE

## 2023-10-25 ENCOUNTER — ANTI-COAG VISIT (OUTPATIENT)
Dept: PHARMACY | Age: 78
End: 2023-10-25

## 2023-10-25 ENCOUNTER — HOSPITAL ENCOUNTER (OUTPATIENT)
Age: 78
Discharge: HOME | End: 2023-10-25
Payer: MEDICARE

## 2023-10-25 VITALS — SYSTOLIC BLOOD PRESSURE: 98 MMHG | HEART RATE: 70 BPM | DIASTOLIC BLOOD PRESSURE: 50 MMHG

## 2023-10-25 DIAGNOSIS — E78.5 HYPERLIPIDEMIA, UNSPECIFIED HYPERLIPIDEMIA TYPE: ICD-10-CM

## 2023-10-25 DIAGNOSIS — L29.9 ITCHING: ICD-10-CM

## 2023-10-25 DIAGNOSIS — I49.5 SSS (SICK SINUS SYNDROME) (HCC): ICD-10-CM

## 2023-10-25 DIAGNOSIS — I48.20 CHRONIC ATRIAL FIBRILLATION (HCC): Primary | ICD-10-CM

## 2023-10-25 DIAGNOSIS — I48.20 CHRONIC ATRIAL FIBRILLATION (HCC): ICD-10-CM

## 2023-10-25 LAB
ALBUMIN SERPL-MCNC: 4 G/DL (ref 3.5–5.2)
ALP SERPL-CCNC: 81 U/L (ref 35–104)
ALT SERPL-CCNC: 51 U/L (ref 5–33)
ANION GAP SERPL CALCULATED.3IONS-SCNC: 15 MMOL/L (ref 9–17)
AST SERPL-CCNC: 35 U/L
BASOPHILS # BLD: 0.04 K/UL (ref 0–0.2)
BASOPHILS NFR BLD: 0 % (ref 0–2)
BILIRUB SERPL-MCNC: 0.4 MG/DL (ref 0.3–1.2)
BUN SERPL-MCNC: 19 MG/DL (ref 8–23)
BUN/CREAT SERPL: 19 (ref 9–20)
CALCIUM SERPL-MCNC: 9.3 MG/DL (ref 8.6–10.4)
CHLORIDE SERPL-SCNC: 104 MMOL/L (ref 98–107)
CHOLEST SERPL-MCNC: 169 MG/DL
CHOLESTEROL/HDL RATIO: 4.1
CO2 SERPL-SCNC: 20 MMOL/L (ref 20–31)
CREAT SERPL-MCNC: 1 MG/DL (ref 0.5–0.9)
EOSINOPHIL # BLD: 0.18 K/UL (ref 0–0.4)
EOSINOPHILS RELATIVE PERCENT: 2 % (ref 0–5)
ERYTHROCYTE [DISTWIDTH] IN BLOOD BY AUTOMATED COUNT: 15.3 % (ref 12.1–15.2)
GFR SERPL CREATININE-BSD FRML MDRD: 57 ML/MIN/1.73M2
GLUCOSE SERPL-MCNC: 116 MG/DL (ref 70–99)
HCT VFR BLD AUTO: 41.9 % (ref 36–46)
HDLC SERPL-MCNC: 41 MG/DL
HGB BLD-MCNC: 13.8 G/DL (ref 12–16)
IMM GRANULOCYTES # BLD AUTO: 0.05 K/UL (ref 0–0.3)
IMM GRANULOCYTES NFR BLD: 1 % (ref 0–5)
INR BLD: 1.6
INTERNATIONAL NORMALIZATION RATIO, POC: 1.6
LDLC SERPL CALC-MCNC: 87 MG/DL (ref 0–130)
LYMPHOCYTES NFR BLD: 1.12 K/UL (ref 1–4.8)
LYMPHOCYTES RELATIVE PERCENT: 11 % (ref 15–40)
MAGNESIUM SERPL-MCNC: 2.2 MG/DL (ref 1.6–2.6)
MCH RBC QN AUTO: 28.6 PG (ref 26–34)
MCHC RBC AUTO-ENTMCNC: 32.9 G/DL (ref 31–37)
MCV RBC AUTO: 86.9 FL (ref 80–100)
MONOCYTES NFR BLD: 0.79 K/UL (ref 0–1)
MONOCYTES NFR BLD: 8 % (ref 4–8)
NEUTROPHILS NFR BLD: 78 % (ref 47–75)
NEUTS SEG NFR BLD: 7.69 K/UL (ref 2.5–7)
PATIENT FASTING?: NO
PLATELET # BLD AUTO: 238 K/UL (ref 140–450)
PMV BLD AUTO: 10.9 FL (ref 6–12)
POTASSIUM SERPL-SCNC: 4.1 MMOL/L (ref 3.7–5.3)
PROT SERPL-MCNC: 7.2 G/DL (ref 6.4–8.3)
RBC # BLD AUTO: 4.82 M/UL (ref 4–5.2)
SODIUM SERPL-SCNC: 139 MMOL/L (ref 135–144)
TRIGL SERPL-MCNC: 206 MG/DL
TSH SERPL DL<=0.05 MIU/L-ACNC: 1.46 UIU/ML (ref 0.3–5)
WBC OTHER # BLD: 9.9 K/UL (ref 3.5–11)

## 2023-10-25 PROCEDURE — 85025 COMPLETE CBC W/AUTO DIFF WBC: CPT

## 2023-10-25 PROCEDURE — 84443 ASSAY THYROID STIM HORMONE: CPT

## 2023-10-25 PROCEDURE — 83735 ASSAY OF MAGNESIUM: CPT

## 2023-10-25 PROCEDURE — 85610 PROTHROMBIN TIME: CPT

## 2023-10-25 PROCEDURE — 99211 OFF/OP EST MAY X REQ PHY/QHP: CPT

## 2023-10-25 PROCEDURE — 80053 COMPREHEN METABOLIC PANEL: CPT

## 2023-10-25 PROCEDURE — 80061 LIPID PANEL: CPT

## 2023-10-25 PROCEDURE — 36415 COLL VENOUS BLD VENIPUNCTURE: CPT

## 2023-10-25 RX ORDER — ACETAMINOPHEN/DIPHENHYDRAMINE 500MG-25MG
2 TABLET ORAL NIGHTLY PRN
COMMUNITY

## 2023-10-25 NOTE — PROGRESS NOTES
[facility]  Medication Management  ANTICOAGULATION    Referring Provider: Dr Iris Malave INR: 2.0-3.0     TODAY'S INR: 1.6     WARFARIN Dosage:  10 mg x 1, then increase dose to 5 mg daily     INR (no units)   Date Value   10/25/2023 1.6   2023 1.8   2023 1.4   2023 1.9   2023 1.8   2023 1.9   2023 3.3       Hemoglobin   Date Value Ref Range Status   10/25/2023 13.8 12.0 - 16.0 g/dL Final     Hematocrit   Date Value Ref Range Status   10/25/2023 41.9 36.0 - 46.0 % Final     ALT   Date Value Ref Range Status   10/25/2023 51 (H) 5 - 33 U/L Final     AST   Date Value Ref Range Status   10/25/2023 35 (H) <32 U/L Final       Medication changes:  Mounjaro started  Lantus decreased from 15 U daily to 10 U daily  Taking Tylenol PM 2 QHS    Notes:    Fingerstick INR drawn per clinic protocol. Patient states no visible blood in urine, black tarry stool or ER visits and denies any missed or extra doses of warfarin. Admits to falling to her knees last night but was able to get back up and had no injuries. Since her last visit, she has started [facility] and her Lantus dose was decreased from 15 U daily to 10 U daily as a result. States that she has also been taking 2 tablets of Tylenol PM at bedtime but it hasn't been helping her sleep much. No change in other maintenance medications or in diet. INR is subtherapeutic again today so will order 10 mg this evening and will increase her maintenance dose to 5 mg daily thereafter. Will recheck INR in 3 weeks. Patient acknowledges working in consult agreement with pharmacist as referred by his/her physician.     For Pharmacy Admin Tracking Only    Intervention Detail: Adherence Monitorin and Dose Adjustment: 1, reason: Therapy Optimization  Total # of Interventions Recommended: 2  Total # of Interventions Accepted: 2  Time Spent (min): 30    Griffin Hope PharmD 10/25/2023 11:21 AM

## 2023-10-26 RX ORDER — FAMOTIDINE 20 MG/1
TABLET, FILM COATED ORAL
Qty: 30 TABLET | Refills: 0 | Status: SHIPPED | OUTPATIENT
Start: 2023-10-26

## 2023-10-26 NOTE — TELEPHONE ENCOUNTER
Last OV: 8/28/2023  Last RX: 9/27/2023   Next scheduled apt: 10/31/2023      Surescripts requesting refill

## 2023-10-31 ENCOUNTER — OFFICE VISIT (OUTPATIENT)
Dept: CARDIOLOGY CLINIC | Age: 78
End: 2023-10-31
Payer: MEDICARE

## 2023-10-31 VITALS — SYSTOLIC BLOOD PRESSURE: 120 MMHG | DIASTOLIC BLOOD PRESSURE: 70 MMHG

## 2023-10-31 DIAGNOSIS — I48.20 CHRONIC ATRIAL FIBRILLATION (HCC): Primary | ICD-10-CM

## 2023-10-31 DIAGNOSIS — E55.9 AVITAMINOSIS D: ICD-10-CM

## 2023-10-31 DIAGNOSIS — I34.0 MITRAL VALVE INSUFFICIENCY, UNSPECIFIED ETIOLOGY: ICD-10-CM

## 2023-10-31 DIAGNOSIS — E78.5 HYPERLIPIDEMIA, UNSPECIFIED HYPERLIPIDEMIA TYPE: ICD-10-CM

## 2023-10-31 PROCEDURE — G8484 FLU IMMUNIZE NO ADMIN: HCPCS | Performed by: INTERNAL MEDICINE

## 2023-10-31 PROCEDURE — G8399 PT W/DXA RESULTS DOCUMENT: HCPCS | Performed by: INTERNAL MEDICINE

## 2023-10-31 PROCEDURE — G8428 CUR MEDS NOT DOCUMENT: HCPCS | Performed by: INTERNAL MEDICINE

## 2023-10-31 PROCEDURE — 1090F PRES/ABSN URINE INCON ASSESS: CPT | Performed by: INTERNAL MEDICINE

## 2023-10-31 PROCEDURE — 99214 OFFICE O/P EST MOD 30 MIN: CPT | Performed by: INTERNAL MEDICINE

## 2023-10-31 PROCEDURE — 1123F ACP DISCUSS/DSCN MKR DOCD: CPT | Performed by: INTERNAL MEDICINE

## 2023-10-31 PROCEDURE — 1036F TOBACCO NON-USER: CPT | Performed by: INTERNAL MEDICINE

## 2023-10-31 PROCEDURE — G8417 CALC BMI ABV UP PARAM F/U: HCPCS | Performed by: INTERNAL MEDICINE

## 2023-10-31 RX ORDER — DIGOXIN 125 MCG
125 TABLET ORAL DAILY
Qty: 90 TABLET | Refills: 3 | Status: SHIPPED | OUTPATIENT
Start: 2023-10-31

## 2023-11-02 NOTE — PROGRESS NOTES
Ov Dr. Shaquille Jack for one year follow up   One hospital stay [facility]/diabetes   No chest pain   No palpitations   Itching getting worse -Shelia Pyle  Treating   Has a list of meds but Mg and direction not   On list     No changes    Follow up in one year
and COPD.  5.  Insulin-dependent diabetes. 6.  Mild LV dysfunction, EF of 45% to 50% range. 7.  Hypertension, well controlled. 8.  Hyperlipidemia, well controlled. 9.  Severe arthritis in both knees, her right hip and her right shoulder. PLAN:  1. No change in medications. 2.  Follow up in one year. DISCUSSION:  Liana Goetz is doing excellent from a cardiac standpoint. Her risks are nicely modified. Pacemaker is working appropriately and her blood pressure and rates are well controlled. I made no change in medications. She is pacing 78% of the time and she has no tachyarrhythmias. I will plan on meeting with her in one year unless a problem would develop. Thank you very much for allowing me the privilege of seeing Mrs. Hall January. If you have any questions on my thoughts, please do not hesitate to contact me.     Sincerely,        Mark Barrios MD    D: 10/31/2023 14:27:34     T: 10/31/2023 14:33:36     [id number]  Job#: [id number]   Doc#: [id number]

## 2023-11-06 ENCOUNTER — OFFICE VISIT (OUTPATIENT)
Dept: FAMILY MEDICINE CLINIC | Age: 78
End: 2023-11-06

## 2023-11-06 VITALS
WEIGHT: 223 LBS | BODY MASS INDEX: 39.51 KG/M2 | OXYGEN SATURATION: 97 % | HEIGHT: 63 IN | HEART RATE: 65 BPM | SYSTOLIC BLOOD PRESSURE: 112 MMHG | DIASTOLIC BLOOD PRESSURE: 70 MMHG

## 2023-11-06 DIAGNOSIS — M51.36 DDD (DEGENERATIVE DISC DISEASE), LUMBAR: ICD-10-CM

## 2023-11-06 DIAGNOSIS — E11.49 DM (DIABETES MELLITUS), TYPE 2 WITH NEUROLOGICAL COMPLICATIONS (HCC): Primary | ICD-10-CM

## 2023-11-06 DIAGNOSIS — L98.9 CHANGING SKIN LESION: ICD-10-CM

## 2023-11-06 DIAGNOSIS — Z23 NEED FOR INFLUENZA VACCINATION: ICD-10-CM

## 2023-11-06 DIAGNOSIS — L29.9 ITCHING: ICD-10-CM

## 2023-11-06 DIAGNOSIS — M54.17 LUMBOSACRAL RADICULOPATHY AT L5: ICD-10-CM

## 2023-11-06 RX ORDER — NYSTATIN 100000 U/G
CREAM TOPICAL
Qty: 1 EACH | Refills: 0 | Status: SHIPPED | OUTPATIENT
Start: 2023-11-06

## 2023-11-06 RX ORDER — ARIPIPRAZOLE 2 MG/1
2 TABLET ORAL DAILY
Qty: 30 TABLET | Refills: 0 | Status: SHIPPED | OUTPATIENT
Start: 2023-11-06

## 2023-11-06 RX ORDER — TIRZEPATIDE 5 MG/.5ML
5 INJECTION, SOLUTION SUBCUTANEOUS WEEKLY
Qty: 4 ADJUSTABLE DOSE PRE-FILLED PEN SYRINGE | Refills: 2 | Status: SHIPPED | OUTPATIENT
Start: 2023-11-06

## 2023-11-06 RX ORDER — HYDROCODONE BITARTRATE AND ACETAMINOPHEN 5; 325 MG/1; MG/1
0.5 TABLET ORAL EVERY 12 HOURS PRN
Qty: 7 TABLET | Refills: 0 | Status: SHIPPED | OUTPATIENT
Start: 2023-11-06 | End: 2023-11-13

## 2023-11-06 RX ORDER — INSULIN GLARGINE 100 [IU]/ML
5 INJECTION, SOLUTION SUBCUTANEOUS
Qty: 5 ADJUSTABLE DOSE PRE-FILLED PEN SYRINGE | Refills: 0 | Status: SHIPPED
Start: 2023-11-06

## 2023-11-06 NOTE — PROGRESS NOTES
Yes    Pt c/o itching, feels like she has bugs crawling on her. , will check ammonia level, pepcid taken with little help will trial abilify       Reviewed prior notes Cardiology, Orthopedics, and Previous PCP  Reviewed previous Labs, Imaging, and Hospital Records    Past Medical History:   Diagnosis Date    Atrial fibrillation ([address])     CHF (congestive heart failure) ([address])     Diverticulosis 07/27/2021    per Colonoscopy per Dr. Daryn Etienne    Gout     Hearing loss 2015    bilateral hearing aids     History of colon polyps 2016    Hypertension     Kidney stones     Mitral valve insufficiency     Obesity, morbid, BMI 40.0-49.9 ([address])     Osteoarthritis     Primary osteoarthritis of both knees     Dr. Jaleesa Licea    Pulmonary HTN [facility])       Past Surgical History:   Procedure Laterality Date    CARPAL TUNNEL RELEASE      bilaterally    COLONOSCOPY  11/28/2016    Melisa Suggs MD    COLONOSCOPY  05/2017    at [address] [place] [address]. PYlori positive, adenoma, hyperplastic polyp    COLONOSCOPY  06/14/2018, 10/2019, 10/27/2021    DR. Boles -polyp x 1, diverticulosis    JOINT REPLACEMENT Left 07/17/2019    knee    LITHOTRIPSY      NERVE BLOCK Left 11/06/2020    GENICULAR NERVE BLOCK LEFT KNEE performed by Sergei Pulliam MD at [address] [place] [address] N/A 3/8/2023    PACEMAKER INSERTION PERMANENT performed by Ritu Dobbs MD at [address] N/A 09/04/2020    L3,L4 MEDIAL BRANCH BLOCK , L5 DORSAL RAMI INJECTION performed by Sergei Pulliam MD at [address] Bilateral 10/02/2020    BILATERAL L3-4 MEDIAL BRANCH AND L5 DORSAL RAMI INJECTION performed by Sergei Pulliam MD at [address] Right 01/15/2021    RFA, L3-L4 MEDIAL BRANCH AND L-5 DORSEL RAMI INJECTION RIGHT performed by Sergei Pulliam MD at [address] Left 07/17/2019    LEFT KNEE TOTAL ARTHROPLASTY- DEPUY performed by Chetan Cox DO at [address] [facility]

## 2023-11-13 ENCOUNTER — TELEPHONE (OUTPATIENT)
Dept: FAMILY MEDICINE CLINIC | Age: 78
End: 2023-11-13

## 2023-11-13 DIAGNOSIS — M51.36 DDD (DEGENERATIVE DISC DISEASE), LUMBAR: ICD-10-CM

## 2023-11-13 DIAGNOSIS — M54.17 LUMBOSACRAL RADICULOPATHY AT L5: Primary | ICD-10-CM

## 2023-11-13 NOTE — TELEPHONE ENCOUNTER
----- Message from Adriana Matute sent at 11/13/2023  8:43 AM EST -----  Subject: Message to Provider    QUESTIONS  Information for Provider? Patient would like PCP to know that she is   almost out of her pain medication and she does not know what to do about   that. She states she is to be sent to pain management but she does not   know the doctor's name that PCP wants her to go to. She says it is not Dr. Seth Loomis. She has had him before and does not want to see him.   ---------------------------------------------------------------------------  --------------  Clinton CHRISTOPHER  [phone]; Do not leave any message, patient will call back for answer  ---------------------------------------------------------------------------  --------------  SCRIPT ANSWERS  Relationship to Patient?  Self

## 2023-11-13 NOTE — TELEPHONE ENCOUNTER
----- Message from Didier Vinson sent at 11/13/2023  8:45 AM EST -----  Subject: Refill Request    QUESTIONS  Name of Medication? HYDROcodone-acetaminophen (NORCO) 5-325 MG per tablet  Patient-reported dosage and instructions? 1/2 tablet 2 times daily   How many days do you have left? 1  Preferred Pharmacy? [id number]  Pharmacy phone number (if available)? [phone]  Additional Information for Provider? Patient states that the medication   does not help very much.   ---------------------------------------------------------------------------  --------------  CALL BACK INFO  What is the best way for the office to contact you? Do not leave any   message, patient will call back for answer  Preferred Call Back Phone Number? [phone]  ---------------------------------------------------------------------------  --------------  SCRIPT ANSWERS  Relationship to Patient?  Self

## 2023-11-14 NOTE — TELEPHONE ENCOUNTER
Thank pt for the feedback on pain med Norco.     Pt has exhausted my ability to offer further pain med    Tried:   Elavil,   Cymbalta  Tylenol  Cannot use ultram with codeine allergy  Gabapentin caused swelling (allergy)  Tried topical meds  Lumbar xray with DDD, arthritis   Physical therapy- pt stated made it worse and did not continue. Norco states ineffective. In on wegovy with good effect of weight loss. Would not use muscle relaxant due to pt with limited mobility now lives alone and spends time mostly in chair,     Option available at this point of multiple above med trials and therapy , would refer to pain management. Dr. Kirk Iglesias available in [facility] location. Injections , TENS , increase activity. Can help   Can consider lumbar MRI     Please check with pt on her blood sugar numbers with stopping lantus and increasing mounjaro. Thanks    Back to me for referral if pt agreeable.    Lumbar xray done

## 2023-11-15 ENCOUNTER — HOSPITAL ENCOUNTER (OUTPATIENT)
Dept: PHARMACY | Age: 78
Setting detail: THERAPIES SERIES
Discharge: HOME | End: 2023-11-15
Payer: MEDICARE

## 2023-11-15 VITALS — HEART RATE: 71 BPM | SYSTOLIC BLOOD PRESSURE: 131 MMHG | DIASTOLIC BLOOD PRESSURE: 63 MMHG

## 2023-11-15 DIAGNOSIS — I48.20 CHRONIC ATRIAL FIBRILLATION (HCC): Primary | ICD-10-CM

## 2023-11-15 LAB — INR BLD: 2.4

## 2023-11-15 PROCEDURE — 85610 PROTHROMBIN TIME: CPT

## 2023-11-15 PROCEDURE — 99211 OFF/OP EST MAY X REQ PHY/QHP: CPT

## 2023-11-15 NOTE — PROGRESS NOTES
[facility]ication Management  ANTICOAGULATION    Referring Provider: Dr Rock Penaloza INR: 2.0 - 3.0     TODAY'S INR: 2.4     WARFARIN Dosage:  5 mg EVERY DAY of the week     INR (no units)   Date Value   11/15/2023 2.4   10/25/2023 1.6   2023 1.8   2023 1.4   2023 1.9   2023 1.8   2023 1.9     INR,(POC) (no units)   Date Value   10/25/2023 1.6       Hemoglobin   Date Value Ref Range Status   10/25/2023 13.8 12.0 - 16.0 g/dL Final     Hematocrit   Date Value Ref Range Status   10/25/2023 41.9 36.0 - 46.0 % Final     ALT   Date Value Ref Range Status   10/25/2023 51 (H) 5 - 33 U/L Final     AST   Date Value Ref Range Status   10/25/2023 35 (H) <32 U/L Final       Medication changes:  Weaned off Lantus insulin and increased Mounjaro from 2.5 to 5 mg weekly       Notes:    Fingerstick INR drawn per clinic protocol. Patient states no visible blood in urine and no black tarry stool. Denies any missed doses of warfarin. Randy Ghosh had an appointment with Zacarias Lawson CNP on 2023 and has been weaning off her Lantus insulin. She says she has increased her Mounjaro from 2.5 mg to 5 mg weekly as instructed and is now off the Lantus insulin. She still has chronic back pain and received 7 tablets of Norco 5/325 mg per Zacarias Lawson CNP. She has been using them sparingly as recommended and only 1/2 tablet as needed, but says she has used them all already and is waiting to be set up for an appointment with pain specialist. No change in other maintenance medications or in diet. Since her INR is therapeutic today, we will continue current weekly warfarin regimen and recheck INR in 4 weeks. Patient acknowledges working in consult agreement with pharmacist as referred by his/her physician.                   For Pharmacy Admin Tracking Only    Intervention Detail: Adherence Monitorin  Total # of Interventions Recommended: 1  Total # of Interventions Accepted:

## 2023-11-16 RX ORDER — DULOXETIN HYDROCHLORIDE 30 MG/1
30 CAPSULE, DELAYED RELEASE ORAL DAILY
Qty: 30 CAPSULE | Refills: 1 | Status: SHIPPED | OUTPATIENT
Start: 2023-11-16

## 2023-11-16 NOTE — TELEPHONE ENCOUNTER
Raymond Ponce is agreeable to pain management. She was asking if she could have something until she can see them. She stated that her family is coming in and she can hardly get around. Please advise.

## 2023-11-16 NOTE — TELEPHONE ENCOUNTER
I would recommend the cymbalta 30 mg daily which she did well on before.      Thanks  Sent to pharmacy

## 2023-11-17 NOTE — TELEPHONE ENCOUNTER
Nghia Mcclellany with information on medication recommended. She was not happy with that recommendation, she stated that does not help her at all.

## 2023-12-04 ENCOUNTER — TELEMEDICINE (OUTPATIENT)
Dept: FAMILY MEDICINE CLINIC | Age: 78
End: 2023-12-04

## 2023-12-04 DIAGNOSIS — E11.49 DM (DIABETES MELLITUS), TYPE 2 WITH NEUROLOGICAL COMPLICATIONS (HCC): ICD-10-CM

## 2023-12-04 DIAGNOSIS — M54.17 LUMBOSACRAL RADICULOPATHY AT L5: Primary | ICD-10-CM

## 2023-12-04 DIAGNOSIS — L29.9 ITCHING: ICD-10-CM

## 2023-12-04 DIAGNOSIS — I48.20 CHRONIC ATRIAL FIBRILLATION (HCC): ICD-10-CM

## 2023-12-04 DIAGNOSIS — F33.1 MAJOR DEPRESSIVE DISORDER, RECURRENT, MODERATE (HCC): ICD-10-CM

## 2023-12-04 RX ORDER — ARIPIPRAZOLE 2 MG/1
2 TABLET ORAL DAILY
Qty: 30 TABLET | Refills: 2 | Status: SHIPPED | OUTPATIENT
Start: 2023-12-04

## 2023-12-04 RX ORDER — DULOXETIN HYDROCHLORIDE 30 MG/1
30 CAPSULE, DELAYED RELEASE ORAL DAILY
Qty: 90 CAPSULE | Refills: 1 | Status: SHIPPED | OUTPATIENT
Start: 2023-12-04

## 2023-12-09 NOTE — PROGRESS NOTES
Documentation:  I communicated with the patient and/or health care decision maker about  cymbalta new start, diabetes control with mounjaro. Itching helped with abilify and also mood stabilizer. Needs more bactroban for nasal dryness (hx nosebleeds)     Details of this discussion including any medical advice provided:   Continue current meds working well   Informed [place] with coumadin clinic about weight loss and also medication doing well. Pt states I went out in the garage for the first time in a long time. Weight loss 15# so far. Good water hydration encouraged     Pt feels she is up and around more;       1. Lumbosacral radiculopathy at L5  Continue cymbalta  Good hydration   Up and around in home    2. DM (diabetes mellitus), type 2 with neurological complications (HCC)  On metformin and mounjaro  Hemoglobin A1C   Date Value Ref Range Status   08/30/2023 12.6 (H) 4.0 - 6.0 % Final         3. Itching  Abilify working well    4. Major depressive disorder, recurrent, moderate (HCC)  Cymbalta helping    5. Chronic atrial fibrillation (HCC)  Rate controlled with lanoxin, cardizem and coumadin      Total Time: minutes: 5-10 minutes    Lorenzo Merino was evaluated through a synchronous (real-time) audio encounter. Patient identification was verified at the start of the visit. She (or guardian if applicable) is aware that this is a billable service, which includes applicable co-pays. This visit was conducted with the patient's (and/or legal guardian's) verbal consent. She has not had a related appointment within my department in the past 7 days or scheduled within the next 24 hours. The patient was located at Home: [address] [place] [address] [address] [place] [address]. The provider was located at [facility] (Appt Dept): [address] [place] [address]. Note: not billable if this call serves to triage the patient into an appointment for the relevant concern    Lorenzo Merino is a 78 y.o.

## 2023-12-13 ENCOUNTER — HOSPITAL ENCOUNTER (OUTPATIENT)
Dept: PHARMACY | Age: 78
Setting detail: THERAPIES SERIES
Discharge: HOME | End: 2023-12-13
Payer: MEDICARE

## 2023-12-13 VITALS
SYSTOLIC BLOOD PRESSURE: 117 MMHG | WEIGHT: 215 LBS | BODY MASS INDEX: 38.7 KG/M2 | HEART RATE: 66 BPM | DIASTOLIC BLOOD PRESSURE: 70 MMHG

## 2023-12-13 LAB — INR BLD: 3.2

## 2023-12-13 PROCEDURE — 99211 OFF/OP EST MAY X REQ PHY/QHP: CPT

## 2023-12-13 PROCEDURE — 85610 PROTHROMBIN TIME: CPT

## 2023-12-13 NOTE — PROGRESS NOTES
[facility]  Medication Management  ANTICOAGULATION    Referring Provider: Dr Cornelio Baker INR: 2.0-3.0     TODAY'S INR: 3.2     WARFARIN Dosage: Continue 5 mg daily       INR (no units)   Date Value   2023 3.2   11/15/2023 2.4   10/25/2023 1.6   2023 1.8   2023 1.4   2023 1.9   2023 1.8     INR,(POC) (no units)   Date Value   10/25/2023 1.6       Hemoglobin   Date Value Ref Range Status   10/25/2023 13.8 12.0 - 16.0 g/dL Final     Hematocrit   Date Value Ref Range Status   10/25/2023 41.9 36.0 - 46.0 % Final     ALT   Date Value Ref Range Status   10/25/2023 51 (H) 5 - 33 U/L Final     AST   Date Value Ref Range Status   10/25/2023 35 (H) <32 U/L Final       Medication changes: Only taking diltiazem 60 mg just 1QD now  Cymbalta 30 mg QD restarted    Notes:    Fingerstick INR drawn per clinic protocol. Patient states no visible blood in urine, black tarry stool, falls or ER visits and denies any missed or extra doses of warfarin. Only taking diltiazem 60 mg once daily now and she was restarted on Cymbalta 30 mg QD, mainly for pain. No change in other maintenance medications or in diet. INR is slightly supratherapeutic so will continue 5 mg daily and will let her bring her INR down with Vitamin K containing foods and will recheck INR in 4 weeks. Patient acknowledges working in consult agreement with pharmacist as referred by his/her physician.     For Pharmacy Admin Tracking Only    Intervention Detail: Adherence Monitorin  Total # of Interventions Recommended: 1  Total # of Interventions Accepted: 1  Time Spent (min): 45    Jenelle Brewer PharmD 2023 1:56 PM

## 2023-12-28 RX ORDER — ALLOPURINOL 300 MG/1
TABLET ORAL
Qty: 45 TABLET | Refills: 3 | Status: SHIPPED | OUTPATIENT
Start: 2023-12-28

## 2024-01-08 ENCOUNTER — TELEPHONE (OUTPATIENT)
Dept: FAMILY MEDICINE CLINIC | Age: 79
End: 2024-01-08

## 2024-01-08 RX ORDER — TIRZEPATIDE 7.5 MG/.5ML
7.5 INJECTION, SOLUTION SUBCUTANEOUS WEEKLY
Qty: 4 ADJUSTABLE DOSE PRE-FILLED PEN SYRINGE | Refills: 2 | Status: SHIPPED | OUTPATIENT
Start: 2024-01-08

## 2024-01-08 NOTE — TELEPHONE ENCOUNTER
Jo said she needs her mounjaro increased.  She said it did not help with her appetite. She would need this sent in by Wed, because she takes her shot Thursdays. Please let Jo know.      Health Maintenance   Topic Date Due    Respiratory Syncytial Virus (RSV) Pregnant or age 60 yrs+ (1 - 1-dose 60+ series) Never done    Shingles vaccine (2 of 3) 09/01/2014    COVID-19 Vaccine (4 - 2023-24 season) 09/01/2023    Depression Monitoring  02/27/2024    Annual Wellness Visit (Medicare)  02/28/2024    DTaP/Tdap/Td vaccine (2 - Td or Tdap) 12/29/2028    DEXA (modify frequency per FRAX score)  Completed    Flu vaccine  Completed    Pneumococcal 65+ years Vaccine  Completed    Hepatitis A vaccine  Aged Out    Hepatitis B vaccine  Aged Out    Hib vaccine  Aged Out    Polio vaccine  Aged Out    Meningococcal (ACWY) vaccine  Aged Out    Depression Screen  Discontinued             (applicable per patient's age: Cancer Screenings, Depression Screening, Fall Risk Screening, Immunizations)    Hemoglobin A1C (%)   Date Value   08/30/2023 12.6 (H)   01/11/2013 6.4 (H)     LDL Cholesterol (mg/dL)   Date Value   10/25/2023 87     AST (U/L)   Date Value   10/25/2023 35 (H)     ALT (U/L)   Date Value   10/25/2023 51 (H)     BUN (mg/dL)   Date Value   10/25/2023 19      (goal A1C is < 7)   (goal LDL is <100) need 30-50% reduction from baseline     BP Readings from Last 3 Encounters:   12/13/23 117/70   11/15/23 131/63   11/06/23 112/70    (goal /80)      All Future Testing planned in [facility]:  Lab Frequency Next Occurrence   Magnesium Once 02/27/2023   CBC with Auto Differential Once 03/01/2023   Basic Metabolic Panel Once 03/16/2023   Digoxin Level Once 03/16/2023   Magnesium Once 06/15/2023   CBC with Auto Differential Once 10/31/2024   Comprehensive Metabolic Panel Once 10/31/2024   Vitamin D 25 Hydroxy Once 10/31/2024   Lipid Panel Once 10/31/2024   TSH with Reflex Once 10/31/2024   Magnesium Once 10/31/2024   EKG 12 Lead

## 2024-01-09 NOTE — TELEPHONE ENCOUNTER
Inform patient okay for mounjaro increase to 7.5 mg SQ weekly on Thursdays.     She needs to have her glyco done at office when she comes to hospital for her coumadin check next week. I also need updated weight and bp. As we adjust medication if she is having weight loss, I may need to lower other meds. Cannot just adjust without these stats.     Please arrange for glyco , bp, hr and weight at hospital next week when pt coming for coumadin clinic.     Thanks  Emily

## 2024-01-10 ENCOUNTER — NURSE ONLY (OUTPATIENT)
Dept: FAMILY MEDICINE CLINIC | Age: 79
End: 2024-01-10
Payer: MEDICARE

## 2024-01-10 ENCOUNTER — HOSPITAL ENCOUNTER (OUTPATIENT)
Dept: PHARMACY | Age: 79
Setting detail: THERAPIES SERIES
Discharge: HOME | End: 2024-01-10
Payer: MEDICARE

## 2024-01-10 VITALS — HEART RATE: 74 BPM | WEIGHT: 224 LBS | RESPIRATION RATE: 20 BRPM | BODY MASS INDEX: 40.32 KG/M2

## 2024-01-10 VITALS
SYSTOLIC BLOOD PRESSURE: 142 MMHG | WEIGHT: 224.4 LBS | BODY MASS INDEX: 40.39 KG/M2 | HEART RATE: 76 BPM | DIASTOLIC BLOOD PRESSURE: 69 MMHG

## 2024-01-10 DIAGNOSIS — E11.49 DM (DIABETES MELLITUS), TYPE 2 WITH NEUROLOGICAL COMPLICATIONS (HCC): Primary | ICD-10-CM

## 2024-01-10 LAB
HBA1C MFR BLD: 7.1 %
INR BLD: 2.7

## 2024-01-10 PROCEDURE — 99211 OFF/OP EST MAY X REQ PHY/QHP: CPT

## 2024-01-10 PROCEDURE — 83036 HEMOGLOBIN GLYCOSYLATED A1C: CPT | Performed by: NURSE PRACTITIONER

## 2024-01-10 PROCEDURE — 85610 PROTHROMBIN TIME: CPT

## 2024-01-10 NOTE — PATIENT INSTRUCTIONS
Continue to monitor urine and stool for signs and symptoms of bleeding.   Please notify the clinic of any medication changes.   Please remember to bring all medications (both prescription and OTC) to your next visit.   Kindly notify the clinic if you are unable to make to your next appointment.   Follow warfarin dosing instructions on dosing calendar provided.

## 2024-01-10 NOTE — TELEPHONE ENCOUNTER
Spoke with Jo about change in medication and she voiced understanding. Weight and BP with be done at Coumadin clinic and she will go down to Primary care for A1C.

## 2024-01-10 NOTE — PROGRESS NOTES
[facility]  ANTICOAGULATION    Referring Provider: Dr Gregorio Herndon     GOAL INR: 2.0-3.0     TODAY'S INR: 2.7     WARFARIN Dosage: Continue 5 mg daily    INR (no units)   Date Value   01/10/2024 2.7   2023 3.2   11/15/2023 2.4   10/25/2023 1.6   2023 1.8   2023 1.4   2023 1.9     INR,(POC) (no units)   Date Value   10/25/2023 1.6       Hemoglobin   Date Value Ref Range Status   10/25/2023 13.8 12.0 - 16.0 g/dL Final     Hematocrit   Date Value Ref Range Status   10/25/2023 41.9 36.0 - 46.0 % Final     ALT   Date Value Ref Range Status   10/25/2023 51 (H) 5 - 33 U/L Final     AST   Date Value Ref Range Status   10/25/2023 35 (H) <32 U/L Final       Medication changes:  May be starting metamucil soon  May be stopping aripiprazole d/t lack of efficacy    Notes:    Fingerstick INR drawn per clinic protocol. Patient states no visible blood in urine, black tarry stool, falls or ER visits and denies any missed or extra doses of warfarin. She is back to 2 Tylenol PM HS with APAP 500 mg PRN. States she may be starting metamucil soon as her fiber capsules did not work and she may be stopping aripiprazole due to lack of efficacy. Neither change has officially occurred yet. No change in other maintenance medications or in diet. INR is therapeutic so will continue 5 mg daily and will recheck INR in 6 weeks. Patient acknowledges working in consult agreement with pharmacist as referred by his/her physician.    For Pharmacy Admin Tracking Only    Intervention Detail: Adherence Monitorin and Dose Adjustment: 1, reason: Therapy Optimization  Total # of Interventions Recommended: 2  Total # of Interventions Accepted: 2  Time Spent (min): 40    José Vo, PharmD 1/10/2024 12:59 PM

## 2024-01-26 RX ORDER — TIRZEPATIDE 7.5 MG/.5ML
7.5 INJECTION, SOLUTION SUBCUTANEOUS WEEKLY
Qty: 4 ADJUSTABLE DOSE PRE-FILLED PEN SYRINGE | Refills: 2 | Status: SHIPPED | OUTPATIENT
Start: 2024-01-26

## 2024-01-26 NOTE — TELEPHONE ENCOUNTER
Last OV: 11/6/2023  Last RX: 1/8/2024   Next scheduled apt: 3/4/2024    Pt requesting refill    Jo called in with her sugar numbers 125, 109, 148, 109, 114, 121   Her weight is 212 lbs down from 224 lbs

## 2024-02-08 ENCOUNTER — OFFICE VISIT (OUTPATIENT)
Dept: PAIN MANAGEMENT | Age: 79
End: 2024-02-08
Payer: COMMERCIAL

## 2024-02-08 VITALS
DIASTOLIC BLOOD PRESSURE: 68 MMHG | WEIGHT: 224 LBS | OXYGEN SATURATION: 96 % | HEART RATE: 71 BPM | SYSTOLIC BLOOD PRESSURE: 140 MMHG | RESPIRATION RATE: 18 BRPM | BODY MASS INDEX: 40.32 KG/M2

## 2024-02-08 DIAGNOSIS — M47.817 SPONDYLOSIS OF LUMBOSACRAL REGION WITHOUT MYELOPATHY OR RADICULOPATHY: Primary | ICD-10-CM

## 2024-02-08 DIAGNOSIS — E66.01 OBESITY, CLASS III, BMI 40-49.9 (MORBID OBESITY) (HCC): ICD-10-CM

## 2024-02-08 DIAGNOSIS — G56.81 SUPRASCAPULAR NERVE ENTRAPMENT, RIGHT: ICD-10-CM

## 2024-02-08 DIAGNOSIS — M25.511 CHRONIC RIGHT SHOULDER PAIN: ICD-10-CM

## 2024-02-08 DIAGNOSIS — G89.29 CHRONIC RIGHT SHOULDER PAIN: ICD-10-CM

## 2024-02-08 DIAGNOSIS — Z79.01 LONG TERM (CURRENT) USE OF ANTICOAGULANTS: ICD-10-CM

## 2024-02-08 PROCEDURE — 99204 OFFICE O/P NEW MOD 45 MIN: CPT | Performed by: STUDENT IN AN ORGANIZED HEALTH CARE EDUCATION/TRAINING PROGRAM

## 2024-02-08 PROCEDURE — 1123F ACP DISCUSS/DSCN MKR DOCD: CPT | Performed by: STUDENT IN AN ORGANIZED HEALTH CARE EDUCATION/TRAINING PROGRAM

## 2024-02-08 RX ORDER — ARIPIPRAZOLE 2 MG/1
TABLET ORAL
COMMUNITY
Start: 2024-01-14

## 2024-02-08 NOTE — PROGRESS NOTES
performed by Antony Hays MD at [facility] OR    PAIN MANAGEMENT PROCEDURE Bilateral 10/02/2020    BILATERAL L3-4 MEDIAL BRANCH AND L5 DORSAL RAMI INJECTION performed by Antony Hays MD at [facility] OR    PAIN MANAGEMENT PROCEDURE Right 01/15/2021    RFA, L3-L4 MEDIAL BRANCH AND L-5 DORSEL RAMI INJECTION RIGHT performed by Antony Hays MD at [facility] OR    TOTAL KNEE ARTHROPLASTY Left 2019    LEFT KNEE TOTAL ARTHROPLASTY- DEPUY performed by Danielito Matta DO at [facility] OR    TUBAL LIGATION      UPPER GASTROINTESTINAL ENDOSCOPY  2016    Shira CHRISTENSEN       Family History   Problem Relation Age of Onset    High Blood Pressure Mother     Diabetes Father        Social History     Socioeconomic History    Marital status:      Spouse name: Not on file    Number of children: Not on file    Years of education: Not on file    Highest education level: Not on file   Occupational History    Not on file   Tobacco Use    Smoking status: Former     Current packs/day: 0.00     Average packs/day: 0.3 packs/day for 4.0 years (1.0 ttl pk-yrs)     Types: Cigarettes     Start date: 1977     Quit date: 1981     Years since quittin.1    Smokeless tobacco: Never   Vaping Use    Vaping Use: Not on file   Substance and Sexual Activity    Alcohol use: Never     Comment: rare, states she hasn't had a drink in months    Drug use: Never    Sexual activity: Not Currently   Other Topics Concern    Not on file   Social History Narrative    Not on file     Social Determinants of Health     Financial Resource Strain: Low Risk  (2023)    Overall Financial Resource Strain (CARDIA)     Difficulty of Paying Living Expenses: Not hard at all   Food Insecurity: Not on file (2023)   Transportation Needs: Unknown (2023)    PRAPARE - Transportation     Lack of Transportation (Medical): Not on file     Lack of Transportation (Non-Medical): No   Physical Activity: Inactive (2023)    Exercise

## 2024-02-09 ENCOUNTER — TELEPHONE (OUTPATIENT)
Dept: PAIN MANAGEMENT | Age: 79
End: 2024-02-09

## 2024-02-09 NOTE — TELEPHONE ENCOUNTER
SURGERY SCHEDULING FORM  [facility]   [address] [place] [address]    Phone [phone] Fax [phone]      Jo Spann 1945 female    [address]  [address]  [place] [address]   Marital Status:          Home Phone: [phone]      Cell Phone:    No relevant phone numbers on file.          Surgeon: Marjorie   Surgery Date: 3/7/24     Time:     Procedure: right suprascapular nerve PNS implant using ultrasound guidance     Diagnosis:  G56.81    Important Medical History:  In Epic    Special Inst/Equip: Regular    CPT Codes:    56934  Latex Allergy: No     Cardiac Device:  Yes -pacemaker    Anesthesia:  Local          Admission Type:  Same Day                          Admit Prior to Day of Surgery: No    Case Location:  Ambulatory            Preadmission Testing:  Phone Call          PAT Date and Time:     Need Preop Cardiac Clearance: Yes    Does Patient have Cardiologist/physician?     Vigesaa  No need to hold Coumadin

## 2024-02-12 RX ORDER — WARFARIN SODIUM 10 MG/1
TABLET ORAL
Qty: 90 TABLET | Refills: 3 | Status: SHIPPED | OUTPATIENT
Start: 2024-02-12

## 2024-02-19 RX ORDER — SPIRONOLACTONE 25 MG/1
TABLET ORAL
Qty: 180 TABLET | Refills: 3 | Status: SHIPPED | OUTPATIENT
Start: 2024-02-19

## 2024-02-21 ENCOUNTER — HOSPITAL ENCOUNTER (OUTPATIENT)
Dept: PHARMACY | Age: 79
Setting detail: THERAPIES SERIES
Discharge: HOME | End: 2024-02-21
Payer: MEDICARE

## 2024-02-21 LAB — INR BLD: 2

## 2024-02-21 PROCEDURE — 85610 PROTHROMBIN TIME: CPT

## 2024-02-21 PROCEDURE — 99211 OFF/OP EST MAY X REQ PHY/QHP: CPT

## 2024-02-21 NOTE — PROGRESS NOTES
[facility]  Medication Management  ANTICOAGULATION    Referring Provider: Dr Gregorio Herndon     GOAL INR: 2.0-3.0     TODAY'S INR: 2.0     WARFARIN Dosage: Continue 5 mg daily    INR (no units)   Date Value   2024 2.0   01/10/2024 2.7   2023 3.2   11/15/2023 2.4   10/25/2023 1.6   2023 1.8   2023 1.4     INR,(POC) (no units)   Date Value   10/25/2023 1.6       Hemoglobin   Date Value Ref Range Status   10/25/2023 13.8 12.0 - 16.0 g/dL Final     Hematocrit   Date Value Ref Range Status   10/25/2023 41.9 36.0 - 46.0 % Final     ALT   Date Value Ref Range Status   10/25/2023 51 (H) 5 - 33 U/L Final     AST   Date Value Ref Range Status   10/25/2023 35 (H) <32 U/L Final       Medication changes:  None     Notes:    Fingerstick INR drawn per clinic protocol. Patient states no visible blood in urine, black tarry stool, falls or ER visits and denies any missed or extra doses of warfarin. She does complain of constant arm and shoulder pain and states that she has been taking more Tylenol (~ 2000 mg daily) lately. She is seeing Dr Amador in Pain Management. She also mentions that she has started eating 2-3 stalks of celery daily but denies any other changes in medication or diet. INR is therapeutic so will continue 5 mg daily and will recheck INR in 6 weeks. Patient acknowledges working in consult agreement with pharmacist as referred by his/her physician.    For Pharmacy Admin Tracking Only    Intervention Detail: Adherence Monitorin  Total # of Interventions Recommended: 1  Total # of Interventions Accepted: 1  Time Spent (min): 30    José Vo, PharmD 2024 11:31 AM

## 2024-03-04 ENCOUNTER — HOSPITAL ENCOUNTER (OUTPATIENT)
Age: 79
Setting detail: SPECIMEN
Discharge: HOME | End: 2024-03-04
Payer: MEDICARE

## 2024-03-04 ENCOUNTER — OFFICE VISIT (OUTPATIENT)
Dept: FAMILY MEDICINE CLINIC | Age: 79
End: 2024-03-04
Payer: MEDICARE

## 2024-03-04 VITALS
HEART RATE: 62 BPM | WEIGHT: 216 LBS | BODY MASS INDEX: 39.75 KG/M2 | HEIGHT: 62 IN | SYSTOLIC BLOOD PRESSURE: 124 MMHG | DIASTOLIC BLOOD PRESSURE: 78 MMHG | OXYGEN SATURATION: 97 %

## 2024-03-04 DIAGNOSIS — N18.31 STAGE 3A CHRONIC KIDNEY DISEASE (HCC): ICD-10-CM

## 2024-03-04 DIAGNOSIS — F33.1 MAJOR DEPRESSIVE DISORDER, RECURRENT, MODERATE (HCC): ICD-10-CM

## 2024-03-04 DIAGNOSIS — I50.22 CHRONIC SYSTOLIC (CONGESTIVE) HEART FAILURE (HCC): ICD-10-CM

## 2024-03-04 DIAGNOSIS — I48.20 CHRONIC ATRIAL FIBRILLATION (HCC): ICD-10-CM

## 2024-03-04 DIAGNOSIS — E11.49 DM (DIABETES MELLITUS), TYPE 2 WITH NEUROLOGICAL COMPLICATIONS (HCC): ICD-10-CM

## 2024-03-04 DIAGNOSIS — Z00.00 MEDICARE ANNUAL WELLNESS VISIT, SUBSEQUENT: Primary | ICD-10-CM

## 2024-03-04 LAB
CREAT UR-MCNC: 117.2 MG/DL (ref 28–217)
MICROALBUMIN UR-MCNC: 19 MG/L
MICROALBUMIN/CREAT UR-RTO: 16 MCG/MG CREAT

## 2024-03-04 PROCEDURE — G8484 FLU IMMUNIZE NO ADMIN: HCPCS | Performed by: NURSE PRACTITIONER

## 2024-03-04 PROCEDURE — 82570 ASSAY OF URINE CREATININE: CPT

## 2024-03-04 PROCEDURE — 3051F HG A1C>EQUAL 7.0%<8.0%: CPT | Performed by: NURSE PRACTITIONER

## 2024-03-04 PROCEDURE — 1123F ACP DISCUSS/DSCN MKR DOCD: CPT | Performed by: NURSE PRACTITIONER

## 2024-03-04 PROCEDURE — 82043 UR ALBUMIN QUANTITATIVE: CPT

## 2024-03-04 PROCEDURE — G0439 PPPS, SUBSEQ VISIT: HCPCS | Performed by: NURSE PRACTITIONER

## 2024-03-04 RX ORDER — TIRZEPATIDE 7.5 MG/.5ML
7.5 INJECTION, SOLUTION SUBCUTANEOUS WEEKLY
Qty: 2 ML | Refills: 2 | Status: SHIPPED | OUTPATIENT
Start: 2024-03-04

## 2024-03-04 SDOH — ECONOMIC STABILITY: INCOME INSECURITY: HOW HARD IS IT FOR YOU TO PAY FOR THE VERY BASICS LIKE FOOD, HOUSING, MEDICAL CARE, AND HEATING?: NOT HARD AT ALL

## 2024-03-04 SDOH — ECONOMIC STABILITY: FOOD INSECURITY: WITHIN THE PAST 12 MONTHS, YOU WORRIED THAT YOUR FOOD WOULD RUN OUT BEFORE YOU GOT MONEY TO BUY MORE.: NEVER TRUE

## 2024-03-04 SDOH — ECONOMIC STABILITY: FOOD INSECURITY: WITHIN THE PAST 12 MONTHS, THE FOOD YOU BOUGHT JUST DIDN'T LAST AND YOU DIDN'T HAVE MONEY TO GET MORE.: NEVER TRUE

## 2024-03-04 ASSESSMENT — PATIENT HEALTH QUESTIONNAIRE - PHQ9
1. LITTLE INTEREST OR PLEASURE IN DOING THINGS: 0
SUM OF ALL RESPONSES TO PHQ9 QUESTIONS 1 & 2: 0
SUM OF ALL RESPONSES TO PHQ QUESTIONS 1-9: 6
5. POOR APPETITE OR OVEREATING: 0
10. IF YOU CHECKED OFF ANY PROBLEMS, HOW DIFFICULT HAVE THESE PROBLEMS MADE IT FOR YOU TO DO YOUR WORK, TAKE CARE OF THINGS AT HOME, OR GET ALONG WITH OTHER PEOPLE: 0
7. TROUBLE CONCENTRATING ON THINGS, SUCH AS READING THE NEWSPAPER OR WATCHING TELEVISION: 0
SUM OF ALL RESPONSES TO PHQ QUESTIONS 1-9: 6
3. TROUBLE FALLING OR STAYING ASLEEP: 3
8. MOVING OR SPEAKING SO SLOWLY THAT OTHER PEOPLE COULD HAVE NOTICED. OR THE OPPOSITE, BEING SO FIGETY OR RESTLESS THAT YOU HAVE BEEN MOVING AROUND A LOT MORE THAN USUAL: 0
6. FEELING BAD ABOUT YOURSELF - OR THAT YOU ARE A FAILURE OR HAVE LET YOURSELF OR YOUR FAMILY DOWN: 0
9. THOUGHTS THAT YOU WOULD BE BETTER OFF DEAD, OR OF HURTING YOURSELF: 0
4. FEELING TIRED OR HAVING LITTLE ENERGY: 3
2. FEELING DOWN, DEPRESSED OR HOPELESS: 0

## 2024-03-04 NOTE — PROGRESS NOTES
Medicare Annual Wellness Visit    Jo Spann is here for Medicare AWV (DM, hyperlipidemia, afib, gout)    Assessment & Plan   Chronic systolic (congestive) heart failure (HCC)  Major depressive disorder, recurrent, moderate (HCC)  Chronic atrial fibrillation (HCC)  DM (diabetes mellitus), type 2 with neurological complications (HCC)  Stage 3a chronic kidney disease (HCC)    Recommendations for Preventive Services Due: see orders and patient instructions/AVS.  Recommended screening schedule for the next 5-10 years is provided to the patient in written form: see Patient Instructions/AVS.     No follow-ups on file.     Subjective   The following acute and/or chronic problems were also addressed today:    Weight loss > 44# in the last year   Discussed getting up and being more active.   Pt does not desire to do so.     Refilled meds at current dose.       Patient's complete Health Risk Assessment and screening values have been reviewed and are found in Flowsheets. The following problems were reviewed today and where indicated follow up appointments were made and/or referrals ordered.    Positive Risk Factor Screenings with Interventions:    Fall Risk:  Do you feel unsteady or are you worried about falling? : (!) yes  2 or more falls in past year?: no  Fall with injury in past year?: (!) yes       Interventions:    Reviewed medications, home hazards, visual acuity, and co-morbidities that can increase risk for falls getting new glasses   fell in bedroom on carpet with left hip downward. Able to get back up , \"knocked hip into place\"      Depression:  PHQ-2 Score: 0  PHQ-9 Total Score: 6    Interpretation:  5-9 mild   10-14 moderate   15-19 moderately severe   20-27 severe     Interventions:  Pt with 44 pound weight loss in last year            Activity, Diet, and Weight:  On average, how many days per week do you engage in moderate to strenuous exercise (like a brisk walk)?: 0 days  On average, how many minutes do

## 2024-03-04 NOTE — PATIENT INSTRUCTIONS
Plan: Care Instructions  Overview     If you're thinking about losing weight, it can be hard to know where to start. Your doctor can help you set up a weight loss plan that best meets your needs. You may want to take a class on nutrition or exercise, or you could join a weight loss support group. If you have questions about how to make changes to your eating or exercise habits, ask your doctor about seeing a registered dietitian or an exercise specialist.  It can be a big challenge to lose weight. But you don't have to make huge changes at once. Make small changes, and stick with them. When those changes become habit, add a few more changes.  If you don't think you're ready to make changes right now, try to pick a date in the future. Make an appointment to see your doctor to discuss whether the time is right for you to start a plan.  Follow-up care is a key part of your treatment and safety. Be sure to make and go to all appointments, and call your doctor if you are having problems. It's also a good idea to know your test results and keep a list of the medicines you take.  How can you care for yourself at home?  Set realistic goals. Many people expect to lose much more weight than is likely. A weight loss of 5% to 10% of your body weight may be enough to improve your health.  Get family and friends involved to provide support. Talk to them about why you are trying to lose weight, and ask them to help. They can help by participating in exercise and having meals with you, even if they may be eating something different.  Find what works best for you. If you do not have time or do not like to cook, a program that offers meal replacement bars or shakes may be better for you. Or if you like to prepare meals, finding a plan that includes daily menus and recipes may be best.  Ask your doctor about other health professionals who can help you achieve your weight loss goals.  A dietitian can help you make healthy changes in

## 2024-03-04 NOTE — PROGRESS NOTES
[facility]   Preadmission Testing    Name: Jo Spann  : 1945  Patient Phone: [phone] (home)     Procedure: RIGHT SUPRASCAPULAR NERVE PNS IMPLANT USING ULTRASOUND GUIDANCE - Right   Date of Procedure: 2024  Surgeon: Chris Amador DO    Ht:  5'2  Wt: 215lb  Wt method: Stated    Allergies:   Allergies   Allergen Reactions    Clarithromycin Rash     Patient says she will \"never take this again\"     Gabapentin Swelling     Pt states that her body swelled up, not her airway, no troubles breathing    Merthiolate Glycerite [Thimerosal (Thiomersal)] Dermatitis     Redness, blisters    Acetaminophen      Other Reaction(s): with codeine- nausea and vomitting    Thimerosal     Tylenol With Codeine #3 [Acetaminophen-Codeine] Nausea And Vomiting                There were no vitals filed for this visit.    No LMP recorded (lmp unknown). Patient is postmenopausal.    Do you take blood thinners?   [x] Yes    [] No         Instructed to stop blood thinners prior to procedure?    [] Yes    [] No      [] N/A    []Eliquis - 3 days  []Xarelto - 3 days  []Pradaxa - 5 days  [x]Coumadin - 5 days   []Plavix - 7 days  []ASA 325mg - 7 days  []Brillinta - 5 days  []Pletal - 2 days   Have you had a respiratory infection or sore throat in last 4 weeks before surgery?    [] Yes    [x] No     Patient instructed on: [x] NPO Status - if receiving sedation  [x] Meds to Take  [] Ride Home - sedation/ epidurals  []No Jewelry/Contact Lenses/Nail Polish     DOS Patient Needs [] HCG   [x] Blood Sugar  [x] PT/INR

## 2024-03-06 ENCOUNTER — TELEPHONE (OUTPATIENT)
Dept: PRIMARY CARE CLINIC | Age: 79
End: 2024-03-06

## 2024-03-06 NOTE — TELEPHONE ENCOUNTER
Jo called in asking what she was supposed to do since her mounjaro is not available. Please advise.

## 2024-03-07 ENCOUNTER — HOSPITAL ENCOUNTER (OUTPATIENT)
Age: 79
Setting detail: OUTPATIENT SURGERY
Discharge: HOME | End: 2024-03-07
Attending: STUDENT IN AN ORGANIZED HEALTH CARE EDUCATION/TRAINING PROGRAM | Admitting: STUDENT IN AN ORGANIZED HEALTH CARE EDUCATION/TRAINING PROGRAM
Payer: MEDICARE

## 2024-03-07 VITALS
OXYGEN SATURATION: 95 % | WEIGHT: 215 LBS | HEART RATE: 74 BPM | TEMPERATURE: 97.6 F | BODY MASS INDEX: 39.56 KG/M2 | HEIGHT: 62 IN | RESPIRATION RATE: 18 BRPM | SYSTOLIC BLOOD PRESSURE: 122 MMHG | DIASTOLIC BLOOD PRESSURE: 84 MMHG

## 2024-03-07 LAB
GLUCOSE BLD-MCNC: 108 MG/DL (ref 65–99)
INR PPP: 1.8
PROTHROMBIN TIME: 21 SEC (ref 11.5–14.2)

## 2024-03-07 PROCEDURE — 82947 ASSAY GLUCOSE BLOOD QUANT: CPT

## 2024-03-07 PROCEDURE — 85610 PROTHROMBIN TIME: CPT

## 2024-03-07 PROCEDURE — 36415 COLL VENOUS BLD VENIPUNCTURE: CPT

## 2024-03-07 ASSESSMENT — PAIN DESCRIPTION - DESCRIPTORS: DESCRIPTORS: ACHING

## 2024-03-07 ASSESSMENT — PAIN - FUNCTIONAL ASSESSMENT: PAIN_FUNCTIONAL_ASSESSMENT: 0-10

## 2024-03-08 RX ORDER — SEMAGLUTIDE 1.34 MG/ML
1 INJECTION, SOLUTION SUBCUTANEOUS WEEKLY
Qty: 3 ML | Refills: 1 | Status: SHIPPED | OUTPATIENT
Start: 2024-03-08

## 2024-03-08 NOTE — TELEPHONE ENCOUNTER
Due to unavailability of mounjaro,   Will change GLP -1 to ozempic which is sister drug and we try to dose as close to your current dose as we can. Should be just as effective as mounjaro.     [facility] 1 [facility] SQ [facility]   There is no guarantee of it being available in shortage for everyone.     Thus if it is not we can try mail in pharmacy if you have one   Or you will need to go to once a day basal insulin with mealtime sliding scale.     Emily SNIDER CNP

## 2024-03-13 ENCOUNTER — TELEPHONE (OUTPATIENT)
Dept: PAIN MANAGEMENT | Age: 79
End: 2024-03-13

## 2024-03-13 NOTE — TELEPHONE ENCOUNTER
Patient contacted the office questioning if she had an injection scheduled with Dr. Amador tomorrow. Advised patient nothing appears to be scheduled for her tomorrow with Dr. Amador. Patient became upset and was questioning when her procedure was going to be rescheduled. Advised the patient the providers would be informed of her questions/concerns and staff would be back in contact with her. She voiced understanding.

## 2024-03-13 NOTE — TELEPHONE ENCOUNTER
Patient appointment rescheduled to 03/28/24. Patient is very upset and did not want to reschedule her appt.

## 2024-03-13 NOTE — TELEPHONE ENCOUNTER
Per Dr Amador, please reschedule this patient to another week. We are way too overbooked tomorrow for a medication discussion add on.   Thank you

## 2024-03-28 ENCOUNTER — OFFICE VISIT (OUTPATIENT)
Dept: PAIN MANAGEMENT | Age: 79
End: 2024-03-28
Payer: MEDICARE

## 2024-03-28 VITALS
HEART RATE: 68 BPM | OXYGEN SATURATION: 94 % | BODY MASS INDEX: 39.32 KG/M2 | WEIGHT: 215 LBS | RESPIRATION RATE: 18 BRPM

## 2024-03-28 DIAGNOSIS — M47.817 SPONDYLOSIS OF LUMBOSACRAL REGION WITHOUT MYELOPATHY OR RADICULOPATHY: ICD-10-CM

## 2024-03-28 DIAGNOSIS — Z79.01 LONG TERM (CURRENT) USE OF ANTICOAGULANTS: ICD-10-CM

## 2024-03-28 DIAGNOSIS — G56.81 SUPRASCAPULAR NERVE ENTRAPMENT, RIGHT: ICD-10-CM

## 2024-03-28 DIAGNOSIS — M25.511 CHRONIC RIGHT SHOULDER PAIN: Primary | ICD-10-CM

## 2024-03-28 DIAGNOSIS — E66.01 OBESITY, CLASS III, BMI 40-49.9 (MORBID OBESITY) (HCC): ICD-10-CM

## 2024-03-28 DIAGNOSIS — G89.29 CHRONIC RIGHT SHOULDER PAIN: Primary | ICD-10-CM

## 2024-03-28 PROCEDURE — G8417 CALC BMI ABV UP PARAM F/U: HCPCS | Performed by: STUDENT IN AN ORGANIZED HEALTH CARE EDUCATION/TRAINING PROGRAM

## 2024-03-28 PROCEDURE — 1123F ACP DISCUSS/DSCN MKR DOCD: CPT | Performed by: STUDENT IN AN ORGANIZED HEALTH CARE EDUCATION/TRAINING PROGRAM

## 2024-03-28 PROCEDURE — G8484 FLU IMMUNIZE NO ADMIN: HCPCS | Performed by: STUDENT IN AN ORGANIZED HEALTH CARE EDUCATION/TRAINING PROGRAM

## 2024-03-28 PROCEDURE — G8427 DOCREV CUR MEDS BY ELIG CLIN: HCPCS | Performed by: STUDENT IN AN ORGANIZED HEALTH CARE EDUCATION/TRAINING PROGRAM

## 2024-03-28 PROCEDURE — 1090F PRES/ABSN URINE INCON ASSESS: CPT | Performed by: STUDENT IN AN ORGANIZED HEALTH CARE EDUCATION/TRAINING PROGRAM

## 2024-03-28 PROCEDURE — 1036F TOBACCO NON-USER: CPT | Performed by: STUDENT IN AN ORGANIZED HEALTH CARE EDUCATION/TRAINING PROGRAM

## 2024-03-28 PROCEDURE — G8399 PT W/DXA RESULTS DOCUMENT: HCPCS | Performed by: STUDENT IN AN ORGANIZED HEALTH CARE EDUCATION/TRAINING PROGRAM

## 2024-03-28 PROCEDURE — 99214 OFFICE O/P EST MOD 30 MIN: CPT | Performed by: STUDENT IN AN ORGANIZED HEALTH CARE EDUCATION/TRAINING PROGRAM

## 2024-03-28 RX ORDER — METHOCARBAMOL 500 MG/1
500 TABLET, FILM COATED ORAL 3 TIMES DAILY PRN
Qty: 90 TABLET | Refills: 2 | Status: SHIPPED | OUTPATIENT
Start: 2024-03-28 | End: 2024-06-26

## 2024-03-28 NOTE — PROGRESS NOTES
spondylosis as the patient has axial low back pain that is mechanical in nature. There is tenderness to palpation along the lumbar paraspinal musculature with positive lumbar facet loading bilaterally.  The lumbar MRI on 12/6/2023 reveals multilevel degenerative changes with facet hypertrophy throughout the lumbar spine specifically at L4-5 and L5-S1 facet joints.  I will address these issues in the future after treating her shoulder pain.    Neurologically, it appears the patient has full strength and normal sensation. There is no evidence of radiculopathy or myelopathy on examination. There are no red flags in the patient's history.     PLAN:  Medications:    For nonopioid therapy, the following medications were prescribed:    -Start robaxin 500 mg three times daily as needed    Opioid therapy:  -Not indicated    Interventions:  -Unable to perform right suprascapular nerve peripheral nerve stimulator implant using ultrasound guidance due to pacemaker  -Of note, patient on Coumadin  -Consider lumbar medial branch blocks in future    Imaging:  -No new imaging    Behavioral Therapies:  -Continue daily stretching and home exercise program    Referrals:  -In-home physical therapy    Follow-up Plan:  -4 months    Patient was offered intervention where appropriate.     Multi-modal Pain Therapy:  The patient was explicitly considered for multimodal and interdisciplinary therapy. Non-opioid and non-pharmacological opportunities to enhance analgesia and quality of life have been and will continue to be pursued.    Chris Amador,   Interventional Pain Management/PM&R   [facility]    Orders Placed This Encounter    PT home eval     Standing Status:   Future     Standing Expiration Date:   3/28/2025    methocarbamol (ROBAXIN) 500 MG tablet     Sig: Take 1 tablet by mouth 3 times daily as needed (pain)     Dispense:  90 tablet     Refill:  2

## 2024-04-03 ENCOUNTER — HOSPITAL ENCOUNTER (OUTPATIENT)
Dept: PHARMACY | Age: 79
Setting detail: THERAPIES SERIES
Discharge: HOME | End: 2024-04-03
Payer: MEDICARE

## 2024-04-03 VITALS
DIASTOLIC BLOOD PRESSURE: 49 MMHG | HEART RATE: 80 BPM | SYSTOLIC BLOOD PRESSURE: 111 MMHG | WEIGHT: 217 LBS | BODY MASS INDEX: 39.69 KG/M2

## 2024-04-03 LAB — INR BLD: 2.1

## 2024-04-03 PROCEDURE — 99211 OFF/OP EST MAY X REQ PHY/QHP: CPT

## 2024-04-03 PROCEDURE — 85610 PROTHROMBIN TIME: CPT

## 2024-04-03 NOTE — PROGRESS NOTES
[facility]  Medication Management  ANTICOAGULATION    Referring Provider: Dr Gregorio Herndon     GOAL INR: 2.0-3.0     TODAY'S INR: 2.1     WARFARIN Dosage: Continue 5 mg daily    INR (no units)   Date Value   2024 2.1   2024 1.8   2024 2.0   01/10/2024 2.7   2023 3.2   11/15/2023 2.4   10/25/2023 1.6     INR,(POC) (no units)   Date Value   10/25/2023 1.6       Hemoglobin   Date Value Ref Range Status   10/25/2023 13.8 12.0 - 16.0 g/dL Final     Hematocrit   Date Value Ref Range Status   10/25/2023 41.9 36.0 - 46.0 % Final     ALT   Date Value Ref Range Status   10/25/2023 51 (H) 5 - 33 U/L Final     AST   Date Value Ref Range Status   10/25/2023 35 (H) <32 U/L Final       Medication changes:  Started methocarbamol 500 mg TID PRN Pain    Notes:    Fingerstick INR drawn per clinic protocol. Patient states no visible blood in urine, black tarry stool, falls or ER visits and denies any missed or extra doses of warfarin. She was started on methocarbamol 500 mg three times daily as needed for pain for her shoulder pain but had no other changes in medications or diet. She did say the nerve stimulator she was supposed to get had to be canceled due to her having a pacemaker. States her pain doctor has done nothing else for her. She will let his office know that the methocarbamol is not working. INR is therapeutic so will continue 5 mg daily and will recheck INR in 6 weeks. Patient acknowledges working in consult agreement with pharmacist as referred by his/her physician.    For Pharmacy Admin Tracking Only    Intervention Detail: Adherence Monitorin  Total # of Interventions Recommended: 1  Total # of Interventions Accepted: 1  Time Spent (min): 20    José Vo, PharmD 4/3/2024 2:32 PM

## 2024-04-26 NOTE — TELEPHONE ENCOUNTER
Last OV: 3/4/24  Next OV: 7/15/24  Last Rx: 3/8/24      Recent BS numbers:     : 106  : 127  : 111  : 116  : 125  : 117      Weight this mornin lbs

## 2024-04-27 RX ORDER — SEMAGLUTIDE 1.34 MG/ML
1 INJECTION, SOLUTION SUBCUTANEOUS WEEKLY
Qty: 3 ML | Refills: 1 | Status: SHIPPED | OUTPATIENT
Start: 2024-04-27

## 2024-05-15 ENCOUNTER — HOSPITAL ENCOUNTER (OUTPATIENT)
Dept: PHARMACY | Age: 79
Setting detail: THERAPIES SERIES
Discharge: HOME | End: 2024-05-15
Payer: MEDICARE

## 2024-05-15 VITALS — WEIGHT: 210 LBS | BODY MASS INDEX: 38.41 KG/M2

## 2024-05-15 DIAGNOSIS — I48.91 ATRIAL FIBRILLATION, UNSPECIFIED TYPE (HCC): Primary | ICD-10-CM

## 2024-05-15 LAB — INR BLD: 2

## 2024-05-15 PROCEDURE — 85610 PROTHROMBIN TIME: CPT

## 2024-05-15 PROCEDURE — 99211 OFF/OP EST MAY X REQ PHY/QHP: CPT

## 2024-05-15 NOTE — PROGRESS NOTES
[facility]  Medication Management  ANTICOAGULATION    Referring Provider: Dr Gregorio Herndon     GOAL INR: 2.0-3.0     TODAY'S INR: 2.0     WARFARIN Dosage: Continue 5 mg daily    INR (no units)   Date Value   05/15/2024 2.0   2024 2.1   2024 1.8   2024 2.0   01/10/2024 2.7   2023 3.2   11/15/2023 2.4       Hemoglobin   Date Value Ref Range Status   10/25/2023 13.8 12.0 - 16.0 g/dL Final     Hematocrit   Date Value Ref Range Status   10/25/2023 41.9 36.0 - 46.0 % Final     ALT   Date Value Ref Range Status   10/25/2023 51 (H) 5 - 33 U/L Final     AST   Date Value Ref Range Status   10/25/2023 35 (H) <32 U/L Final       Medication changes:  Mounjaro was changed to Ozempic due to availability    Notes:    Fingerstick INR drawn per clinic protocol. Patient states no visible blood in urine, black tarry stool, falls or ER visits and denies any missed or extra doses of warfarin. States that her Mounjaro was changed to Ozempic due to availability but no other changes in medication or diet. INR is therapeutic so will continue 5 mg daily and will recheck INR in 6 weeks. Patient acknowledges working in consult agreement with pharmacist as referred by his/her physician.    For Pharmacy Admin Tracking Only    Intervention Detail: Adherence Monitorin  Total # of Interventions Recommended: 1  Total # of Interventions Accepted: 1  Time Spent (min): 20    José Vo, PharmD 5/15/2024 4:02 PM

## 2024-05-23 RX ORDER — DULOXETIN HYDROCHLORIDE 30 MG/1
CAPSULE, DELAYED RELEASE ORAL
Qty: 90 CAPSULE | Refills: 3 | Status: SHIPPED | OUTPATIENT
Start: 2024-05-23

## 2024-05-28 ENCOUNTER — TELEPHONE (OUTPATIENT)
Dept: PRIMARY CARE CLINIC | Age: 79
End: 2024-05-28

## 2024-05-28 RX ORDER — ATENOLOL 50 MG/1
TABLET ORAL
Qty: 90 TABLET | Refills: 3 | Status: SHIPPED | OUTPATIENT
Start: 2024-05-28

## 2024-05-28 NOTE — TELEPHONE ENCOUNTER
Okay for disability placard letter pt will  portillo next Monday.     Back to me to write letter after informing pt

## 2024-05-28 NOTE — TELEPHONE ENCOUNTER
Jo called asking if you could do a letter for her handicap placard. She stated that it was ok to  in [place] next week.

## 2024-05-30 NOTE — PROGRESS NOTES
Per  pt is to resume all meds including coumadin tonight; Spoke with pt. Gave her the number to DME to call and check status of her oxygen order.

## 2024-06-24 RX ORDER — SEMAGLUTIDE 1.34 MG/ML
1 INJECTION, SOLUTION SUBCUTANEOUS WEEKLY
Qty: 3 ML | Refills: 0 | Status: SHIPPED | OUTPATIENT
Start: 2024-06-24

## 2024-06-26 ENCOUNTER — HOSPITAL ENCOUNTER (OUTPATIENT)
Dept: PHARMACY | Age: 79
Setting detail: THERAPIES SERIES
Discharge: HOME | End: 2024-06-26
Payer: MEDICARE

## 2024-06-26 VITALS
SYSTOLIC BLOOD PRESSURE: 99 MMHG | WEIGHT: 201 LBS | BODY MASS INDEX: 36.76 KG/M2 | DIASTOLIC BLOOD PRESSURE: 65 MMHG | HEART RATE: 72 BPM

## 2024-06-26 LAB — INR BLD: 1.9

## 2024-06-26 PROCEDURE — 85610 PROTHROMBIN TIME: CPT

## 2024-06-26 PROCEDURE — 99211 OFF/OP EST MAY X REQ PHY/QHP: CPT

## 2024-06-26 NOTE — PROGRESS NOTES
[facility]ication Management  ANTICOAGULATION    Referring Provider: Dr Gregorio Herndon     GOAL INR: 2.0-3.0     TODAY'S INR: 1.9     WARFARIN Dosage: Continue 5 mg daily    INR (no units)   Date Value   2024 1.9   05/15/2024 2.0   2024 2.1   2024 1.8   2024 2.0   01/10/2024 2.7   2023 3.2       Hemoglobin   Date Value Ref Range Status   10/25/2023 13.8 12.0 - 16.0 g/dL Final     Hematocrit   Date Value Ref Range Status   10/25/2023 41.9 36.0 - 46.0 % Final     ALT   Date Value Ref Range Status   10/25/2023 51 (H) 5 - 33 U/L Final     AST   Date Value Ref Range Status   10/25/2023 35 (H) <32 U/L Final       Medication changes:  None     Notes:    Fingerstick INR drawn per clinic protocol. Patient states no visible blood in urine, black tarry stool, falls or ER visits and denies any missed or extra doses of warfarin. No change in other maintenance medications or in diet. INR is slightly subtherapeutic but will continue 5 mg daily and will recheck INR in 6 weeks. Patient acknowledges working in consult agreement with pharmacist as referred by his/her physician.    For Pharmacy Admin Tracking Only    Intervention Detail: Adherence Monitorin  Total # of Interventions Recommended: 1  Total # of Interventions Accepted: 1  Time Spent (min): 20    José Vo, PharmD 2024 12:45 PM

## 2024-07-12 RX ORDER — FUROSEMIDE 40 MG/1
TABLET ORAL
Qty: 180 TABLET | Refills: 3 | Status: SHIPPED | OUTPATIENT
Start: 2024-07-12

## 2024-07-15 ENCOUNTER — OFFICE VISIT (OUTPATIENT)
Dept: FAMILY MEDICINE CLINIC | Age: 79
End: 2024-07-15

## 2024-07-15 VITALS
WEIGHT: 199 LBS | BODY MASS INDEX: 36.62 KG/M2 | DIASTOLIC BLOOD PRESSURE: 70 MMHG | HEART RATE: 62 BPM | SYSTOLIC BLOOD PRESSURE: 120 MMHG | OXYGEN SATURATION: 96 % | HEIGHT: 62 IN

## 2024-07-15 DIAGNOSIS — I48.20 CHRONIC ATRIAL FIBRILLATION (HCC): ICD-10-CM

## 2024-07-15 DIAGNOSIS — E11.49 DM (DIABETES MELLITUS), TYPE 2 WITH NEUROLOGICAL COMPLICATIONS (HCC): Primary | ICD-10-CM

## 2024-07-15 DIAGNOSIS — I50.22 CHRONIC SYSTOLIC (CONGESTIVE) HEART FAILURE (HCC): ICD-10-CM

## 2024-07-15 DIAGNOSIS — M17.0 PRIMARY OSTEOARTHRITIS OF BOTH KNEES: ICD-10-CM

## 2024-07-15 DIAGNOSIS — M54.17 LUMBOSACRAL RADICULOPATHY AT L5: ICD-10-CM

## 2024-07-15 DIAGNOSIS — N18.31 STAGE 3A CHRONIC KIDNEY DISEASE (HCC): ICD-10-CM

## 2024-07-15 DIAGNOSIS — E78.2 MIXED HYPERLIPIDEMIA: ICD-10-CM

## 2024-07-15 LAB — HBA1C MFR BLD: 5.8 %

## 2024-07-15 RX ORDER — ARIPIPRAZOLE 5 MG/1
5 TABLET ORAL DAILY
Qty: 30 TABLET | Refills: 0 | Status: SHIPPED | OUTPATIENT
Start: 2024-07-15

## 2024-07-15 RX ORDER — SEMAGLUTIDE 1.34 MG/ML
1 INJECTION, SOLUTION SUBCUTANEOUS WEEKLY
Qty: 3 ML | Refills: 0 | Status: SHIPPED | OUTPATIENT
Start: 2024-07-15

## 2024-07-15 NOTE — PATIENT INSTRUCTIONS
THANK YOU FOR TRUSTING US WITH YOUR HEALTHCARE !!    The associates caring for you today were:    Family Practice Provider: Emily SNIDER-JUSTIN    Clinical Team Nurse: Christy Kaplan LPN    Clinical Team Medical Assistant: Sigrid Dickinson MA    Our goal is to provide you with EXCEPTIONAL patient care, and we strive to do this for EVERY patient, EVERY visit. Since we care about you and your experience, you may receive a patient satisfaction survey from [employer] by postal mail/email/text. We truly welcome your feedback and ask that you complete the survey to let us know how we are doing.    Important Numbers:  [facility] phone [phone]   [place] [facility] Nurse/MA Line: [phone]  Fax  [phone]   Central [phone]  Billing questions: [phone]  Medical Records Request: [phone]    Manage your healthcare  with [facility] My[facility]. To activate your account, visit [email or web address]   DIGITAL scheduling available now through my chart.  Schedule your next appointment at your convenience through your my chart.       [place] Office Hours:  Monday: [place] office location 8-5 ([phone]) Offering additional late hours the first Monday of the month until 7 pm.   Tuesday: 8: :  812 Noon, closed  of the month   : 7:30-4:30   SURVEY:    You may be receiving a survey from [employer] regarding your visit today.    Please complete the survey to enable us to provide the highest quality of care to you and your family.    If you cannot score us a very good on any question, please call the office to discuss how we could have made your experience a very good one.    Thank you.

## 2024-07-15 NOTE — PROGRESS NOTES
HPI Notes    Name: Jo Spann  : 1945         Chief Complaint:     Chief Complaint   Patient presents with    Hypertension     6 month check    Diabetes     6 month check        History of Present Illness:        HPI    Follow up type 2 DM   Hemoglobin A1C   Date Value Ref Range Status   07/15/2024 5.8 % Final       Decrease metformin from 500mg bid to am only, ozempic 1 mg dose Sq on Thursday AM .  10/2023 started on GLP-1 @237 now 199 today  38# weight loss     Hypertension on atenolol, lasix, spironolactone, diltiazem, lanoxin.     No gout flares with allopurinol     On coumadin due to atrial fibrillation. Managed by [facility] coumadin clinic     C/o itching again, was on abilify 2 mg daily and will trial 5 mg daily dose.     C/o bilateral knee pain chronic should be better and more moveable with weight loss.   Hyperlipidemia on statin   Lab Results   Component Value Date    LDL 87 10/25/2023           Past Medical History:     Past Medical History:   Diagnosis Date    Atrial fibrillation ([facility])     CHF (congestive heart failure) ([facility])     Diverticulosis 2021    per Colonoscopy per Dr. Stern    Gout     Hearing loss 2015    bilateral hearing aids     History of colon polyps 2016    Hypertension     Kidney stones     Mitral valve insufficiency     Obesity, morbid, BMI 40.0-49.9 ([facility])     237# start on 10/2023 with GLP-1 med    Osteoarthritis     Primary osteoarthritis of both knees     Dr. Matta    Pulmonary HTN ([facility])       Reviewed all health maintenance requirements and ordered appropriate tests  Health Maintenance Due   Topic Date Due    Respiratory Syncytial Virus (RSV) Pregnant or age 60 yrs+ (1 - 1-dose 60+ series) Never done    Shingles vaccine (2 of 3) 2014    COVID-19 Vaccine (2023- season) 2023       Past Surgical History:     Past Surgical History:   Procedure Laterality Date    CARPAL TUNNEL RELEASE      bilaterally    COLONOSCOPY  2016

## 2024-08-12 ENCOUNTER — TELEPHONE (OUTPATIENT)
Dept: FAMILY MEDICINE CLINIC | Age: 79
End: 2024-08-12

## 2024-08-12 RX ORDER — ARIPIPRAZOLE 5 MG/1
5 TABLET ORAL DAILY
Qty: 30 TABLET | Refills: 2 | Status: SHIPPED | OUTPATIENT
Start: 2024-08-12

## 2024-08-12 NOTE — TELEPHONE ENCOUNTER
Pt states that since metformin was decreased to 1 tab her sugars have been high, and she has gained 5 lbs. Pt wondering if she should go back to bid metformin and if her ozempic is still going to be working

## 2024-08-14 NOTE — TELEPHONE ENCOUNTER
Last OV: 7/15/2024  Last RX: 7/15/2024   Next scheduled apt: 10/21/2024    Surescripts requesting refill

## 2024-08-15 ENCOUNTER — ANTI-COAG VISIT (OUTPATIENT)
Age: 79
End: 2024-08-15
Payer: MEDICARE

## 2024-08-15 VITALS
HEART RATE: 69 BPM | BODY MASS INDEX: 37.86 KG/M2 | WEIGHT: 207 LBS | SYSTOLIC BLOOD PRESSURE: 105 MMHG | DIASTOLIC BLOOD PRESSURE: 58 MMHG

## 2024-08-15 DIAGNOSIS — I48.20 CHRONIC ATRIAL FIBRILLATION (HCC): Primary | ICD-10-CM

## 2024-08-15 LAB
INTERNATIONAL NORMALIZATION RATIO, POC: 1.7
PROTHROMBIN TIME, POC: 0

## 2024-08-15 PROCEDURE — 85610 PROTHROMBIN TIME: CPT

## 2024-08-15 PROCEDURE — 99211 OFF/OP EST MAY X REQ PHY/QHP: CPT

## 2024-08-15 RX ORDER — SEMAGLUTIDE 1.34 MG/ML
INJECTION, SOLUTION SUBCUTANEOUS
Qty: 3 ML | Refills: 0 | Status: SHIPPED | OUTPATIENT
Start: 2024-08-15

## 2024-08-15 NOTE — PROGRESS NOTES
[facility]  Medication Management  ANTICOAGULATION    Referring Provider: Dr. Gregorio Herndon    GOAL INR: 2.0 - 3.0     TODAY'S INR: 1.7    WARFARIN Dosage: 10 mg tonight, then change dosage to 10 mg every Wednesday and 5 mg all other days of the week      INR (no units)   Date Value   08/15/2024 1.7   06/26/2024 1.9   05/15/2024 2.0   04/03/2024 2.1   03/07/2024 1.8   02/21/2024 2.0   01/10/2024 2.7   12/13/2023 3.2       Hemoglobin   Date Value Ref Range Status   10/25/2023 13.8 12.0 - 16.0 g/dL Final     Hematocrit   Date Value Ref Range Status   10/25/2023 41.9 36.0 - 46.0 % Final     ALT   Date Value Ref Range Status   10/25/2023 51 (H) 5 - 33 U/L Final     AST   Date Value Ref Range Status   10/25/2023 35 (H) <32 U/L Final       Medication changes:  None      Notes:    Fingerstick INR drawn per clinic protocol. Patient states no visible blood in urine and no black tarry stool. Denies any missed doses of warfarin. Jo saw Emily Yao CNP on 7/15/2024 and her Metformin was decreased from 500 mg BID to once daily. Also noted, was that she had started Ozempic SQ once weekly back in October 2023 and had lost 38 pounds since starting that medication. However, her weight is back up 8 pounds over the past month and she states she is \"not eating any different\". However, her Abilify had also been increased from 2.5 to 5 mg daily for \"itching\" on 7/15/2024 and this can contribute to weight gain. No change in other maintenance medications or in diet. Since her INR has dropped even more sub-therapeutic today, and she has been trending on the low end of the therapeutic range in recent visits, we will have her take 10 mg warfarin tonight and increase weekly warfarin regimen to 10 mg on Wednesdays and 5 mg all other days of the week. We will recheck INR in 5 weeks and reassess further warfarin dosing at that time. Patient acknowledges working in consult agreement with pharmacist as referred by

## 2024-08-15 NOTE — TELEPHONE ENCOUNTER
I decreased her metformin due to the low glyco number    Hemoglobin A1C   Date Value Ref Range Status   07/15/2024 5.8 % Final     This means she is actually having sugar lows.   Yes she needs to monitor her portion sizes, but sugar lows will drive hunger too.     Try to track her sugar for 3 days often     AM, before lunch, 2 hours after lunch , bedtime     Call with these numbers next week for review (diabetes controlled is 130-150 ) so unsure what she considers a high number.     Trying to find a smooth baseline for her diabetes management

## 2024-08-16 NOTE — TELEPHONE ENCOUNTER
Pt will track sugar numbers for a few days and will call back next week with numbers. Pt states that she has not been taking her water pill everyday and she has gained 2 lbs. Advised pt that she should be taking her medication as prescribed

## 2024-08-19 ENCOUNTER — TELEPHONE (OUTPATIENT)
Dept: FAMILY MEDICINE CLINIC | Age: 79
End: 2024-08-19

## 2024-08-19 NOTE — TELEPHONE ENCOUNTER
8/16/24 Blood Sugar Readings  101 AM  123 before lunch  112 2 hours after lunch  130 bedtime    8/17/24 Blood Sugar Readings  117 AM  115 before Lunch   152 2 hours after lunch  138 bedtime     8/18/24 Blood Sugar Readings  118 AM  140 before lunch  144 2 hours after lunch  149 bedtime    8/19/24 Blood Sugar Readings  113 AM    Health Maintenance   Topic Date Due    Respiratory Syncytial Virus (RSV) Pregnant or age 60 yrs+ (1 - 1-dose 60+ series) Never done    Shingles vaccine (2 of 3) 09/01/2014    COVID-19 Vaccine (4 - 2023-24 season) 09/01/2023    Flu vaccine (1) 08/01/2024    Depression Monitoring  03/04/2025    Annual Wellness Visit (Medicare)  03/05/2025    DTaP/Tdap/Td vaccine (2 - Td or Tdap) 12/29/2028    DEXA (modify frequency per FRAX score)  Completed    Pneumococcal 65+ years Vaccine  Completed    Hepatitis A vaccine  Aged Out    Hepatitis B vaccine  Aged Out    Hib vaccine  Aged Out    Polio vaccine  Aged Out    Meningococcal (ACWY) vaccine  Aged Out    Depression Screen  Discontinued             (applicable per patient's age: Cancer Screenings, Depression Screening, Fall Risk Screening, Immunizations)    Hemoglobin A1C (%)   Date Value   07/15/2024 5.8   01/10/2024 7.1   08/30/2023 12.6 (H)     AST (U/L)   Date Value   10/25/2023 35 (H)     ALT (U/L)   Date Value   10/25/2023 51 (H)     BUN (mg/dL)   Date Value   10/25/2023 19      (goal A1C is < 7)   (goal LDL is <100) need 30-50% reduction from baseline     BP Readings from Last 3 Encounters:   08/15/24 (!) 105/58   07/15/24 120/70   06/26/24 99/65    (goal /80)      All Future Testing planned in [facility]:  Lab Frequency Next Occurrence   CBC with Auto Differential Once 10/31/2024   Comprehensive Metabolic Panel Once 10/31/2024   Vitamin D 25 Hydroxy Once 10/31/2024   Lipid Panel Once 10/31/2024   TSH with Reflex Once 10/31/2024   Magnesium Once 10/31/2024   EKG 12 Lead Once 10/31/2024   XR CHEST (2 VW) Once 10/31/2024   PT home eval Once

## 2024-08-27 RX ORDER — NYSTATIN 100000 U/G
CREAM TOPICAL
Qty: 15 G | Refills: 0 | Status: SHIPPED | OUTPATIENT
Start: 2024-08-27

## 2024-09-03 RX ORDER — DIGOXIN 125 MCG
125 TABLET ORAL DAILY
Qty: 90 TABLET | Refills: 3 | Status: SHIPPED | OUTPATIENT
Start: 2024-09-03

## 2024-09-18 ENCOUNTER — ANTI-COAG VISIT (OUTPATIENT)
Age: 79
End: 2024-09-18
Payer: MEDICARE

## 2024-09-18 VITALS
BODY MASS INDEX: 37.86 KG/M2 | HEART RATE: 73 BPM | WEIGHT: 207 LBS | DIASTOLIC BLOOD PRESSURE: 67 MMHG | SYSTOLIC BLOOD PRESSURE: 129 MMHG

## 2024-09-18 LAB
INTERNATIONAL NORMALIZATION RATIO, POC: 1.5
PROTHROMBIN TIME, POC: 0

## 2024-09-18 PROCEDURE — 99211 OFF/OP EST MAY X REQ PHY/QHP: CPT | Performed by: PHARMACIST

## 2024-09-18 PROCEDURE — 85610 PROTHROMBIN TIME: CPT | Performed by: PHARMACIST

## 2024-09-20 RX ORDER — SEMAGLUTIDE 1.34 MG/ML
1 INJECTION, SOLUTION SUBCUTANEOUS
Qty: 3 ML | Refills: 0 | Status: SHIPPED | OUTPATIENT
Start: 2024-09-20

## 2024-10-09 ENCOUNTER — APPOINTMENT (OUTPATIENT)
Age: 79
End: 2024-10-09
Payer: MEDICARE

## 2024-10-14 ENCOUNTER — HOSPITAL ENCOUNTER (OUTPATIENT)
Age: 79
Discharge: HOME | End: 2024-10-14
Payer: MEDICARE

## 2024-10-14 ENCOUNTER — HOSPITAL ENCOUNTER (OUTPATIENT)
Age: 79
Discharge: HOME | End: 2024-10-16
Payer: MEDICARE

## 2024-10-14 ENCOUNTER — HOSPITAL ENCOUNTER (OUTPATIENT)
Dept: GENERAL RADIOLOGY | Age: 79
Discharge: HOME | End: 2024-10-16
Payer: MEDICARE

## 2024-10-14 DIAGNOSIS — I34.0 MITRAL VALVE INSUFFICIENCY, UNSPECIFIED ETIOLOGY: ICD-10-CM

## 2024-10-14 DIAGNOSIS — I48.20 CHRONIC ATRIAL FIBRILLATION (HCC): ICD-10-CM

## 2024-10-14 DIAGNOSIS — E78.5 HYPERLIPIDEMIA, UNSPECIFIED HYPERLIPIDEMIA TYPE: ICD-10-CM

## 2024-10-14 DIAGNOSIS — E55.9 AVITAMINOSIS D: ICD-10-CM

## 2024-10-14 LAB
25(OH)D3 SERPL-MCNC: 83 NG/ML (ref 30–100)
ALBUMIN SERPL-MCNC: 4.1 G/DL (ref 3.5–5.2)
ALP SERPL-CCNC: 95 U/L (ref 35–104)
ALT SERPL-CCNC: 37 U/L (ref 5–33)
ANION GAP SERPL CALCULATED.3IONS-SCNC: 13 MMOL/L (ref 9–17)
AST SERPL-CCNC: 23 U/L
BASOPHILS # BLD: 0.04 K/UL (ref 0–0.2)
BASOPHILS NFR BLD: 0 % (ref 0–2)
BILIRUB SERPL-MCNC: 0.5 MG/DL (ref 0.3–1.2)
BUN SERPL-MCNC: 26 MG/DL (ref 8–23)
BUN/CREAT SERPL: 33 (ref 9–20)
CALCIUM SERPL-MCNC: 9.3 MG/DL (ref 8.6–10.4)
CHLORIDE SERPL-SCNC: 101 MMOL/L (ref 98–107)
CHOLEST SERPL-MCNC: 212 MG/DL (ref 0–199)
CHOLESTEROL/HDL RATIO: 4
CO2 SERPL-SCNC: 24 MMOL/L (ref 20–31)
CREAT SERPL-MCNC: 0.8 MG/DL (ref 0.5–0.9)
EKG Q-T INTERVAL: 364 MS
EKG QRS DURATION: 86 MS
EKG QTC CALCULATION (BAZETT): 398 MS
EKG R AXIS: -44 DEGREES
EKG T AXIS: -77 DEGREES
EKG VENTRICULAR RATE: 72 BPM
EOSINOPHIL # BLD: 0.23 K/UL (ref 0–0.4)
EOSINOPHILS RELATIVE PERCENT: 3 % (ref 0–5)
ERYTHROCYTE [DISTWIDTH] IN BLOOD BY AUTOMATED COUNT: 13.9 % (ref 12.1–15.2)
GFR, ESTIMATED: 74 ML/MIN/1.73M2
GLUCOSE SERPL-MCNC: 132 MG/DL (ref 70–99)
HCT VFR BLD AUTO: 44.3 % (ref 36–46)
HDLC SERPL-MCNC: 47 MG/DL
HGB BLD-MCNC: 14.8 G/DL (ref 12–16)
IMM GRANULOCYTES # BLD AUTO: 0.06 K/UL (ref 0–0.3)
IMM GRANULOCYTES NFR BLD: 1 % (ref 0–5)
LDLC SERPL CALC-MCNC: 127 MG/DL (ref 0–100)
LYMPHOCYTES NFR BLD: 1.54 K/UL (ref 1–4.8)
LYMPHOCYTES RELATIVE PERCENT: 17 % (ref 15–40)
MAGNESIUM SERPL-MCNC: 2.1 MG/DL (ref 1.6–2.6)
MCH RBC QN AUTO: 30 PG (ref 26–34)
MCHC RBC AUTO-ENTMCNC: 33.4 G/DL (ref 31–37)
MCV RBC AUTO: 89.7 FL (ref 80–100)
MONOCYTES NFR BLD: 0.74 K/UL (ref 0–1)
MONOCYTES NFR BLD: 8 % (ref 4–8)
NEUTROPHILS NFR BLD: 71 % (ref 47–75)
NEUTS SEG NFR BLD: 6.66 K/UL (ref 2.5–7)
PLATELET # BLD AUTO: 216 K/UL (ref 140–450)
PMV BLD AUTO: 10.1 FL (ref 6–12)
POTASSIUM SERPL-SCNC: 4.3 MMOL/L (ref 3.7–5.3)
PROT SERPL-MCNC: 7.4 G/DL (ref 6.4–8.3)
RBC # BLD AUTO: 4.94 M/UL (ref 4–5.2)
SODIUM SERPL-SCNC: 138 MMOL/L (ref 135–144)
TRIGL SERPL-MCNC: 188 MG/DL
TSH SERPL DL<=0.05 MIU/L-ACNC: 1.47 UIU/ML (ref 0.3–5)
VLDLC SERPL CALC-MCNC: 38 MG/DL
WBC OTHER # BLD: 9.3 K/UL (ref 3.5–11)

## 2024-10-14 PROCEDURE — 71046 X-RAY EXAM CHEST 2 VIEWS: CPT

## 2024-10-14 PROCEDURE — 36415 COLL VENOUS BLD VENIPUNCTURE: CPT

## 2024-10-14 PROCEDURE — 85025 COMPLETE CBC W/AUTO DIFF WBC: CPT

## 2024-10-14 PROCEDURE — 80053 COMPREHEN METABOLIC PANEL: CPT

## 2024-10-14 PROCEDURE — 80061 LIPID PANEL: CPT

## 2024-10-14 PROCEDURE — 83735 ASSAY OF MAGNESIUM: CPT

## 2024-10-14 PROCEDURE — 93005 ELECTROCARDIOGRAM TRACING: CPT

## 2024-10-14 PROCEDURE — 82306 VITAMIN D 25 HYDROXY: CPT

## 2024-10-14 PROCEDURE — 84443 ASSAY THYROID STIM HORMONE: CPT

## 2024-10-15 ENCOUNTER — OFFICE VISIT (OUTPATIENT)
Dept: CARDIOLOGY CLINIC | Age: 79
End: 2024-10-15
Payer: MEDICARE

## 2024-10-15 ENCOUNTER — ANTI-COAG VISIT (OUTPATIENT)
Age: 79
End: 2024-10-15
Payer: MEDICARE

## 2024-10-15 VITALS
DIASTOLIC BLOOD PRESSURE: 64 MMHG | SYSTOLIC BLOOD PRESSURE: 110 MMHG | OXYGEN SATURATION: 94 % | HEART RATE: 84 BPM | RESPIRATION RATE: 16 BRPM

## 2024-10-15 VITALS — WEIGHT: 209 LBS | BODY MASS INDEX: 38.23 KG/M2

## 2024-10-15 DIAGNOSIS — I48.20 CHRONIC ATRIAL FIBRILLATION (HCC): Primary | ICD-10-CM

## 2024-10-15 DIAGNOSIS — I47.29 NSVT (NONSUSTAINED VENTRICULAR TACHYCARDIA) (HCC): ICD-10-CM

## 2024-10-15 DIAGNOSIS — Z95.0 PACEMAKER: ICD-10-CM

## 2024-10-15 LAB
INTERNATIONAL NORMALIZATION RATIO, POC: 4.3
PROTHROMBIN TIME, POC: 0

## 2024-10-15 PROCEDURE — G8484 FLU IMMUNIZE NO ADMIN: HCPCS | Performed by: NURSE PRACTITIONER

## 2024-10-15 PROCEDURE — 99211 OFF/OP EST MAY X REQ PHY/QHP: CPT

## 2024-10-15 PROCEDURE — 1036F TOBACCO NON-USER: CPT | Performed by: NURSE PRACTITIONER

## 2024-10-15 PROCEDURE — 1090F PRES/ABSN URINE INCON ASSESS: CPT | Performed by: NURSE PRACTITIONER

## 2024-10-15 PROCEDURE — 1123F ACP DISCUSS/DSCN MKR DOCD: CPT | Performed by: NURSE PRACTITIONER

## 2024-10-15 PROCEDURE — G8399 PT W/DXA RESULTS DOCUMENT: HCPCS | Performed by: NURSE PRACTITIONER

## 2024-10-15 PROCEDURE — 99214 OFFICE O/P EST MOD 30 MIN: CPT | Performed by: NURSE PRACTITIONER

## 2024-10-15 PROCEDURE — G8417 CALC BMI ABV UP PARAM F/U: HCPCS | Performed by: NURSE PRACTITIONER

## 2024-10-15 PROCEDURE — 85610 PROTHROMBIN TIME: CPT

## 2024-10-15 PROCEDURE — G8427 DOCREV CUR MEDS BY ELIG CLIN: HCPCS | Performed by: NURSE PRACTITIONER

## 2024-10-15 RX ORDER — SEMAGLUTIDE 1.34 MG/ML
1 INJECTION, SOLUTION SUBCUTANEOUS
Qty: 3 ML | Refills: 2 | Status: SHIPPED | OUTPATIENT
Start: 2024-10-15

## 2024-10-15 RX ORDER — ATENOLOL 50 MG/1
TABLET ORAL
Qty: 135 TABLET | Refills: 3 | Status: SHIPPED | OUTPATIENT
Start: 2024-10-15

## 2024-10-15 RX ORDER — DILTIAZEM HYDROCHLORIDE 180 MG/1
180 CAPSULE, COATED, EXTENDED RELEASE ORAL DAILY
Qty: 90 CAPSULE | Refills: 3 | Status: SHIPPED | OUTPATIENT
Start: 2024-10-15

## 2024-10-15 NOTE — PROGRESS NOTES
Errol Norton CNP 1 year follow up   With pacemaker check   No chest pain   C/o sob not new.  Walks with walker   Due to knee and   Back pain.       
controlled.  9.  Severe arthritis in both knees, her right hip and her right shoulder.  10.  Device check today with 32 episodes of NSVT     PLAN:        Chronic  Atrial Fibrillation: Rate Control Asymptomatic  Beta Blocker: INCREASE to Atenolol (Tenormin) 50 mg, 1 tablet in the AM and 1/2 tablet, 25 mg in the PM for total of 75 mg daily. I also discussed the potential side effects of this medication including lightheadedness and dizziness and instructed them to stop the medication of this occurs and call our office if this occurs.   Calcium Channel Blocker: DECREASE to diltiazem CD (Cardizem CD) 180 mg once daily.   Anti-Arrhythmic: Not indicated.  [id number] Score for Atrial Fibrillation Stroke Risk   Risk   Factors  Component Value   C CHF Yes 1   H HTN No 0   A2 Age >= 75 Yes,  (79 y.o.) 2   D DM Yes 1   S2 Prior Stroke/TIA No 0   V Vascular Disease No 0   A Age 65-74 No,  (79 y.o.) 0   Sc Sex female 1    [id number]  Score  5   Score last updated 10/15/24 2:28 PM EDT  Click here for a link to the UpToDate guideline \"Atrial Fibrillation: Anticoagulation therapy to prevent embolization    Disclaimer: Risk Score calculation is dependent on accuracy of patient problem list and past encounter diagnosis.   Stroke Risk: CHADS2-VASc Score: 5/9 (6.7% stroke risk)  Anticoagulation: Continue warfarin (Coumadin) take as directed (goal INR 2-3)  2.  Will have her check her BP and HR daily at different times of day, record and call with readings in 2 weeks.  3.  Will follow up in 1 year for device check and EKG    Finally, we recommended that she continue her other medications and follow up with you as previously scheduled.      DISCUSSION:        Reviewed findings with Dr. Herndon and then Ms. Spann was also evaluated by Dr. Herndon.      Overall, from a cardiac standpoint, she is doing well.   She has had no chest pain, chest discomfort, shortness of breath or change in energy level.   She will continue her

## 2024-10-15 NOTE — PATIENT INSTRUCTIONS
Will increase atenolol 25 mg to 1 tablet in AM and 1/2 tablet in PM.  Will decrease Cardizem  mg daily.  Will have her check her BP and HR daily at different times of day, record and call with readings in 2 weeks.  Will follow up in 1 year for device check and EKG

## 2024-10-15 NOTE — PROGRESS NOTES
[facility]  Medication Management  ANTICOAGULATION    Referring Provider: Dr Gregorio Herndon     GOAL INR: 2.0 - 3.0     TODAY'S INR: 4.3     WARFARIN Dosage: HOLD x 1 dose, then 5 mg x 1 dose, then decrease dose to 10 mg Wednesdays and 5 mg all other days of the week      INR (no units)   Date Value   06/26/2024 1.9   05/15/2024 2.0   04/03/2024 2.1   03/07/2024 1.8   02/21/2024 2.0   01/10/2024 2.7   12/13/2023 3.2     INR,(POC) (no units)   Date Value   09/18/2024 1.5   08/15/2024 1.7       Hemoglobin   Date Value Ref Range Status   10/14/2024 14.8 12.0 - 16.0 g/dL Final     Hematocrit   Date Value Ref Range Status   10/14/2024 44.3 36.0 - 46.0 % Final     ALT   Date Value Ref Range Status   10/14/2024 37 (H) 5 - 33 U/L Final     AST   Date Value Ref Range Status   10/14/2024 23 <32 U/L Final       Medication changes:  Will increase her Atenolol to 25 mg in the AM and 12.5 mg in the PM per Dr. Herndon  Will decrease her Cardizem CD dosage to 180 mg daily per Dr. Herndon      Notes:    Fingerstick INR drawn per clinic protocol. Patient states no visible blood in urine and no black tarry stool. Denies any missed doses of warfarin. Jo just came from an appointment with Dr. Herndon and was instructed to increase her Atenolol to 25 mg in the AM and 12.5 mg in the PM. Also, her Cardizem CD dosage will be decreased from 240 mg to 180 mg daily. No change in other maintenance medications. Since her INR was sub-therapeutic during her last visit, she does admit to have eaten \"less\" of the foods that are high in vitamin-K content, including cabbage, as had been recommended for the past 3 weeks. Currently, her INR is supra-therapeutic, so we will HOLD her warfarin dosage tonight and have her take only 5 mg warfarin tomorrow night. Then, she will decrease current weekly warfarin regimen (11%) to 10 mg on Wednesdays and 5 mg all other days of the week as noted on her dosing calendar. We will

## 2024-10-21 ENCOUNTER — OFFICE VISIT (OUTPATIENT)
Dept: FAMILY MEDICINE CLINIC | Age: 79
End: 2024-10-21

## 2024-10-21 VITALS
BODY MASS INDEX: 38.64 KG/M2 | DIASTOLIC BLOOD PRESSURE: 70 MMHG | SYSTOLIC BLOOD PRESSURE: 110 MMHG | HEART RATE: 70 BPM | OXYGEN SATURATION: 96 % | HEIGHT: 62 IN | WEIGHT: 210 LBS

## 2024-10-21 DIAGNOSIS — N18.31 STAGE 3A CHRONIC KIDNEY DISEASE (HCC): ICD-10-CM

## 2024-10-21 DIAGNOSIS — Z23 NEED FOR INFLUENZA VACCINATION: ICD-10-CM

## 2024-10-21 DIAGNOSIS — E78.2 MIXED HYPERLIPIDEMIA: ICD-10-CM

## 2024-10-21 DIAGNOSIS — E66.1 CLASS 2 DRUG-INDUCED OBESITY WITH SERIOUS COMORBIDITY AND BODY MASS INDEX (BMI) OF 38.0 TO 38.9 IN ADULT: ICD-10-CM

## 2024-10-21 DIAGNOSIS — L29.9 GENERALIZED PRURITUS: ICD-10-CM

## 2024-10-21 DIAGNOSIS — Z13.820 OSTEOPOROSIS SCREENING: ICD-10-CM

## 2024-10-21 DIAGNOSIS — E66.812 CLASS 2 DRUG-INDUCED OBESITY WITH SERIOUS COMORBIDITY AND BODY MASS INDEX (BMI) OF 38.0 TO 38.9 IN ADULT: ICD-10-CM

## 2024-10-21 DIAGNOSIS — Z78.0 OSTEOPENIA AFTER MENOPAUSE: ICD-10-CM

## 2024-10-21 DIAGNOSIS — Z78.0 POSTMENOPAUSAL ESTROGEN DEFICIENCY: ICD-10-CM

## 2024-10-21 DIAGNOSIS — I48.20 CHRONIC ATRIAL FIBRILLATION (HCC): ICD-10-CM

## 2024-10-21 DIAGNOSIS — M85.80 OSTEOPENIA AFTER MENOPAUSE: ICD-10-CM

## 2024-10-21 DIAGNOSIS — E11.49 DM (DIABETES MELLITUS), TYPE 2 WITH NEUROLOGICAL COMPLICATIONS (HCC): Primary | ICD-10-CM

## 2024-10-21 DIAGNOSIS — F22 DELUSIONS OF PARASITOSIS (HCC): ICD-10-CM

## 2024-10-21 LAB — HBA1C MFR BLD: 6.1 %

## 2024-10-21 RX ORDER — ARIPIPRAZOLE 5 MG/1
5 TABLET ORAL DAILY
Qty: 30 TABLET | Refills: 5 | Status: SHIPPED | OUTPATIENT
Start: 2024-10-21

## 2024-10-21 RX ORDER — ARIPIPRAZOLE 5 MG/1
5 TABLET ORAL DAILY
Qty: 30 TABLET | Refills: 2 | Status: CANCELLED | OUTPATIENT
Start: 2024-10-21

## 2024-10-21 NOTE — PROGRESS NOTES
[facility]  [facility] [address] [address]  Dept: [phone]  Dept Fax: [phone]    Last encounter 7/15/2024    Diabetes (3 month check)       HPI:   Jo Spann is a 79 y.o. female who presentstoday for her medical conditions/complaints as noted below.  Jo Spann is c/o of Diabetes (3 month check)      HPI  Established patient    On ozempic GLP-1 doing well with weight loss, encouraged increased activity or physical therapy   Pt states \"I don't want to\" , pt denies desire to get up and move more      2/2023  260  9/2023 237  11/2023 224     Wt Readings from Last 3 Encounters:   10/21/24 95.3 kg (210 lb)   10/15/24 94.8 kg (209 lb)   09/18/24 93.9 kg (207 lb)     50 # loss since Feb 2023     Pt with intense itching , ammonia level normal, no meds found as cause.   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 Does offer \"feels like something on me, crawling at time\"    Treatment Adherence:   Medication compliance:  compliant all of the time  Diet compliance:  compliant all of the time  Weight trend: decreasing  Current exercise: no regular exercise  Barriers: none    Diabetes Mellitus Type 2: Current symptoms/problems include blurry vision, polyphagia, polyuria, and polydipsia.    Home blood sugar records: fasting range: < 130  Any episodes of hypoglycemia? no  Eye exam current (within one year): yes due soon, feels she cannot see through her glasses, [place] [facility].   Cataracts.     Tobacco history: She  reports that she quit smoking about 43 years ago. Her smoking use included cigarettes. She started smoking about 47 years ago. She has a 1 pack-year smoking history. She has never used smokeless tobacco.   Daily Aspirin? Yes    Hypertension:  Home blood pressure monitoring: No.  She is adherent to a low sodium diet. Patient denies chest pain, shortness of breath, headache, lightheadedness, blurred vision,

## 2024-10-22 RX ORDER — ALLOPURINOL 300 MG/1
TABLET ORAL
Qty: 45 TABLET | Refills: 3 | Status: SHIPPED | OUTPATIENT
Start: 2024-10-22

## 2024-11-07 ENCOUNTER — ANTI-COAG VISIT (OUTPATIENT)
Age: 79
End: 2024-11-07
Payer: MEDICARE

## 2024-11-07 VITALS
SYSTOLIC BLOOD PRESSURE: 97 MMHG | WEIGHT: 209.4 LBS | BODY MASS INDEX: 38.3 KG/M2 | HEART RATE: 79 BPM | DIASTOLIC BLOOD PRESSURE: 63 MMHG

## 2024-11-07 DIAGNOSIS — I48.20 CHRONIC ATRIAL FIBRILLATION (HCC): Primary | ICD-10-CM

## 2024-11-07 LAB
INTERNATIONAL NORMALIZATION RATIO, POC: 2.1
PROTHROMBIN TIME, POC: 0

## 2024-11-07 PROCEDURE — 85610 PROTHROMBIN TIME: CPT

## 2024-11-07 PROCEDURE — 99211 OFF/OP EST MAY X REQ PHY/QHP: CPT

## 2024-11-07 NOTE — PROGRESS NOTES
[facility]  Medication Management  ANTICOAGULATION    Referring Provider: Dr Gregorio Herndon     GOAL INR: 2.0 - 3.0     TODAY'S INR: 2.1     WARFARIN Dosage: continue 10 mg  and 5 mg all other days of the week      INR (no units)   Date Value   2024 1.9   05/15/2024 2.0   2024 2.1   2024 1.8   2024 2.0   01/10/2024 2.7   2023 3.2     INR,(POC) (no units)   Date Value   10/15/2024 4.3   2024 1.5   08/15/2024 1.7       Hemoglobin   Date Value Ref Range Status   10/14/2024 14.8 12.0 - 16.0 g/dL Final     Hematocrit   Date Value Ref Range Status   10/14/2024 44.3 36.0 - 46.0 % Final     ALT   Date Value Ref Range Status   10/14/2024 37 (H) 5 - 33 U/L Final     AST   Date Value Ref Range Status   10/14/2024 23 <32 U/L Final       Medication changes:  None      Notes:    Fingerstick INR drawn per clinic protocol. Patient states no visible blood in urine and no black tarry stool. Denies any missed doses of warfarin. Jo had an appointment with Emily Yao CNP on 10/21/2024 and is scheduled to have a DEXA bone density scan on 2024. No change in other maintenance medications or in diet. Since her INR is 2.1 today, we will have her take 5 mg warfarin tonight and then continue current weekly warfarin regimen as noted on her dosing calendar. We will recheck INR in 6 weeks. Patient acknowledges working in consult agreement with pharmacist as referred by his/her physician.                  For Pharmacy Admin Tracking Only    Intervention Detail: Adherence Monitorin and Dose Adjustment: 1, reason: Therapy Optimization  Total # of Interventions Recommended: 2  Total # of Interventions Accepted: 2  Time Spent (min): 45      Zain Elkins [facility], PharmD

## 2024-11-21 ENCOUNTER — HOSPITAL ENCOUNTER (OUTPATIENT)
Age: 79
Discharge: HOME | End: 2024-11-21
Payer: MEDICARE

## 2024-11-21 LAB
INR PPP: 2
PROTHROMBIN TIME: 21.9 SEC (ref 11.5–14.2)

## 2024-11-21 PROCEDURE — 36415 COLL VENOUS BLD VENIPUNCTURE: CPT

## 2024-11-21 PROCEDURE — 85610 PROTHROMBIN TIME: CPT

## 2024-11-27 ENCOUNTER — TELEPHONE (OUTPATIENT)
Dept: PHARMACY | Age: 79
End: 2024-11-27

## 2024-11-27 NOTE — TELEPHONE ENCOUNTER
Received a call from this patient stating that she was scheduled to have cataract surgery on 12/4/2024 at [facility] in [place]. She reviewed her instructions with me via the phone and says that she has to hold her \"Ozempic\" for 7 days prior, but does not have to hold her warfarin at all pre-procedure. She also states that she is supposed to take only her \"blood pressure and heart\" medications the morning of the procedure with a \"sip of water\". We discussed that she will be taking only her Digoxin, Diltiazem, Atenolol, and Spironolactone that morning of the procedure. Jo verbalizes understanding of her instructions via the phone.

## 2024-12-17 NOTE — TELEPHONE ENCOUNTER
Ozempic    [facility]    Health Maintenance   Topic Date Due    Shingles vaccine (2 of 3) 09/01/2014    Respiratory Syncytial Virus (RSV) Pregnant or age 60 yrs+ (1 - 1-dose 75+ series) Never done    COVID-19 Vaccine (4 - 2023-24 season) 09/01/2024    Diabetic Alb to Cr ratio (uACR) test  03/04/2025    Depression Monitoring  03/04/2025    Annual Wellness Visit (Medicare)  03/05/2025    GFR test (Diabetes, CKD 3-4, OR last GFR 15-59)  10/14/2025    DTaP/Tdap/Td vaccine (2 - Td or Tdap) 12/29/2028    DEXA (modify frequency per FRAX score)  Completed    Flu vaccine  Completed    Pneumococcal 65+ years Vaccine  Completed    Hepatitis A vaccine  Aged Out    Hepatitis B vaccine  Aged Out    Hib vaccine  Aged Out    Polio vaccine  Aged Out    Meningococcal (ACWY) vaccine  Aged Out    Depression Screen  Discontinued             (applicable per patient's age: Cancer Screenings, Depression Screening, Fall Risk Screening, Immunizations)    Hemoglobin A1C (%)   Date Value   10/21/2024 6.1   07/15/2024 5.8   01/10/2024 7.1     AST (U/L)   Date Value   10/14/2024 23     ALT (U/L)   Date Value   10/14/2024 37 (H)     BUN (mg/dL)   Date Value   10/14/2024 26 (H)      (goal A1C is < 7)   (goal LDL is <100) need 30-50% reduction from baseline     BP Readings from Last 3 Encounters:   11/07/24 97/63   10/21/24 110/70   10/15/24 110/64    (goal /80)      All Future Testing planned in [facility]:  Lab Frequency Next Occurrence   PT home eval Once 03/28/2024   EKG 12 Lead Once 10/15/2025   DEXA BONE DENSITY 2 SITES Once 10/21/2024       Next Visit Date:  Future Appointments   Date Time Provider Department Center   12/19/2024 10:00 AM [facility]BILE VAN UNIT [facility]BILE  [place] [facility]   12/19/2024 10:40 AM [facility] [place]   4/21/2025  1:20 PM Emily Yao APRN - CNP [facility] BS [facility] DEP   10/14/2025  1:30 PM Meryl Norton, APRN - [facility]            Patient Active Problem List:     Kidney

## 2024-12-18 RX ORDER — SEMAGLUTIDE 1.34 MG/ML
1 INJECTION, SOLUTION SUBCUTANEOUS
Qty: 3 ML | Refills: 2 | Status: SHIPPED | OUTPATIENT
Start: 2024-12-18

## 2024-12-19 ENCOUNTER — ANTI-COAG VISIT (OUTPATIENT)
Age: 79
End: 2024-12-19
Payer: MEDICARE

## 2024-12-19 VITALS
HEART RATE: 70 BPM | WEIGHT: 217.4 LBS | DIASTOLIC BLOOD PRESSURE: 62 MMHG | SYSTOLIC BLOOD PRESSURE: 103 MMHG | BODY MASS INDEX: 39.76 KG/M2

## 2024-12-19 DIAGNOSIS — I48.20 CHRONIC ATRIAL FIBRILLATION (HCC): Primary | ICD-10-CM

## 2024-12-19 LAB
INTERNATIONAL NORMALIZATION RATIO, POC: 2.6
PROTHROMBIN TIME, POC: 0

## 2024-12-19 PROCEDURE — 85610 PROTHROMBIN TIME: CPT

## 2024-12-19 PROCEDURE — 99211 OFF/OP EST MAY X REQ PHY/QHP: CPT

## 2024-12-30 RX ORDER — SPIRONOLACTONE 25 MG/1
TABLET ORAL
Qty: 180 TABLET | Refills: 3 | Status: SHIPPED | OUTPATIENT
Start: 2024-12-30

## 2024-12-30 RX ORDER — WARFARIN SODIUM 10 MG/1
TABLET ORAL
Qty: 90 TABLET | Refills: 3 | Status: SHIPPED | OUTPATIENT
Start: 2024-12-30

## 2025-01-20 RX ORDER — SEMAGLUTIDE 1.34 MG/ML
1 INJECTION, SOLUTION SUBCUTANEOUS
Qty: 3 ML | Refills: 2 | Status: SHIPPED | OUTPATIENT
Start: 2025-01-20

## 2025-01-20 RX ORDER — FUROSEMIDE 40 MG/1
TABLET ORAL
Qty: 180 TABLET | Refills: 3 | Status: SHIPPED | OUTPATIENT
Start: 2025-01-20

## 2025-01-20 NOTE — TELEPHONE ENCOUNTER
Last OV: 10/21/2024  Next scheduled apt: 4/21/2025      Pt . Called requesting med refills, med pending approval.      Thank you

## 2025-01-30 ENCOUNTER — APPOINTMENT (OUTPATIENT)
Age: 80
End: 2025-01-30
Payer: MEDICARE

## 2025-02-06 ENCOUNTER — ANTI-COAG VISIT (OUTPATIENT)
Age: 80
End: 2025-02-06
Payer: MEDICARE

## 2025-02-06 VITALS
HEART RATE: 67 BPM | WEIGHT: 215.8 LBS | DIASTOLIC BLOOD PRESSURE: 65 MMHG | SYSTOLIC BLOOD PRESSURE: 105 MMHG | BODY MASS INDEX: 39.47 KG/M2

## 2025-02-06 DIAGNOSIS — I48.20 CHRONIC ATRIAL FIBRILLATION (HCC): Primary | ICD-10-CM

## 2025-02-06 LAB
INTERNATIONAL NORMALIZATION RATIO, POC: 3.1
PROTHROMBIN TIME, POC: 0

## 2025-02-06 PROCEDURE — 99211 OFF/OP EST MAY X REQ PHY/QHP: CPT

## 2025-02-06 PROCEDURE — 85610 PROTHROMBIN TIME: CPT

## 2025-02-06 NOTE — PROGRESS NOTES
[facility]  [facility]ication [facility]  ANTICOAGULATION    Referring Provider: Dr Gregorio Herndon     GOAL INR: 2.0 - 3.0     TODAY'S INR: 3.1     WARFARIN Dosage: 5 mg tonight, then continue 10 mg  and 5 mg all other days of the week      INR (no units)   Date Value   2024 2.0   2024 1.9   05/15/2024 2.0   2024 2.1   2024 1.8   2024 2.0   01/10/2024 2.7     INR,(POC) (no units)   Date Value   2025 3.1   2024 2.6   2024 2.1   10/15/2024 4.3   2024 1.5   08/15/2024 1.7       Hemoglobin   Date Value Ref Range Status   10/14/2024 14.8 12.0 - 16.0 g/dL Final     Hematocrit   Date Value Ref Range Status   10/14/2024 44.3 36.0 - 46.0 % Final     ALT   Date Value Ref Range Status   10/14/2024 37 (H) 5 - 33 U/L Final     AST   Date Value Ref Range Status   10/14/2024 23 <32 U/L Final       Medication changes:  None      Notes:    Fingerstick INR drawn per clinic protocol. Patient states no visible blood in urine and no black tarry stool. Denies any missed doses of warfarin. Jo says she took \"2 Tylenol\" this morning for knee pain, but doesn't take Tylenol \"most days\" of the week. No change in other maintenance medications or in diet. Since her INR is 3.1 today, Jo says she will eat some \"spinach with vinegar\" tonight, along with her \"vegetable soup\". We will have her take 5 mg warfarin tonight and then continue current weekly warfarin regimen as noted on her dosing calendar. We will recheck INR in 6 weeks. Patient acknowledges working in consult agreement with pharmacist as referred by his/her physician.                            For Pharmacy Admin Tracking Only    Intervention Detail: Adherence Monitorin and Dose Adjustment: 1, reason: Therapy Optimization  Total # of Interventions Recommended: 2  Total # of Interventions Accepted: 2  Time Spent (min): 45      Zain Elkins RPH, PharmD

## 2025-03-20 ENCOUNTER — APPOINTMENT (OUTPATIENT)
Age: 80
End: 2025-03-20
Payer: MEDICARE

## 2025-03-24 DIAGNOSIS — L29.9 GENERALIZED PRURITUS: ICD-10-CM

## 2025-03-24 DIAGNOSIS — E11.49 DM (DIABETES MELLITUS), TYPE 2 WITH NEUROLOGICAL COMPLICATIONS (HCC): ICD-10-CM

## 2025-03-25 RX ORDER — ARIPIPRAZOLE 5 MG/1
TABLET ORAL
Qty: 90 TABLET | Refills: 1 | Status: SHIPPED | OUTPATIENT
Start: 2025-03-25

## 2025-03-25 RX ORDER — DULOXETIN HYDROCHLORIDE 30 MG/1
CAPSULE, DELAYED RELEASE ORAL
Qty: 90 CAPSULE | Refills: 1 | Status: SHIPPED | OUTPATIENT
Start: 2025-03-25

## 2025-03-28 ENCOUNTER — ANTI-COAG VISIT (OUTPATIENT)
Age: 80
End: 2025-03-28
Payer: MEDICARE

## 2025-03-28 VITALS
HEART RATE: 77 BPM | BODY MASS INDEX: 40.09 KG/M2 | SYSTOLIC BLOOD PRESSURE: 110 MMHG | DIASTOLIC BLOOD PRESSURE: 63 MMHG | WEIGHT: 219.2 LBS

## 2025-03-28 LAB
INTERNATIONAL NORMALIZATION RATIO, POC: 3.6
PROTHROMBIN TIME, POC: 0

## 2025-03-28 PROCEDURE — 99211 OFF/OP EST MAY X REQ PHY/QHP: CPT | Performed by: PHARMACIST

## 2025-03-28 PROCEDURE — 85610 PROTHROMBIN TIME: CPT | Performed by: PHARMACIST

## 2025-03-28 NOTE — PROGRESS NOTES
[facility]  Medication Management  ANTICOAGULATION      Referring Provider: Dr Gregorio Herndon     GOAL INR: 2.0-3.0     TODAY'S INR: 3.6     WARFARIN Dosage: 2.5 mg tonight, then decrease dose to 5 mg daily      INR (no units)   Date Value   2024 2.0   2024 1.9   05/15/2024 2.0   2024 2.1   2024 1.8   2024 2.0   01/10/2024 2.7     INR,(POC) (no units)   Date Value   2025 3.6   2025 3.1   2024 2.6   2024 2.1   10/15/2024 4.3   2024 1.5   08/15/2024 1.7       Hemoglobin   Date Value Ref Range Status   10/14/2024 14.8 12.0 - 16.0 g/dL Final     Hematocrit   Date Value Ref Range Status   10/14/2024 44.3 36.0 - 46.0 % Final     ALT   Date Value Ref Range Status   10/14/2024 37 (H) 5 - 33 U/L Final     AST   Date Value Ref Range Status   10/14/2024 23 <32 U/L Final       Medication changes:  Trying \"Gummies\" again for knee pain    Notes:    Fingerstick INR drawn per clinic protocol. Patient states no visible blood in urine, black tarry stool or ER visits but has had approximately 3 falls since her last visit. She has not had any injuries but EMS had to come each time to get her up. She does deny any missed or extra doses of warfarin. Advises she has been trying \"Gummies\" again over the last week for knee pain. She only has a couple left and is not sure if she will continue to take them. No other changes in medication or diet. INR is supratherapeutic again so will order 2.5 mg tonight (she still has 2.5 mg tablets) before decreasing her dose to 5 mg daily and will recheck INR in 3 weeks. Patient acknowledges working in consult agreement with pharmacist as referred by his/her physician.    For Pharmacy Admin Tracking Only    Intervention Detail: Adherence Monitorin and Dose Adjustment: 1, reason: Therapy Optimization  Total # of Interventions Recommended: 2  Total # of Interventions Accepted: 2  Time Spent (min): 45    José

## 2025-04-07 RX ORDER — SEMAGLUTIDE 1.34 MG/ML
INJECTION, SOLUTION SUBCUTANEOUS
Qty: 9 ML | Refills: 3 | Status: SHIPPED | OUTPATIENT
Start: 2025-04-07

## 2025-04-07 NOTE — TELEPHONE ENCOUNTER
Last OV: 10/21/2024   DM   Last RX:    Next scheduled apt: 4/21/2025  AWV         Surescript requesting a refill   Medication pending for approval

## 2025-04-18 ENCOUNTER — APPOINTMENT (OUTPATIENT)
Age: 80
End: 2025-04-18
Payer: MEDICARE

## 2025-04-25 ENCOUNTER — ANTI-COAG VISIT (OUTPATIENT)
Age: 80
End: 2025-04-25
Payer: MEDICARE

## 2025-04-25 VITALS — HEART RATE: 108 BPM | DIASTOLIC BLOOD PRESSURE: 68 MMHG | SYSTOLIC BLOOD PRESSURE: 143 MMHG

## 2025-04-25 DIAGNOSIS — I48.91 ATRIAL FIBRILLATION, UNSPECIFIED TYPE (HCC): Primary | ICD-10-CM

## 2025-04-25 LAB
INTERNATIONAL NORMALIZATION RATIO, POC: 2.2
PROTHROMBIN TIME, POC: 0

## 2025-04-25 PROCEDURE — 99211 OFF/OP EST MAY X REQ PHY/QHP: CPT | Performed by: FAMILY MEDICINE

## 2025-04-25 PROCEDURE — 85610 PROTHROMBIN TIME: CPT | Performed by: FAMILY MEDICINE

## 2025-04-25 NOTE — PATIENT INSTRUCTIONS
Continue to take warfarin 5mg (1/2 tablet) every day.  Continue to monitor urine and stool for signs and symptoms of bleeding.   Please notify the clinic of any medication changes.   Please remember to bring all medications (both prescription and OTC) to your next visit.   Kindly notify the clinic if you are unable to make to your next appointment.   Follow warfarin dosing instructions on dosing calendar provided.

## 2025-04-25 NOTE — PROGRESS NOTES
[facility]  Medication Management  ANTICOAGULATION    Referring Provider: Dr Herndon    GOAL INR: 2.0-3.0    TODAY'S INR: 2.2    WARFARIN Dosage: continue 5mg daily    INR (no units)   Date Value   2024 2.0   2024 1.9   05/15/2024 2.0   2024 2.1   2024 1.8   2024 2.0   01/10/2024 2.7     INR,(POC) (no units)   Date Value   2025 2.2   2025 3.6   2025 3.1   2024 2.6   2024 2.1   10/15/2024 4.3   2024 1.5       Hemoglobin   Date Value Ref Range Status   10/14/2024 14.8 12.0 - 16.0 g/dL Final     Hematocrit   Date Value Ref Range Status   10/14/2024 44.3 36.0 - 46.0 % Final     ALT   Date Value Ref Range Status   10/14/2024 37 (H) 5 - 33 U/L Final     AST   Date Value Ref Range Status   10/14/2024 23 <32 U/L Final       Medication changes:  No change    Notes:    Fingerstick INR drawn per clinic protocol. Patient states no visible blood in urine and no black tarry stool. Denies any missed doses of warfarin. No change in other maintenance medications or in diet. Will recheck INR in 5 weeks. Patient states she is more fatigued today than normal, denies dizziness, headache, lightheadedness. States SOB is about the same as \"usual\". Patient acknowledges working in consult agreement with pharmacist as referred by his/her physician.                  For Pharmacy Admin Tracking Only    Intervention Detail: Adherence Monitorin  Total # of Interventions Recommended: 2  Total # of Interventions Accepted: 2  Time Spent (min): 20      YOUSIF HaroPh., 2025,2:40 PM

## 2025-05-05 ENCOUNTER — HOSPITAL ENCOUNTER (OUTPATIENT)
Age: 80
Setting detail: SPECIMEN
Discharge: HOME | End: 2025-05-05
Payer: MEDICARE

## 2025-05-05 ENCOUNTER — OFFICE VISIT (OUTPATIENT)
Dept: FAMILY MEDICINE CLINIC | Age: 80
End: 2025-05-05

## 2025-05-05 VITALS
HEART RATE: 65 BPM | OXYGEN SATURATION: 96 % | DIASTOLIC BLOOD PRESSURE: 80 MMHG | BODY MASS INDEX: 40.85 KG/M2 | SYSTOLIC BLOOD PRESSURE: 120 MMHG | HEIGHT: 62 IN | WEIGHT: 222 LBS

## 2025-05-05 DIAGNOSIS — I50.22 CHRONIC SYSTOLIC (CONGESTIVE) HEART FAILURE (HCC): ICD-10-CM

## 2025-05-05 DIAGNOSIS — E11.49 DM (DIABETES MELLITUS), TYPE 2 WITH NEUROLOGICAL COMPLICATIONS (HCC): ICD-10-CM

## 2025-05-05 DIAGNOSIS — E78.2 MIXED HYPERLIPIDEMIA: ICD-10-CM

## 2025-05-05 DIAGNOSIS — I48.20 CHRONIC ATRIAL FIBRILLATION (HCC): ICD-10-CM

## 2025-05-05 DIAGNOSIS — F33.1 MAJOR DEPRESSIVE DISORDER, RECURRENT, MODERATE (HCC): ICD-10-CM

## 2025-05-05 DIAGNOSIS — M54.17 LUMBOSACRAL RADICULOPATHY AT L5: ICD-10-CM

## 2025-05-05 DIAGNOSIS — Z00.00 MEDICARE ANNUAL WELLNESS VISIT, SUBSEQUENT: Primary | ICD-10-CM

## 2025-05-05 DIAGNOSIS — I51.7 BIATRIAL ENLARGEMENT: ICD-10-CM

## 2025-05-05 DIAGNOSIS — E66.01 OBESITY, MORBID, BMI 40.0-49.9 (HCC): ICD-10-CM

## 2025-05-05 DIAGNOSIS — N18.31 STAGE 3A CHRONIC KIDNEY DISEASE (HCC): ICD-10-CM

## 2025-05-05 LAB
CREAT UR-MCNC: 99.8 MG/DL (ref 28–217)
HBA1C MFR BLD: 6.8 %
MICROALBUMIN UR-MCNC: <12 MG/L (ref 0–20)
MICROALBUMIN/CREAT UR-RTO: NORMAL MCG/MG CREAT (ref 0–25)

## 2025-05-05 PROCEDURE — 82570 ASSAY OF URINE CREATININE: CPT

## 2025-05-05 PROCEDURE — 82043 UR ALBUMIN QUANTITATIVE: CPT

## 2025-05-05 RX ORDER — MUPIROCIN 20 MG/G
OINTMENT TOPICAL
Qty: 1 EACH | Refills: 1 | Status: SHIPPED | OUTPATIENT
Start: 2025-05-05 | End: 2025-05-12

## 2025-05-05 RX ORDER — METFORMIN HYDROCHLORIDE 500 MG/1
500 TABLET, EXTENDED RELEASE ORAL
Qty: 180 TABLET | Refills: 1 | Status: SHIPPED | OUTPATIENT
Start: 2025-05-05

## 2025-05-05 SDOH — ECONOMIC STABILITY: FOOD INSECURITY: WITHIN THE PAST 12 MONTHS, THE FOOD YOU BOUGHT JUST DIDN'T LAST AND YOU DIDN'T HAVE MONEY TO GET MORE.: NEVER TRUE

## 2025-05-05 SDOH — ECONOMIC STABILITY: FOOD INSECURITY: WITHIN THE PAST 12 MONTHS, YOU WORRIED THAT YOUR FOOD WOULD RUN OUT BEFORE YOU GOT MONEY TO BUY MORE.: NEVER TRUE

## 2025-05-05 ASSESSMENT — PATIENT HEALTH QUESTIONNAIRE - PHQ9
6. FEELING BAD ABOUT YOURSELF - OR THAT YOU ARE A FAILURE OR HAVE LET YOURSELF OR YOUR FAMILY DOWN: NOT AT ALL
4. FEELING TIRED OR HAVING LITTLE ENERGY: NOT AT ALL
SUM OF ALL RESPONSES TO PHQ QUESTIONS 1-9: 2
10. IF YOU CHECKED OFF ANY PROBLEMS, HOW DIFFICULT HAVE THESE PROBLEMS MADE IT FOR YOU TO DO YOUR WORK, TAKE CARE OF THINGS AT HOME, OR GET ALONG WITH OTHER PEOPLE: NOT DIFFICULT AT ALL
SUM OF ALL RESPONSES TO PHQ QUESTIONS 1-9: 2
SUM OF ALL RESPONSES TO PHQ QUESTIONS 1-9: 2
3. TROUBLE FALLING OR STAYING ASLEEP: NOT AT ALL
8. MOVING OR SPEAKING SO SLOWLY THAT OTHER PEOPLE COULD HAVE NOTICED. OR THE OPPOSITE, BEING SO FIGETY OR RESTLESS THAT YOU HAVE BEEN MOVING AROUND A LOT MORE THAN USUAL: NOT AT ALL
5. POOR APPETITE OR OVEREATING: NOT AT ALL
9. THOUGHTS THAT YOU WOULD BE BETTER OFF DEAD, OR OF HURTING YOURSELF: NOT AT ALL
7. TROUBLE CONCENTRATING ON THINGS, SUCH AS READING THE NEWSPAPER OR WATCHING TELEVISION: NOT AT ALL
SUM OF ALL RESPONSES TO PHQ QUESTIONS 1-9: 2
2. FEELING DOWN, DEPRESSED OR HOPELESS: SEVERAL DAYS
1. LITTLE INTEREST OR PLEASURE IN DOING THINGS: SEVERAL DAYS

## 2025-05-05 ASSESSMENT — LIFESTYLE VARIABLES
HOW OFTEN DO YOU HAVE A DRINK CONTAINING ALCOHOL: NEVER
HOW MANY STANDARD DRINKS CONTAINING ALCOHOL DO YOU HAVE ON A TYPICAL DAY: PATIENT DOES NOT DRINK

## 2025-05-05 NOTE — PROGRESS NOTES
1 tablet in AM and 1/2 tablet in PM Yes Meryl Norton APRN - CNP   dilTIAZem (CARDIZEM CD) 180 MG extended release capsule Take 1 capsule by mouth daily Yes Meryl Norton APRN - CNP   digoxin (LANOXIN) 125 MCG tablet TAKE 1 TABLET EVERY DAY Yes Scott Herndon MD   acetaminophen (TYLENOL) 500 MG tablet Take 1 tablet by mouth every 6 hours as needed for Pain Taking 2 tabs QAM and 1 tab (plus 2 Tylenol PM) QHS Yes Kim Xiao MD   Cholecalciferol (VITAMIN D) 2000 UNITS CAPS capsule Take 1 capsule by mouth 2 times daily Yes ProviderKim MD   nystatin (MYCOSTATIN) 067788 UNIT/GM cream APPLY topically TWICE DAILY for abdominal rash under apron FOR 7 DAYS  Emily Yao APRN - CNP   diphenhydrAMINE-APAP, sleep, (TYLENOL PM EXTRA STRENGTH)  MG tablet Take 2 tablets by mouth nightly as needed for Sleep  ProviderKim MD   blood glucose monitor strips Test 2 times a day  Emily Yao APRN - CNP   mupirocin (BACTROBAN NASAL) 2 % nasal ointment Apply to each nostril daily prn hx multiple nose bleeds  Emily Yao APRN - CNP       CareTeam (Including outside providers/suppliers regularly involved in providing care):   Patient Care Team:  Emily Yao APRN - CNP as PCP - General (Certified Nurse Practitioner)  Emily Yao APRN - CNP as PCP - Empaneled Provider  Gurmeet Silva MD as Surgeon (General Surgery)  Zain Elkins [facility] as Pharmacist (Pharmacist)  Rachel Roger [facility] as Pharmacist (Pharmacist)     Recommendations for Preventive Services Due: see orders and patient instructions/AVS.  Recommended screening schedule for the next 5-10 years is provided to the patient in written form: see Patient Instructions/AVS.     Reviewed and updated this visit:  Tobacco  Allergies  Meds  Problems  Med Hx  Surg Hx  Fam Hx  Sexual   Hx

## 2025-05-05 NOTE — PATIENT INSTRUCTIONS
your weight-loss goals.  A dietitian can help you make healthy changes in your diet.  An exercise specialist or  can help you develop a safe and effective exercise program.  A counselor or psychiatrist can help you cope with issues such as depression, anxiety, or family problems that can make it hard to focus on weight loss.  Consider joining a support group for people who are trying to lose weight. Your doctor can suggest groups in your area.  Where can you learn more?  Go to https://www.[employer].net/patientEd and enter U357 to learn more about \"Starting a Weight-Loss Plan: Care Instructions.\"  Current as of: April 30, 2024  Content Version: 14.4  © 2024-2025 [employer].   Care instructions adapted under license by [employer]. If you have questions about a medical condition or this instruction, always ask your healthcare professional. [employer] disclaims any warranty or liability for your use of this information.         A Healthy Heart: Care Instructions  Overview     Coronary artery disease, also called heart disease, occurs when a substance called plaque builds up in the vessels that supply oxygen-rich blood to your heart muscle. This can narrow the blood vessels and reduce blood flow. A heart attack happens when blood flow is completely blocked. A high-fat diet, smoking, and other factors increase the risk of heart disease.  Your doctor has found that you have a chance of having heart disease. A heart-healthy lifestyle can help keep your heart healthy and prevent heart disease. This lifestyle includes eating healthy, being active, staying at a weight that's healthy for you, and not smoking or using tobacco. It also includes taking medicines as directed, managing other health conditions, and trying to get a healthy amount of sleep.  Follow-up care is a key part of your treatment and safety. Be sure to make and go to all appointments, and call your doctor if you are

## 2025-05-06 ENCOUNTER — RESULTS FOLLOW-UP (OUTPATIENT)
Dept: FAMILY MEDICINE CLINIC | Age: 80
End: 2025-05-06

## 2025-05-07 PROBLEM — I51.7 BIATRIAL ENLARGEMENT: Status: ACTIVE | Noted: 2025-05-07

## 2025-06-03 ENCOUNTER — ANTI-COAG VISIT (OUTPATIENT)
Age: 80
End: 2025-06-03
Payer: MEDICARE

## 2025-06-03 ENCOUNTER — TELEPHONE (OUTPATIENT)
Dept: PRIMARY CARE CLINIC | Age: 80
End: 2025-06-03

## 2025-06-03 VITALS — DIASTOLIC BLOOD PRESSURE: 58 MMHG | HEART RATE: 63 BPM | SYSTOLIC BLOOD PRESSURE: 92 MMHG

## 2025-06-03 DIAGNOSIS — I47.29 NSVT (NONSUSTAINED VENTRICULAR TACHYCARDIA) (HCC): ICD-10-CM

## 2025-06-03 DIAGNOSIS — I48.91 ATRIAL FIBRILLATION, UNSPECIFIED TYPE (HCC): Primary | ICD-10-CM

## 2025-06-03 DIAGNOSIS — I48.20 CHRONIC ATRIAL FIBRILLATION (HCC): ICD-10-CM

## 2025-06-03 LAB
INTERNATIONAL NORMALIZATION RATIO, POC: 2.5
PROTHROMBIN TIME, POC: 0

## 2025-06-03 PROCEDURE — 85610 PROTHROMBIN TIME: CPT | Performed by: FAMILY MEDICINE

## 2025-06-03 PROCEDURE — 99211 OFF/OP EST MAY X REQ PHY/QHP: CPT | Performed by: FAMILY MEDICINE

## 2025-06-03 RX ORDER — ATENOLOL 50 MG/1
50 TABLET ORAL DAILY
Qty: 135 TABLET | Refills: 3 | Status: SHIPPED
Start: 2025-06-03

## 2025-06-03 NOTE — TELEPHONE ENCOUNTER
Inform pt I received notice from coumadin clinic of her LOW bp today     With being on GLP-1 medication ozempic we must keep timely checks of all stats including    Bp  HR  Weight . (Pt refused weight today at coumadin clinic)     There is a potentially dangerous medical events that can happen with GLP-1 medication if weight is not monitored closely , due to every 20-30 pound weight loss the medical provider usually has to reduce bp/water pills due to less peripheral resistance.     If pt's are not keeping these checks you pose a risk for events that are being recorded with GLP-1 which is a critical LOW bp which does not perfuse the eyes and brain enough due to low volume and pt communicating with their provider with updates to REDUCE/DE-Escalate medications in this weight loss process.     Her bp is at a critical level so we must choose to either stop the GLP-1 or pt can update weight at hospital with bp and hr .     With updating this I can adjust medication appropriately     For now   Decrease her atenolol to 1 pill daily , (NO longer 1/2 pill in evening)     Decrease her lasix 40 mg daily to 1/2 pill daily     Decrease her spironolactone to 1 pill daily (was on 2 pills prior)     Come for weight is she has trouble standing then she can do weight in the ER BED at the hospital .     Please schedule for her to come for this needed vital signs and recheck bp as soon as possible.     Cancel any refill of GLP-1 I did from pharmacy until pt compliant   Pt also should get new eye exam with this LOW bp due to risk for ischemia to eyes.     Emily

## 2025-06-03 NOTE — PROGRESS NOTES
[facility]  [facility]  ANTICOAGULATION    Referring Provider: Dr Herndon    GOAL INR: 2.0-3.0    TODAY'S INR: 2.5    WARFARIN Dosage: continue 5mg daily    INR (no units)   Date Value   2024 2.0   2024 1.9   05/15/2024 2.0   2024 2.1   2024 1.8   2024 2.0   01/10/2024 2.7     INR,(POC) (no units)   Date Value   2025 2.5   2025 2.2   2025 3.6   2025 3.1   2024 2.6   2024 2.1   10/15/2024 4.3       Hemoglobin   Date Value Ref Range Status   10/14/2024 14.8 12.0 - 16.0 g/dL Final     Hematocrit   Date Value Ref Range Status   10/14/2024 44.3 36.0 - 46.0 % Final     ALT   Date Value Ref Range Status   10/14/2024 37 (H) 5 - 33 U/L Final     AST   Date Value Ref Range Status   10/14/2024 23 <32 U/L Final       Medication changes:  Metformin changed to XR-currently taking 1 tablet daily    Notes:    Fingerstick INR drawn per clinic protocol. Patient states no visible blood in urine and no black tarry stool. Denies any missed doses of warfarin. No change in other maintenance medications or in diet. Will recheck INR in 6 weeks. Patient states Emily Yao changed her metformin to XR due to loose bowels. Patient continues to have loose stools so Is currently taking metformin XR 500mg daily.  Patient acknowledges working in consult agreement with pharmacist as referred by his/her physician.                  For Pharmacy Admin Tracking Only    Intervention Detail: Adherence Monitorin  Total # of Interventions Recommended: 2  Total # of Interventions Accepted: 2  Time Spent (min): 20      Veronica Enrique R.Ph., 6/3/2025,3:03 PM    Emily Yao contacted clinic after seeing chart note and will decrease atenolol to 1 tablet daily

## 2025-06-10 NOTE — TELEPHONE ENCOUNTER
Spoke with Jo about her low BP and changes in her medication. She voiced understanding and will make the changes. She is scheduled to go into [place] office on Friday 6/13/2025 for weight and vitals.

## 2025-06-13 ENCOUNTER — CLINICAL SUPPORT (OUTPATIENT)
Dept: FAMILY MEDICINE CLINIC | Age: 80
End: 2025-06-13

## 2025-06-13 VITALS
OXYGEN SATURATION: 94 % | HEART RATE: 73 BPM | BODY MASS INDEX: 40.55 KG/M2 | DIASTOLIC BLOOD PRESSURE: 58 MMHG | SYSTOLIC BLOOD PRESSURE: 100 MMHG | WEIGHT: 221.7 LBS

## 2025-06-13 DIAGNOSIS — I50.22 CHRONIC SYSTOLIC (CONGESTIVE) HEART FAILURE (HCC): Primary | ICD-10-CM

## 2025-06-13 NOTE — PROGRESS NOTES
Pt stated she wants to stop taking Abilify due to it is not helping her itching.   Pt also wants to stop taking Cymbalta pt stated that it is not helping her at all. Please advise

## 2025-07-07 ENCOUNTER — TELEPHONE (OUTPATIENT)
Dept: FAMILY MEDICINE CLINIC | Age: 80
End: 2025-07-07

## 2025-07-08 NOTE — TELEPHONE ENCOUNTER
Refill sent   
Emily Robbins, APRN - CNP [facility] DEP            Patient Active Problem List:     Kidney stones     Localized osteoarthritis of left knee     Atrial fibrillation ([facility])     Pulmonary HTN ([facility])     Knee pain, chronic     Iron deficiency anemia due to chronic blood loss     Mitral valve insufficiency     Backache     CHF (congestive heart failure) ([facility])     CKD (chronic kidney disease)     Echocardiogram abnormal     Gout     HL (hearing loss)     Hyperlipidemia     Morbid obesity with BMI of 45.0-49.9, adult ([facility])     DJD (degenerative joint disease)     Chronic renal disease, stage III ([facility]) [072110]     Chronic systolic (congestive) heart failure     Lumbar radiculopathy     Foraminal stenosis of lumbar region     Chronic bilateral low back pain with bilateral sciatica     SSS (sick sinus syndrome) ([facility])     Hyperglycemia     Type 2 diabetes mellitus     Biatrial enlargement

## 2025-07-15 ENCOUNTER — TELEPHONE (OUTPATIENT)
Dept: PRIMARY CARE CLINIC | Age: 80
End: 2025-07-15

## 2025-07-15 ENCOUNTER — ANTI-COAG VISIT (OUTPATIENT)
Age: 80
End: 2025-07-15
Payer: MEDICARE

## 2025-07-15 VITALS
HEIGHT: 62 IN | HEART RATE: 60 BPM | SYSTOLIC BLOOD PRESSURE: 115 MMHG | WEIGHT: 225 LBS | BODY MASS INDEX: 41.41 KG/M2 | DIASTOLIC BLOOD PRESSURE: 60 MMHG

## 2025-07-15 DIAGNOSIS — I48.91 ATRIAL FIBRILLATION, UNSPECIFIED TYPE (HCC): Primary | ICD-10-CM

## 2025-07-15 LAB
INTERNATIONAL NORMALIZATION RATIO, POC: 1.9
PROTHROMBIN TIME, POC: 0

## 2025-07-15 PROCEDURE — 85610 PROTHROMBIN TIME: CPT | Performed by: FAMILY MEDICINE

## 2025-07-15 PROCEDURE — 99211 OFF/OP EST MAY X REQ PHY/QHP: CPT | Performed by: FAMILY MEDICINE

## 2025-07-15 RX ORDER — FUROSEMIDE 40 MG/1
20 TABLET ORAL DAILY
Qty: 90 TABLET | Refills: 3 | Status: SHIPPED
Start: 2025-07-15

## 2025-07-15 NOTE — PROGRESS NOTES
[facility]  [facility]ication [facility]  ANTICOAGULATION    Referring Provider: Dr Herndon    GOAL INR: 2.0-3.0    TODAY'S INR: 1.9    WARFARIN Dosage: 10mg x1 then resume 5mg daily    INR (no units)   Date Value   2024 2.0   2024 1.9   05/15/2024 2.0   2024 2.1   2024 1.8   2024 2.0   01/10/2024 2.7     INR,(POC) (no units)   Date Value   07/15/2025 1.9   2025 2.5   2025 2.2   2025 3.6   2025 3.1   2024 2.6   2024 2.1       Hemoglobin   Date Value Ref Range Status   10/14/2024 14.8 12.0 - 16.0 g/dL Final     Hematocrit   Date Value Ref Range Status   10/14/2024 44.3 36.0 - 46.0 % Final     ALT   Date Value Ref Range Status   10/14/2024 37 (H) 5 - 33 U/L Final     AST   Date Value Ref Range Status   10/14/2024 23 <32 U/L Final       Medication changes:  As of 6/3/25 note - Emily Yao:  Decrease atenolol to 1 tablet daily  Decrease furosemide to 20mg daily (1/2 tablet)  Decrease spironolactone to 1 tablet daily    Notes:    Fingerstick INR drawn per clinic protocol. Patient states no visible blood in urine and no black tarry stool. Denies any missed doses of warfarin. No change in other maintenance medications or in diet. Will recheck INR in 6 weeks. Jo is unable to tell me the medication changes made but agrees with all information I \"tell\" her.  Patient is very unstable on feet when rises to have weight checked.  Patient will take warfarin 10mg today then resume previously stable dosing.  Patient acknowledges working in consult agreement with pharmacist as referred by his/her physician.                  For Pharmacy Admin Tracking Only    Intervention Detail: Adherence Monitorin and Dose Adjustment: 1, reason: Therapy Optimization  Total # of Interventions Recommended: 3  Total # of Interventions Accepted: 3  Time Spent (min): 20      JONO Haro.Ph., 7/15/2025,2:53 PM

## 2025-07-15 NOTE — PATIENT INSTRUCTIONS
Take warfarin 10mg (1 tablet) today then resume warfarin 5mg (1/2 tablet) every day.  Continue to monitor urine and stool for signs and symptoms of bleeding.   Please notify the clinic of any medication changes.   Please remember to bring all medications (both prescription and OTC) to your next visit.   Kindly notify the clinic if you are unable to make to your next appointment.   Follow warfarin dosing instructions on dosing calendar provided.

## 2025-07-16 NOTE — TELEPHONE ENCOUNTER
Inform pt after review of bp readings from coumadin clinic I would like pt to decrease lasix 40 mg tab to 1/2 pill which will be 20 mg daily please.     The spironolactone is already at 1 pill daily . Please try to take the water pills at different times. One in am and the other at noon if possible to not overstress the kidneys.     Bp goal systolic top number at 115/    Thanks   Emily

## 2025-08-05 RX ORDER — NYSTATIN 100000 U/G
CREAM TOPICAL
Qty: 15 G | Refills: 0 | Status: SHIPPED | OUTPATIENT
Start: 2025-08-05

## 2025-08-18 ENCOUNTER — HOSPITAL ENCOUNTER (OUTPATIENT)
Dept: LAB | Age: 80
Discharge: HOME | End: 2025-08-18
Payer: MEDICARE

## 2025-08-18 ENCOUNTER — OFFICE VISIT (OUTPATIENT)
Dept: FAMILY MEDICINE CLINIC | Age: 80
End: 2025-08-18

## 2025-08-18 VITALS — HEART RATE: 64 BPM | SYSTOLIC BLOOD PRESSURE: 112 MMHG | OXYGEN SATURATION: 95 % | DIASTOLIC BLOOD PRESSURE: 78 MMHG

## 2025-08-18 DIAGNOSIS — E11.49 DM (DIABETES MELLITUS), TYPE 2 WITH NEUROLOGICAL COMPLICATIONS (HCC): Primary | ICD-10-CM

## 2025-08-18 DIAGNOSIS — I48.20 CHRONIC ATRIAL FIBRILLATION (HCC): ICD-10-CM

## 2025-08-18 DIAGNOSIS — M10.00 IDIOPATHIC GOUT, UNSPECIFIED CHRONICITY, UNSPECIFIED SITE: ICD-10-CM

## 2025-08-18 DIAGNOSIS — S31.819A BUTTOCK WOUND, RIGHT, INITIAL ENCOUNTER: ICD-10-CM

## 2025-08-18 DIAGNOSIS — L29.9 GENERALIZED PRURITUS: ICD-10-CM

## 2025-08-18 DIAGNOSIS — R30.0 DYSURIA: ICD-10-CM

## 2025-08-18 DIAGNOSIS — E11.65 HYPERGLYCEMIA DUE TO DIABETES MELLITUS (HCC): ICD-10-CM

## 2025-08-18 DIAGNOSIS — E11.49 DM (DIABETES MELLITUS), TYPE 2 WITH NEUROLOGICAL COMPLICATIONS (HCC): ICD-10-CM

## 2025-08-18 DIAGNOSIS — I47.29 NSVT (NONSUSTAINED VENTRICULAR TACHYCARDIA) (HCC): ICD-10-CM

## 2025-08-18 LAB
ALBUMIN SERPL-MCNC: 4.2 G/DL (ref 3.5–5.2)
ALBUMIN/GLOB SERPL: 1.4 {RATIO} (ref 1–2.5)
ALP SERPL-CCNC: 83 U/L (ref 35–104)
ALT SERPL-CCNC: 57 U/L (ref 5–33)
ANION GAP SERPL CALCULATED.3IONS-SCNC: 11 MMOL/L (ref 9–17)
AST SERPL-CCNC: 53 U/L
BASOPHILS # BLD: 0.07 K/UL (ref 0–0.2)
BASOPHILS NFR BLD: 1 % (ref 0–2)
BILIRUB SERPL-MCNC: 0.4 MG/DL (ref 0.3–1.2)
BUN SERPL-MCNC: 23 MG/DL (ref 8–23)
CALCIUM SERPL-MCNC: 9 MG/DL (ref 8.6–10.4)
CHLORIDE SERPL-SCNC: 101 MMOL/L (ref 98–107)
CHP ED QC CHECK: NORMAL
CO2 SERPL-SCNC: 25 MMOL/L (ref 20–31)
CREAT SERPL-MCNC: 0.8 MG/DL (ref 0.5–0.9)
EOSINOPHIL # BLD: 0.21 K/UL (ref 0–0.4)
EOSINOPHILS RELATIVE PERCENT: 2 % (ref 0–5)
ERYTHROCYTE [DISTWIDTH] IN BLOOD BY AUTOMATED COUNT: 14.5 % (ref 12.1–15.2)
GFR, ESTIMATED: 74 ML/MIN/1.73M2
GLUCOSE BLD-MCNC: 208 MG/DL
GLUCOSE SERPL-MCNC: 187 MG/DL (ref 70–99)
HBA1C MFR BLD: 8.2 %
HCT VFR BLD AUTO: 42.6 % (ref 36–46)
HGB BLD-MCNC: 13.9 G/DL (ref 12–16)
IMM GRANULOCYTES # BLD AUTO: 0.1 K/UL (ref 0–0.3)
IMM GRANULOCYTES NFR BLD: 1 % (ref 0–5)
INR PPP: 2.1
LYMPHOCYTES NFR BLD: 1.75 K/UL (ref 1–4.8)
LYMPHOCYTES RELATIVE PERCENT: 16 % (ref 15–40)
MCH RBC QN AUTO: 29.3 PG (ref 26–34)
MCHC RBC AUTO-ENTMCNC: 32.6 G/DL (ref 31–37)
MCV RBC AUTO: 89.7 FL (ref 80–100)
MONOCYTES NFR BLD: 0.9 K/UL (ref 0–1)
MONOCYTES NFR BLD: 8 % (ref 4–8)
NEUTROPHILS NFR BLD: 72 % (ref 47–75)
NEUTS SEG NFR BLD: 7.76 K/UL (ref 2.5–7)
PLATELET # BLD AUTO: 223 K/UL (ref 140–450)
PMV BLD AUTO: 10 FL (ref 6–12)
POTASSIUM SERPL-SCNC: 4.4 MMOL/L (ref 3.7–5.3)
PROT SERPL-MCNC: 7.2 G/DL (ref 6.4–8.3)
PROTHROMBIN TIME: 24.4 SEC (ref 11.5–14.2)
RBC # BLD AUTO: 4.75 M/UL (ref 4–5.2)
SODIUM SERPL-SCNC: 137 MMOL/L (ref 135–144)
URATE SERPL-MCNC: 5.6 MG/DL (ref 2.4–5.7)
WBC OTHER # BLD: 10.8 K/UL (ref 3.5–11)

## 2025-08-18 PROCEDURE — 84550 ASSAY OF BLOOD/URIC ACID: CPT

## 2025-08-18 PROCEDURE — 85610 PROTHROMBIN TIME: CPT

## 2025-08-18 PROCEDURE — 85025 COMPLETE CBC W/AUTO DIFF WBC: CPT

## 2025-08-18 PROCEDURE — 36415 COLL VENOUS BLD VENIPUNCTURE: CPT

## 2025-08-18 PROCEDURE — 87086 URINE CULTURE/COLONY COUNT: CPT

## 2025-08-18 PROCEDURE — 80053 COMPREHEN METABOLIC PANEL: CPT

## 2025-08-18 RX ORDER — ATENOLOL 50 MG/1
25 TABLET ORAL DAILY
Qty: 90 TABLET | Refills: 1 | Status: SHIPPED
Start: 2025-08-18

## 2025-08-18 RX ORDER — ARIPIPRAZOLE 5 MG/1
2.5 TABLET ORAL DAILY
Qty: 90 TABLET | Refills: 1
Start: 2025-08-18 | End: 2025-08-19

## 2025-08-18 RX ORDER — SPIRONOLACTONE 25 MG/1
TABLET ORAL
Qty: 90 TABLET | Refills: 1 | Status: SHIPPED | OUTPATIENT
Start: 2025-08-18

## 2025-08-19 ENCOUNTER — TELEPHONE (OUTPATIENT)
Age: 80
End: 2025-08-19

## 2025-08-19 ENCOUNTER — RESULTS FOLLOW-UP (OUTPATIENT)
Dept: PRIMARY CARE CLINIC | Age: 80
End: 2025-08-19

## 2025-08-19 LAB
MICROORGANISM SPEC CULT: NORMAL
SERVICE CMNT-IMP: NORMAL
SPECIMEN DESCRIPTION: NORMAL

## 2025-08-19 RX ORDER — FLUCONAZOLE 150 MG/1
150 TABLET ORAL WEEKLY
Qty: 4 TABLET | Refills: 0 | Status: SHIPPED | OUTPATIENT
Start: 2025-08-19 | End: 2025-09-10

## 2025-08-19 RX ORDER — FUROSEMIDE 40 MG/1
20 TABLET ORAL DAILY
Qty: 90 TABLET | Refills: 3 | Status: SHIPPED
Start: 2025-08-19

## 2025-08-19 ASSESSMENT — ENCOUNTER SYMPTOMS
COUGH: 0
EYES NEGATIVE: 1
WHEEZING: 0
SHORTNESS OF BREATH: 0
CHEST TIGHTNESS: 0
SORE THROAT: 0

## 2025-08-26 ENCOUNTER — APPOINTMENT (OUTPATIENT)
Age: 80
End: 2025-08-26
Payer: MEDICARE

## 2025-08-28 ENCOUNTER — ANTI-COAG VISIT (OUTPATIENT)
Age: 80
End: 2025-08-28
Payer: MEDICARE

## 2025-08-28 VITALS
WEIGHT: 225.6 LBS | DIASTOLIC BLOOD PRESSURE: 56 MMHG | SYSTOLIC BLOOD PRESSURE: 128 MMHG | BODY MASS INDEX: 41.25 KG/M2 | HEART RATE: 60 BPM

## 2025-08-28 DIAGNOSIS — I48.20 CHRONIC ATRIAL FIBRILLATION (HCC): Primary | ICD-10-CM

## 2025-08-28 LAB
INTERNATIONAL NORMALIZATION RATIO, POC: 2.4
PROTHROMBIN TIME, POC: NORMAL

## 2025-08-28 PROCEDURE — 85610 PROTHROMBIN TIME: CPT

## 2025-08-28 PROCEDURE — 99211 OFF/OP EST MAY X REQ PHY/QHP: CPT

## (undated) DEVICE — TOWEL,OR,DSP,ST,BLUE,STD,4/PK,20PK/CS: Brand: MEDLINE

## (undated) DEVICE — SYRINGE MED 3ML CLR PLAS STD N CTRL LUERLOCK TIP DISP

## (undated) DEVICE — Device

## (undated) DEVICE — PADDING CAST W6INXL4YD POLY POR SPUN DACRON SYN VERSATILE

## (undated) DEVICE — CUFF CRYO 15-23IN CIRC M KNEE COOL PD TB GRAV FLO W/O BD

## (undated) DEVICE — 4-PORT MANIFOLD: Brand: NEPTUNE 2

## (undated) DEVICE — RECIPROCATING BLADE, DOUBLE SIDED, OFFSET  (70.0 X 0.64 X 12.6MM)

## (undated) DEVICE — NERVE BLOCK TRAY: Brand: MEDLINE INDUSTRIES, INC.

## (undated) DEVICE — NEEDLE HYPO 18GA L1.5IN PNK POLYPR HUB S STL REG BVL STR

## (undated) DEVICE — GLOVE SURG SZ 75 L12IN FNGR THK79MIL GRN LTX FREE

## (undated) DEVICE — BANDAGE ADH W1XL3IN NAT FAB WVN FLX DURABLE N ADH PD SEAL

## (undated) DEVICE — SINGLE BASIN PLUS 3-LF: Brand: MEDLINE INDUSTRIES, INC.

## (undated) DEVICE — SUTURE VCRL SZ 2-0 L27IN ABSRB UD L26MM CT-2 1/2 CIR J269H

## (undated) DEVICE — GLOVE SURG SZ 85 L12IN FNGR THK13MIL WHT ISOLEX POLYISOPRENE

## (undated) DEVICE — ZIMMER® STERILE DISPOSABLE TOURNIQUET CUFF WITH PLC, SINGLE PORT, SINGLE BLADDER, 34 IN. (86 CM)

## (undated) DEVICE — GLOVE ORANGE PI 7   MSG9070

## (undated) DEVICE — NEEDLE SYR 18GA L1.5IN RED PLAS HUB S STL BLNT FILL W/O

## (undated) DEVICE — CEMENT MIXING SYSTEM WITH FEMORAL BREAKWAY NOZZLE: Brand: REVOLUTION

## (undated) DEVICE — NEEDLE HYPO 25GA L1.5IN BLU POLYPR HUB S STL REG BVL STR

## (undated) DEVICE — SYRINGE MED 5ML POLYPR GEN PURP FLAT TOP LUERLOCK TIP

## (undated) DEVICE — CANNULA NSL AD TUBE L7FT END TDL CO2 SAMP STD CONN DISP

## (undated) DEVICE — BANDAGE COMPR W6INXL12FT SMOOTH FOR LIMB EXSANG ESMARCH

## (undated) DEVICE — FLUFF UNDERPAD,MODERATE: Brand: WINGS

## (undated) DEVICE — SYRINGE 20ML LL S/C 50

## (undated) DEVICE — SUTURE COAT VCRL SZ 4-0 L18IN ABSRB UD L19MM PS-2 1/2 CIR J496G

## (undated) DEVICE — DECANTER FLD 9IN ST BG FOR ASEP TRNSF OF FLD

## (undated) DEVICE — SYRINGE MED 10ML LUERLOCK TIP W/O SFTY DISP

## (undated) DEVICE — 3M™ STERI-STRIP™ REINFORCED ADHESIVE SKIN CLOSURES, R1547, 1/2 IN X 4 IN (12 MM X 100 MM), 6 STRIPS/ENVELOPE: Brand: 3M™ STERI-STRIP™

## (undated) DEVICE — 3M™ STERI-STRIP™ COMPOUND BENZOIN TINCTURE 40 BAGS/CARTON 4 CARTONS/CASE C1544: Brand: 3M™ STERI-STRIP™

## (undated) DEVICE — NEEDLE SPNL 22GA L35IN PNCL PNT ATRAUM TIP PENCAN

## (undated) DEVICE — STANDARD HYPODERMIC NEEDLE,POLYPROPYLENE HUB: Brand: MONOJECT

## (undated) DEVICE — INTENDED FOR TISSUE SEPARATION, AND OTHER PROCEDURES THAT REQUIRE A SHARP SURGICAL BLADE TO PUNCTURE OR CUT.: Brand: BARD-PARKER ® CARBON RIB-BACK BLADES

## (undated) DEVICE — GOWN,SIRUS,POLYRNF,BRTHSLV,2XL,18/CS: Brand: MEDLINE

## (undated) DEVICE — TOWEL,OR,DSP,ST,NATURAL,DLX,4/PK,20PK/CS: Brand: MEDLINE

## (undated) DEVICE — APPLICATOR MEDICATED 10.5 CC SOLUTION HI LT ORNG CHLORAPREP

## (undated) DEVICE — SOLUTION IV 500ML 0.9% SOD CHL PH 5 INJ USP VIAFLX PLAS

## (undated) DEVICE — GLOVE SURG SZ 7 CRM LTX FREE POLYISOPRENE POLYMER BEAD ANTI

## (undated) DEVICE — SLING ORTH L ARM FASHION

## (undated) DEVICE — SOLUTION IV 1000ML 0.9% SOD CHL PH 5 INJ USP VIAFLX PLAS

## (undated) DEVICE — GLOVE SURG SZ 85 L12IN FNGR THK87MIL WHT LTX FREE

## (undated) DEVICE — CONMED ACCESSORY ELECTRODE, STANDARD FLAT BLADE: Brand: CONMED

## (undated) DEVICE — Z DISCONTINUED APPLICATOR SURG PREP 0.35OZ 2% CHG 70% ISO ALC W/ HI LT

## (undated) DEVICE — CUSTOM PACK: Brand: UNBRANDED

## (undated) DEVICE — ERASECAUTI INTERMIT TRAY: Brand: MEDLINE INDUSTRIES, INC.

## (undated) DEVICE — MEDIA CONTRAST INJ OMNIPAQUE 240 10 ML

## (undated) DEVICE — SUTURE VCRL SZ 4-0 L27IN ABSRB UD L19MM FS-2 3/8 CIR REV J422H

## (undated) DEVICE — NEEDLE SPNL 22GA L3.5IN BLK HUB S STL REG WALL FIT STYL W/

## (undated) DEVICE — 2T11 #2 PDO 36 X 36: Brand: 2T11 #2 PDO 36 X 36

## (undated) DEVICE — GLOVE SURG SZ 65 THK91MIL LTX FREE SYN POLYISOPRENE

## (undated) DEVICE — TOWEL OR BLUEE 16X26IN ST 8 PACK ORB08 16X26ORTWL

## (undated) DEVICE — DRESSING TRNSPAR W5XL4.5IN FLM SHT SEMIPERMEABLE WIND

## (undated) DEVICE — INTRODUCER SHTH 7FR L13CM NDL 18GA GWIRE 0.035IN SYR VLV

## (undated) DEVICE — NEEDLE HYPO 18GA L1IN PNK POLYPR HUB S STL REG BVL STR W/O

## (undated) DEVICE — 3M™ IOBAN™ 2 ANTIMICROBIAL INCISE DRAPE 6651EZ: Brand: IOBAN™ 2

## (undated) DEVICE — DRAPE,U/ SHT,SPLIT,PLAS,STERIL: Brand: MEDLINE

## (undated) DEVICE — SUTURE 2-0 L24CM ABSRB VIO L26MM 3/8 CIR DMND PT NDL RA1028Q

## (undated) DEVICE — TIP SUCT FLNG TIP ON OFF CTRL YANK

## (undated) DEVICE — T4 HOOD

## (undated) DEVICE — GOWN,AURORA,NON-REINFORCED,2XL: Brand: MEDLINE

## (undated) DEVICE — GLOVE SURG SZ 7 L12IN FNGR THK79MIL GRN LTX FREE

## (undated) DEVICE — HANDPIECE SET WITH HIGH FLOW TIP AND SUCTION TUBE: Brand: INTERPULSE

## (undated) DEVICE — SOLUTION PREP PAINT POV IOD FOR SKIN MUCOUS MEM

## (undated) DEVICE — DRESSING TRNSPAR W4XL4.8IN FLM SURESITE 123

## (undated) DEVICE — Z DISCONTINUED BY MEDLINE USE 2711682 TRAY SKIN PREP DRY W/ PREM GLV

## (undated) DEVICE — GLOVE SURG SZ 8 L12IN THK75MIL DK GRN LTX FREE

## (undated) DEVICE — STAPLER SKIN H3.9MM WIRE DIA0.58MM CRWN 6.9MM 35 STPL ROT

## (undated) DEVICE — DRAPE SURG L 60X76IN SMS FAB UNIV ANCIL RESIST FLAME TEARING

## (undated) DEVICE — BRIEF INCONT AD L FOR 45-58IN WAIST UNISX SUP ABSRB POLYMER

## (undated) DEVICE — HOOK LOCK LATEX FREE ELASTIC BANDAGE 15 YD 6IN

## (undated) DEVICE — DBD-PACK,LAPAROTOMY,2 REINFORCED GOWNS: Brand: MEDLINE

## (undated) DEVICE — GLOVE SURG SZ 8 CRM LTX FREE POLYISOPRENE POLYMER BEAD ANTI

## (undated) DEVICE — ELECTRODE TRAIN EDGE SYS W/ QUIK-COMBO CONN

## (undated) DEVICE — DRAPE SHEET, X-LARGE: Brand: CONVERTORS

## (undated) DEVICE — NEEDLE HYPO 22GA L1.5IN BLK POLYPR HUB S STL REG BVL STR

## (undated) DEVICE — NEEDLE,BLUNT,FILTER, 18GX1.5": Brand: MEDLINE

## (undated) DEVICE — GLOVE SURG SZ 85 CRM LTX FREE POLYISOPRENE POLYMER BEAD ANTI

## (undated) DEVICE — Z DISCONTINUED PER MEDLINE USE 2741943 DRESSING AQUACEL 10 IN ALG W9XL25CM SIL CVR WTRPRF VIR BACT BARR ANTIMIC

## (undated) DEVICE — TUBING, SUCTION, 1/4" X 10', STRAIGHT: Brand: MEDLINE

## (undated) DEVICE — 3M™ COBAN™ SELF-ADHERENT WRAP, 1586S, STERILE, 6 IN X 5 YD (15 CM X 4,5 M), 12 ROLLS/CASE: Brand: 3M™ COBAN™

## (undated) DEVICE — SYRINGE MED 30ML STD CLR PLAS LUERLOCK TIP N CTRL DISP

## (undated) DEVICE — DEVICE VASC CLSR 24CM FEM OR RAD ART EXT MECH PRSS DSG POST

## (undated) DEVICE — YANKAUER OPEN TIP WITH ON/OFF SWITCH: Brand: ARGYLE

## (undated) DEVICE — SYRINGE MARK SCALE W/ LL TIP 3ML 200 S/C

## (undated) DEVICE — CURVED SHARP RF CANNULA, RADIOPAQUE MARKER: Brand: RADIOPAQUE RADIOFREQUENCY CANNULA

## (undated) DEVICE — PREMIUM DRY TRAY LF: Brand: MEDLINE INDUSTRIES, INC.

## (undated) DEVICE — STRIP,CLOSURE,WOUND,MEDI-STRIP,1/2X4: Brand: MEDLINE

## (undated) DEVICE — GAUZE,SPONGE,4"X4",16PLY,XRAY,STRL,LF: Brand: MEDLINE

## (undated) DEVICE — SYRINGE, LUER LOCK, 10ML: Brand: MEDLINE

## (undated) DEVICE — PROVE COVER: Brand: UNBRANDED

## (undated) DEVICE — Z INACTIVE USE 2660664 SOLUTION IRRIG 3000ML 0.9% SOD CHL USP UROMATIC PLAS CONT

## (undated) DEVICE — STRYKER PERFORMANCE SERIES SAGITTAL BLADE: Brand: STRYKER PERFORMANCE SERIES

## (undated) DEVICE — SOLUTION SCRB 4OZ 10% POVIDONE IOD ANTIMIC BTL

## (undated) DEVICE — 3M™ STERI-DRAPE™ U-DRAPE, LONG 1019: Brand: STERI-DRAPE™

## (undated) DEVICE — HYPODERMIC SAFETY NEEDLE: Brand: MAGELLAN

## (undated) DEVICE — SUTURE PERMA-HAND SZ 1 L30IN NONABSORBABLE BLK L36MM MH 1/2 K845H